# Patient Record
Sex: MALE | Race: WHITE | NOT HISPANIC OR LATINO | Employment: FULL TIME | URBAN - METROPOLITAN AREA
[De-identification: names, ages, dates, MRNs, and addresses within clinical notes are randomized per-mention and may not be internally consistent; named-entity substitution may affect disease eponyms.]

---

## 2020-06-15 ENCOUNTER — TRANSFERRED RECORDS (OUTPATIENT)
Dept: HEALTH INFORMATION MANAGEMENT | Facility: CLINIC | Age: 30
End: 2020-06-15

## 2021-01-08 NOTE — TELEPHONE ENCOUNTER
REFERRAL INFORMATION:    Referring Provider:  Self Referral     Referring Clinic:  N/A    Reason for Visit/Diagnosis: Crohn's Disease- 2nd opinion      FUTURE VISIT INFORMATION:    Appointment Date: 2/22/2021    Appointment Time: 1 PM      NOTES STATUS DETAILS   OFFICE NOTE from Referring Provider N/A    OFFICE NOTE from Other Specialist Care Everywhere 2/4/2021, 7/6/2020 Office visit with Dr. Chino Bishop ([facility])    HOSPITAL DISCHARGE SUMMARY/  ED VISITS N/A    OPERATIVE REPORT Care Everywhere Flexible Sigmoidoscopy: 9/24/2020 ([facility])    MEDICATION LIST Care Everywhere         ENDOSCOPY  N/A    COLONOSCOPY Care Everywhere 6/15/2020 ([facility])    ERCP N/A    EUS N/A    STOOL TESTING Care Everywhere 6/3/2020   PERTINENT LABS Care Everywhere    PATHOLOGY REPORTS (RELATED) Care Everywhere 9/24/2020, 6/15/2020   IMAGING (CT, MRI, EGD, MRCP, Small Bowel Follow Through/SBT, MR/CT Enterography) N/A

## 2021-01-09 ENCOUNTER — HEALTH MAINTENANCE LETTER (OUTPATIENT)
Age: 31
End: 2021-01-09

## 2021-02-22 ENCOUNTER — VIRTUAL VISIT (OUTPATIENT)
Dept: GASTROENTEROLOGY | Facility: CLINIC | Age: 31
End: 2021-02-22
Payer: COMMERCIAL

## 2021-02-22 ENCOUNTER — PRE VISIT (OUTPATIENT)
Dept: GASTROENTEROLOGY | Facility: CLINIC | Age: 31
End: 2021-02-22

## 2021-02-22 VITALS — BODY MASS INDEX: 28.14 KG/M2 | WEIGHT: 190 LBS | HEIGHT: 69 IN

## 2021-02-22 DIAGNOSIS — K52.9 IBD (INFLAMMATORY BOWEL DISEASE): ICD-10-CM

## 2021-02-22 DIAGNOSIS — K50.111 CROHN'S DISEASE OF LARGE INTESTINE WITH RECTAL BLEEDING (H): Primary | ICD-10-CM

## 2021-02-22 PROCEDURE — 99205 OFFICE O/P NEW HI 60 MIN: CPT | Mod: 95 | Performed by: INTERNAL MEDICINE

## 2021-02-22 RX ORDER — DICYCLOMINE HYDROCHLORIDE 10 MG/1
10 CAPSULE ORAL 2 TIMES DAILY
Qty: 90 CAPSULE | Refills: 1 | Status: SHIPPED | OUTPATIENT
Start: 2021-02-22 | End: 2021-08-05

## 2021-02-22 RX ORDER — BUDESONIDE 3 MG/1
9 CAPSULE, COATED PELLETS ORAL
COMMUNITY
Start: 2021-02-04 | End: 2021-04-20

## 2021-02-22 RX ORDER — LISINOPRIL 20 MG/1
20 TABLET ORAL EVERY MORNING
COMMUNITY
Start: 2021-01-18

## 2021-02-22 RX ORDER — MESALAMINE 1.2 G/1
4800 TABLET, DELAYED RELEASE ORAL EVERY MORNING
Qty: 180 TABLET | Refills: 3 | Status: SHIPPED | OUTPATIENT
Start: 2021-02-22 | End: 2021-08-17

## 2021-02-22 RX ORDER — VEDOLIZUMAB 300 MG/5ML
INJECTION, POWDER, LYOPHILIZED, FOR SOLUTION INTRAVENOUS
COMMUNITY
Start: 2020-07-14 | End: 2021-02-22

## 2021-02-22 ASSESSMENT — PAIN SCALES - GENERAL: PAINLEVEL: MILD PAIN (2)

## 2021-02-22 ASSESSMENT — MIFFLIN-ST. JEOR: SCORE: 1822.21

## 2021-02-22 NOTE — PATIENT INSTRUCTIONS
At this point, I am not 100% sure if you have crohn's or ulcerative colitis.  There are some features of ulcerative colitis on your colonoscopy and pathology such as involvement of the rectum, and potentially some features of Crohn's such as the patchy disease on the biopsies.  I would like to obtain your biopsies from [facility] and have been reviewed by our pathologist to help determine if you fit Crohn's or ulcerative colitis picture better.    Regardless of if we diagnose you with Crohn's disease or ulcerative colitis, the inflammation appears to be limited to the colon.  Because of this I think you would do best with a medication called mesalamine.  There are about 5 or 6 different brands of mesalamine, my preference would be Lialda 4 pills once a day.  These are much cheaper than the Biologics, and they are not suppressing your immune system.  Overall they are extremely safe.    --Hold off on starting the Humira as I am not sure that you need it.   --Start Lialda 4 pills once a day  --My office will obtain the pathology slides for review from [facility]  --Start Bentyl, and antispasmodic to help with urgency  --We will see how you respond to Lialda over the next 8 weeks.  --Once you start the Lialda, start to decrease the budesonide as follows: 2 pills for 2 weeks, then 1 pill for 2 weeks, then stop.     Follow-up in IBD clinic with Dr. Bowers in 8 weeks.     If you have any questions, please don't hesitate to contact the Gastroenterology nurse at [phone].     -Dr. Bowers

## 2021-02-22 NOTE — NURSING NOTE
"Chief Complaint   Patient presents with     Consult     IBD consult       Vitals:    02/22/21 1240   Weight: 86.2 kg (190 lb)   Height: 1.753 m (5' 9\")       Body mass index is 28.06 kg/m .    Neville Espinoza, EMT    "

## 2021-02-22 NOTE — LETTER
"    2/22/2021         RE: Cody Pickard  [address] [address] [place] [id number]        Dear Colleague,    Thank you for referring your patient, Cody Pickard, to the [facility] [place]. Please see a copy of my visit note below.    Cody Pickard is a 30 year old male who is being evaluated via a billable video visit.      The patient has been notified of following:     \"This video visit will be conducted via a call between you and your physician/provider. We have found that certain health care needs can be provided without the need for an in-person physical exam.  This service lets us provide the care you need with a video conversation.  If a prescription is necessary we can send it directly to your pharmacy.  If lab work is needed we can place an order for that and you can then stop by our lab to have the test done at a later time.    If during the course of the call the physician/provider feels a video visit is not appropriate, you will not be charged for this service.\"     Patient confirmed that they are in [place] for today's visit     If the video visit is dropped, please send link to:   Text to cell phone: [phone]    Video-Visit Details  Type of service:  Video Visit    Video Start Time: 1:03 PM  Video End Time:  1:32 PM    Originating Location (pt. Location): Home    Distant Location (provider location):  [facility] [place]     Platform used: Jennifer Bowers                IBD CLINIC VISIT    CC/REFERRING MD:  Referred Self  REASON FOR CONSULTATION: Colitis    ASSESSMENT/PLAN:    1. IBD: Extensive colitis  Current medication: Adalimumab (started Feb 2021)  Current clinical disease activity: Mild (on budesonide)  Current endoscopic disease activity: Mild    It is unclear to me at this point if he has Crohn's disease or ulcerative colitis.  He clearly has colonic inflammation in a fairly continuous pattern from the rectum to " the proximal transverse colon.  Ileum was normal on colonoscopy.  Favoring ulcerative colitis would be rectal involvement and continuous inflammation.  Favoring Crohn's disease would be the fact that he is still smoking with active disease and potentially the path report of patchy inflammation.  I plan to obtain pathology slides for Path consult at [facility].    Regardless of diagnosis of Crohn's disease or ulcerative colitis, he clearly has mild colonic disease of the left colon.  He has responded very well to budesonide although still has some residual urgency and only has decreased bowel movements due to decreased p.o. intake.  I recommend we try mesalamine therapy as this can be quite effective for colitis.  We will plan on starting Lialda 4.8 g once a day.  Plan to taper off budesonide over the first month of starting Lialda.    --Start mesalamine 4.8 g daily  --After starting mesalamine, taper budesonide as follows: 2 pills for 2 weeks, 1 pill for 2 weeks then stop  --Hold off on starting Humira at this time  --Discussed will need a flexible sigmoidoscopy in the future, however will not schedule at this time, rather we will reassess his clinical response in 8 weeks  --He is currently up-to-date on blood work, but will plan on repeating blood work after 2 to 3 months on mesalamine therapy.      2. IBD dysplasia surveillance: > 1/3 of colon involved. Will need dysplasia surveillance.   -- IBD dysplasia surveillance starting in 2028    Return to clinic in 2 months    Thank you for this consultation.  It was a pleasure to participate in the care of this patient; please contact us with any further questions.  I spent a total of 62 minutes, face to face, was spent with this patient, >50% of which was counseling regarding the above delineated issues.    This note was created with voice recognition software, and while reviewed for accuracy, typos may remain.     Alfie Bowers MD   of  "Medicine  Division of Gastroenterology, Hepatology and Nutrition  [facility]  Pager: [phone]       IBD HISTORY  Age at diagnosis: 30  Endoscopic extent of disease: Continuous: rectum to proximal transverse  Histologic Extent of disease: Rectum,   Disease phenotype:  Rose-anal disease:  Prior IBD surgeries:  Prior IBD Medications:  - Vedolizumab - not dose escalated     DRUG MONITORING  TPMT enzyme activity:     6-TGN/6-MMPN levels:    Biologic concentration:    HPI:   Diarrhea started in March 2020. Reported 10-12 stools a day with blood. Reports intermittent loose stools going back to ~2013. Stool studies were negative for infectious work-up including C difficile. H pylori stool antigen negative. TTG-IGA negative.      Colonoscopy demonstrated inflammation from the rectum through the transverse colon. It was favored Crohn's for \"patchy\" disease. He was started on prednisone    Prior endoscopy:   6/17/20: \"Congested, erythematous, friable, furrowed and inflamed mucosa in the rectum, sigmoid, descending and proximal transverse colon\".   Path:Continuous, patchy mild inflammation from rectum to transverse colon. Normal TI    At the time of diagnosis, he did not have anemia and albumin was normal. CBC was normal.     Hep B surface antigen Quant Gold negative (careeverwhere)    He was placed on budesonide with a plan to start infliximab or vedolizumab based on insurance approval. He ultimately started vedolizumab.     Of note, also reported chronic low back pain.     After starting the Entyvio, he did not feel like he got better.     Over winter 2021/2022 he had continued symptoms. A flex sig demonstrated mild colitis. He was changed from entyvio to adalimumab. He was also started on budesonide.     Currently, he is on budesonide 9mg once a day. He is having 2 stools a day. The first is a very urgent stool when he wakes up. Then he will have a 2nd stool during breakfast. He will still have some urgency " throughout the day. He manages his stools with not eating. Currently blood has resolved.     Prior to budesonide, he was having 6 stools a day. Basically every time he would eat. He was having bleeding too, although this may have been from a hemorrhoid.       His baseline stool frequency is: loose stools.     Bello score: (on budesonide 9mg)  Stool freq: 0 (baseline stools frequency)  Rectal bleedin (None)  PGA: 1 (Mild)  Endoscopy: Not done     Extra intestinal manifestations of IBD:  No uveitis/episcleritis  No aphthous ulcers   No arthritis   No erythema nodosum/pyoderma gangrenosum.     sIBDQ:  No flowsheet data found.     Last endoscopic activity: mild per report (2020)  Last histologic activity:  Mild, no CMV    Pertinent labs / imaging:     ROS:    Constitutional, HEENT, cardiovascular, pulmonary, GI, , musculoskeletal, neuro, skin, endocrine and psych systems are negative, except as otherwise noted.    PHYSICAL EXAMINATION:  Constitutional: aaox3, cooperative, pleasant, not dyspneic/diaphoretic, no acute distress  Vitals reviewed: There were no vitals taken for this visit.  Wt:   Wt Readings from Last 2 Encounters:   No data found for Wt      Constitutional - general appearance is well and in no acute distress. Body habitus normal  Eyes - No redness or discharge  Respiratory - No cough, unlabored breathing  Musculoskeletal - range of motion intact: Neck and arms  Skin - No discoloration or lesions  Neurological - No tremors, headaches  Psychiatric - No anxiety, alert & oriented      PERTINENT PAST MEDICAL HISTORY:  No past medical history on file.   Inflammatory bowel disease  Hypertension     PREVIOUS SURGERIES:  No past surgical history on file.    ALLERGIES:   Not on File    PERTINENT MEDICATIONS:  No current outpatient medications on file.    SOCIAL HISTORY:  Social History     Socioeconomic History     Marital status: Not on file     Spouse name: Not on file     Number of children: Not on file      Years of education: Not on file     Highest education level: Not on file   Occupational History     Not on file   Social Needs     Financial resource strain: Not on file     Food insecurity     Worry: Not on file     Inability: Not on file     Transportation needs     Medical: Not on file     Non-medical: Not on file   Tobacco Use     Smoking status: Not on file   Substance and Sexual Activity     Alcohol use: Not on file     Drug use: Not on file     Sexual activity: Not on file   Lifestyle     Physical activity     Days per week: Not on file     Minutes per session: Not on file     Stress: Not on file   Relationships     Social connections     Talks on phone: Not on file     Gets together: Not on file     Attends [employer] service: Not on file     Active member of club or organization: Not on file     Attends meetings of clubs or organizations: Not on file     Relationship status: Not on file     Intimate partner violence     Fear of current or ex partner: Not on file     Emotionally abused: Not on file     Physically abused: Not on file     Forced sexual activity: Not on file   Other Topics Concern     Not on file   Social History Narrative     Not on file       FAMILY HISTORY:  No family history on file.  No family history of IBD    Past/family/social history reviewed and no changes            Again, thank you for allowing me to participate in the care of your patient.        Sincerely,        Alfie Bowers MD

## 2021-02-22 NOTE — PROGRESS NOTES
IBD CLINIC VISIT    CC/REFERRING MD:  Referred Self  REASON FOR CONSULTATION: Colitis    ASSESSMENT/PLAN:    1. IBD: Extensive colitis  Current medication: Adalimumab (started Feb 2021)  Current clinical disease activity: Mild (on budesonide)  Current endoscopic disease activity: Mild    It is unclear to me at this point if he has Crohn's disease or ulcerative colitis.  He clearly has colonic inflammation in a fairly continuous pattern from the rectum to the proximal transverse colon.  Ileum was normal on colonoscopy.  Favoring ulcerative colitis would be rectal involvement and continuous inflammation.  Favoring Crohn's disease would be the fact that he is still smoking with active disease and potentially the path report of patchy inflammation.  I plan to obtain pathology slides for Path consult at [facility].    Regardless of diagnosis of Crohn's disease or ulcerative colitis, he clearly has mild colonic disease of the left colon.  He has responded very well to budesonide although still has some residual urgency and only has decreased bowel movements due to decreased p.o. intake.  I recommend we try mesalamine therapy as this can be quite effective for colitis.  We will plan on starting Lialda 4.8 g once a day.  Plan to taper off budesonide over the first month of starting Lialda.    --Start mesalamine 4.8 g daily  --After starting mesalamine, taper budesonide as follows: 2 pills for 2 weeks, 1 pill for 2 weeks then stop  --Hold off on starting Humira at this time  --Discussed will need a flexible sigmoidoscopy in the future, however will not schedule at this time, rather we will reassess his clinical response in 8 weeks  --He is currently up-to-date on blood work, but will plan on repeating blood work after 2 to 3 months on mesalamine therapy.      2. IBD dysplasia surveillance: > 1/3 of colon involved. Will need dysplasia surveillance.   -- IBD dysplasia surveillance starting in 2028    Return to clinic  "in 2 months    Thank you for this consultation.  It was a pleasure to participate in the care of this patient; please contact us with any further questions.  I spent a total of 62 minutes, face to face, was spent with this patient, >50% of which was counseling regarding the above delineated issues.    This note was created with voice recognition software, and while reviewed for accuracy, typos may remain.     Alfie Bowers MD   of Medicine  Division of Gastroenterology, Hepatology and Nutrition  [facility]  Pager: [phone]       IBD HISTORY  Age at diagnosis: 30  Endoscopic extent of disease: Continuous: rectum to proximal transverse  Histologic Extent of disease: Rectum,   Disease phenotype:  Rose-anal disease:  Prior IBD surgeries:  Prior IBD Medications:  - Vedolizumab - not dose escalated     DRUG MONITORING  TPMT enzyme activity:     6-TGN/6-MMPN levels:    Biologic concentration:    HPI:   Diarrhea started in March 2020. Reported 10-12 stools a day with blood. Reports intermittent loose stools going back to ~2013. Stool studies were negative for infectious work-up including C difficile. H pylori stool antigen negative. TTG-IGA negative.      Colonoscopy demonstrated inflammation from the rectum through the transverse colon. It was favored Crohn's for \"patchy\" disease. He was started on prednisone    Prior endoscopy:   6/17/20: \"Congested, erythematous, friable, furrowed and inflamed mucosa in the rectum, sigmoid, descending and proximal transverse colon\".   Path:Continuous, patchy mild inflammation from rectum to transverse colon. Normal TI    At the time of diagnosis, he did not have anemia and albumin was normal. CBC was normal.     Hep B surface antigen Quant Gold negative (careeverwhere)    He was placed on budesonide with a plan to start infliximab or vedolizumab based on insurance approval. He ultimately started vedolizumab.     Of note, also reported chronic low back pain. "     After starting the Entyvio, he did not feel like he got better.     Over winter 2021/2022 he had continued symptoms. A flex sig demonstrated mild colitis. He was changed from entyvio to adalimumab. He was also started on budesonide.     Currently, he is on budesonide 9mg once a day. He is having 2 stools a day. The first is a very urgent stool when he wakes up. Then he will have a 2nd stool during breakfast. He will still have some urgency throughout the day. He manages his stools with not eating. Currently blood has resolved.     Prior to budesonide, he was having 6 stools a day. Basically every time he would eat. He was having bleeding too, although this may have been from a hemorrhoid.       His baseline stool frequency is: loose stools.     Bello score: (on budesonide 9mg)  Stool freq: 0 (baseline stools frequency)  Rectal bleedin (None)  PGA: 1 (Mild)  Endoscopy: Not done     Extra intestinal manifestations of IBD:  No uveitis/episcleritis  No aphthous ulcers   No arthritis   No erythema nodosum/pyoderma gangrenosum.     sIBDQ:  No flowsheet data found.     Last endoscopic activity: mild per report (2020)  Last histologic activity:  Mild, no CMV    Pertinent labs / imaging:     ROS:    Constitutional, HEENT, cardiovascular, pulmonary, GI, , musculoskeletal, neuro, skin, endocrine and psych systems are negative, except as otherwise noted.    PHYSICAL EXAMINATION:  Constitutional: aaox3, cooperative, pleasant, not dyspneic/diaphoretic, no acute distress  Vitals reviewed: There were no vitals taken for this visit.  Wt:   Wt Readings from Last 2 Encounters:   No data found for Wt      Constitutional - general appearance is well and in no acute distress. Body habitus normal  Eyes - No redness or discharge  Respiratory - No cough, unlabored breathing  Musculoskeletal - range of motion intact: Neck and arms  Skin - No discoloration or lesions  Neurological - No tremors, headaches  Psychiatric - No  anxiety, alert & oriented      PERTINENT PAST MEDICAL HISTORY:  No past medical history on file.   Inflammatory bowel disease  Hypertension     PREVIOUS SURGERIES:  No past surgical history on file.    ALLERGIES:   Not on File    PERTINENT MEDICATIONS:  No current outpatient medications on file.    SOCIAL HISTORY:  Social History     Socioeconomic History     Marital status: Not on file     Spouse name: Not on file     Number of children: Not on file     Years of education: Not on file     Highest education level: Not on file   Occupational History     Not on file   Social Needs     Financial resource strain: Not on file     Food insecurity     Worry: Not on file     Inability: Not on file     Transportation needs     Medical: Not on file     Non-medical: Not on file   Tobacco Use     Smoking status: Not on file   Substance and Sexual Activity     Alcohol use: Not on file     Drug use: Not on file     Sexual activity: Not on file   Lifestyle     Physical activity     Days per week: Not on file     Minutes per session: Not on file     Stress: Not on file   Relationships     Social connections     Talks on phone: Not on file     Gets together: Not on file     Attends [employer] service: Not on file     Active member of club or organization: Not on file     Attends meetings of clubs or organizations: Not on file     Relationship status: Not on file     Intimate partner violence     Fear of current or ex partner: Not on file     Emotionally abused: Not on file     Physically abused: Not on file     Forced sexual activity: Not on file   Other Topics Concern     Not on file   Social History Narrative     Not on file       FAMILY HISTORY:  No family history on file.  No family history of IBD    Past/family/social history reviewed and no changes

## 2021-02-22 NOTE — PROGRESS NOTES
"Cody Pickard is a 30 year old male who is being evaluated via a billable video visit.      The patient has been notified of following:     \"This video visit will be conducted via a call between you and your physician/provider. We have found that certain health care needs can be provided without the need for an in-person physical exam.  This service lets us provide the care you need with a video conversation.  If a prescription is necessary we can send it directly to your pharmacy.  If lab work is needed we can place an order for that and you can then stop by our lab to have the test done at a later time.    If during the course of the call the physician/provider feels a video visit is not appropriate, you will not be charged for this service.\"     Patient confirmed that they are in [place] for today's visit     If the video visit is dropped, please send link to:   Text to cell phone: [phone]    Video-Visit Details  Type of service:  Video Visit    Video Start Time: 1:03 PM  Video End Time:  1:32 PM    Originating Location (pt. Location): Home    Distant Location (provider location):  [facility] [place]     Platform used: Jennifer Bowers              "

## 2021-02-22 NOTE — LETTER
Date:April 18, 2021      Patient was self referred, no letter generated. Do not send.        [facility] Health Information

## 2021-02-23 ENCOUNTER — PATIENT OUTREACH (OUTPATIENT)
Dept: GASTROENTEROLOGY | Facility: CLINIC | Age: 31
End: 2021-02-23

## 2021-02-23 NOTE — PROGRESS NOTES
his pathology slides for Path consult: Question ulcerative colitis versus Crohn's disease       Left a message for patient to call back to discuss path slides,follow up etc.  In basket to obtain slides from June 2020

## 2021-02-23 NOTE — PROGRESS NOTES
Patient returned called back. Discussed obtaining slides from [facility]  Scheduled follow up appointment  Patient states out of pocket for Lialda is 114  dollars  Will check with [employer]  Out of pocket cost has to meet out of pocket cost /deductible.   Patient  stares he is okay with the out of pocket costs.

## 2021-02-26 ENCOUNTER — DOCUMENTATION ONLY (OUTPATIENT)
Dept: GASTROENTEROLOGY | Facility: CLINIC | Age: 31
End: 2021-02-26

## 2021-02-26 NOTE — PROGRESS NOTES
Action 2021 7:39am -Bhao   Action Taken Fax request sent to [facility] ([phone]) for Path Slides: Colonoscopy 6/15/2020.     [facility]  [facility]  [address]   [facility] [address]  [phone]    Case: [id number]  Ordering Location: [facility]  Authoring Provider: Dr. Nash Morales    ** Requested by Carmelita Vega RN

## 2021-03-12 ENCOUNTER — PATIENT OUTREACH (OUTPATIENT)
Dept: GASTROENTEROLOGY | Facility: CLINIC | Age: 31
End: 2021-03-12

## 2021-03-12 DIAGNOSIS — K52.9 IBD (INFLAMMATORY BOWEL DISEASE): Primary | ICD-10-CM

## 2021-03-12 NOTE — PROGRESS NOTES
Order printed along with 2 packs of slides Case [id number]  Delivered to [facility] path   Reason for consult crohns versus ulcerative colitis

## 2021-03-15 PROCEDURE — 88321 CONSLTJ&REPRT SLD PREP ELSWR: CPT | Performed by: PATHOLOGY

## 2021-03-15 PROCEDURE — 999N001032 HC STATISTIC REVIEW OUTSIDE SLIDES TC 88321: Performed by: INTERNAL MEDICINE

## 2021-03-16 LAB — COPATH REPORT: NORMAL

## 2021-04-20 ENCOUNTER — VIRTUAL VISIT (OUTPATIENT)
Dept: GASTROENTEROLOGY | Facility: CLINIC | Age: 31
End: 2021-04-20
Payer: COMMERCIAL

## 2021-04-20 VITALS — BODY MASS INDEX: 27.7 KG/M2 | HEIGHT: 69 IN | WEIGHT: 187 LBS

## 2021-04-20 DIAGNOSIS — M84.469D: ICD-10-CM

## 2021-04-20 DIAGNOSIS — K52.9 IBD (INFLAMMATORY BOWEL DISEASE): Primary | ICD-10-CM

## 2021-04-20 PROCEDURE — 99214 OFFICE O/P EST MOD 30 MIN: CPT | Mod: GC | Performed by: INTERNAL MEDICINE

## 2021-04-20 ASSESSMENT — MIFFLIN-ST. JEOR
SCORE: 1803.61
SCORE: 1350.01

## 2021-04-20 NOTE — PATIENT INSTRUCTIONS
- The pathology/biopsy tissue shows your disease is likely ulcerative colitis rather than Crohn's disease   - start taking calcium (1000mg a day( and vitamin D (1000 units day) supplements over the counter  - we will schedule flex sigmoidoscopy within the next few weeks to see if there is ongoing inflammation   - Complete a DEXA (bone) scan sometime within the next 6 months sine you had the fracture

## 2021-04-20 NOTE — PROGRESS NOTES
IBD CLINIC VISIT    CC/REFERRING MD:  Referred Self  REASON FOR CONSULTATION: Colitis    ASSESSMENT/PLAN:    1. IBD: Extensive colitis, favoring Ulcerative colitis   Current medication: Mesalamine  Current clinical disease activity: Mild   Current endoscopic disease activity: unknown since changing therapy.    Reviewed pathology consult for path slides obtained from [facility]. Although severity of inflammation is patchy, the continuous nature of inflammation is more suggestive of ulcerative colitis rather than initial suspected diagnosis of CD. He has had significant clinical improvement with mesalamine but continues to have mild symptoms and BRBPR. He does describe baseline GI symptoms/bowel patters that are suggestive of underlying IBS.  At this point, a flexible sigmoidoscopy would be helpful to distinguish if lingering symptoms are from ongoing colitis vs irritable bowel +/- hemorrhoid bleeding.   -- continue mesalamine 4.8g daily   -- plan for flexible sigmoidoscopy in the next few weeks with conscious sedation     2. IBD dysplasia surveillance: > 1/3 of colon involved. Will need dysplasia surveillance.   -- IBD dysplasia surveillance starting in 2028    3. Fibula fracture, non-traumatic  Slipped and fell on ice resulting in nondisplaced fibula fracture. Has not has significant or lengthy exposure to steroids but chronic inflammation from IBD may also increase risk of osteopenia or osteoporosis   -- DEXA scan to be completed within next 6 months   -- recommended to start calcium and vitamin D supplements     Patient seen and discussed with attending, Dr. Bowers.     Arik Lowe MD  [id number]       IBD HISTORY  Age at diagnosis: 30  Endoscopic extent of disease: Continuous: rectum to proximal transverse  Histologic Extent of disease: Rectum,   Disease phenotype:  Rose-anal disease:  Prior IBD surgeries: none  Prior IBD Medications:  - Vedolizumab - not dose escalated     Brief review of IBD diagnosis:  Presented in  "2020 with 10-12 bloody loose stools per day Stool studies were negative for infectious work-up including C difficile. H pylori stool antigen negative. TTG-IGA negative.  Colonoscopy demonstrated inflammation from the rectum through the transverse colon. It was favored Crohn's for \"patchy\" disease. At the time of diagnosis, he did not have anemia and albumin was normal. CBC was normal.  Started on budesonide and then ultimately vedolizumab however no improvement after starting vedolizumab and presented to [facility] for second opinion 2021. Started on mesalamine due to colonic inflammation and obtained pathology slides for path review here.     DRUG MONITORING  TPMT enzyme activity: --  6-TGN/6-MMPN levels:--  Biologic concentration: --    HPI:   Patient presents for follow-up after visit in February.  Since that time he has started mesalamine and Bentyl and weaned off the budesonide.  He reports that symptoms are better controlled with the new medications.  He is currently having 3-5 loose stools per day whereas previously he was having 8-12 stools per day.  He does note blood at the end of the bowel movement that drips into the water or is present on toilet paper when wiping, no blood within the stool.  Whereas in February he was limiting his oral intake due to frequent stools, today he reports he is able to eat more without having the loose stools or urgency.  Finds that dairy does still upset the stomach.  No nighttime bowel movements.  Weight is stable.  He did slip on the ice and broke his fibula since last visit, currently in a walking boot but thinks he will be getting out of it next week.    On review of systems he notes chronic low back pain that is worse at the end of the day after work.  No change or progression in this pain recently.    Bello score: 21  Stool freq: 1 (1-2 stools more than baseline stools frequency)  Rectal bleedin (blood with stool more than half the " "time)  PGA: 1 (Mild)  Endoscopy: unable to include due to change in therapy since last colonoscopy    Extra intestinal manifestations of IBD:  No uveitis/episcleritis  No aphthous ulcers   No arthritis   No erythema nodosum/pyoderma gangrenosum.     sIBDQ:  No flowsheet data found.     Last endoscopic activity: mild per report (9/24/2020)  Prior endoscopy:   6/17/20: \"Congested, erythematous, friable, furrowed and inflamed mucosa in the rectum, sigmoid, descending and proximal transverse colon\".   Path:Continuous, patchy mild inflammation from rectum to transverse colon. Normal TI      Last histologic activity:  Mild, no CMV    Pertinent labs / imaging: no new data since last visit     ROS:    Constitutional, HEENT, cardiovascular, pulmonary, GI, , musculoskeletal, neuro, skin, endocrine and psych systems are negative, except as otherwise noted.    PHYSICAL EXAMINATION:  Constitutional - general appearance is well and in no acute distress. Body habitus normal  Eyes - No redness or discharge  Respiratory - No cough, unlabored breathing  Musculoskeletal - range of motion intact: Neck and arms  Skin - No discoloration or lesions  Neurological - No tremors, headaches  Psychiatric - No anxiety, alert & oriented      PERTINENT PAST MEDICAL HISTORY:  Inflammatory bowel disease  Hypertension   Chronic low back pain    PREVIOUS SURGERIES:  No past surgical history on file.    ALLERGIES:   No Known Allergies    PERTINENT MEDICATIONS:    Current Outpatient Medications:      dicyclomine (BENTYL) 10 MG capsule, Take 1 capsule (10 mg) by mouth 2 times daily, Disp: 90 capsule, Rfl: 1     lisinopril (ZESTRIL) 20 MG tablet, Take 20 mg by mouth, Disp: , Rfl:      mesalamine (LIALDA) 1.2 g EC tablet, Take 4 tablets (4,800 mg) by mouth every morning, Disp: 180 tablet, Rfl: 3     budesonide (ENTOCORT EC) 3 MG EC capsule, Take 9 mg by mouth, Disp: , Rfl:     SOCIAL HISTORY:  Social History     Socioeconomic History     Marital status: " Not on file     Spouse name: Not on file     Number of children: Not on file     Years of education: Not on file     Highest education level: Not on file   Occupational History     Not on file   Social Needs     Financial resource strain: Not on file     Food insecurity     Worry: Not on file     Inability: Not on file     Transportation needs     Medical: Not on file     Non-medical: Not on file   Tobacco Use     Smoking status: Former Smoker     Packs/day: 0.50     Years: 2.50     Pack years: 1.25     Types: Cigarettes     Start date:      Quit date: 2020     Years since quittin.7     Smokeless tobacco: Never Used   Substance and Sexual Activity     Alcohol use: Not on file     Drug use: Not on file     Sexual activity: Not on file   Lifestyle     Physical activity     Days per week: Not on file     Minutes per session: Not on file     Stress: Not on file   Relationships     Social connections     Talks on phone: Not on file     Gets together: Not on file     Attends Bahai service: Not on file     Active member of club or organization: Not on file     Attends meetings of clubs or organizations: Not on file     Relationship status: Not on file     Intimate partner violence     Fear of current or ex partner: Not on file     Emotionally abused: Not on file     Physically abused: Not on file     Forced sexual activity: Not on file   Other Topics Concern     Not on file   Social History Narrative     Not on file       FAMILY HISTORY:  Family History   Problem Relation Age of Onset     Multiple Sclerosis Mother      Ulcerative Colitis Maternal Grandmother      Colon Cancer No family hx of      Inflammatory Bowel Disease No family hx of      Irritable Bowel Syndrome No family hx of      Crohn's Disease No family hx of      No family history of IBD    Past/family/social history reviewed and no changes

## 2021-04-20 NOTE — PROGRESS NOTES
"Cody Pickard is a 31 year old male who is being evaluated via a billable video visit.      The patient has been notified of following:     \"This video visit will be conducted via a call between you and your physician/provider. We have found that certain health care needs can be provided without the need for an in-person physical exam.  This service lets us provide the care you need with a video conversation.  If a prescription is necessary we can send it directly to your pharmacy.  If lab work is needed we can place an order for that and you can then stop by our lab to have the test done at a later time.    If during the course of the call the physician/provider feels a video visit is not appropriate, you will not be charged for this service.\"     Patient confirmed that they are in [place] for today's visit yes.    Video-Visit Details  Type of service:  Video Visit    Video Start Time: 9:25 AM  Video End Time:  9:41 AM    Originating Location (pt. Location): [place]    Distant Location (provider location):  [facility] [place]     Platform used: [employer]    Virtual visit for patient conducted via three way video conference with myself, patient and Dr. Lowe. I reviewed the history and abbreviated physical exam and discussed the management with Dr. Lowe on 4/20/2021. I reviewed the note and there are no changes to the past medical, family or social history.  A complete 10 point review of systems was obtained. Please see the HPI for pertinent positives and negatives. All other systems were reviewed and were found to be negative. I agree with the documented findings and plan of care as outlined.    Dramatic improvement on mesalamine therapy. Still with some ongoing symptoms that may be residual colitis or may be irritable bowel. Will pursue flexible sigmoidoscopy to clarify.     -Dr. Bowers            "

## 2021-04-20 NOTE — LETTER
"    4/20/2021         RE: Cody Pickard  [id number] [address] [place] [id number]        Dear Colleague,    Thank you for referring your patient, Cody Pickard, to the [facility] [place]. Please see a copy of my visit note below.    Cody Pickard is a 31 year old male who is being evaluated via a billable video visit.      The patient has been notified of following:     \"This video visit will be conducted via a call between you and your physician/provider. We have found that certain health care needs can be provided without the need for an in-person physical exam.  This service lets us provide the care you need with a video conversation.  If a prescription is necessary we can send it directly to your pharmacy.  If lab work is needed we can place an order for that and you can then stop by our lab to have the test done at a later time.    If during the course of the call the physician/provider feels a video visit is not appropriate, you will not be charged for this service.\"     Patient confirmed that they are in [place] for today's visit yes.    Video-Visit Details  Type of service:  Video Visit    Video Start Time: 9:25 AM  Video End Time:  9:41 AM    Originating Location (pt. Location): Home    Distant Location (provider location):  [facility] [place]     Platform used: [employer]    Virtual visit for patient conducted via three way video conference with myself, patient and Dr. Lowe. I reviewed the history and abbreviated physical exam and discussed the management with Dr. Lowe on 4/20/2021. I reviewed the note and there are no changes to the past medical, family or social history.  A complete 10 point review of systems was obtained. Please see the HPI for pertinent positives and negatives. All other systems were reviewed and were found to be negative. I agree with the documented findings and plan of care as outlined.    Dramatic improvement " on mesalamine therapy. Still with some ongoing symptoms that may be residual colitis or may be irritable bowel. Will pursue flexible sigmoidoscopy to clarify.     -Dr. Bowers              IBD CLINIC VISIT    CC/REFERRING MD:  Referred Self  REASON FOR CONSULTATION: Colitis    ASSESSMENT/PLAN:    1. IBD: Extensive colitis, favoring Ulcerative colitis   Current medication: Mesalamine  Current clinical disease activity: Mild   Current endoscopic disease activity: unknown since changing therapy.    Reviewed pathology consult for path slides obtained from [facility]. Although severity of inflammation is patchy, the continuous nature of inflammation is more suggestive of ulcerative colitis rather than initial suspected diagnosis of CD. He has had significant clinical improvement with mesalamine but continues to have mild symptoms and BRBPR. He does describe baseline GI symptoms/bowel patters that are suggestive of underlying IBS.  At this point, a flexible sigmoidoscopy would be helpful to distinguish if lingering symptoms are from ongoing colitis vs irritable bowel +/- hemorrhoid bleeding.   -- continue mesalamine 4.8g daily   -- plan for flexible sigmoidoscopy in the next few weeks with conscious sedation     2. IBD dysplasia surveillance: > 1/3 of colon involved. Will need dysplasia surveillance.   -- IBD dysplasia surveillance starting in 2028    3. Fibula fracture, non-traumatic  Slipped and fell on ice resulting in nondisplaced fibula fracture. Has not has significant or lengthy exposure to steroids but chronic inflammation from IBD may also increase risk of osteopenia or osteoporosis   -- DEXA scan to be completed within next 6 months   -- recommended to start calcium and vitamin D supplements     Patient seen and discussed with attending, Dr. Bowers.     Arik Lowe MD  IM-[id number]       IBD HISTORY  Age at diagnosis: 30  Endoscopic extent of disease: Continuous: rectum to proximal transverse  Histologic Extent of  "disease: Rectum,   Disease phenotype:  Rose-anal disease:  Prior IBD surgeries: none  Prior IBD Medications:  - Vedolizumab - not dose escalated     Brief review of IBD diagnosis:  Presented in March 2020 with 10-12 bloody loose stools per day Stool studies were negative for infectious work-up including C difficile. H pylori stool antigen negative. TTG-IGA negative.  Colonoscopy demonstrated inflammation from the rectum through the transverse colon. It was favored Crohn's for \"patchy\" disease. At the time of diagnosis, he did not have anemia and albumin was normal. CBC was normal.  Started on budesonide and then ultimately vedolizumab however no improvement after starting vedolizumab and presented to [facility] for second opinion 2/2021. Started on mesalamine due to colonic inflammation and obtained pathology slides for path review here.     DRUG MONITORING  TPMT enzyme activity: --  6-TGN/6-MMPN levels:--  Biologic concentration: --    HPI:   Patient presents for follow-up after visit in February.  Since that time he has started mesalamine and Bentyl and weaned off the budesonide.  He reports that symptoms are better controlled with the new medications.  He is currently having 3-5 loose stools per day whereas previously he was having 8-12 stools per day.  He does note blood at the end of the bowel movement that drips into the water or is present on toilet paper when wiping, no blood within the stool.  Whereas in February he was limiting his oral intake due to frequent stools, today he reports he is able to eat more without having the loose stools or urgency.  Finds that dairy does still upset the stomach.  No nighttime bowel movements.  Weight is stable.  He did slip on the ice and broke his fibula since last visit, currently in a walking boot but thinks he will be getting out of it next week.    On review of systems he notes chronic low back pain that is worse at the end of the day after work.  No " "change or progression in this pain recently.    Bello score: 21  Stool freq: 1 (1-2 stools more than baseline stools frequency)  Rectal bleedin (blood with stool more than half the time)  PGA: 1 (Mild)  Endoscopy: unable to include due to change in therapy since last colonoscopy    Extra intestinal manifestations of IBD:  No uveitis/episcleritis  No aphthous ulcers   No arthritis   No erythema nodosum/pyoderma gangrenosum.     sIBDQ:  No flowsheet data found.     Last endoscopic activity: mild per report (2020)  Prior endoscopy:   20: \"Congested, erythematous, friable, furrowed and inflamed mucosa in the rectum, sigmoid, descending and proximal transverse colon\".   Path:Continuous, patchy mild inflammation from rectum to transverse colon. Normal TI      Last histologic activity:  Mild, no CMV    Pertinent labs / imaging: no new data since last visit     ROS:    Constitutional, HEENT, cardiovascular, pulmonary, GI, , musculoskeletal, neuro, skin, endocrine and psych systems are negative, except as otherwise noted.    PHYSICAL EXAMINATION:  Constitutional - general appearance is well and in no acute distress. Body habitus normal  Eyes - No redness or discharge  Respiratory - No cough, unlabored breathing  Musculoskeletal - range of motion intact: Neck and arms  Skin - No discoloration or lesions  Neurological - No tremors, headaches  Psychiatric - No anxiety, alert & oriented      PERTINENT PAST MEDICAL HISTORY:  Inflammatory bowel disease  Hypertension   Chronic low back pain    PREVIOUS SURGERIES:  No past surgical history on file.    ALLERGIES:   No Known Allergies    PERTINENT MEDICATIONS:    Current Outpatient Medications:      dicyclomine (BENTYL) 10 MG capsule, Take 1 capsule (10 mg) by mouth 2 times daily, Disp: 90 capsule, Rfl: 1     lisinopril (ZESTRIL) 20 MG tablet, Take 20 mg by mouth, Disp: , Rfl:      mesalamine (LIALDA) 1.2 g EC tablet, Take 4 tablets (4,800 mg) by mouth every " morning, Disp: 180 tablet, Rfl: 3     budesonide (ENTOCORT EC) 3 MG EC capsule, Take 9 mg by mouth, Disp: , Rfl:     SOCIAL HISTORY:  Social History     Socioeconomic History     Marital status: Not on file     Spouse name: Not on file     Number of children: Not on file     Years of education: Not on file     Highest education level: Not on file   Occupational History     Not on file   Social Needs     Financial resource strain: Not on file     Food insecurity     Worry: Not on file     Inability: Not on file     Transportation needs     Medical: Not on file     Non-medical: Not on file   Tobacco Use     Smoking status: Former Smoker     Packs/day: 0.50     Years: 2.50     Pack years: 1.25     Types: Cigarettes     Start date:      Quit date: 2020     Years since quittin.7     Smokeless tobacco: Never Used   Substance and Sexual Activity     Alcohol use: Not on file     Drug use: Not on file     Sexual activity: Not on file   Lifestyle     Physical activity     Days per week: Not on file     Minutes per session: Not on file     Stress: Not on file   Relationships     Social connections     Talks on phone: Not on file     Gets together: Not on file     Attends [employer] service: Not on file     Active member of club or organization: Not on file     Attends meetings of clubs or organizations: Not on file     Relationship status: Not on file     Intimate partner violence     Fear of current or ex partner: Not on file     Emotionally abused: Not on file     Physically abused: Not on file     Forced sexual activity: Not on file   Other Topics Concern     Not on file   Social History Narrative     Not on file       FAMILY HISTORY:  Family History   Problem Relation Age of Onset     Multiple Sclerosis Mother      Ulcerative Colitis Maternal Grandmother      Colon Cancer No family hx of      Inflammatory Bowel Disease No family hx of      Irritable Bowel Syndrome No family hx of      Crohn's Disease No family hx  of      No family history of IBD    Past/family/social history reviewed and no changes            Again, thank you for allowing me to participate in the care of your patient.        Sincerely,        Alfie Bowers MD

## 2021-04-20 NOTE — LETTER
Date:June 29, 2021      Patient was self referred, no letter generated. Do not send.        [facility] Information

## 2021-04-20 NOTE — NURSING NOTE
"Chief Complaint   Patient presents with     Follow Up       Vitals:    04/20/21 0854   Weight: 39.5 kg (87 lb)   Height: 1.753 m (5' 9\")       Body mass index is 12.85 kg/m .    Donna Braxton CMA    "

## 2021-05-18 ENCOUNTER — TELEPHONE (OUTPATIENT)
Dept: GASTROENTEROLOGY | Facility: CLINIC | Age: 31
End: 2021-05-18

## 2021-05-18 NOTE — TELEPHONE ENCOUNTER
Screening Questions  1. What insurance is in the chart? BCBS    2.  Ordering/Referring Provider: Mynor    3. BMI 28.1    4. Are you on daily home oxygen? no    5. Do you have a history of difficult airway? no    6. Have you had a heart, lung, or liver transplant? no    7. Are you currently on dialysis? no    8. Have you had a stroke or Transient ischemic atttack (TIA) within 6 months? no    9. In the past 6 months, have you had any heart related issues including cardiomyopathy or heart attack?         If yes, did it require cardiac stenting or other implantable device? no    10. Do you have any implantable devices in your body (pacemaker, defib, LVAD)? no    11. Do you take nitroglycerin? If yes, how often? no    12. Are you currently taking any blood thinners yes    13. Are you a diabetic? no    14. (Females) Are you currently pregnant?   If yes, how many weeks?    15. Have you had a procedure in the past that was difficult to tolerate with conscious sedation? Any allergies to Fentanyl or Versed  no    16. Are you taking any scheduled prescription narcotics more than once daily?  no    17. Do you have any chemical dependencies such as alcohol, street drugs, or methadone? no    18. Do you have any history of post-traumatic stress syndrome or mental health issues? no    19. Do you transfer independently? yes    20.  Do you have any issues with constipation? no    21. Preferred Pharmacy for Pre Prescription     Scheduling Details    Procedure Scheduled: Colon  Provider/Surgeon: Robinson  Date of Procedure: 6/28/2021  Location: [facility]  Caller (Please ask for phone number if not scheduled by patient): Cody Pickard      Sedation Type: MAC  Conscious Sedation- Needs  for 6 hours after the procedure  MAC/General-Needs  for 24 hours after procedure    Pre-op Required at [facility] and OR for  MAC sedation:   (if yes advise patient they will need a pre-op prior to procedure)      Is patient on blood thinners? -no (If  yes- inform patient to follow up with PCP or provider for follow up instructions)     Informed patient they will need an adult  yes  Cannot take any type of public or medical transportation alone    Informed Patient of COVID Test Requirement yes    Confirmed Nurse will call to complete assessment yes     Additional comments: no

## 2021-05-31 DIAGNOSIS — Z11.59 ENCOUNTER FOR SCREENING FOR OTHER VIRAL DISEASES: ICD-10-CM

## 2021-06-15 ENCOUNTER — TELEPHONE (OUTPATIENT)
Dept: GASTROENTEROLOGY | Facility: OUTPATIENT CENTER | Age: 31
End: 2021-06-15

## 2021-06-15 NOTE — TELEPHONE ENCOUNTER
Is patient taking blood thinners/antiplatelet medications? no    Heart Disease? denies    Lung Disease? denies    Sleep Apnea? denies    Diabetic? denies    Kidney Disease? denies    Electronic Implantable Devices? denies    PTSD? N/a    Prep instructions reviewed with patient? Instructions,  policy, MAC sedation plan reviewed. Instructed patient to have someone stay with him for 24 hours post exam    : yes    Pharmacy: N/a    Indication for Procedure: eval for ongoing inflammation for UC    Referring Provider: Self    Arrival Time: 9:15 AM    COVID test? 6-24 [facility]    Yana Avalos RN

## 2021-06-24 DIAGNOSIS — Z11.59 ENCOUNTER FOR SCREENING FOR OTHER VIRAL DISEASES: ICD-10-CM

## 2021-06-24 LAB
SARS-COV-2 RNA RESP QL NAA+PROBE: NORMAL
SPECIMEN SOURCE: NORMAL

## 2021-06-24 PROCEDURE — U0003 INFECTIOUS AGENT DETECTION BY NUCLEIC ACID (DNA OR RNA); SEVERE ACUTE RESPIRATORY SYNDROME CORONAVIRUS 2 (SARS-COV-2) (CORONAVIRUS DISEASE [COVID-19]), AMPLIFIED PROBE TECHNIQUE, MAKING USE OF HIGH THROUGHPUT TECHNOLOGIES AS DESCRIBED BY CMS-2020-01-R: HCPCS | Performed by: INTERNAL MEDICINE

## 2021-06-24 PROCEDURE — U0005 INFEC AGEN DETEC AMPLI PROBE: HCPCS | Performed by: INTERNAL MEDICINE

## 2021-06-25 LAB
LABORATORY COMMENT REPORT: NORMAL
SARS-COV-2 RNA RESP QL NAA+PROBE: NEGATIVE
SPECIMEN SOURCE: NORMAL

## 2021-06-28 ENCOUNTER — TRANSFERRED RECORDS (OUTPATIENT)
Dept: HEALTH INFORMATION MANAGEMENT | Facility: CLINIC | Age: 31
End: 2021-06-28

## 2021-06-28 ENCOUNTER — DOCUMENTATION ONLY (OUTPATIENT)
Dept: GASTROENTEROLOGY | Facility: OUTPATIENT CENTER | Age: 31
End: 2021-06-28
Payer: COMMERCIAL

## 2021-06-28 DIAGNOSIS — K52.9 IBD (INFLAMMATORY BOWEL DISEASE): ICD-10-CM

## 2021-06-30 LAB — COPATH REPORT: NORMAL

## 2021-07-01 ENCOUNTER — PATIENT OUTREACH (OUTPATIENT)
Dept: GASTROENTEROLOGY | Facility: CLINIC | Age: 31
End: 2021-07-01

## 2021-07-05 ENCOUNTER — VIRTUAL VISIT (OUTPATIENT)
Dept: PHARMACY | Facility: CLINIC | Age: 31
End: 2021-07-05
Payer: COMMERCIAL

## 2021-07-05 DIAGNOSIS — K52.9 IBD (INFLAMMATORY BOWEL DISEASE): Primary | ICD-10-CM

## 2021-07-05 PROCEDURE — 99605 MTMS BY PHARM NP 15 MIN: CPT | Performed by: PHARMACIST

## 2021-07-05 NOTE — PATIENT INSTRUCTIONS
Recommendations from today's MTM visit:                                                      1. Start rectal tacrolimus therapy as indicated by Dr. Bowers. We typically use this twice daily for eight weeks. Please reach out if you are experiencing any side effects or have difficulty getting the product.          -- [facility]   [address] [place] [address]     PHONE: [phone] or    [phone]      Follow-up: follow-up with Abdiaziz Hull PA-C next week as planned   - Consider an IBD health maintenance visit going forward, especially if you start a biologic medication.    It was great to speak with you today.  I value your experience and would be very thankful for your time with providing feedback on our clinic survey. You may receive a survey via email or text message in the next few days.     To schedule another MTM appointment, please call the clinic directly or you may call the MTM scheduling line at [phone] or toll-free at [phone].     My Clinical Pharmacist's contact information:                                                      Please feel free to contact me with any questions or concerns you have.      Lo Peters PharmD, BCACP  MTM Pharmacist    [facility]  Phone: [phone]

## 2021-07-05 NOTE — PROGRESS NOTES
Medication Therapy Management (MTM) Encounter    ASSESSMENT:                            Medication Adherence/Access: No issues identified    Inflammatory Bowel Disease: Cody may benefit from starting rectal tacrolimus therapy as indicated by Dr. Bowers. Follow-up plan in place to see Abdiaziz next week. No major drug-drug interactions with rectal tacrolimus therapy, however, if he does absorb the tacrolimus to a meaningful degree he may benefit from a potassium check given he is on a moderate dose of lisinopril. Will discuss with his care team.    Hypertension: Stable based on report.    PLAN:                            1. Cody to start rectal tacrolimus therapy as indicated by Dr. Bowers   -- order sent to  compounding pharmacy per discussion    Follow-up: Follow-up with Abdiaziz Hull PA-C 7/14 as planned     Future Consideration   -- DEXA/bone supplements per Dr. Bowers visit on 4/20    SUBJECTIVE/OBJECTIVE:                          Cody Pickard is a 31 year old male called for an initial visit. He was referred to me from Dr. Bowers.      Reason for visit: discuss rectal tacrolimus per Dr. Bowers    Allergies/ADRs: Reviewed in chart  Tobacco: He reports that he quit smoking about 11 months ago. His smoking use included cigarettes. He started smoking about 3 years ago. He has a 1.25 pack-year smoking history. He has never used smokeless tobacco. smokes 1/2 pack per day - yes slowing down  Alcohol: 12 drinks / week    Medication Adherence/Access: no issues reported    Inflammatory Bowel Disease:   Dicyclomine 10 mg twice daily   Mesalamine 4.8 g daily     Pain and bleeding with bowel movements (5-6 times daily). He acknowledges urgency, and states that when he needs to go he pretty much needs to go. Per chart notes, he was initially planning to start Humira but this was held-off. Per recent scope notes, discuss rectal tac versus escalating to biologics.    We reviewed rectal tacrolimus today including mechanism of  action, administration and dosing, product availability (compounded), results from studies and potential adverse effects which were limited in studies but in the setting of inflammation there can be meaningful serum levels of tacrolimus. Encouraged him to contact us if he is experiencing adverse effects.    Health maintenance not discussed with the patient today, but done per chart review in anticipation of possible biologic escalation:    IBD Health Care Maintenance:    Vaccinations:  All patients on biologics should avoid live vaccines.    -- Influenza (every year) recommend yearly  -- TdaP (every 10 years) 8/10/2017  -- Pneumococcal Pneumonia    -Prevnar-13 not on file   -Pneumovax-23 not on file  -- Yearly assessment for latent Tb (verbal screening and exam, PPD or QuantiFERON-Tb testing)   - indeterminate 7/6/2020    One time confirmation of immunity or serologies:  -- Hepatitis A (serologies or immunizations) not on file  -- Hepatitis B (serologies or immunizations) not on file    Due to the planned immunosuppression in this patient, I would not advise administration of live vaccines such as varicella/VZV, intranasal influenza, MMR, or yellow fever vaccine (if traveling).        Cancer Screening:  IBD dysplasia surveillance: > 1/3 of colon involved. Will need dysplasia surveillance.   -- IBD dysplasia surveillance starting in 2028    Skin cancer screening: Annual visual exam of skin by dermatologist if he were to progress to biologic therapy    Misc:  -- Avoid tobacco use  -- Avoid NSAIDs as there is potentially a 25% chance of causing an IBD flare     Hypertension:   Lisinopril 20 mg daily    No reported questions or concerns. Last office /72 mmHg per [facility].    ----------------    I spent 10 minutes with this patient today. All changes were made via verbal approval with Dr. Bowers. A copy of the visit note was provided to the patient's referring provider.    The patient was sent via [employer]  summary of these recommendations.     Lo Peters PharmD, BCACP  MTM Pharmacist   [facility]  Phone: [phone]    Telemedicine Visit Details  Type of service:  Telephone visit  Start Time: 10:06 AM  End Time: 10:16 AM  Originating Location (patient location): Home  Distant Location (provider location):  [facility] MTM    The appointment was originally scheduled for video, however, after five minutes I called Cody to check-in as he had not arrived. He was agreeable to a telephone visit.        Medication Therapy Recommendations  IBD (inflammatory bowel disease)    Current Medication: mesalamine (LIALDA) 1.2 g EC tablet   Rationale: Synergistic therapy - Needs additional medication therapy - Indication   Recommendation: Start Medication   Status: Accepted per Provider   Note: Start rectal tacrolimus twice daily as indicated by Dr. Bowers.

## 2021-07-05 NOTE — Clinical Note
Hello! He is seeing Abdiaziz next week. I would be in favor of having him get his potassium checked in case he has meaningful absorption of tac in setting of moderate lisinopril dose. Please let me know if you would like me to coordinate this.

## 2021-07-08 NOTE — PROGRESS NOTES
Robinson, MD Fran Engle Lindsay [facility]          Yes please!    Previous Messages    ----- Message -----   From: Lo Peters AUDREY   Sent: 7/5/2021  11:25 AM CDT   To: MD Augustine Fernandez! He is seeing Abdiaziz next week. I would be in favor of having him get his potassium checked in case he has meaningful absorption of tac in setting of moderate lisinopril dose. Please let me know if you would like me to coordinate this.         Will notify Cody via [employer].

## 2021-07-14 ENCOUNTER — VIRTUAL VISIT (OUTPATIENT)
Dept: GASTROENTEROLOGY | Facility: CLINIC | Age: 31
End: 2021-07-14
Payer: COMMERCIAL

## 2021-07-14 VITALS — WEIGHT: 185 LBS | BODY MASS INDEX: 27.32 KG/M2

## 2021-07-14 DIAGNOSIS — K52.9 IBD (INFLAMMATORY BOWEL DISEASE): Primary | ICD-10-CM

## 2021-07-14 PROCEDURE — 99215 OFFICE O/P EST HI 40 MIN: CPT | Mod: 95 | Performed by: PHYSICIAN ASSISTANT

## 2021-07-14 NOTE — PROGRESS NOTES
"Cody Pickard is a 31 year old male who is being evaluated via a billable video visit.      The patient has been notified of following:     \"This video visit will be conducted via a call between you and your physician/provider. We have found that certain health care needs can be provided without the need for an in-person physical exam.  This service lets us provide the care you need with a video conversation.  If a prescription is necessary we can send it directly to your pharmacy.  If lab work is needed we can place an order for that and you can then stop by our lab to have the test done at a later time.    If during the course of the call the physician/provider feels a video visit is not appropriate, you will not be charged for this service.\"     Patient confirmed that they are in [place] for today's visit yes.    Video-Visit Details  Type of service:  Video Visit    Video Start Time: 2:24 PM  Video End Time:  2:38 PM    Originating Location (pt. Location): [place]    Distant Location (provider location):  [facility] [place]     Platform used: [facility]     IBD CLINIC VISIT    CC/REFERRING MD:  Referred Self  REASON FOR CONSULTATION: Colitis    ASSESSMENT/PLAN:    1. IBD: Extensive colitis  Current medication: lialda 4.8g daily  Current clinical disease activity: Bello score 8  Current endoscopic disease activity: Bello 2 in rectum, mild activity on biopsies with associated anal stricture.     It is unclear to me at this point if he has Crohn's disease or ulcerative colitis.  Path consult on previous scopes were unable to fully distinguish either (absence of ileal and cecal inflammation with more distal, continuous, albeit on even severity may favor UC over Crohn's however histologic features not specific for either).     Most recent flex sig showing moderate disease endoscopically and histologic evidence of mild acute on chronic inflammation.  Plan was to proceed with [place] tacrolimus " however this has yet to be obtained due to cost and insurance issues.  He continues on Lialda 4.8 g daily.  Clinically he is having evidence of active symptoms consistent with a flare.  Pending coverage, alternative options to be discussed include Canasa suppositories and uceris GA foam to help with tenesmus.  If these options are not available, we may need to consider biologic therapy.  When and if tacrolimus is started, pharmacist recommended potassium check in case he has meaningful absorption of tac in the setting of moderate lisinopril dose.   --Continue mesalamine 4.8 g daily  --Proceed with GA tacrolimus  --Blood work due to include CBC, LFTs, CRP, ESR  --Fecal calprotectin to trend    ADDENDUM:  Tacro is unable to be approved (insurance does not cover compounding agents). We will proceed with starting Uceris foam (BID x 2 weeks then weekly x 4 weeks) and starting adalimumab induction. He will meet with our pharmacist again for new start of adalimumab.     2. IBD dysplasia surveillance: > 1/3 of colon involved. Will need dysplasia surveillance.   -- IBD dysplasia surveillance starting in 2028    Return to clinic in 2 months    Thank you for this consultation.  It was a pleasure to participate in the care of this patient; please contact us with any further questions.  I spent a total of 40 minutes, face to face, was spent with this patient, >50% of which was counseling regarding the above delineated issues.      DIANA EliasC  Division of Gastroenterology, Hepatology and Nutrition  [facility]        IBD HISTORY  Age at diagnosis: 30  Endoscopic extent of disease: Continuous: rectum to proximal transverse  Histologic Extent of disease: Rectum,   Disease phenotype:  Rose-anal disease:  Prior IBD surgeries:  Prior IBD Medications:  - Vedolizumab - not dose escalated     DRUG MONITORING  TPMT enzyme activity:     6-TGN/6-MMPN levels:    Biologic concentration:    HPI:   Diarrhea started in  "2020. Reported 10-12 stools a day with blood. Reports intermittent loose stools going back to ~. Stool studies were negative for infectious work-up including C difficile. H pylori stool antigen negative. TTG-IGA negative.      Colonoscopy demonstrated inflammation from the rectum through the transverse colon. It was favored Crohn's for \"patchy\" disease. He was started on prednisone    Prior endoscopy:   20: \"Congested, erythematous, friable, furrowed and inflamed mucosa in the rectum, sigmoid, descending and proximal transverse colon\".   Path:Continuous, patchy mild inflammation from rectum to transverse colon. Normal TI    At the time of diagnosis, he did not have anemia and albumin was normal. CBC was normal. Hep B surface antigen Quant Gold negative (careeverwhere).  He was placed on budesonide with a plan to start infliximab or vedolizumab based on insurance approval. He ultimately started vedolizumab.  Of note, also reported chronic low back pain. After starting the Entyvio, he did not feel like he got better. Over winter 2021/2022 he had continued symptoms. A flex sig demonstrated mild colitis. It was recommended that he switch to adalimumab but this was never started. He was also started on budesonide 9mg, still with urgency in the morning.     Currently having 6 stools per day, blood in half the stools which is primarily at the end of the stool and when wiping. Associated urgency. No fecal incontinence. Sometimes will have nighttime stools. He is also having abdominal discomfort which has fluctuated in severity, but feels that it is worse now.  It can be associated with the urgency, but can also be when he is laying down in bed.  No EIM at this time. He is only on lialda 4.8g at this time.      Bello score:   Stool freq: 2 (3-4 stools/day more than normal)  Rectal bleedin (Visible blood more than half of the time)  PGA: 2 (Moderate)  Endoscopy: 2 (Moderate)    Remission: <3   Mild disease: " 3-5  Moderate disease: 6-10  Severe disease: >10     Extra intestinal manifestations of IBD:  No uveitis/episcleritis  No aphthous ulcers   No arthritis   No erythema nodosum/pyoderma gangrenosum.     sIBDQ:  No flowsheet data found.     Last endoscopic activity: flex sig 6/2021 with moderate disease in rectum   Last histologic activity:  Mild (rectum)    Pertinent labs / imaging:     ROS:    Constitutional, HEENT, cardiovascular, pulmonary, GI, , musculoskeletal, neuro, skin, endocrine and psych systems are negative, except as otherwise noted.    PHYSICAL EXAMINATION:  Constitutional: aaox3, cooperative, pleasant, not dyspneic/diaphoretic, no acute distress  Vitals reviewed: Wt 83.9 kg (185 lb)   BMI 27.32 kg/m    Wt:   Wt Readings from Last 2 Encounters:   07/14/21 83.9 kg (185 lb)   04/20/21 84.8 kg (187 lb)      Constitutional - general appearance is well and in no acute distress. Body habitus normal  Eyes - No redness or discharge  Respiratory - No cough, unlabored breathing  Musculoskeletal - range of motion intact: Neck and arms  Skin - No discoloration or lesions  Neurological - No tremors, headaches  Psychiatric - No anxiety, alert & oriented      PERTINENT PAST MEDICAL HISTORY:  No past medical history on file.   Inflammatory bowel disease  Hypertension     PREVIOUS SURGERIES:  No past surgical history on file.    ALLERGIES:   No Known Allergies    PERTINENT MEDICATIONS:    Current Outpatient Medications:      COMPOUNDED NON-CONTROLLED SUBSTANCE (CMPD RX) - PHARMACY TO MIX COMPOUNDED MEDICATION, Place 1.5 mg tacrolimus (3 g ointment) rectally twice daily via vaginal topiclick device., Disp: 180 g, Rfl: 1     dicyclomine (BENTYL) 10 MG capsule, Take 1 capsule (10 mg) by mouth 2 times daily, Disp: 90 capsule, Rfl: 1     lisinopril (ZESTRIL) 20 MG tablet, Take 20 mg by mouth, Disp: , Rfl:      mesalamine (LIALDA) 1.2 g EC tablet, Take 4 tablets (4,800 mg) by mouth every morning, Disp: 180 tablet, Rfl:  3    SOCIAL HISTORY:  Social History     Socioeconomic History     Marital status:      Spouse name: Not on file     Number of children: Not on file     Years of education: Not on file     Highest education level: Not on file   Occupational History     Not on file   Tobacco Use     Smoking status: Former Smoker     Packs/day: 0.50     Years: 2.50     Pack years: 1.25     Types: Cigarettes     Start date:      Quit date: 2020     Years since quittin.9     Smokeless tobacco: Never Used   Substance and Sexual Activity     Alcohol use: Not on file     Drug use: Not on file     Sexual activity: Not on file   Other Topics Concern     Not on file   Social History Narrative     Not on file     Social Determinants of Health     Financial Resource Strain:      Difficulty of Paying Living Expenses:    Food Insecurity:      Worried About Running Out of Food in the Last Year:      Ran Out of Food in the Last Year:    Transportation Needs:      Lack of Transportation (Medical):      Lack of Transportation (Non-Medical):    Physical Activity:      Days of Exercise per Week:      Minutes of Exercise per Session:    Stress:      Feeling of Stress :    Social Connections:      Frequency of Communication with Friends and Family:      Frequency of Social Gatherings with Friends and Family:      Attends [employer] Services:      Active Member of Clubs or Organizations:      Attends Club or Organization Meetings:      Marital Status:    Intimate Partner Violence:      Fear of Current or Ex-Partner:      Emotionally Abused:      Physically Abused:      Sexually Abused:        FAMILY HISTORY:  Family History   Problem Relation Age of Onset     Multiple Sclerosis Mother      Ulcerative Colitis Maternal Grandmother      Colon Cancer No family hx of      Inflammatory Bowel Disease No family hx of      Irritable Bowel Syndrome No family hx of      Crohn's Disease No family hx of      No family history of  IBD    Past/family/social history reviewed and no changes

## 2021-07-14 NOTE — LETTER
"    7/14/2021         RE: Cody Pickard  [address] [address] [place] [id number]        Dear Colleague,    Thank you for referring your patient, Cody Pickard, to the [facility] [place]. Please see a copy of my visit note below.    Cody Pickard is a 31 year old male who is being evaluated via a billable video visit.      The patient has been notified of following:     \"This video visit will be conducted via a call between you and your physician/provider. We have found that certain health care needs can be provided without the need for an in-person physical exam.  This service lets us provide the care you need with a video conversation.  If a prescription is necessary we can send it directly to your pharmacy.  If lab work is needed we can place an order for that and you can then stop by our lab to have the test done at a later time.    If during the course of the call the physician/provider feels a video visit is not appropriate, you will not be charged for this service.\"     Patient confirmed that they are in [place] for today's visit yes.    Video-Visit Details  Type of service:  Video Visit    Video Start Time: 2:24 PM  Video End Time:  2:38 PM    Originating Location (pt. Location): Home    Distant Location (provider location):  [facility] [place]     Platform used: [email or web address]     IBD CLINIC VISIT    CC/REFERRING MD:  Referred Self  REASON FOR CONSULTATION: Colitis    ASSESSMENT/PLAN:    1. IBD: Extensive colitis  Current medication: lialda 4.8g daily  Current clinical disease activity: Bello score 8  Current endoscopic disease activity: Bello 2 in rectum, mild activity on biopsies with associated anal stricture.     It is unclear to me at this point if he has Crohn's disease or ulcerative colitis.  Path consult on previous scopes were unable to fully distinguish either (absence of ileal and cecal inflammation with more distal, continuous, albeit " on even severity may favor UC over Crohn's however histologic features not specific for either).     Most recent flex sig showing moderate disease endoscopically and histologic evidence of mild acute on chronic inflammation.  Plan was to proceed with KS tacrolimus however this has yet to be obtained due to cost and insurance issues.  He continues on Lialda 4.8 g daily.  Clinically he is having evidence of active symptoms consistent with a flare.  Pending coverage, alternative options to be discussed include Canasa suppositories and uceris KS foam to help with tenesmus.  If these options are not available, we may need to consider biologic therapy.  When and if tacrolimus is started, pharmacist recommended potassium check in case he has meaningful absorption of tac in the setting of moderate lisinopril dose.   --Continue mesalamine 4.8 g daily  --Proceed with KS tacrolimus  --Blood work due to include CBC, LFTs, CRP, ESR  --Fecal calprotectin to trend    ADDENDUM:  Tacro is unable to be approved (insurance does not cover compounding agents). We will proceed with starting Uceris foam (BID x 2 weeks then weekly x 4 weeks) and starting adalimumab induction. He will meet with our pharmacist again for new start of adalimumab.     2. IBD dysplasia surveillance: > 1/3 of colon involved. Will need dysplasia surveillance.   -- IBD dysplasia surveillance starting in 2028    Return to clinic in 2 months    Thank you for this consultation.  It was a pleasure to participate in the care of this patient; please contact us with any further questions.  I spent a total of 40 minutes, face to face, was spent with this patient, >50% of which was counseling regarding the above delineated issues.      Abdiaziz Hull PA-C  Division of Gastroenterology, Hepatology and Nutrition  [facility]        IBD HISTORY  Age at diagnosis: 30  Endoscopic extent of disease: Continuous: rectum to proximal transverse  Histologic Extent of  "disease: Rectum,   Disease phenotype:  Rose-anal disease:  Prior IBD surgeries:  Prior IBD Medications:  - Vedolizumab - not dose escalated     DRUG MONITORING  TPMT enzyme activity:     6-TGN/6-MMPN levels:    Biologic concentration:    HPI:   Diarrhea started in March 2020. Reported 10-12 stools a day with blood. Reports intermittent loose stools going back to ~2013. Stool studies were negative for infectious work-up including C difficile. H pylori stool antigen negative. TTG-IGA negative.      Colonoscopy demonstrated inflammation from the rectum through the transverse colon. It was favored Crohn's for \"patchy\" disease. He was started on prednisone    Prior endoscopy:   6/17/20: \"Congested, erythematous, friable, furrowed and inflamed mucosa in the rectum, sigmoid, descending and proximal transverse colon\".   Path:Continuous, patchy mild inflammation from rectum to transverse colon. Normal TI    At the time of diagnosis, he did not have anemia and albumin was normal. CBC was normal. Hep B surface antigen Quant Gold negative (careeverwhere).  He was placed on budesonide with a plan to start infliximab or vedolizumab based on insurance approval. He ultimately started vedolizumab.  Of note, also reported chronic low back pain. After starting the Entyvio, he did not feel like he got better. Over winter 2021/2022 he had continued symptoms. A flex sig demonstrated mild colitis. It was recommended that he switch to adalimumab but this was never started. He was also started on budesonide 9mg, still with urgency in the morning.     Currently having 6 stools per day, blood in half the stools which is primarily at the end of the stool and when wiping. Associated urgency. No fecal incontinence. Sometimes will have nighttime stools. He is also having abdominal discomfort which has fluctuated in severity, but feels that it is worse now.  It can be associated with the urgency, but can also be when he is laying down in bed.  No " EIM at this time. He is only on lialda 4.8g at this time.      Bello score:   Stool freq: 2 (3-4 stools/day more than normal)  Rectal bleedin (Visible blood more than half of the time)  PGA: 2 (Moderate)  Endoscopy: 2 (Moderate)    Remission: <3   Mild disease: 3-5  Moderate disease: 6-10  Severe disease: >10     Extra intestinal manifestations of IBD:  No uveitis/episcleritis  No aphthous ulcers   No arthritis   No erythema nodosum/pyoderma gangrenosum.     sIBDQ:  No flowsheet data found.     Last endoscopic activity: flex sig 2021 with moderate disease in rectum   Last histologic activity:  Mild (rectum)    Pertinent labs / imaging:     ROS:    Constitutional, HEENT, cardiovascular, pulmonary, GI, , musculoskeletal, neuro, skin, endocrine and psych systems are negative, except as otherwise noted.    PHYSICAL EXAMINATION:  Constitutional: aaox3, cooperative, pleasant, not dyspneic/diaphoretic, no acute distress  Vitals reviewed: Wt 83.9 kg (185 lb)   BMI 27.32 kg/m    Wt:   Wt Readings from Last 2 Encounters:   21 83.9 kg (185 lb)   21 84.8 kg (187 lb)      Constitutional - general appearance is well and in no acute distress. Body habitus normal  Eyes - No redness or discharge  Respiratory - No cough, unlabored breathing  Musculoskeletal - range of motion intact: Neck and arms  Skin - No discoloration or lesions  Neurological - No tremors, headaches  Psychiatric - No anxiety, alert & oriented      PERTINENT PAST MEDICAL HISTORY:  No past medical history on file.   Inflammatory bowel disease  Hypertension     PREVIOUS SURGERIES:  No past surgical history on file.    ALLERGIES:   No Known Allergies    PERTINENT MEDICATIONS:    Current Outpatient Medications:      COMPOUNDED NON-CONTROLLED SUBSTANCE (CMPD RX) - PHARMACY TO MIX COMPOUNDED MEDICATION, Place 1.5 mg tacrolimus (3 g ointment) rectally twice daily via vaginal topiclick device., Disp: 180 g, Rfl: 1     dicyclomine (BENTYL) 10 MG capsule,  Take 1 capsule (10 mg) by mouth 2 times daily, Disp: 90 capsule, Rfl: 1     lisinopril (ZESTRIL) 20 MG tablet, Take 20 mg by mouth, Disp: , Rfl:      mesalamine (LIALDA) 1.2 g EC tablet, Take 4 tablets (4,800 mg) by mouth every morning, Disp: 180 tablet, Rfl: 3    SOCIAL HISTORY:  Social History     Socioeconomic History     Marital status:      Spouse name: Not on file     Number of children: Not on file     Years of education: Not on file     Highest education level: Not on file   Occupational History     Not on file   Tobacco Use     Smoking status: Former Smoker     Packs/day: 0.50     Years: 2.50     Pack years: 1.25     Types: Cigarettes     Start date:      Quit date: 2020     Years since quittin.9     Smokeless tobacco: Never Used   Substance and Sexual Activity     Alcohol use: Not on file     Drug use: Not on file     Sexual activity: Not on file   Other Topics Concern     Not on file   Social History Narrative     Not on file     Social Determinants of Health     Financial Resource Strain:      Difficulty of Paying Living Expenses:    Food Insecurity:      Worried About Running Out of Food in the Last Year:      Ran Out of Food in the Last Year:    Transportation Needs:      Lack of Transportation (Medical):      Lack of Transportation (Non-Medical):    Physical Activity:      Days of Exercise per Week:      Minutes of Exercise per Session:    Stress:      Feeling of Stress :    Social Connections:      Frequency of Communication with Friends and Family:      Frequency of Social Gatherings with Friends and Family:      Attends Judaism Services:      Active Member of Clubs or Organizations:      Attends Club or Organization Meetings:      Marital Status:    Intimate Partner Violence:      Fear of Current or Ex-Partner:      Emotionally Abused:      Physically Abused:      Sexually Abused:        FAMILY HISTORY:  Family History   Problem Relation Age of Onset     Multiple Sclerosis  Mother      Ulcerative Colitis Maternal Grandmother      Colon Cancer No family hx of      Inflammatory Bowel Disease No family hx of      Irritable Bowel Syndrome No family hx of      Crohn's Disease No family hx of      No family history of IBD    Past/family/social history reviewed and no changes                  Again, thank you for allowing me to participate in the care of your patient.        Sincerely,        Abdiaziz Hull PA-C

## 2021-07-14 NOTE — NURSING NOTE
Chief Complaint   Patient presents with     Follow Up       Vitals:    07/14/21 1341   Weight: 83.9 kg (185 lb)       Body mass index is 27.32 kg/m .    Donna Braxton CMA

## 2021-07-14 NOTE — PATIENT INSTRUCTIONS
It was a pleasure taking care of you today.  I've included a brief summary of our discussion and care plan from today's visit below.  Please review this information with your primary care provider.  ______________________________________________________________________    My recommendations are summarized as follows:    -- Continue lialda 4.8 g daily   -- Labs + stool sample when able   The order for this is in, can be completed at our lab. To schedule lab appointment here, use my chart or call [phone].   -- Next endoscopic assessment: TBD  -- Patient with IBD we recommend supplementation vitamin D 1000 units daily and calcium 500 mg twice daily.  -- Vaccines/immunizations to be updated: Recommend yearly flu shot, pneumonia vaccines (Prevnar 13 then 8 weeks later Pneumovax 23 then 5 years later Pneumovax 23), tetanus every 10 years.  -- No NSAIDs (ibuprofen, or anything containing ibuprofen)       For additional resources about inflammatory bowel disease visit [email or web address]      Return to GI Clinic in 2 months to review your progress.    ______________________________________________________________________    How do I schedule labs, imaging studies, or procedures that were ordered in clinic today?     Labs: To schedule lab appointment at the [facility], use my chart or call [phone]. If you have a [facility] lab closer to home where you are regularly seen you can give them a call.     Procedures: If a colonoscopy, upper endoscopy, breath test, esophageal manometry, or pH impedence was ordered today, our endoscopy team will call you to schedule this. If you have not heard from our endoscopy team within a week, please call [phone] to schedule.     Imaging Studies: If you were scheduled for a CT scan, X-ray, MRI, ultrasound, HIDA scan or other imaging study, please call [phone] to have this scheduled.     Referral: If a referral to another specialty was  ordered, expect a phone call or follow instructions above. If you have not heard from anyone regarding your referral in a week, please call our clinic to check the status.     Who do I call with any questions after my visit?  Please be in touch if there are any further questions that arise following today's visit.  There are multiple ways to contact your gastroenterology care team.        During business hours, you may reach a Gastroenterology nurse at [phone]      To schedule or reschedule an appointment, please call [phone].       You can always send a secure message through [employer].  [employer] messages are answered by your nurse or doctor typically within 24 hours.  Please allow extra time on weekends and holidays.        For urgent/emergent questions after business hours, you may reach the on-call GI Fellow by contacting the [facility]  at [phone].     How will I get the results of any tests ordered?    You will receive all of your results.  If you have signed up for [employer], any tests ordered at your visit will be available to you after your physician reviews them.  Typically this takes 1-2 weeks.  If there are urgent results that require a change in your care plan, your physician or nurse will call you to discuss the next steps.      What is [employer]?  [employer] is a secure way for you to access all of your healthcare records from the [facility].  It is a web based computer program, so you can sign on to it from any location.  It also allows you to send secure messages to your care team.  I recommend signing up for [employer] access if you have not already done so and are comfortable with using a computer.         Sincerely,    Abdiaziz Hull PA-C  [facility]  Division of Gastroenterology

## 2021-07-15 RX ORDER — BUDESONIDE 28 MG/1
AEROSOL, FOAM RECTAL
Qty: 33.4 G | Refills: 1 | Status: SHIPPED | OUTPATIENT
Start: 2021-07-15 | End: 2021-07-27

## 2021-07-19 ENCOUNTER — VIRTUAL VISIT (OUTPATIENT)
Dept: PHARMACY | Facility: CLINIC | Age: 31
End: 2021-07-19
Payer: COMMERCIAL

## 2021-07-19 ENCOUNTER — CARE COORDINATION (OUTPATIENT)
Dept: GASTROENTEROLOGY | Facility: CLINIC | Age: 31
End: 2021-07-19

## 2021-07-19 DIAGNOSIS — K52.9 IBD (INFLAMMATORY BOWEL DISEASE): Primary | ICD-10-CM

## 2021-07-19 PROCEDURE — 99606 MTMS BY PHARM EST 15 MIN: CPT | Performed by: PHARMACIST

## 2021-07-19 PROCEDURE — 99607 MTMS BY PHARM ADDL 15 MIN: CPT | Performed by: PHARMACIST

## 2021-07-19 NOTE — Clinical Note
Chapin Freed - I asked Aye/Elisabeth to add his pre-biologic screening labs on for tomorrow. I assumed he should stay on mesalamine for now, but he wanted me to double check with you. Thank you! Lo

## 2021-07-19 NOTE — PROGRESS NOTES
Orders placed for Humira starter pack, Humira every 2 weeks and for labs (hepatic function, CBC, CPR, and Sed rate.

## 2021-07-19 NOTE — PATIENT INSTRUCTIONS
Recommendations from today's MTM visit:                                                       1. Cody to get labs tomorrow as scheduled.    -- Lo to update appointment notes to indicate labs per Abdiaziz Hull PA-C (done). This should also include your hepatitis B labs and Quantiferon screening (for tuberculosis).     2. Use the budesonide rectal foam twice daily for 14 days, then decrease to once daily for 28 days. I did contact [facility] to make sure they were clear on the directions.    3. I will help coordinate your order for Humira. The pharmacy will likely reach out to you to setup delivery. When you have confirmed your shipment, please call [facility] to setup a time for your first injection.   -- Update: You are restricted to using [facility] pharmacy ([facility]) so we are unable to confirm if the prior authorization is still in place. As far as we can tell, it will be good until 2/2022. Please let us know if you have difficulty coordinating your order.   -- Consider signing up for Humira Complete [email or web address]  This program offers copay assistance if needed, and many people also opt into the sharps container program for disposal. They have a nurse ambassador program as well, though this is an optional service.    4. Consider the following vaccines:   -- Pneumonia vaccines: Prevnar-13 first, followed by Pneumovax-23 at least eight weeks later. Pneumovax-23 then gets boosted in five years if you remain on immunosuppression.   -- Shingrix (off-label recommendation - please contact our office if you are interested)    -- Reminder to avoid any LIVE vaccines given planned immunosuppression.     Follow-up: 1 month after medication start    It was great to speak with you today.  I value your experience and would be very thankful for your time with providing feedback on our clinic survey. You may receive a survey via email or text message in the next few days.     To schedule another MTM  appointment, please call the clinic directly or you may call the MTM scheduling line at [phone] or toll-free at [phone].     My Clinical Pharmacist's contact information:                                                      Please feel free to contact me with any questions or concerns you have.      Lo GottliebD, BCACP  MTM Pharmacist   [facility]  Phone: [phone]

## 2021-07-19 NOTE — PROGRESS NOTES
Medication Therapy Management (MTM) Encounter    ASSESSMENT:                            Medication Adherence/Access: See below for considerations    Inflammatory Bowel Disease: Cody would benefit from starting adalimumab as directed by Dr. Bowers and Abdiaziz Hull PA-C. He has labs planned for tomorrow (baseline inflammatory/CBC/hepatic panel) and would likely benefit from addition of pre-biologic screening labs, especially given his last quantiferon was indeterminate. Will also coordinate an order for adalimumab so we can determine if his previous prior authorization is still active.     We discussed dosing for budesonide foam and I will reach out to his pharmacy to confirm this was processed correctly, otherwise he may not be able to get a timely refill. This should have been twice DAILY for two weeks, then decrease to once daily for four weeks. As below, he doesn't typically get vaccines, but would likely benefit from getting at least the pneumococcal series and hepatitis B depending on serologies. Shingrix would be another consideration given planned anti-TNF therapy, though this is a an off-label recommendation and would not likely be covered for him. He should avoid any LIVE vaccines at this time. Reviewed need for formal injection training with his first dose which can be setup after his order has a scheduled delivery date.    We discussed that dicyclomine is more used for symptomatic relief, so he can continue this when starting Humira. Typically mesalamine is continued at least through the induction phase of biologics, but I will confirm with his care team at his preference. Abdiaziz agrees with mesalamine continuation.    PLAN:                            1. Cody to get labs tomorrow as scheduled   -- Lo to coordinate pre-biologic screening     - orders placed by Aye REBOLLEDO    2. Lo to contact Sam Roche regarding budesonide foam   -- 7/19/21 at 1:55 PM I spoke with the pharmacist and confirmed the  "directions should have been twice daily for 14 days, then once daily for 28 days.    3. Lo to coordinate order for Humira starter pack + maintenance dose    - orders placed by Aye VALENZUELACC    4. Cody to Consider the following vaccines:   -- Pneumonia vaccines: Prevnar-13 first, followed by Pneumovax-23 at least eight weeks later. Pneumovax-23 then gets boosted in five years if you remain on immunosuppression.   -- Shingrix (off-label recommendation - please contact our office if you are interested)   -- Hep B pending serologies     Follow-up: 1 month after medication start    SUBJECTIVE/OBJECTIVE:                          Cody Pickard is a 31 year old male called for a follow-up visit. He was referred to me from Dr. Bowers/Abdiaziz Hull PA-C.  Today's visit is a follow-up MTM visit from 7/5/21.     Reason for visit: Humira start    Allergies/ADRs: Reviewed in chart  Tobacco: He reports that he quit smoking about a year ago. His smoking use included cigarettes. He started smoking about 3 years ago. He has a 1.25 pack-year smoking history. He has never used smokeless tobacco.  Alcohol: 10 or more beverages /week    Medication Adherence/Access: notes he had difficulty scheduling labs because they could only see potassium, unable to get rectal tacrolimus because compounded medications are not covered by his plan. Additionally, he said the box of uceris foam says use \"Place 2 mg rectally twice a week for 14 days, THEN 2 mg daily for 28 days\".  He thought this didn't make sense. He started using it just at night since he wasn't sure.    Inflammatory Bowel Disease:   Budesonide foam twice daily x 2 weeks, then daily for four weeks (taking at night)  Mesalamine 4.8 g daily  Dicyclomine 10 mg twice daily if needed  Adalimumab (pending start)    We were unable to proceed with rectal tacrolimus since compounded agents are not covered by his insurance plan. Plan per Abdiaziz Hull PA-C:    \"Tacro is unable to be approved " "(insurance does not cover compounding agents). We will proceed with starting Uceris foam (BID x 2 weeks then weekly x 4 weeks) and starting adalimumab induction. He will meet with our pharmacist again for new start of adalimumab.\"    Cody notes he was previously on Entyvio and they were going to start adalimumab, but he did not. Then he switched to our clinic and we were going to use rectal tacrolimus, but as noted above this was not covered by his insurance plan. He did research Humira before, but he is okay with going over the major highlights today. He is unsure if he should stay on his other medications while starting Humira.    Discussed adalimumab today including mechanism of action, dosing, dosage form, side effects (both common/serious), monitoring for safety and efficacy as well as time to efficacy. Discussed the basics of specialty pharmacy and coverage process. Reviewed vaccinations briefly, and he stated he doesn't usually get vaccines. Reviewed recommendation to avoid live vaccines given planned immunosuppression as well as potential need to hold biologics in the setting of immunosuppression. Reviewed recommendation for yearly skin cancer screening with anti-TNF therapy. He notes that he previously had a prior authorization in place for Humira and per [facility] it appears they did have an authorization for the 40 mg / 0.8 mL adalimumab through 2/18/2022.     He is unsure if he should continue with mesalamine/Bentyl once starting Humira.    IBD Health Care Maintenance:     Vaccinations:  All patients on biologics should avoid live vaccines.    -- Influenza (every year) recommend yearly  -- TdaP (every 10 years) 8/10/2017  -- Pneumococcal Pneumonia               -Prevnar-13 not on file              -Pneumovax-23 not on file  -- Yearly assessment for latent Tb (verbal screening and exam, PPD or QuantiFERON-Tb testing)              - indeterminate 7/6/2020     One time confirmation of immunity or " serologies:  -- Hepatitis A (serologies or immunizations) not on file  -- Hepatitis B (serologies or immunizations) not on file     Due to the planned immunosuppression in this patient, I would not advise administration of live vaccines such as varicella/VZV, intranasal influenza, MMR, or yellow fever vaccine (if traveling).       Cancer Screening:  IBD dysplasia surveillance: > 1/3 of colon involved. Will need dysplasia surveillance.   -- IBD dysplasia surveillance starting in 2028     Skin cancer screening: Annual visual exam of skin by dermatologist due to planned immunosuppressive therapy     Misc:  -- Avoid tobacco use  -- Avoid NSAIDs as there is potentially a 25% chance of causing an IBD flare     ----------------    I spent 32 minutes with this patient today. I offer these suggestions for consideration by his care team. A copy of the visit note was provided to the patient's referring provider.    The patient was sent via "[employer] a summary of these recommendations.     Lo GottliebD, BCACP  MTM Pharmacist   [facility]  Phone: [phone]    Telemedicine Visit Details  Type of service:  Telephone visit  Start Time: 10:02 AM  End Time: 10:34 AM  Originating Location (patient location): Home  Distant Location (provider location):  [facility] SPECIAL[facility] MTM     Medication Therapy Recommendations  IBD (inflammatory bowel disease)    Current Medication: Budesonide 2 MG/ACT FOAM   Rationale: Dose too low - Dosage too low - Effectiveness   Recommendation: Increase Frequency   Status: Accepted - no CPA Needed   Note: Increase budesonide rectal foam to twice daily x 14 days, then decrease to daily for one month.          Current Medication: adalimumab (HUMIRA *CF*) 80 MG/0.8ML pen kit   Rationale: Medication requires monitoring - Needs additional monitoring   Recommendation: Order Lab   Status: Accepted per Provider   Note: Consider pre-biologic screening labs           Current Medication: adalimumab (HUMIRA *CF*) 80 MG/0.8ML pen kit   Rationale: Medication product not available - Adherence - Adherence   Status: Accepted per Provider   Note: Lo to coordinate Humira order.          Rationale: Preventive therapy - Needs additional medication therapy - Indication   Recommendation: Start Medication - pneumococcal Susp injection   Status: Patient Agreed - Adherence/Education   Note: Consider Prevnar-13, Pneumovax-23, Shingrix (off-label) and Hepatitis B (depending on labs).

## 2021-07-20 ENCOUNTER — LAB (OUTPATIENT)
Dept: LAB | Facility: CLINIC | Age: 31
End: 2021-07-20
Payer: COMMERCIAL

## 2021-07-20 DIAGNOSIS — K52.9 IBD (INFLAMMATORY BOWEL DISEASE): ICD-10-CM

## 2021-07-20 LAB
ALBUMIN SERPL-MCNC: 3.6 G/DL (ref 3.4–5)
ALP SERPL-CCNC: 82 U/L (ref 40–150)
ALT SERPL W P-5'-P-CCNC: 22 U/L (ref 0–70)
AST SERPL W P-5'-P-CCNC: 11 U/L (ref 0–45)
BASOPHILS # BLD AUTO: 0.1 10E3/UL (ref 0–0.2)
BASOPHILS NFR BLD AUTO: 1 %
BILIRUB DIRECT SERPL-MCNC: 0.2 MG/DL (ref 0–0.2)
BILIRUB SERPL-MCNC: 0.5 MG/DL (ref 0.2–1.3)
CRP SERPL-MCNC: 24.6 MG/L (ref 0–8)
EOSINOPHIL # BLD AUTO: 0.2 10E3/UL (ref 0–0.7)
EOSINOPHIL NFR BLD AUTO: 1 %
ERYTHROCYTE [DISTWIDTH] IN BLOOD BY AUTOMATED COUNT: 12.3 % (ref 10–15)
ERYTHROCYTE [SEDIMENTATION RATE] IN BLOOD BY WESTERGREN METHOD: 8 MM/HR (ref 0–15)
HCT VFR BLD AUTO: 41.1 % (ref 40–53)
HGB BLD-MCNC: 13.3 G/DL (ref 13.3–17.7)
IMM GRANULOCYTES # BLD: 0 10E3/UL
IMM GRANULOCYTES NFR BLD: 0 %
LYMPHOCYTES # BLD AUTO: 1.2 10E3/UL (ref 0.8–5.3)
LYMPHOCYTES NFR BLD AUTO: 11 %
MCH RBC QN AUTO: 28.8 PG (ref 26.5–33)
MCHC RBC AUTO-ENTMCNC: 32.4 G/DL (ref 31.5–36.5)
MCV RBC AUTO: 89 FL (ref 78–100)
MONOCYTES # BLD AUTO: 0.8 10E3/UL (ref 0–1.3)
MONOCYTES NFR BLD AUTO: 7 %
NEUTROPHILS # BLD AUTO: 8.7 10E3/UL (ref 1.6–8.3)
NEUTROPHILS NFR BLD AUTO: 80 %
NRBC # BLD AUTO: 0 10E3/UL
NRBC BLD AUTO-RTO: 0 /100
PLATELET # BLD AUTO: 425 10E3/UL (ref 150–450)
PROT SERPL-MCNC: 7.8 G/DL (ref 6.8–8.8)
RBC # BLD AUTO: 4.62 10E6/UL (ref 4.4–5.9)
WBC # BLD AUTO: 11 10E3/UL (ref 4–11)

## 2021-07-20 PROCEDURE — 86481 TB AG RESPONSE T-CELL SUSP: CPT

## 2021-07-20 PROCEDURE — 36415 COLL VENOUS BLD VENIPUNCTURE: CPT

## 2021-07-20 PROCEDURE — 85652 RBC SED RATE AUTOMATED: CPT

## 2021-07-20 PROCEDURE — 80076 HEPATIC FUNCTION PANEL: CPT

## 2021-07-20 PROCEDURE — 86140 C-REACTIVE PROTEIN: CPT

## 2021-07-20 PROCEDURE — 86706 HEP B SURFACE ANTIBODY: CPT

## 2021-07-20 PROCEDURE — 87340 HEPATITIS B SURFACE AG IA: CPT

## 2021-07-20 PROCEDURE — 85025 COMPLETE CBC W/AUTO DIFF WBC: CPT

## 2021-07-20 PROCEDURE — 86704 HEP B CORE ANTIBODY TOTAL: CPT

## 2021-07-20 NOTE — TELEPHONE ENCOUNTER
Phoned [facility] pharmacy - spoke to valentine Esquivel.  He stated that the starter kit order for Humira was received as well as both maintenance scripts.  I explained that the clinic only wants the CF kit.  He stated that he cannot discontinue meds, but will send to [facility] to perform ([facility] may be calling to clarify) - Did also explain that patient needs/would like to have med in hand by Friday for nurse training.  Alvin stated he will place note in patient profile.

## 2021-07-21 LAB
HBV CORE AB SERPL QL IA: NONREACTIVE
HBV SURFACE AB SERPL IA-ACNC: 242.75 M[IU]/ML
HBV SURFACE AG SERPL QL IA: NONREACTIVE

## 2021-07-22 ENCOUNTER — PATIENT OUTREACH (OUTPATIENT)
Dept: GASTROENTEROLOGY | Facility: CLINIC | Age: 31
End: 2021-07-22

## 2021-07-22 LAB
GAMMA INTERFERON BACKGROUND BLD IA-ACNC: 0.08 IU/ML
M TB IFN-G BLD-IMP: NEGATIVE
M TB IFN-G CD4+ BCKGRND COR BLD-ACNC: 5.32 IU/ML
MITOGEN IGNF BCKGRD COR BLD-ACNC: -0.01 IU/ML
MITOGEN IGNF BCKGRD COR BLD-ACNC: -0.03 IU/ML
QUANTIFERON MITOGEN: 5.4 IU/ML
QUANTIFERON NIL TUBE: 0.08 IU/ML
QUANTIFERON TB1 TUBE: 0.05 IU/ML
QUANTIFERON TB2 TUBE: 0.07

## 2021-07-22 NOTE — PROGRESS NOTES
Patient will call  [facility] to schedule humira asap  Patient will call back to update on delivery date so appointment for injection training can take place.

## 2021-07-22 NOTE — PROGRESS NOTES
Patient calls back with an update on humira  Per his pharmacy will send ou today with a deliver to be on Friday the 23rd.   Pt will call once he has received the humira

## 2021-07-23 ENCOUNTER — ALLIED HEALTH/NURSE VISIT (OUTPATIENT)
Dept: GASTROENTEROLOGY | Facility: CLINIC | Age: 31
End: 2021-07-23
Payer: COMMERCIAL

## 2021-07-23 DIAGNOSIS — K50.111 CROHN'S DISEASE OF LARGE INTESTINE WITH RECTAL BLEEDING (H): Primary | ICD-10-CM

## 2021-07-23 PROCEDURE — 99207 PR NO BILLABLE SERVICE THIS VISIT: CPT

## 2021-07-23 NOTE — PROGRESS NOTES
Discussed with pt the following   Preparation of sites  Sterile technique  Site selection  Rotation  of sites   Next dose  Dosing  Importance of timeliness  Questioning if upper respiratory symptoms or GI is or fever if to administer the next dose   Calling if misfire and process for receiving  another pen   Traveling with medication  Refills  Follow up   Disposal of pens  To call if symptoms develop     Patient here for injection training

## 2021-07-27 ENCOUNTER — PATIENT OUTREACH (OUTPATIENT)
Dept: GASTROENTEROLOGY | Facility: CLINIC | Age: 31
End: 2021-07-27

## 2021-07-27 DIAGNOSIS — K52.9 IBD (INFLAMMATORY BOWEL DISEASE): ICD-10-CM

## 2021-07-27 RX ORDER — BUDESONIDE 28 MG/1
AEROSOL, FOAM RECTAL
Qty: 33.4 G | Refills: 1 | Status: SHIPPED | OUTPATIENT
Start: 2021-07-27 | End: 2021-09-06

## 2021-07-27 NOTE — PROGRESS NOTES
Patient states that he has a swollen  sore spot on the left side of his rectum  Stated that noted on Saturday  Tender to the the touch   Started Humira on July 23   Has been using budenoside foam twice a day     Discussed symptoms with Ms Hull   Colon and rectal referral  Sitz bath  No toilet paper only baby wipes   Will discuss with Dr Bowers

## 2021-08-03 ENCOUNTER — TELEPHONE (OUTPATIENT)
Dept: SURGERY | Facility: CLINIC | Age: 31
End: 2021-08-03

## 2021-08-03 NOTE — TELEPHONE ENCOUNTER
LVM 3x for patient to follow up with colorectal per Dr. Bowers. DRAKE Lance and I followed up with Dr. Bowers today 08/03/2021 in regards to being unable to reach patient. I will reach out to Dr. Bowers's nurse to follow up with the patient.         Yaima Bergeron, CMA

## 2021-08-04 DIAGNOSIS — K50.111 CROHN'S DISEASE OF LARGE INTESTINE WITH RECTAL BLEEDING (H): ICD-10-CM

## 2021-08-05 DIAGNOSIS — K50.111 CROHN'S DISEASE OF LARGE INTESTINE WITH RECTAL BLEEDING (H): ICD-10-CM

## 2021-08-05 RX ORDER — DICYCLOMINE HYDROCHLORIDE 10 MG/1
10 CAPSULE ORAL 2 TIMES DAILY
Qty: 90 CAPSULE | Refills: 1 | Status: SHIPPED | OUTPATIENT
Start: 2021-08-05 | End: 2022-06-10

## 2021-08-06 RX ORDER — DICYCLOMINE HYDROCHLORIDE 10 MG/1
10 CAPSULE ORAL 2 TIMES DAILY
Qty: 90 CAPSULE | Refills: 1 | OUTPATIENT
Start: 2021-08-06

## 2021-08-13 DIAGNOSIS — K50.111 CROHN'S DISEASE OF LARGE INTESTINE WITH RECTAL BLEEDING (H): ICD-10-CM

## 2021-08-17 RX ORDER — MESALAMINE 1.2 G/1
4800 TABLET, DELAYED RELEASE ORAL EVERY MORNING
Qty: 180 TABLET | Refills: 3 | Status: SHIPPED | OUTPATIENT
Start: 2021-08-17 | End: 2022-03-08

## 2021-08-17 NOTE — TELEPHONE ENCOUNTER
MESALAMINE 1.2GM TABLETS  Last Written Prescription Date:  2/22/2021  Last Fill Quantity: 180,   # refills: 3  Last Office Visit : 7/14/2021  Future Office visit:  10/19/2021  Order sent to updated pharmacy for Pt care.       Alisha Harrington RN  Central Triage Red Flags/Med Refills

## 2021-08-22 ENCOUNTER — NURSE TRIAGE (OUTPATIENT)
Dept: NURSING | Facility: CLINIC | Age: 31
End: 2021-08-22

## 2021-08-22 NOTE — TELEPHONE ENCOUNTER
"Patient not feeling well, the left side of rectum swollen this morning. Fever 100.6.  Patient started taking humira a month ago. Patient has been icing the left side of his anus. The area was hot to the touch and swollen before icing. One episode of bleeding with bowel movement a small amount. Rectal pain rating 4/10. \"if you touch in the wrong spot it is more painful.\"  Protocol recommends see HCP within 4 hours or PCP triage. No PCP.   Directing patient to ED for evaluation.   Chaya Norman RN   08/22/21 6:55 PM  [facility] Nurse Advisor    COVID 19 Nurse Triage Plan/Patient Instructions    Please be aware that novel coronavirus (COVID-19) may be circulating in the community. If you develop symptoms such as fever, cough, or SOB or if you have concerns about the presence of another infection including coronavirus (COVID-19), please contact your health care provider or visit [email or web address].     Disposition/Instructions    ED Visit recommended. Follow protocol based instructions.     Bring Your Own Device:  Please also bring your smart device(s) (smart phones, tablets, laptops) and their charging cables for your personal use and to communicate with your care team during your visit.    Thank you for taking steps to prevent the spread of this virus.  o Limit your contact with others.  o Wear a simple mask to cover your cough.  o Wash your hands well and often.    Resources    [facility]: About COVID-19: [email or web address]    CDC: What to Do If You're Sick: www.cdc.gov/coronavirus/2019-ncov/about/steps-when-sick.html    CDC: Ending Home Isolation: www.cdc.gov/coronavirus/2019-ncov/hcp/disposition-in-home-patients.html     CDC: Caring for Someone: www.cdc.gov/coronavirus/2019-ncov/if-you-are-sick/care-for-someone.html     [facility]: Interim Guidance for Hospital Discharge to Home: www.health.[facility].[place].us/diseases/coronavirus/hcp/hospdischarge.pdf    [facility] clinical " trials (COVID-19 research studies): clinicalaffairs.[facility]/n-clinical-trials     Below are the COVID-19 hotlines at the [place] Department of Health ([facility]). Interpreters are available.   o For health questions: Call 989-803-5050 or 1-274.672.8637 (7 a.m. to 7 p.m.)  o For questions about schools and childcare: Call 732-702-9273 or 1-893.314.3783 (7 a.m. to 7 p.m.)     Reason for Disposition    [1] Rectal pain or redness AND [2] fever    Additional Information    Negative: Foreign body in rectum    Negative: Diarrhea is main symptom    Negative: Constipation is main symptom (e.g., pain or discomfort caused by passage of hard BMs)    Negative: Blood in or on bowel movement is main symptom    Negative: Pregnant    Negative: Pinworms are suspected (rectal itching; (white thread-like worm about size of a staple, moves)    Negative: Sexual assault    Negative: Injury to rectum    Negative: Large mass protruding out of rectum    Negative: Patient sounds very sick or weak to the triager    Negative: SEVERE rectal pain (e.g., excruciating, unable to have a bowel movement)    Protocols used: RECTAL SYMPTOMS-A-AH

## 2021-08-23 ENCOUNTER — PATIENT OUTREACH (OUTPATIENT)
Dept: GASTROENTEROLOGY | Facility: CLINIC | Age: 31
End: 2021-08-23

## 2021-08-23 ENCOUNTER — PRE VISIT (OUTPATIENT)
Dept: SURGERY | Facility: CLINIC | Age: 31
End: 2021-08-23

## 2021-08-23 ENCOUNTER — TELEPHONE (OUTPATIENT)
Dept: SURGERY | Facility: CLINIC | Age: 31
End: 2021-08-23

## 2021-08-23 DIAGNOSIS — K60.30 ANAL FISTULA: ICD-10-CM

## 2021-08-23 DIAGNOSIS — K50.111 CROHN'S DISEASE OF LARGE INTESTINE WITH RECTAL BLEEDING (H): Primary | ICD-10-CM

## 2021-08-23 NOTE — PROGRESS NOTES
Patient calls this am with an update  Stated out to lunch with his wife yesterday and started to have chills  Notes a small lump in his left side of rectum   States has been lump but worse.   Contacted the triage nurse and directed to go to emergency room as had a fever of 100.6   Patient states he was told to go to the emergency room so thought [facility] was part of [place]   Patient has ultrasound and said that an abscess of 3 cm.  States drainage of fluid   Culture taken  Given prescription for flagyl but has not picked up   Pt had been contacted last week 3 times occasions to set up appt by colon and rectal team.   Patient states when he called that number for colon and rectal no answer.   Pt just started humira on July 27.

## 2021-08-23 NOTE — PROGRESS NOTES
Discussed with Ms Hull  Start flagyl   Await appt with colon and rectal   If develops a fever again after office hours to go to the [facility] on [address]  Await call to be seen by colon and rectal   When author of this note called colon and rectal scheduling stated first opening is November.   Sent a message to the colon and rectal team   Will wait on humira level

## 2021-08-23 NOTE — TELEPHONE ENCOUNTER
RECORDS RECEIVED FROM: internal and CE   Appt date: 08/25/2021   NOTES STATUS DETAILS   OFFICE NOTE from referring provider  Internal 08/05/2021 -- Abdiaziz Hull PA-C in [facility]   OFFICE NOTE from other specialist   Internal 02/22/2021 -- Alfie Bowers MD -- [facility] [place]   DISCHARGE SUMMARY from hospital  N/A    DISCHARGE REPORT from the ER Care Everywhere 08/22/2021 -- Yakov Agudelo MD  - [facility]   OPERATIVE REPORT  N/A    MEDICATION LIST Internal    LABS     PFC TESTING N/A    ANAL PAP N/A    BIOPSIES/PATHOLOGY RELATED TO DIAGNOSIS Internal 06/28/2021 -- [facility] - A: Colon biopsy, left   B: Rectal biopsy    DIAGNOSTIC PROCEDURES     COLONOSCOPY Care Everywhere 06/15/2020 -- [facility]   UPPER ENDOSCOPY (EGD) N/A    FLEX SIGMOIDOSCOPY  Received 06/28/2021 -- [facility]  09/24/2020 -- [facility]   ERCP N/A    IMAGING (DISC & REPORT)      CT  N/A    MRI N/A    XRAY N/A    ULTRASOUND (ENDOANAL/ENDORECTAL) N/A

## 2021-08-25 ENCOUNTER — OFFICE VISIT (OUTPATIENT)
Dept: SURGERY | Facility: CLINIC | Age: 31
End: 2021-08-25
Payer: COMMERCIAL

## 2021-08-25 VITALS
WEIGHT: 191.6 LBS | SYSTOLIC BLOOD PRESSURE: 135 MMHG | HEART RATE: 59 BPM | OXYGEN SATURATION: 100 % | DIASTOLIC BLOOD PRESSURE: 88 MMHG | HEIGHT: 70 IN | BODY MASS INDEX: 27.43 KG/M2 | TEMPERATURE: 99.2 F

## 2021-08-25 DIAGNOSIS — K60.30 ANAL FISTULA: ICD-10-CM

## 2021-08-25 DIAGNOSIS — K61.0 PERIANAL ABSCESS: Primary | ICD-10-CM

## 2021-08-25 PROCEDURE — 46050 I&D PERIANAL ABSCESS SUPFC: CPT | Performed by: NURSE PRACTITIONER

## 2021-08-25 RX ORDER — LIDOCAINE HYDROCHLORIDE AND EPINEPHRINE 10; 10 MG/ML; UG/ML
2.5 INJECTION, SOLUTION INFILTRATION; PERINEURAL ONCE
Status: COMPLETED | OUTPATIENT
Start: 2021-08-25 | End: 2021-08-25

## 2021-08-25 RX ORDER — METRONIDAZOLE 500 MG/1
TABLET ORAL
COMMUNITY
Start: 2021-08-23 | End: 2021-09-06

## 2021-08-25 RX ORDER — CEFPODOXIME PROXETIL 200 MG/1
TABLET, FILM COATED ORAL
COMMUNITY
Start: 2021-08-23 | End: 2021-09-06

## 2021-08-25 RX ADMIN — LIDOCAINE HYDROCHLORIDE AND EPINEPHRINE 2.5 ML: 10; 10 INJECTION, SOLUTION INFILTRATION; PERINEURAL at 10:05

## 2021-08-25 ASSESSMENT — MIFFLIN-ST. JEOR: SCORE: 1832.4

## 2021-08-25 ASSESSMENT — PAIN SCALES - GENERAL: PAINLEVEL: MILD PAIN (2)

## 2021-08-25 NOTE — NURSING NOTE
"Chief Complaint   Patient presents with     New Patient     New consult for anal fistula       Vitals:    08/25/21 0858   BP: 135/88   BP Location: Left arm   Patient Position: Sitting   Cuff Size: Adult Regular   Pulse: 59   Temp: 99.2  F (37.3  C)   TempSrc: Oral   SpO2: 100%   Weight: 191 lb 9.6 oz   Height: 5' 9.5\"       Body mass index is 27.89 kg/m .       Isabella Sandoval CMA    "

## 2021-08-25 NOTE — PROGRESS NOTES
"Colon and Rectal Surgery Consult Clinic Note    Date: 2021     Referring provider:  No referring provider defined for this encounter.     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    HPI:  Cody was diagnosed with colitis last year.  He was initially diagnosed with Crohn's disease and was treated with Entyvio.  However, this was not helpful and he switched his care here to the [facility] and is following with Dr. Bowers.  He was started on mesalamine and Humira and has found this helpful.  He is now having but wanted to bowel movements a day.  He has had intermittent swelling in the perianal area for about a year.  This seems to come and go and he has noticed some mild bleeding or drainage with it on occasion.  However, on  he developed worsening area of pain and redness as well as a fever.  He was seen in the emergency room in [place] with aspiration of a perianal abscess.  He was also put on antibiotics.  He reports that this does not seem to have resolved his symptoms and he still has a swollen, painful area.    Physical Examination: Exam was chaperoned by Isabella Petit MA  /88 (BP Location: Left arm, Patient Position: Sitting, Cuff Size: Adult Regular)   Pulse 59   Temp 99.2  F (37.3  C) (Oral)   Ht 5' 9.5\"   Wt 191 lb 9.6 oz   SpO2 100%   BMI 27.89 kg/m    General: Alert, oriented, in no acute distress, sitting comfortably  HEENT: Mucous membranes moist  Perianal external examination:  Induration in the left posterior position with fluctuance present.  There is a tract of induration palpable extending anteriorly and towards the anus.  No erythema and no drainage present.    Digital rectal examination: Was deferred    Anoscopy: Was deferred    Procedures:  Prior to the start of the procedure and with procedural staff participation, I verbally confirmed " the patient s identity using two indicators, relevant allergies, that the procedure was appropriate and matched the consent or emergent situation, and that the correct equipment/implants were available. Immediately prior to starting the procedure I conducted the Time Out with the procedural staff and re-confirmed the patient s name, procedure, and site/side. (The Joint Commission universal protocol was followed.)  Yes    Sedation (Moderate or Deep): None    Effected area cleaned with betadine. 1% lidocaine with epineprhine used to infiltrate the area with good anethesia. Scapel used to make a cruciate incision and edges trimmed. A small amount of purulent drainage was removed.  I was able to gently insert a fistula probe into the site through a tract of induration that extends anteriorly and toward the anal canal approximately 5 to 6 cm.  No gauze was needed for packing as area was small. External gauze dressing applied. Pt tolerated preocedure well. No additional loculations noted.      Assessment/Plan: 31 year old male with colitis and perianal abscess.  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  Would like to get a 3T MRI when this is settled down in a few weeks.  Discussed that I would recommend a seton placement.  However, he would like to consider continued medical therapy to see if this will slow down the perianal disease, which is likely Crohn's disease to try to avoid or hold off on surgery as he is having a baby in 2 weeks.  We will have him discuss this with Dr. Devries but I worry that he will develop a recurrent abscess given the size of the fistula tract.  He tolerated I&D in clinic in today and I would like to have him return next week for recheck.  Asked him to contact the clinic in the meantime with any questions or concerns. Patient's questions were answered to his stated satisfaction and he is in agreement with this plan.      Medical  history:  No past medical history on file.    Surgical history:  No past surgical history on file.    Problem list:  Patient Active Problem List    Diagnosis Date Noted     IBD (inflammatory bowel disease) 2021     Priority: Medium       Medications:  Current Outpatient Medications   Medication Sig Dispense Refill     adalimumab (HUMIRA *CF*) 40 MG/0.4ML pen kit Inject 0.4 mLs (40 mg) Subcutaneous every 14 days 2 each 5     Budesonide 2 MG/ACT FOAM Place 2 mg rectally 2 times daily for 14 days, THEN 2 mg daily for 28 days. 33.4 g 1     cefpodoxime (VANTIN) 200 MG tablet TAKE 2 TABLETS BY MOUTH TWICE DAILY for five days       dicyclomine (BENTYL) 10 MG capsule Take 1 capsule (10 mg) by mouth 2 times daily 90 capsule 1     lisinopril (ZESTRIL) 20 MG tablet Take 20 mg by mouth       mesalamine (LIALDA) 1.2 g EC tablet Take 4 tablets (4,800 mg) by mouth every morning 180 tablet 3     metroNIDAZOLE (FLAGYL) 500 MG tablet Take 1 tablet by mouth twice a day for 5 days. Dont drink alcohol when taking this antibiotic.       adalimumab (HUMIRA *CF*) 80 MG/0.8ML pen kit Inject 0.8 mLs (80 mg) Subcutaneous once for 1 dose On day 0 inject 160 mg (2 pens) subcutaneous,  On day 14 inject  80 mg (1 pen) subcutaneous 3 each 0       Allergies:  No Known Allergies    Family history:  Family History   Problem Relation Age of Onset     Multiple Sclerosis Mother      Ulcerative Colitis Maternal Grandmother      Colon Cancer No family hx of      Inflammatory Bowel Disease No family hx of      Irritable Bowel Syndrome No family hx of      Crohn's Disease No family hx of        Social history:  Social History     Tobacco Use     Smoking status: Former Smoker     Packs/day: 0.50     Years: 2.50     Pack years: 1.25     Types: Cigarettes     Start date:      Quit date: 2020     Years since quittin.0     Smokeless tobacco: Never Used   Substance Use Topics     Alcohol use: Not on file    Marital status: .  Occupation:  "[employer]    Nursing Notes:   Isabella Calderón CMA  8/25/2021  9:03 AM  Signed  Chief Complaint   Patient presents with     New Patient     New consult for anal fistula       Vitals:    08/25/21 0858   BP: 135/88   BP Location: Left arm   Patient Position: Sitting   Cuff Size: Adult Regular   Pulse: 59   Temp: 99.2  F (37.3  C)   TempSrc: Oral   SpO2: 100%   Weight: 191 lb 9.6 oz   Height: 5' 9.5\"       Body mass index is 27.89 kg/m .       Isabella Sandoval CMA         30 minutes spent on the date of the encounter doing chart review, history and exam, documentation and further activities as noted above.     MEENAKSHI Bass, NP-C  Colon and Rectal Surgery   [facility]    This note was created using speech recognition software and may contain unintended word substitutions.    "

## 2021-08-25 NOTE — LETTER
"2021       RE: Cody Pickard  [address] [address] [place] [id number]     Dear Colleague,    Thank you for referring your patient, Cody Pickard, to the [facility] COLON AND RECTAL SURGERY CLINIC [place] at [facility]. Please see a copy of my visit note below.    Colon and Rectal Surgery Consult Clinic Note    Date: 2021     Referring provider:  No referring provider defined for this encounter.     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    HPI:  Cody was diagnosed with colitis last year.  He was initially diagnosed with Crohn's disease and was treated with Entyvio.  However, this was not helpful and he switched his care here to the [place] and is following with Dr. Bowers.  He was started on mesalamine and Humira and has found this helpful.  He is now having but wanted to bowel movements a day.  He has had intermittent swelling in the perianal area for about a year.  This seems to come and go and he has noticed some mild bleeding or drainage with it on occasion.  However, on  he developed worsening area of pain and redness as well as a fever.  He was seen in the emergency room in [place] with aspiration of a perianal abscess.  He was also put on antibiotics.  He reports that this does not seem to have resolved his symptoms and he still has a swollen, painful area.    Physical Examination: Exam was chaperoned by Isabella Petit MA  /88 (BP Location: Left arm, Patient Position: Sitting, Cuff Size: Adult Regular)   Pulse 59   Temp 99.2  F (37.3  C) (Oral)   Ht 5' 9.5\"   Wt 191 lb 9.6 oz   SpO2 100%   BMI 27.89 kg/m    General: Alert, oriented, in no acute distress, sitting comfortably  HEENT: Mucous membranes moist  Perianal external examination:  Induration in the left posterior position with " fluctuance present.  There is a tract of induration palpable extending anteriorly and towards the anus.  No erythema and no drainage present.    Digital rectal examination: Was deferred    Anoscopy: Was deferred    Procedures:  Prior to the start of the procedure and with procedural staff participation, I verbally confirmed the patient s identity using two indicators, relevant allergies, that the procedure was appropriate and matched the consent or emergent situation, and that the correct equipment/implants were available. Immediately prior to starting the procedure I conducted the Time Out with the procedural staff and re-confirmed the patient s name, procedure, and site/side. (The Joint Commission universal protocol was followed.)  Yes    Sedation (Moderate or Deep): None    Effected area cleaned with betadine. 1% lidocaine with epineprhine used to infiltrate the area with good anethesia. Scapel used to make a cruciate incision and edges trimmed. A small amount of purulent drainage was removed.  I was able to gently insert a fistula probe into the site through a tract of induration that extends anteriorly and toward the anal canal approximately 5 to 6 cm.  No gauze was needed for packing as area was small. External gauze dressing applied. Pt tolerated preocedure well. No additional loculations noted.      Assessment/Plan: 31 year old male with colitis and perianal abscess.  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  Would like to get a 3T MRI when this is settled down in a few weeks.  Discussed that I would recommend a seton placement.  However, he would like to consider continued medical therapy to see if this will slow down the perianal disease, which is likely Crohn's disease to try to avoid or hold off on surgery as he is having a baby in 2 weeks.  We will have him discuss this with Dr. Devries but I worry that he will develop a recurrent abscess given the  size of the fistula tract.  He tolerated I&D in clinic in today and I would like to have him return next week for recheck.  Asked him to contact the clinic in the meantime with any questions or concerns. Patient's questions were answered to his stated satisfaction and he is in agreement with this plan.      Medical history:  No past medical history on file.    Surgical history:  No past surgical history on file.    Problem list:  Patient Active Problem List    Diagnosis Date Noted     IBD (inflammatory bowel disease) 02/22/2021     Priority: Medium       Medications:  Current Outpatient Medications   Medication Sig Dispense Refill     adalimumab (HUMIRA *CF*) 40 MG/0.4ML pen kit Inject 0.4 mLs (40 mg) Subcutaneous every 14 days 2 each 5     Budesonide 2 MG/ACT FOAM Place 2 mg rectally 2 times daily for 14 days, THEN 2 mg daily for 28 days. 33.4 g 1     cefpodoxime (VANTIN) 200 MG tablet TAKE 2 TABLETS BY MOUTH TWICE DAILY for five days       dicyclomine (BENTYL) 10 MG capsule Take 1 capsule (10 mg) by mouth 2 times daily 90 capsule 1     lisinopril (ZESTRIL) 20 MG tablet Take 20 mg by mouth       mesalamine (LIALDA) 1.2 g EC tablet Take 4 tablets (4,800 mg) by mouth every morning 180 tablet 3     metroNIDAZOLE (FLAGYL) 500 MG tablet Take 1 tablet by mouth twice a day for 5 days. Dont drink alcohol when taking this antibiotic.       adalimumab (HUMIRA *CF*) 80 MG/0.8ML pen kit Inject 0.8 mLs (80 mg) Subcutaneous once for 1 dose On day 0 inject 160 mg (2 pens) subcutaneous,  On day 14 inject  80 mg (1 pen) subcutaneous 3 each 0       Allergies:  No Known Allergies    Family history:  Family History   Problem Relation Age of Onset     Multiple Sclerosis Mother      Ulcerative Colitis Maternal Grandmother      Colon Cancer No family hx of      Inflammatory Bowel Disease No family hx of      Irritable Bowel Syndrome No family hx of      Crohn's Disease No family hx of        Social history:  Social History     Tobacco  "Use     Smoking status: Former Smoker     Packs/day: 0.50     Years: 2.50     Pack years: 1.25     Types: Cigarettes     Start date:      Quit date: 2020     Years since quittin.0     Smokeless tobacco: Never Used   Substance Use Topics     Alcohol use: Not on file    Marital status: .  Occupation: [employer] for [employer]    Nursing Notes:   Isabella Calderón CMA  2021  9:03 AM  Signed  Chief Complaint   Patient presents with     New Patient     New consult for anal fistula       Vitals:    21 0858   BP: 135/88   BP Location: Left arm   Patient Position: Sitting   Cuff Size: Adult Regular   Pulse: 59   Temp: 99.2  F (37.3  C)   TempSrc: Oral   SpO2: 100%   Weight: 191 lb 9.6 oz   Height: 5' 9.5\"       Body mass index is 27.89 kg/m .       Isabella Sandoval CMA         30 minutes spent on the date of the encounter doing chart review, history and exam, documentation and further activities as noted above.     MEENAKSHI Lance, NP-C  Colon and Rectal Surgery   [facility]    This note was created using speech recognition software and may contain unintended word substitutions.        Again, thank you for allowing me to participate in the care of your patient.      Sincerely,    MEENAKSHI Lance CNP      "

## 2021-09-03 ENCOUNTER — PATIENT OUTREACH (OUTPATIENT)
Dept: GASTROENTEROLOGY | Facility: CLINIC | Age: 31
End: 2021-09-03

## 2021-09-03 ENCOUNTER — OFFICE VISIT (OUTPATIENT)
Dept: SURGERY | Facility: CLINIC | Age: 31
End: 2021-09-03
Payer: COMMERCIAL

## 2021-09-03 ENCOUNTER — LAB (OUTPATIENT)
Dept: LAB | Facility: CLINIC | Age: 31
End: 2021-09-03
Payer: COMMERCIAL

## 2021-09-03 VITALS
WEIGHT: 183.8 LBS | HEIGHT: 70 IN | DIASTOLIC BLOOD PRESSURE: 74 MMHG | HEART RATE: 117 BPM | SYSTOLIC BLOOD PRESSURE: 121 MMHG | OXYGEN SATURATION: 100 % | BODY MASS INDEX: 26.31 KG/M2

## 2021-09-03 DIAGNOSIS — K60.30 ANAL FISTULA: ICD-10-CM

## 2021-09-03 DIAGNOSIS — R19.7 DIARRHEA: ICD-10-CM

## 2021-09-03 DIAGNOSIS — K50.111 CROHN'S DISEASE OF LARGE INTESTINE WITH RECTAL BLEEDING (H): Primary | ICD-10-CM

## 2021-09-03 DIAGNOSIS — K50.111 CROHN'S DISEASE OF LARGE INTESTINE WITH RECTAL BLEEDING (H): ICD-10-CM

## 2021-09-03 DIAGNOSIS — K60.30 ANAL FISTULA: Primary | ICD-10-CM

## 2021-09-03 DIAGNOSIS — K61.0 PERIANAL ABSCESS: Primary | ICD-10-CM

## 2021-09-03 LAB
ALBUMIN SERPL-MCNC: 3.4 G/DL (ref 3.4–5)
ALP SERPL-CCNC: 61 U/L (ref 40–150)
ALT SERPL W P-5'-P-CCNC: 40 U/L (ref 0–70)
AST SERPL W P-5'-P-CCNC: 17 U/L (ref 0–45)
BASOPHILS # BLD AUTO: 0 10E3/UL (ref 0–0.2)
BASOPHILS NFR BLD AUTO: 0 %
BILIRUB DIRECT SERPL-MCNC: 0.2 MG/DL (ref 0–0.2)
BILIRUB SERPL-MCNC: 0.6 MG/DL (ref 0.2–1.3)
CRP SERPL-MCNC: 43 MG/L (ref 0–8)
EOSINOPHIL # BLD AUTO: 0 10E3/UL (ref 0–0.7)
EOSINOPHIL NFR BLD AUTO: 0 %
ERYTHROCYTE [DISTWIDTH] IN BLOOD BY AUTOMATED COUNT: 12.1 % (ref 10–15)
ERYTHROCYTE [SEDIMENTATION RATE] IN BLOOD BY WESTERGREN METHOD: 9 MM/HR (ref 0–15)
HCT VFR BLD AUTO: 37.5 % (ref 40–53)
HGB BLD-MCNC: 12.6 G/DL (ref 13.3–17.7)
IMM GRANULOCYTES # BLD: 0.1 10E3/UL
IMM GRANULOCYTES NFR BLD: 1 %
LYMPHOCYTES # BLD AUTO: 0.8 10E3/UL (ref 0.8–5.3)
LYMPHOCYTES NFR BLD AUTO: 4 %
MCH RBC QN AUTO: 28.9 PG (ref 26.5–33)
MCHC RBC AUTO-ENTMCNC: 33.6 G/DL (ref 31.5–36.5)
MCV RBC AUTO: 86 FL (ref 78–100)
MONOCYTES # BLD AUTO: 1.4 10E3/UL (ref 0–1.3)
MONOCYTES NFR BLD AUTO: 8 %
NEUTROPHILS # BLD AUTO: 16 10E3/UL (ref 1.6–8.3)
NEUTROPHILS NFR BLD AUTO: 87 %
NRBC # BLD AUTO: 0 10E3/UL
NRBC BLD AUTO-RTO: 0 /100
PLATELET # BLD AUTO: 411 10E3/UL (ref 150–450)
PROT SERPL-MCNC: 7.5 G/DL (ref 6.8–8.8)
RBC # BLD AUTO: 4.36 10E6/UL (ref 4.4–5.9)
WBC # BLD AUTO: 18.3 10E3/UL (ref 4–11)

## 2021-09-03 PROCEDURE — 86140 C-REACTIVE PROTEIN: CPT | Performed by: PATHOLOGY

## 2021-09-03 PROCEDURE — 99024 POSTOP FOLLOW-UP VISIT: CPT | Performed by: NURSE PRACTITIONER

## 2021-09-03 PROCEDURE — 85025 COMPLETE CBC W/AUTO DIFF WBC: CPT | Performed by: PATHOLOGY

## 2021-09-03 PROCEDURE — 36415 COLL VENOUS BLD VENIPUNCTURE: CPT | Performed by: PATHOLOGY

## 2021-09-03 PROCEDURE — 85652 RBC SED RATE AUTOMATED: CPT | Performed by: PATHOLOGY

## 2021-09-03 PROCEDURE — 80076 HEPATIC FUNCTION PANEL: CPT | Performed by: PATHOLOGY

## 2021-09-03 ASSESSMENT — PAIN SCALES - GENERAL: PAINLEVEL: MILD PAIN (2)

## 2021-09-03 ASSESSMENT — MIFFLIN-ST. JEOR: SCORE: 1797.02

## 2021-09-03 NOTE — NURSING NOTE
"Chief Complaint   Patient presents with     Follow Up     f/u after I&D       Vitals:    09/03/21 1230   BP: 121/74   BP Location: Left arm   Patient Position: Sitting   Cuff Size: Adult Regular   Pulse: 117   SpO2: 100%   Weight: 183 lb 12.8 oz   Height: 5' 9.5\"       Body mass index is 26.75 kg/m .    Yaima Bergeron CMA    "

## 2021-09-03 NOTE — PROGRESS NOTES
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    Interval history: Cody's wife had their baby, Connor, the day after he was in clinic last week. [facility] is [facility]. Cody's abscess site has been doing better. He had a lot of drainage the first day but this has slowed down. No fevers or chills. No significant pain at that site. He had a lot of diarrhea yesterday and today and his anal opening is sore. He is due for his Humira today.    Physical Examination: Exam was chaperoned by Yaima Bergeron MA   /74 (BP Location: Left arm, Patient Position: Sitting, Cuff Size: Adult Regular)   Pulse 117   Ht 5' 9.5\"   Wt 183 lb 12.8 oz   SpO2 100%   BMI 26.75 kg/m    General: alert, oriented, in no acute distress, sitting comfortably  HEENT: mucous membranes moist  Perianal external examination:  I&D site in the left posterior position only superficially open with a small amount of induration but no fluctuance or erythema. Minimal drainage. Some perianal excoriation and skin tag present.    Digital rectal examination: Was deferred.    Anoscopy: Was deferred.    Assessment/Plan: 31 year old male with indeterminate colitis with perianal abscess s/p I&D in clinic last week. Discussed likely Crohn's with perianal fistula. Scheduled for 3T MRI on . He discussed seton with Dr. Bowers and would like to schedule this, assuming there is a fistula on MRI. He had increased diarrhea yesterday and today. Spoke with Carmelita Adame RN with GI who will order labs and stool studies. Asked him to contact the clinic in the meantime with any questions or concerns. Patient's questions were answered to his stated satisfaction and he is in agreement with this plan.    Medical history:  No past " medical history on file.    Surgical history:  No past surgical history on file.    Problem list:  Patient Active Problem List    Diagnosis Date Noted     IBD (inflammatory bowel disease) 2021     Priority: Medium       Medications:  Current Outpatient Medications   Medication Sig Dispense Refill     adalimumab (HUMIRA *CF*) 40 MG/0.4ML pen kit Inject 0.4 mLs (40 mg) Subcutaneous every 14 days 2 each 5     Budesonide 2 MG/ACT FOAM Place 2 mg rectally 2 times daily for 14 days, THEN 2 mg daily for 28 days. 33.4 g 1     cefpodoxime (VANTIN) 200 MG tablet TAKE 2 TABLETS BY MOUTH TWICE DAILY for five days       dicyclomine (BENTYL) 10 MG capsule Take 1 capsule (10 mg) by mouth 2 times daily 90 capsule 1     lisinopril (ZESTRIL) 20 MG tablet Take 20 mg by mouth       mesalamine (LIALDA) 1.2 g EC tablet Take 4 tablets (4,800 mg) by mouth every morning 180 tablet 3     metroNIDAZOLE (FLAGYL) 500 MG tablet Take 1 tablet by mouth twice a day for 5 days. Dont drink alcohol when taking this antibiotic.       adalimumab (HUMIRA *CF*) 80 MG/0.8ML pen kit Inject 0.8 mLs (80 mg) Subcutaneous once for 1 dose On day 0 inject 160 mg (2 pens) subcutaneous,  On day 14 inject  80 mg (1 pen) subcutaneous 3 each 0       Allergies:  No Known Allergies    Family history:  Family History   Problem Relation Age of Onset     Multiple Sclerosis Mother      Ulcerative Colitis Maternal Grandmother      Colon Cancer No family hx of      Inflammatory Bowel Disease No family hx of      Irritable Bowel Syndrome No family hx of      Crohn's Disease No family hx of        Social history:  Social History     Tobacco Use     Smoking status: Former Smoker     Packs/day: 0.50     Years: 2.50     Pack years: 1.25     Types: Cigarettes     Start date:      Quit date: 2020     Years since quittin.0     Smokeless tobacco: Never Used   Substance Use Topics     Alcohol use: Not on file     Marital status: .    Nursing Notes:   Rubio  "Yaima  9/3/2021 12:32 PM  Sign at exiting of workspace  Chief Complaint   Patient presents with     Follow Up     f/u after I&D       Vitals:    09/03/21 1230   BP: 121/74   BP Location: Left arm   Patient Position: Sitting   Cuff Size: Adult Regular   Pulse: 117   SpO2: 100%   Weight: 183 lb 12.8 oz   Height: 5' 9.5\"       Body mass index is 26.75 kg/m .    Yaima Bergeron CMA         15 minutes spent on the date of the encounter doing chart review, history and exam, documentation and further activities as noted above.     Rachel Mahmood NP-C  Colon and Rectal Surgery  [facility]    "

## 2021-09-03 NOTE — LETTER
"9/3/2021       RE: Cody Pickard  [address] [address] [place] [id number]     Dear Colleague,    Thank you for referring your patient, Cody Pickard, to the [facility] COLON AND RECTAL SURGERY CLINIC [place] at [facility]. Please see a copy of my visit note below.    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    Interval history: Cody's wife had their baby, Connor, the day after he was in clinic last week. [facility] at [facility] but is [facility] well. Cody's abscess site has been doing better. He had a lot of drainage the first day but this has slowed down. No fevers or chills. No significant pain at that site. He had a lot of diarrhea yesterday and today and his anal opening is sore. He is due for his Humira today.    Physical Examination: Exam was chaperoned by Yaima Bergeron MA   /74 (BP Location: Left arm, Patient Position: Sitting, Cuff Size: Adult Regular)   Pulse 117   Ht 5' 9.5\"   Wt 183 lb 12.8 oz   SpO2 100%   BMI 26.75 kg/m    General: alert, oriented, in no acute distress, sitting comfortably  HEENT: mucous membranes moist  Perianal external examination:  I&D site in the left posterior position only superficially open with a small amount of induration but no fluctuance or erythema. Minimal drainage. Some perianal excoriation and skin tag present.    Digital rectal examination: Was deferred.    Anoscopy: Was deferred.    Assessment/Plan: 31 year old male with indeterminate colitis with perianal abscess s/p I&D in clinic last week. Discussed likely Crohn's with perianal fistula. Scheduled for 3T MRI on . He discussed seton with Dr. Bowers and would like to schedule this, assuming there is a fistula on MRI. He had " increased diarrhea yesterday and today. Spoke with Carmelita Adame RN with GI who will order labs and stool studies. Asked him to contact the clinic in the meantime with any questions or concerns. Patient's questions were answered to his stated satisfaction and he is in agreement with this plan.    Medical history:  No past medical history on file.    Surgical history:  No past surgical history on file.    Problem list:  Patient Active Problem List    Diagnosis Date Noted     IBD (inflammatory bowel disease) 02/22/2021     Priority: Medium       Medications:  Current Outpatient Medications   Medication Sig Dispense Refill     adalimumab (HUMIRA *CF*) 40 MG/0.4ML pen kit Inject 0.4 mLs (40 mg) Subcutaneous every 14 days 2 each 5     Budesonide 2 MG/ACT FOAM Place 2 mg rectally 2 times daily for 14 days, THEN 2 mg daily for 28 days. 33.4 g 1     cefpodoxime (VANTIN) 200 MG tablet TAKE 2 TABLETS BY MOUTH TWICE DAILY for five days       dicyclomine (BENTYL) 10 MG capsule Take 1 capsule (10 mg) by mouth 2 times daily 90 capsule 1     lisinopril (ZESTRIL) 20 MG tablet Take 20 mg by mouth       mesalamine (LIALDA) 1.2 g EC tablet Take 4 tablets (4,800 mg) by mouth every morning 180 tablet 3     metroNIDAZOLE (FLAGYL) 500 MG tablet Take 1 tablet by mouth twice a day for 5 days. Dont drink alcohol when taking this antibiotic.       adalimumab (HUMIRA *CF*) 80 MG/0.8ML pen kit Inject 0.8 mLs (80 mg) Subcutaneous once for 1 dose On day 0 inject 160 mg (2 pens) subcutaneous,  On day 14 inject  80 mg (1 pen) subcutaneous 3 each 0       Allergies:  No Known Allergies    Family history:  Family History   Problem Relation Age of Onset     Multiple Sclerosis Mother      Ulcerative Colitis Maternal Grandmother      Colon Cancer No family hx of      Inflammatory Bowel Disease No family hx of      Irritable Bowel Syndrome No family hx of      Crohn's Disease No family hx of        Social history:  Social History     Tobacco Use     Smoking  "status: Former Smoker     Packs/day: 0.50     Years: 2.50     Pack years: 1.25     Types: Cigarettes     Start date:      Quit date: 2020     Years since quittin.0     Smokeless tobacco: Never Used   Substance Use Topics     Alcohol use: Not on file     Marital status: .    Nursing Notes:   Yaima Bergeron  9/3/2021 12:32 PM  Sign at exiting of workspace  Chief Complaint   Patient presents with     Follow Up     f/u after I&D       Vitals:    21 1230   BP: 121/74   BP Location: Left arm   Patient Position: Sitting   Cuff Size: Adult Regular   Pulse: 117   SpO2: 100%   Weight: 183 lb 12.8 oz   Height: 5' 9.5\"       Body mass index is 26.75 kg/m .    Yaima Bergeron CMA         15 minutes spent on the date of the encounter doing chart review, history and exam, documentation and further activities as noted above.     LYN BassC  Colon and Rectal Surgery  [facility]        Again, thank you for allowing me to participate in the care of your patient.      Sincerely,    Rachel Holt, APRN CNP      "

## 2021-09-06 ENCOUNTER — ANESTHESIA (OUTPATIENT)
Dept: SURGERY | Facility: CLINIC | Age: 31
End: 2021-09-06
Payer: COMMERCIAL

## 2021-09-06 ENCOUNTER — APPOINTMENT (OUTPATIENT)
Dept: CT IMAGING | Facility: CLINIC | Age: 31
End: 2021-09-06
Attending: EMERGENCY MEDICINE
Payer: COMMERCIAL

## 2021-09-06 ENCOUNTER — HOSPITAL ENCOUNTER (OUTPATIENT)
Facility: CLINIC | Age: 31
Discharge: HOME | End: 2021-09-07
Attending: EMERGENCY MEDICINE | Admitting: COLON & RECTAL SURGERY
Payer: COMMERCIAL

## 2021-09-06 ENCOUNTER — ANESTHESIA EVENT (OUTPATIENT)
Dept: SURGERY | Facility: CLINIC | Age: 31
End: 2021-09-06
Payer: COMMERCIAL

## 2021-09-06 DIAGNOSIS — Z11.52 ENCOUNTER FOR SCREENING LABORATORY TESTING FOR SEVERE ACUTE RESPIRATORY SYNDROME CORONAVIRUS 2 (SARS-COV-2): ICD-10-CM

## 2021-09-06 DIAGNOSIS — G89.18 ACUTE POST-OPERATIVE PAIN: Primary | ICD-10-CM

## 2021-09-06 DIAGNOSIS — K61.0 PERIANAL ABSCESS: ICD-10-CM

## 2021-09-06 DIAGNOSIS — K50.10 CROHN'S DISEASE OF LARGE INTESTINE WITHOUT COMPLICATION (H): ICD-10-CM

## 2021-09-06 DIAGNOSIS — K50.114 PERIANAL CROHN'S DISEASE, WITH ABSCESS (H): ICD-10-CM

## 2021-09-06 LAB
ALBUMIN SERPL-MCNC: 3.6 G/DL (ref 3.4–5)
ALP SERPL-CCNC: 60 U/L (ref 40–150)
ALT SERPL W P-5'-P-CCNC: 31 U/L (ref 0–70)
ANION GAP SERPL CALCULATED.3IONS-SCNC: 6 MMOL/L (ref 3–14)
AST SERPL W P-5'-P-CCNC: 14 U/L (ref 0–45)
BASOPHILS # BLD AUTO: 0 10E3/UL (ref 0–0.2)
BASOPHILS NFR BLD AUTO: 0 %
BILIRUB SERPL-MCNC: 0.6 MG/DL (ref 0.2–1.3)
BUN SERPL-MCNC: 9 MG/DL (ref 7–30)
CALCIUM SERPL-MCNC: 9.3 MG/DL (ref 8.5–10.1)
CHLORIDE BLD-SCNC: 105 MMOL/L (ref 94–109)
CO2 SERPL-SCNC: 26 MMOL/L (ref 20–32)
CREAT SERPL-MCNC: 0.83 MG/DL (ref 0.66–1.25)
CRP SERPL-MCNC: 27 MG/L (ref 0–8)
EOSINOPHIL # BLD AUTO: 0 10E3/UL (ref 0–0.7)
EOSINOPHIL NFR BLD AUTO: 0 %
ERYTHROCYTE [DISTWIDTH] IN BLOOD BY AUTOMATED COUNT: 12.2 % (ref 10–15)
ERYTHROCYTE [SEDIMENTATION RATE] IN BLOOD BY WESTERGREN METHOD: 13 MM/HR (ref 0–15)
GFR SERPL CREATININE-BSD FRML MDRD: >90 ML/MIN/1.73M2
GLUCOSE BLD-MCNC: 94 MG/DL (ref 70–99)
HCT VFR BLD AUTO: 38.7 % (ref 40–53)
HGB BLD-MCNC: 12.9 G/DL (ref 13.3–17.7)
HOLD SPECIMEN: NORMAL
IMM GRANULOCYTES # BLD: 0.1 10E3/UL
IMM GRANULOCYTES NFR BLD: 1 %
LYMPHOCYTES # BLD AUTO: 1.4 10E3/UL (ref 0.8–5.3)
LYMPHOCYTES NFR BLD AUTO: 8 %
MCH RBC QN AUTO: 28.8 PG (ref 26.5–33)
MCHC RBC AUTO-ENTMCNC: 33.3 G/DL (ref 31.5–36.5)
MCV RBC AUTO: 86 FL (ref 78–100)
MONOCYTES # BLD AUTO: 1.2 10E3/UL (ref 0–1.3)
MONOCYTES NFR BLD AUTO: 6 %
NEUTROPHILS # BLD AUTO: 15.8 10E3/UL (ref 1.6–8.3)
NEUTROPHILS NFR BLD AUTO: 85 %
NRBC # BLD AUTO: 0 10E3/UL
NRBC BLD AUTO-RTO: 0 /100
PLATELET # BLD AUTO: 429 10E3/UL (ref 150–450)
POTASSIUM BLD-SCNC: 4 MMOL/L (ref 3.4–5.3)
PROT SERPL-MCNC: 8.2 G/DL (ref 6.8–8.8)
RBC # BLD AUTO: 4.48 10E6/UL (ref 4.4–5.9)
SARS-COV-2 RNA RESP QL NAA+PROBE: NEGATIVE
SODIUM SERPL-SCNC: 137 MMOL/L (ref 133–144)
WBC # BLD AUTO: 18.6 10E3/UL (ref 4–11)

## 2021-09-06 PROCEDURE — 250N000011 HC RX IP 250 OP 636: Performed by: STUDENT IN AN ORGANIZED HEALTH CARE EDUCATION/TRAINING PROGRAM

## 2021-09-06 PROCEDURE — 87040 BLOOD CULTURE FOR BACTERIA: CPT | Performed by: EMERGENCY MEDICINE

## 2021-09-06 PROCEDURE — 272N000001 HC OR GENERAL SUPPLY STERILE: Performed by: COLON & RECTAL SURGERY

## 2021-09-06 PROCEDURE — 120N000002 HC R&B MED SURG/OB UMMC

## 2021-09-06 PROCEDURE — 96367 TX/PROPH/DG ADDL SEQ IV INF: CPT | Mod: 59 | Performed by: EMERGENCY MEDICINE

## 2021-09-06 PROCEDURE — 74177 CT ABD & PELVIS W/CONTRAST: CPT

## 2021-09-06 PROCEDURE — 250N000025 HC SEVOFLURANE, PER MIN: Performed by: COLON & RECTAL SURGERY

## 2021-09-06 PROCEDURE — 250N000009 HC RX 250: Performed by: STUDENT IN AN ORGANIZED HEALTH CARE EDUCATION/TRAINING PROGRAM

## 2021-09-06 PROCEDURE — 85025 COMPLETE CBC W/AUTO DIFF WBC: CPT | Performed by: EMERGENCY MEDICINE

## 2021-09-06 PROCEDURE — 36415 COLL VENOUS BLD VENIPUNCTURE: CPT | Performed by: EMERGENCY MEDICINE

## 2021-09-06 PROCEDURE — 46020 PLACEMENT OF SETON: CPT | Performed by: COLON & RECTAL SURGERY

## 2021-09-06 PROCEDURE — 250N000009 HC RX 250: Performed by: COLON & RECTAL SURGERY

## 2021-09-06 PROCEDURE — 370N000017 HC ANESTHESIA TECHNICAL FEE, PER MIN: Performed by: COLON & RECTAL SURGERY

## 2021-09-06 PROCEDURE — 99285 EMERGENCY DEPT VISIT HI MDM: CPT | Mod: 25 | Performed by: EMERGENCY MEDICINE

## 2021-09-06 PROCEDURE — 96375 TX/PRO/DX INJ NEW DRUG ADDON: CPT | Mod: 59 | Performed by: EMERGENCY MEDICINE

## 2021-09-06 PROCEDURE — 250N000011 HC RX IP 250 OP 636: Performed by: EMERGENCY MEDICINE

## 2021-09-06 PROCEDURE — 86140 C-REACTIVE PROTEIN: CPT | Performed by: EMERGENCY MEDICINE

## 2021-09-06 PROCEDURE — U0005 INFEC AGEN DETEC AMPLI PROBE: HCPCS | Performed by: EMERGENCY MEDICINE

## 2021-09-06 PROCEDURE — 74177 CT ABD & PELVIS W/CONTRAST: CPT | Mod: 26 | Performed by: RADIOLOGY

## 2021-09-06 PROCEDURE — 96365 THER/PROPH/DIAG IV INF INIT: CPT | Mod: 59 | Performed by: EMERGENCY MEDICINE

## 2021-09-06 PROCEDURE — 99285 EMERGENCY DEPT VISIT HI MDM: CPT | Performed by: EMERGENCY MEDICINE

## 2021-09-06 PROCEDURE — 250N000011 HC RX IP 250 OP 636

## 2021-09-06 PROCEDURE — 85652 RBC SED RATE AUTOMATED: CPT | Performed by: EMERGENCY MEDICINE

## 2021-09-06 PROCEDURE — C9803 HOPD COVID-19 SPEC COLLECT: HCPCS | Performed by: EMERGENCY MEDICINE

## 2021-09-06 PROCEDURE — 360N000075 HC SURGERY LEVEL 2, PER MIN: Performed by: COLON & RECTAL SURGERY

## 2021-09-06 PROCEDURE — 80053 COMPREHEN METABOLIC PANEL: CPT | Performed by: EMERGENCY MEDICINE

## 2021-09-06 PROCEDURE — 96366 THER/PROPH/DIAG IV INF ADDON: CPT | Performed by: EMERGENCY MEDICINE

## 2021-09-06 PROCEDURE — 710N000010 HC RECOVERY PHASE 1, LEVEL 2, PER MIN: Performed by: COLON & RECTAL SURGERY

## 2021-09-06 RX ORDER — KETOROLAC TROMETHAMINE 15 MG/ML
15 INJECTION, SOLUTION INTRAMUSCULAR; INTRAVENOUS ONCE
Status: COMPLETED | OUTPATIENT
Start: 2021-09-06 | End: 2021-09-06

## 2021-09-06 RX ORDER — CEFTRIAXONE 1 G/1
1 INJECTION, POWDER, FOR SOLUTION INTRAMUSCULAR; INTRAVENOUS EVERY 24 HOURS
Status: COMPLETED | OUTPATIENT
Start: 2021-09-06 | End: 2021-09-06

## 2021-09-06 RX ORDER — IOPAMIDOL 755 MG/ML
111 INJECTION, SOLUTION INTRAVASCULAR ONCE
Status: COMPLETED | OUTPATIENT
Start: 2021-09-06 | End: 2021-09-06

## 2021-09-06 RX ORDER — ONDANSETRON 4 MG/1
4 TABLET, ORALLY DISINTEGRATING ORAL EVERY 30 MIN PRN
Status: DISCONTINUED | OUTPATIENT
Start: 2021-09-06 | End: 2021-09-07 | Stop reason: HOSPADM

## 2021-09-06 RX ORDER — LIDOCAINE HYDROCHLORIDE 20 MG/ML
INJECTION, SOLUTION INFILTRATION; PERINEURAL PRN
Status: DISCONTINUED | OUTPATIENT
Start: 2021-09-06 | End: 2021-09-06

## 2021-09-06 RX ORDER — FENTANYL CITRATE 50 UG/ML
INJECTION, SOLUTION INTRAMUSCULAR; INTRAVENOUS PRN
Status: DISCONTINUED | OUTPATIENT
Start: 2021-09-06 | End: 2021-09-06

## 2021-09-06 RX ORDER — DEXAMETHASONE SODIUM PHOSPHATE 4 MG/ML
INJECTION, SOLUTION INTRA-ARTICULAR; INTRALESIONAL; INTRAMUSCULAR; INTRAVENOUS; SOFT TISSUE PRN
Status: DISCONTINUED | OUTPATIENT
Start: 2021-09-06 | End: 2021-09-06

## 2021-09-06 RX ORDER — LABETALOL HYDROCHLORIDE 5 MG/ML
10 INJECTION, SOLUTION INTRAVENOUS
Status: DISCONTINUED | OUTPATIENT
Start: 2021-09-06 | End: 2021-09-07 | Stop reason: HOSPADM

## 2021-09-06 RX ORDER — SODIUM CHLORIDE, SODIUM LACTATE, POTASSIUM CHLORIDE, CALCIUM CHLORIDE 600; 310; 30; 20 MG/100ML; MG/100ML; MG/100ML; MG/100ML
INJECTION, SOLUTION INTRAVENOUS CONTINUOUS
Status: DISCONTINUED | OUTPATIENT
Start: 2021-09-06 | End: 2021-09-07 | Stop reason: HOSPADM

## 2021-09-06 RX ORDER — CEFTRIAXONE 1 G/1
1 INJECTION, POWDER, FOR SOLUTION INTRAMUSCULAR; INTRAVENOUS EVERY 24 HOURS
Status: DISCONTINUED | OUTPATIENT
Start: 2021-09-06 | End: 2021-09-06

## 2021-09-06 RX ORDER — ONDANSETRON 2 MG/ML
4 INJECTION INTRAMUSCULAR; INTRAVENOUS EVERY 30 MIN PRN
Status: DISCONTINUED | OUTPATIENT
Start: 2021-09-06 | End: 2021-09-07 | Stop reason: HOSPADM

## 2021-09-06 RX ORDER — ONDANSETRON 2 MG/ML
INJECTION INTRAMUSCULAR; INTRAVENOUS PRN
Status: DISCONTINUED | OUTPATIENT
Start: 2021-09-06 | End: 2021-09-06

## 2021-09-06 RX ORDER — HYDROMORPHONE HCL IN WATER/PF 6 MG/30 ML
0.2 PATIENT CONTROLLED ANALGESIA SYRINGE INTRAVENOUS EVERY 5 MIN PRN
Status: DISCONTINUED | OUTPATIENT
Start: 2021-09-06 | End: 2021-09-07 | Stop reason: HOSPADM

## 2021-09-06 RX ORDER — PROPOFOL 10 MG/ML
INJECTION, EMULSION INTRAVENOUS PRN
Status: DISCONTINUED | OUTPATIENT
Start: 2021-09-06 | End: 2021-09-06

## 2021-09-06 RX ADMIN — FENTANYL CITRATE 25 MCG: 50 INJECTION, SOLUTION INTRAMUSCULAR; INTRAVENOUS at 23:07

## 2021-09-06 RX ADMIN — KETOROLAC TROMETHAMINE 15 MG: 15 INJECTION, SOLUTION INTRAMUSCULAR; INTRAVENOUS at 21:01

## 2021-09-06 RX ADMIN — MIDAZOLAM 2 MG: 1 INJECTION INTRAMUSCULAR; INTRAVENOUS at 22:29

## 2021-09-06 RX ADMIN — IOPAMIDOL 111 ML: 755 INJECTION, SOLUTION INTRAVENOUS at 19:41

## 2021-09-06 RX ADMIN — CEFTRIAXONE SODIUM 1 G: 1 INJECTION, POWDER, FOR SOLUTION INTRAMUSCULAR; INTRAVENOUS at 20:51

## 2021-09-06 RX ADMIN — LIDOCAINE HYDROCHLORIDE 100 MG: 20 INJECTION, SOLUTION INFILTRATION; PERINEURAL at 22:30

## 2021-09-06 RX ADMIN — ONDANSETRON 4 MG: 2 INJECTION INTRAMUSCULAR; INTRAVENOUS at 23:09

## 2021-09-06 RX ADMIN — FENTANYL CITRATE 50 MCG: 50 INJECTION, SOLUTION INTRAMUSCULAR; INTRAVENOUS at 22:53

## 2021-09-06 RX ADMIN — PROPOFOL 80 MG: 10 INJECTION, EMULSION INTRAVENOUS at 22:34

## 2021-09-06 RX ADMIN — PROPOFOL 200 MG: 10 INJECTION, EMULSION INTRAVENOUS at 22:30

## 2021-09-06 RX ADMIN — DEXAMETHASONE SODIUM PHOSPHATE 4 MG: 4 INJECTION, SOLUTION INTRA-ARTICULAR; INTRALESIONAL; INTRAMUSCULAR; INTRAVENOUS; SOFT TISSUE at 22:30

## 2021-09-06 RX ADMIN — FENTANYL CITRATE 100 MCG: 50 INJECTION, SOLUTION INTRAMUSCULAR; INTRAVENOUS at 22:30

## 2021-09-06 RX ADMIN — HYDROMORPHONE HYDROCHLORIDE 0.5 MG: 1 INJECTION, SOLUTION INTRAMUSCULAR; INTRAVENOUS; SUBCUTANEOUS at 23:11

## 2021-09-06 RX ADMIN — METRONIDAZOLE 500 MG: 500 INJECTION, SOLUTION INTRAVENOUS at 21:33

## 2021-09-06 RX ADMIN — ROCURONIUM BROMIDE 50 MG: 10 INJECTION INTRAVENOUS at 22:30

## 2021-09-06 ASSESSMENT — ENCOUNTER SYMPTOMS
FEVER: 1
ABDOMINAL PAIN: 1
BACK PAIN: 1

## 2021-09-06 ASSESSMENT — LIFESTYLE VARIABLES: TOBACCO_USE: 1

## 2021-09-06 ASSESSMENT — MIFFLIN-ST. JEOR: SCORE: 1776.39

## 2021-09-06 NOTE — ED TRIAGE NOTES
Presents for further evaluation of abnormal labs from 9/3/21. Patient has anal fistula, states it was examined in clinic this past Friday and looked okay. Patient reports intermittent fevers, chills, lower back/rectal pain. WBC, CRP elevated from lab draw 9/3. Denies urinary issues. Hx crohn's.

## 2021-09-06 NOTE — ED PROVIDER NOTES
ED Provider Note  [facility]      History     Chief Complaint   Patient presents with     Fever     The history is provided by the patient, medical records and the spouse.     Cody Pickard is a 31 year old male with history of Crohn's disease on Humira and perianal abscess s/p I&D 8/25 who presents to the ED for evaluation of fever. Patient is accompanied by his wife and 9 day old baby, who [facility]. Patient reports he has been having intermittent fevers, but was otherwise feeling well and his abscess was very much improved when he was seen in clinic on Friday. Patient is not currently on antibiotics, he finished a course of Flagyl and Vantin on 8/27. Patient is scheduled for 3T MRI on 9/18. He discussed seton with Dr. Bowers and would like to schedule this, assuming there is a fistula on MRI. He has had no previous abscess or fistula surgeries. He had labs done in colorectal clinic on Friday (9/3), which revealed elevated WBC and CRP. Patient states that he was not notified about these lab results over the weekend. Today he spiked a fever with Tmax of 102.6, which was controlled with Tylenol at 1 pm. He endorses increased chronic lower back pain and new lower abdominal pain. He denies increased swelling or drainage of the site. He denies urinary changes, cough, shortness of breath, runny nose. Patient is not vaccinated against Covid.     Past Medical History  History reviewed. No pertinent past medical history.  History reviewed. No pertinent surgical history.  dicyclomine (BENTYL) 10 MG capsule  lisinopril (ZESTRIL) 20 MG tablet  mesalamine (LIALDA) 1.2 g EC tablet  adalimumab (HUMIRA *CF*) 40 MG/0.4ML pen kit      No Known Allergies  Family History  Family History   Problem Relation Age of Onset     Multiple Sclerosis Mother      Ulcerative Colitis Maternal Grandmother      Colon Cancer No family hx of      Inflammatory Bowel Disease No family hx  "of      Irritable Bowel Syndrome No family hx of      Crohn's Disease No family hx of      Social History   Social History     Tobacco Use     Smoking status: Former Smoker     Packs/day: 0.50     Years: 2.50     Pack years: 1.25     Types: Cigarettes     Start date:      Quit date: 2020     Years since quittin.0     Smokeless tobacco: Never Used   Substance Use Topics     Alcohol use: Yes     Comment: occ     Drug use: Never      Past medical history, past surgical history, medications, allergies, family history, and social history were reviewed with the patient. No additional pertinent items.       Review of Systems   Constitutional: Positive for fever.   Gastrointestinal: Positive for abdominal pain (lower).   Musculoskeletal: Positive for back pain (lower).   All other systems reviewed and are negative.    A complete review of systems was performed with pertinent positives and negatives noted in the HPI, and all other systems negative.    Physical Exam   BP: 130/65  Pulse: 101  Temp: 99.6  F (37.6  C)  Resp: 18  Height: 175.3 cm (5' 9\")  Weight: 82.1 kg (181 lb)  SpO2: 99 %  Physical Exam  Constitutional:       General: He is not in acute distress.     Appearance: He is well-developed. He is not ill-appearing, toxic-appearing or diaphoretic.   HENT:      Head: Normocephalic and atraumatic.   Cardiovascular:      Rate and Rhythm: Normal rate and regular rhythm.      Heart sounds: Normal heart sounds.   Pulmonary:      Effort: Pulmonary effort is normal. No respiratory distress.      Breath sounds: No wheezing.   Abdominal:      General: There is no distension.      Palpations: Abdomen is soft.      Tenderness: There is no abdominal tenderness. There is no rebound.   Genitourinary:      Musculoskeletal:      Cervical back: Normal range of motion and neck supple.   Skin:     General: Skin is warm.   Neurological:      Mental Status: He is alert and oriented to person, place, and time.   Psychiatric:    "      Behavior: Behavior normal.         Thought Content: Thought content normal.         ED Course      Procedures       The medical record was reviewed and interpreted.  Current labs reviewed and interpreted.  Previous labs reviewed and interpreted.  Results for orders placed or performed during the hospital encounter of 09/06/21   CT Abdomen Pelvis w Contrast     Status: None    Narrative    EXAMINATION: CT ABDOMEN PELVIS W CONTRAST, 9/6/2021 7:57 PM    INDICATION: Perirectal abscess    COMPARISON STUDY: None    TECHNIQUE: CT scan of the abdomen and pelvis was performed on  multidetector CT scanner using volumetric acquisition technique and  images were reconstructed in multiple planes with variable thickness  and reviewed on dedicated workstations.     CONTRAST: iopamidol (ISOVUE-370) solution 111 mL   injected IV with  oral contrast    CT scan radiation dose is optimized to minimum requisite dose using  automated dose modulation techniques.    FINDINGS:    Lower thorax: No consolidative pulmonary opacities  No pleural  effusion.    Liver: Too small to characterize hepatic hypodensities.  No  intrahepatic biliary dilatation.    Biliary System: Normal gallbladder. No extrahepatic biliary ductal  dilation.    Pancreas: No mass or pancreatic ductal dilation.    Adrenal glands: No mass or nodules    Spleen: Normal.    Kidneys: No suspicious mass, obstructing calculus or hydronephrosis.   Contrast material within the bilateral renal collecting system.  Focal  cortical hypodensities, too small to characterize and statistically  favored to represent simple cysts.    Gastrointestinal tract :Normal appendix. Normal caliber small bowel.   Wall thickening of the distal rectum. Mild wall thickening and  surrounding inflammation of the distal descending colon. No clear  inflammation in the upper rectum or sigmoid colon. Terminal ileum  appears within normal limits.    Mesentery/peritoneum/retroperitoneum: No mass. No free  fluid or air.    Lymph nodes: Prominent but nonenlarged mesenteric lymph nodes in the  right lower quadrant.    Vasculature: Patent major abdominal vasculature.    Pelvis: Urinary bladder is normal.  Prostate and seminal vesicles are  within normal limits.    Osseous structures: No aggressive or acute osseous lesion.      Soft tissues: Within normal limits. Branching inflammatory changes in  the left ischioanal fossa with appear to extend to the posterior  aspect of the anus suspicious for transsphincteric anal fistula.      Impression    IMPRESSION:   1. Perianal inflammation posteriorly and to the left of the anus  extending into the ischioanal fossa suspicious for perianal fistula.  MRI is recommended for full characterization.   2. Mild wall thickening of the distal rectum and distal descending  colon suspicious for mild colitis. Given perianal changes, Crohn's  disease should be considered.  3. Prominent but nonenlarged mesenteric lymph nodes in the right lower  quadrant.    I have personally reviewed the examination and initial interpretation  and I agree with the findings.    TADEO DUENAS MD         SYSTEM ID:  [id number]   Extra Blue Top Tube     Status: None   Result Value Ref Range    Hold Specimen JIC    Extra Red Top Tube     Status: None   Result Value Ref Range    Hold Specimen JIC    Extra Green Top (Lithium Heparin) Tube     Status: None   Result Value Ref Range    Hold Specimen JIC    Extra Purple Top Tube     Status: None   Result Value Ref Range    Hold Specimen JIC    CBC with platelets differential     Status: Abnormal    Narrative    The following orders were created for panel order CBC with platelets differential.  Procedure                               Abnormality         Status                     ---------                               -----------         ------                     CBC with platelets and d...[[id number]]  Abnormal            Final result                 Please view  results for these tests on the individual orders.   Comprehensive metabolic panel     Status: Normal   Result Value Ref Range    Sodium 137 133 - 144 mmol/L    Potassium 4.0 3.4 - 5.3 mmol/L    Chloride 105 94 - 109 mmol/L    Carbon Dioxide (CO2) 26 20 - 32 mmol/L    Anion Gap 6 3 - 14 mmol/L    Urea Nitrogen 9 7 - 30 mg/dL    Creatinine 0.83 0.66 - 1.25 mg/dL    Calcium 9.3 8.5 - 10.1 mg/dL    Glucose 94 70 - 99 mg/dL    Alkaline Phosphatase 60 40 - 150 U/L    AST 14 0 - 45 U/L    ALT 31 0 - 70 U/L    Protein Total 8.2 6.8 - 8.8 g/dL    Albumin 3.6 3.4 - 5.0 g/dL    Bilirubin Total 0.6 0.2 - 1.3 mg/dL    GFR Estimate >90 >60 mL/min/1.73m2   CRP inflammation     Status: Abnormal   Result Value Ref Range    CRP Inflammation 27.0 (H) 0.0 - 8.0 mg/L   Erythrocyte sedimentation rate auto     Status: Normal   Result Value Ref Range    Erythrocyte Sedimentation Rate 13 0 - 15 mm/hr   CBC with platelets and differential     Status: Abnormal   Result Value Ref Range    WBC Count 18.6 (H) 4.0 - 11.0 10e3/uL    RBC Count 4.48 4.40 - 5.90 10e6/uL    Hemoglobin 12.9 (L) 13.3 - 17.7 g/dL    Hematocrit 38.7 (L) 40.0 - 53.0 %    MCV 86 78 - 100 fL    MCH 28.8 26.5 - 33.0 pg    MCHC 33.3 31.5 - 36.5 g/dL    RDW 12.2 10.0 - 15.0 %    Platelet Count 429 150 - 450 10e3/uL    % Neutrophils 85 %    % Lymphocytes 8 %    % Monocytes 6 %    % Eosinophils 0 %    % Basophils 0 %    % Immature Granulocytes 1 %    NRBCs per 100 WBC 0 <1 /100    Absolute Neutrophils 15.8 (H) 1.6 - 8.3 10e3/uL    Absolute Lymphocytes 1.4 0.8 - 5.3 10e3/uL    Absolute Monocytes 1.2 0.0 - 1.3 10e3/uL    Absolute Eosinophils 0.0 0.0 - 0.7 10e3/uL    Absolute Basophils 0.0 0.0 - 0.2 10e3/uL    Absolute Immature Granulocytes 0.1 (H) <=0.0 10e3/uL    Absolute NRBCs 0.0 10e3/uL   Asymptomatic COVID-19 Virus (Coronavirus) by PCR Nasopharyngeal     Status: Normal    Specimen: Nasopharyngeal; Swab   Result Value Ref Range    SARS CoV2 PCR Negative Negative    Narrative     Testing was performed using the Xpert Xpress SARS-CoV-2 Assay on the  Cepheid Gene-Xpert Instrument Systems. Additional information about  this Emergency Use Authorization (EUA) assay can be found via the Lab  Guide. This test should be ordered for the detection of SARS-CoV-2 in  individuals who meet SARS-CoV-2 clinical and/or epidemiological  criteria. Test performance is unknown in asymptomatic patients. This  test is for in vitro diagnostic use under the FDA EUA for  laboratories certified under CLIA to perform high complexity testing.  This test has not been FDA cleared or approved. A negative result  does not rule out the presence of PCR inhibitors in the specimen or  target RNA in concentration below the limit of detection for the  assay. The possibility of a false negative should be considered if  the patient's recent exposure or clinical presentation suggests  COVID-19. This test was validated by the [facility]. This laboratory is certified under  the Clinical Laboratory Improvement Amendments of 1988 (CLIA-88) as  qualified to perform high complexity laboratory testing.     Creatinine     Status: Normal   Result Value Ref Range    Creatinine 0.78 0.66 - 1.25 mg/dL    GFR Estimate >90 >60 mL/min/1.73m2   Blood Culture Peripheral Blood     Status: Normal (Preliminary result)    Specimen: Peripheral Blood   Result Value Ref Range    Culture No growth after 12 hours    Blood Culture Peripheral Blood     Status: Normal (Preliminary result)    Specimen: Peripheral Blood   Result Value Ref Range    Culture No growth after 12 hours    [place] Draw     Status: None    Narrative    The following orders were created for panel order [place] Draw.  Procedure                               Abnormality         Status                     ---------                               -----------         ------                     Extra Blue Top Tube[[id number]]                               Final result               Extra Red Top Tube[[id number]]                               Final result               Extra Green Top (Lithium...[[id number]]                      Final result               Extra Purple Top Tube[[id number]]                            Final result                 Please view results for these tests on the individual orders.     Medications   lidocaine 1 % 0.1-1 mL (has no administration in time range)   lidocaine (LMX4) cream (has no administration in time range)   sodium chloride (PF) 0.9% PF flush 3 mL (3 mLs Intracatheter Not Given 9/7/21 1057)   sodium chloride (PF) 0.9% PF flush 3 mL (has no administration in time range)   acetaminophen (TYLENOL) tablet 975 mg (975 mg Oral Given 9/7/21 1054)   acetaminophen (TYLENOL) tablet 650 mg (has no administration in time range)   ondansetron (ZOFRAN-ODT) ODT tab 4 mg (has no administration in time range)     Or   ondansetron (ZOFRAN) injection 4 mg (has no administration in time range)   prochlorperazine (COMPAZINE) injection 10 mg (has no administration in time range)     Or   prochlorperazine (COMPAZINE) tablet 10 mg (has no administration in time range)   senna-docusate (SENOKOT-S/PERICOLACE) 8.6-50 MG per tablet 1 tablet (1 tablet Oral Not Given 9/7/21 0800)   polyethylene glycol (MIRALAX) Packet 17 g (17 g Oral Given 9/7/21 1051)   magnesium hydroxide (MILK OF MAGNESIA) suspension 30 mL (has no administration in time range)   enoxaparin ANTICOAGULANT (LOVENOX) injection 40 mg (has no administration in time range)   HYDROmorphone (DILAUDID) injection 0.2 mg (has no administration in time range)     Or   HYDROmorphone (DILAUDID) injection 0.4 mg (has no administration in time range)   traMADol (ULTRAM) tablet 50 mg (50 mg Oral Given 9/7/21 0354)   naloxone (NARCAN) injection 0.2 mg (has no administration in time range)     Or   naloxone (NARCAN) injection 0.4 mg (has no administration in time range)     Or   naloxone (NARCAN) injection  0.2 mg (has no administration in time range)     Or   naloxone (NARCAN) injection 0.4 mg (has no administration in time range)   ciprofloxacin (CIPRO) tablet 500 mg (500 mg Oral Given 9/7/21 1053)   metroNIDAZOLE (FLAGYL) tablet 500 mg (has no administration in time range)   iopamidol (ISOVUE-370) solution 111 mL (111 mLs Intravenous Given 9/6/21 1941)   sodium chloride (PF) 0.9% PF flush 78 mL (78 mLs Intravenous Given 9/6/21 1941)   ketorolac (TORADOL) injection 15 mg (15 mg Intravenous Given 9/6/21 2101)   cefTRIAXone (ROCEPHIN) 1 g vial to attach to  mL bag for ADULTS or NS 50 mL bag for PEDS (1 g Intravenous New Bag 9/6/21 2051)          Results for orders placed or performed during the hospital encounter of 09/06/21   [place] Draw     Status: None (In process)    Narrative    The following orders were created for panel order [place] Draw.  Procedure                               Abnormality         Status                     ---------                               -----------         ------                     Extra Blue Top Tube[[id number]]                              In process                 Extra Red Top Tube[[id number]]                               In process                 Extra Green Top (Lithium...[[id number]]                      In process                 Extra Purple Top Tube[[id number]]                            In process                   Please view results for these tests on the individual orders.     Medications - No data to display     Assessments & Plan (with Medical Decision Making)   Patient is a very nice 31-year-old male who presents to the ER due to increased pain in his rectal area.  Patient had a perianal abscess that was recently I&D by colorectal surgery.  Patient says that since Friday the pain is gotten worse.  Patient had a fever today at home so presented to the ER.  Patient here with a low-grade fever but had taken Tylenol prior to coming.  Patient did have an abscess  that was recently opened up but did have tenderness surrounding it.  Patient also has a significant leukocytosis of 18,000.  Case was discussed with the colorectal team who came and evaluated him.  They agreed with obtaining a CT abdomen pelvis to evaluate the abscess.  The CT abdomen pelvis was done which shows that he has fluid collection that is concerning for an abscess.  He also has some perianal inflammation posteriorly into the left of the anus suspicious for a fistula.  Colorectal came and evaluated him and they recommend that he be admitted to the hospital for IV antibiotics and then taken to the operating room to see if that they can open this infection out.  Plan of care was discussed with the patient and his wife.  Patient will be admitted to colorectal service for further care.    I have reviewed the nursing notes. I have reviewed the findings, diagnosis, plan and need for follow up with the patient.    This part of the medical record was transcribed by Beatriz Patel, Medical Scribe, from a dictation done by Mary Velarde MD.   New Prescriptions    No medications on file       Final diagnoses:   Perianal Crohn's disease, with abscess (H)   Perianal abscess   I, Beatriz Patel, am serving as a trained medical scribe to document services personally performed by Mary Velarde MD, based on the provider's statements to me.     I, Mary Velarde MD, was physically present and have reviewed and verified the accuracy of this note documented by Beatriz Patel.      --  Mary Velarde MD  [facility] EMERGENCY DEPARTMENT  9/6/2021     Mary Velarde MD  09/07/21 1128

## 2021-09-07 VITALS
HEART RATE: 68 BPM | RESPIRATION RATE: 14 BRPM | TEMPERATURE: 97.4 F | HEIGHT: 69 IN | WEIGHT: 181 LBS | SYSTOLIC BLOOD PRESSURE: 99 MMHG | OXYGEN SATURATION: 97 % | BODY MASS INDEX: 26.81 KG/M2 | DIASTOLIC BLOOD PRESSURE: 47 MMHG

## 2021-09-07 PROBLEM — G89.18 ACUTE POST-OPERATIVE PAIN: Status: ACTIVE | Noted: 2021-09-07

## 2021-09-07 LAB
CREAT SERPL-MCNC: 0.78 MG/DL (ref 0.66–1.25)
GFR SERPL CREATININE-BSD FRML MDRD: >90 ML/MIN/1.73M2

## 2021-09-07 PROCEDURE — 82565 ASSAY OF CREATININE: CPT | Performed by: COLON & RECTAL SURGERY

## 2021-09-07 PROCEDURE — 250N000011 HC RX IP 250 OP 636: Performed by: STUDENT IN AN ORGANIZED HEALTH CARE EDUCATION/TRAINING PROGRAM

## 2021-09-07 PROCEDURE — 258N000003 HC RX IP 258 OP 636

## 2021-09-07 PROCEDURE — 36415 COLL VENOUS BLD VENIPUNCTURE: CPT | Performed by: COLON & RECTAL SURGERY

## 2021-09-07 PROCEDURE — 250N000013 HC RX MED GY IP 250 OP 250 PS 637: Performed by: PHYSICIAN ASSISTANT

## 2021-09-07 PROCEDURE — 250N000011 HC RX IP 250 OP 636

## 2021-09-07 PROCEDURE — G0378 HOSPITAL OBSERVATION PER HR: HCPCS

## 2021-09-07 PROCEDURE — 250N000013 HC RX MED GY IP 250 OP 250 PS 637

## 2021-09-07 RX ORDER — ACETAMINOPHEN 325 MG/1
975 TABLET ORAL EVERY 8 HOURS
Qty: 70 TABLET | Refills: 0 | Status: SHIPPED | OUTPATIENT
Start: 2021-09-07 | End: 2022-06-10

## 2021-09-07 RX ORDER — PROCHLORPERAZINE MALEATE 5 MG
10 TABLET ORAL EVERY 6 HOURS PRN
Status: DISCONTINUED | OUTPATIENT
Start: 2021-09-07 | End: 2021-09-07 | Stop reason: HOSPADM

## 2021-09-07 RX ORDER — HYDROMORPHONE HCL IN WATER/PF 6 MG/30 ML
0.2 PATIENT CONTROLLED ANALGESIA SYRINGE INTRAVENOUS
Status: DISCONTINUED | OUTPATIENT
Start: 2021-09-07 | End: 2021-09-07 | Stop reason: HOSPADM

## 2021-09-07 RX ORDER — LIDOCAINE 40 MG/G
CREAM TOPICAL
Status: DISCONTINUED | OUTPATIENT
Start: 2021-09-07 | End: 2021-09-07 | Stop reason: HOSPADM

## 2021-09-07 RX ORDER — POLYETHYLENE GLYCOL 3350 17 G/17G
17 POWDER, FOR SOLUTION ORAL DAILY
Status: DISCONTINUED | OUTPATIENT
Start: 2021-09-07 | End: 2021-09-07 | Stop reason: HOSPADM

## 2021-09-07 RX ORDER — CIPROFLOXACIN 500 MG/1
500 TABLET, FILM COATED ORAL EVERY 12 HOURS SCHEDULED
Status: DISCONTINUED | OUTPATIENT
Start: 2021-09-07 | End: 2021-09-07 | Stop reason: HOSPADM

## 2021-09-07 RX ORDER — TRAMADOL HYDROCHLORIDE 50 MG/1
50 TABLET ORAL EVERY 6 HOURS PRN
Status: DISCONTINUED | OUTPATIENT
Start: 2021-09-07 | End: 2021-09-07 | Stop reason: HOSPADM

## 2021-09-07 RX ORDER — HYDROMORPHONE HCL IN WATER/PF 6 MG/30 ML
0.4 PATIENT CONTROLLED ANALGESIA SYRINGE INTRAVENOUS
Status: DISCONTINUED | OUTPATIENT
Start: 2021-09-07 | End: 2021-09-07 | Stop reason: HOSPADM

## 2021-09-07 RX ORDER — AMOXICILLIN 250 MG
1 CAPSULE ORAL 2 TIMES DAILY
Qty: 60 TABLET | Refills: 0 | Status: SHIPPED | OUTPATIENT
Start: 2021-09-07 | End: 2021-09-20

## 2021-09-07 RX ORDER — ONDANSETRON 4 MG/1
4 TABLET, ORALLY DISINTEGRATING ORAL EVERY 6 HOURS PRN
Status: DISCONTINUED | OUTPATIENT
Start: 2021-09-07 | End: 2021-09-07 | Stop reason: HOSPADM

## 2021-09-07 RX ORDER — METRONIDAZOLE 500 MG/1
500 TABLET ORAL 3 TIMES DAILY
Qty: 21 TABLET | Refills: 0 | Status: SHIPPED | OUTPATIENT
Start: 2021-09-07 | End: 2021-09-14

## 2021-09-07 RX ORDER — BISACODYL 10 MG
10 SUPPOSITORY, RECTAL RECTAL DAILY PRN
Status: DISCONTINUED | OUTPATIENT
Start: 2021-09-07 | End: 2021-09-07

## 2021-09-07 RX ORDER — METRONIDAZOLE 250 MG/1
500 TABLET ORAL 3 TIMES DAILY
Status: DISCONTINUED | OUTPATIENT
Start: 2021-09-07 | End: 2021-09-07 | Stop reason: HOSPADM

## 2021-09-07 RX ORDER — POLYETHYLENE GLYCOL 3350 17 G/17G
17 POWDER, FOR SOLUTION ORAL DAILY
Qty: 510 G | Refills: 0 | Status: SHIPPED | OUTPATIENT
Start: 2021-09-07 | End: 2021-09-14

## 2021-09-07 RX ORDER — ACETAMINOPHEN 325 MG/1
975 TABLET ORAL EVERY 8 HOURS
Status: DISCONTINUED | OUTPATIENT
Start: 2021-09-07 | End: 2021-09-07 | Stop reason: HOSPADM

## 2021-09-07 RX ORDER — NALOXONE HYDROCHLORIDE 0.4 MG/ML
0.4 INJECTION, SOLUTION INTRAMUSCULAR; INTRAVENOUS; SUBCUTANEOUS
Status: DISCONTINUED | OUTPATIENT
Start: 2021-09-07 | End: 2021-09-07 | Stop reason: HOSPADM

## 2021-09-07 RX ORDER — ONDANSETRON 2 MG/ML
4 INJECTION INTRAMUSCULAR; INTRAVENOUS EVERY 6 HOURS PRN
Status: DISCONTINUED | OUTPATIENT
Start: 2021-09-07 | End: 2021-09-07 | Stop reason: HOSPADM

## 2021-09-07 RX ORDER — SODIUM CHLORIDE, SODIUM LACTATE, POTASSIUM CHLORIDE, CALCIUM CHLORIDE 600; 310; 30; 20 MG/100ML; MG/100ML; MG/100ML; MG/100ML
INJECTION, SOLUTION INTRAVENOUS CONTINUOUS
Status: DISCONTINUED | OUTPATIENT
Start: 2021-09-07 | End: 2021-09-07

## 2021-09-07 RX ORDER — NALOXONE HYDROCHLORIDE 0.4 MG/ML
0.2 INJECTION, SOLUTION INTRAMUSCULAR; INTRAVENOUS; SUBCUTANEOUS
Status: DISCONTINUED | OUTPATIENT
Start: 2021-09-07 | End: 2021-09-07 | Stop reason: HOSPADM

## 2021-09-07 RX ORDER — ACETAMINOPHEN 325 MG/1
650 TABLET ORAL EVERY 4 HOURS PRN
Status: DISCONTINUED | OUTPATIENT
Start: 2021-09-09 | End: 2021-09-07 | Stop reason: HOSPADM

## 2021-09-07 RX ORDER — AMOXICILLIN 250 MG
1 CAPSULE ORAL 2 TIMES DAILY
Status: DISCONTINUED | OUTPATIENT
Start: 2021-09-07 | End: 2021-09-07 | Stop reason: HOSPADM

## 2021-09-07 RX ORDER — CIPROFLOXACIN 500 MG/1
500 TABLET, FILM COATED ORAL EVERY 12 HOURS
Qty: 14 TABLET | Refills: 0 | Status: SHIPPED | OUTPATIENT
Start: 2021-09-07 | End: 2021-09-14

## 2021-09-07 RX ORDER — TRAMADOL HYDROCHLORIDE 50 MG/1
50 TABLET ORAL EVERY 6 HOURS PRN
Qty: 28 TABLET | Refills: 0 | Status: SHIPPED | OUTPATIENT
Start: 2021-09-07 | End: 2022-06-10

## 2021-09-07 RX ADMIN — ACETAMINOPHEN 975 MG: 325 TABLET, FILM COATED ORAL at 10:54

## 2021-09-07 RX ADMIN — SODIUM CHLORIDE, POTASSIUM CHLORIDE, SODIUM LACTATE AND CALCIUM CHLORIDE: 600; 310; 30; 20 INJECTION, SOLUTION INTRAVENOUS at 03:53

## 2021-09-07 RX ADMIN — SODIUM CHLORIDE, POTASSIUM CHLORIDE, SODIUM LACTATE AND CALCIUM CHLORIDE: 600; 310; 30; 20 INJECTION, SOLUTION INTRAVENOUS at 00:12

## 2021-09-07 RX ADMIN — ACETAMINOPHEN 975 MG: 325 TABLET, FILM COATED ORAL at 01:16

## 2021-09-07 RX ADMIN — TRAMADOL HYDROCHLORIDE 50 MG: 50 TABLET, FILM COATED ORAL at 03:54

## 2021-09-07 RX ADMIN — HYDROMORPHONE HYDROCHLORIDE 0.2 MG: 0.2 INJECTION, SOLUTION INTRAMUSCULAR; INTRAVENOUS; SUBCUTANEOUS at 00:14

## 2021-09-07 RX ADMIN — CIPROFLOXACIN 500 MG: 500 TABLET, FILM COATED ORAL at 10:53

## 2021-09-07 RX ADMIN — METRONIDAZOLE 500 MG: 500 INJECTION, SOLUTION INTRAVENOUS at 06:42

## 2021-09-07 RX ADMIN — POLYETHYLENE GLYCOL 3350 17 G: 17 POWDER, FOR SOLUTION ORAL at 10:51

## 2021-09-07 RX ADMIN — METRONIDAZOLE 500 MG: 250 TABLET ORAL at 13:13

## 2021-09-07 ASSESSMENT — ACTIVITIES OF DAILY LIVING (ADL)
ADLS_ACUITY_SCORE: 14
ADLS_ACUITY_SCORE: 14

## 2021-09-07 NOTE — BRIEF OP NOTE
[facility]    Brief Operative Note    Pre-operative diagnosis: Crohn's disease, perinanal abscess  Post-operative diagnosis Same as pre-operative diagnosis    Procedure: Procedure(s):  INCISION AND DRAINAGE, RECTUM, placement of Seton  Surgeon: Surgeon(s) and Role:     * Vidhya Hamm MD - Primary   Dilia Marie MD - Fellow  Anesthesia: General   Estimated blood loss: Minimal  Drains: None  Specimens: * No specimens in log *  Findings: Large perianal abscess located in the left posterior quadrant. Fistula identified in the posterior midline and seton placed. Circumferential stricture at 3cm from anal verge was dilated.  Complications: None.  Implants: * No implants in log *    Dilia Marie MD  [facility] Fellow

## 2021-09-07 NOTE — PLAN OF CARE
Admitted/transferred from: PACU   2 RN full skin assessment completed by Susana Holcomb, NICOLAS and Consuelo Jackson RN.  Skin assessment finding: skin intact, no problems. Open incision on R buttock.  Interventions/actions: None at this time    Will continue to monitor.

## 2021-09-07 NOTE — ANESTHESIA POSTPROCEDURE EVALUATION
Patient: Cody Pickard    Procedure(s):  INCISION AND DRAINAGE, RECTUM, placement of Seton    Diagnosis:* No pre-op diagnosis entered *  Diagnosis Additional Information: No value filed.    Anesthesia Type:  General    Note:  Disposition: Outpatient   Postop Pain Control: Uneventful            Sign Out: Well controlled pain   PONV: No   Neuro/Psych: Uneventful            Sign Out: Acceptable/Baseline neuro status   Airway/Respiratory: Uneventful            Sign Out: Acceptable/Baseline resp. status   CV/Hemodynamics: Uneventful            Sign Out: Acceptable CV status; No obvious hypovolemia; No obvious fluid overload   Other NRE: NONE   DID A NON-ROUTINE EVENT OCCUR? No           Last vitals:  Vitals Value Taken Time   /62 09/06/21 2326   Temp     Pulse 102 09/06/21 2329   Resp 15 09/06/21 2329   SpO2 100 % 09/06/21 2329   Vitals shown include unvalidated device data.    Electronically Signed By: Brandon Dejesus MD  September 6, 2021  11:30 PM

## 2021-09-07 NOTE — CONSULTS
Colon and Rectal Surgery Consultation Note  [facility]    Cody Pickard MRN# [id number]   Age: 31 year old YOB: 1990     Date of Admission: 9/6/2021    Reason for consult:        Requesting physician: Dr. Velarde        Level of consult: Consult, place orders and assume complete care of the patient           Assessment:   Patient is a very nice 31-year-old male with IBD who presents to the ER due to increased pain in his rectal area after recent I&Ds. Febrile and leukocytosis. Non-toxic appearing. New back pain and very tender on rectal exam. CT pelvis with fluid concerning for abscess/fistula.          Recommendations:   -Direct to OR from ED, likely admission post-operatively   -NPO   -OR for I&D and possible seton placement   -Ceftriaxone and flagyl      Patient discussed with the fellow who discussed with the staff surgeon.          History of Present Illness:   CC: perirectal abscess      History is obtained from the patient and wife.    Patient is a 32yo male with h/o IBD on Humira with recurrent perianal abscess s/p I&D 8/25 who presents to the ED for evaluation of fever. Patient is accompanied by his wife and 9 day old baby. Patient reports he has been having intermittent fevers, but was otherwise feeling well and his abscess was very much improved when he was seen in clinic on Friday. Patient is not currently on antibiotics, he finished a course of Flagyl and Vantin on 8/27. Patient is scheduled for 3T MRI on 9/18. He discussed seton with Dr. Bowers and would like to schedule this, assuming there is a fistula on MRI. He has had no previous abscess or fistula surgeries. He had labs done in colorectal clinic on Friday (9/3), which revealed elevated WBC and CRP. Patient states that he was not notified about these lab results over the weekend. Today he spiked a fever with Tmax of 102.6, which was controlled with Tylenol at 1 pm. He endorses increased chronic lower back pain and  new lower abdominal pain. He denies increased swelling or drainage of the site. He denies urinary changes, cough, shortness of breath, runny nose. Patient is not vaccinated against Covid. He last ate at 10AM. He has no bleeding or clotting disorders.           Past Medical History:   History reviewed. No pertinent past medical history.          Past Surgical History:   History reviewed. No pertinent surgical history.          Social History:     Social History     Socioeconomic History     Marital status:      Spouse name: Not on file     Number of children: Not on file     Years of education: Not on file     Highest education level: Not on file   Occupational History     Not on file   Tobacco Use     Smoking status: Former Smoker     Packs/day: 0.50     Years: 2.50     Pack years: 1.25     Types: Cigarettes     Start date:      Quit date: 2020     Years since quittin.0     Smokeless tobacco: Never Used   Substance and Sexual Activity     Alcohol use: Yes     Comment: occ     Drug use: Never     Sexual activity: Not on file   Other Topics Concern     Not on file   Social History Narrative     Not on file     Social Determinants of Health     Financial Resource Strain:      Difficulty of Paying Living Expenses:    Food Insecurity:      Worried About Running Out of Food in the Last Year:      Ran Out of Food in the Last Year:    Transportation Needs:      Lack of Transportation (Medical):      Lack of Transportation (Non-Medical):    Physical Activity:      Days of Exercise per Week:      Minutes of Exercise per Session:    Stress:      Feeling of Stress :    Social Connections:      Frequency of Communication with Friends and Family:      Frequency of Social Gatherings with Friends and Family:      Attends [employer] Services:      Active Member of Clubs or Organizations:      Attends Club or Organization Meetings:      Marital Status:    Intimate Partner Violence:      Fear of Current or Ex-Partner:  "     Emotionally Abused:      Physically Abused:      Sexually Abused:              Family History:     Family History   Problem Relation Age of Onset     Multiple Sclerosis Mother      Ulcerative Colitis Maternal Grandmother      Colon Cancer No family hx of      Inflammatory Bowel Disease No family hx of      Irritable Bowel Syndrome No family hx of      Crohn's Disease No family hx of              Allergies:    No Known Allergies          Medications:   No current facility-administered medications on file prior to encounter.  dicyclomine (BENTYL) 10 MG capsule, Take 1 capsule (10 mg) by mouth 2 times daily  lisinopril (ZESTRIL) 20 MG tablet, Take 20 mg by mouth  mesalamine (LIALDA) 1.2 g EC tablet, Take 4 tablets (4,800 mg) by mouth every morning  adalimumab (HUMIRA *CF*) 40 MG/0.4ML pen kit, Inject 0.4 mLs (40 mg) Subcutaneous every 14 days           Review of Systems:      All other review of systems negative, except for what is mentioned above        Physical Exam:   /69   Pulse 70   Temp 98.7  F (37.1  C) (Axillary)   Resp 18   Ht 1.753 m (5' 9\")   Wt 82.1 kg (181 lb)   SpO2 100%   BMI 26.73 kg/m    General: Alert, interactive, NAD  Resp: CTAB, no crackles or wheezes  Cardiac: RRR, NS1,S2, No m/r/g  Abdomen: Soft, nontender, nondistended. No HSM or masses, no rebound or guarding. No surgical scars   Extremities: No LE edema or obvious joint abnormalities  Skin: Warm and dry, no jaundice or rash  Neuro: A&Ox3, CN 2-12 intact, FRASER  : large type I skin tag most anterior aspect of anus. Scab over healing I&D site left of the anus with blanching erythema, tender. Rectal exam painful, fluctuance felt left lateral aspect.            Data:   All laboratory data reviewed  All imaging studies reviewed by me.     CT Pelvis                                                         IMPRESSION:   1. Perianal inflammation posteriorly and to the left of the anus  extending into the ischioanal fossa suspicious " for perianal fistula.  MRI is recommended for full characterization.   2. Mild wall thickening of the distal rectum and distal descending  colon suspicious for mild colitis. Given perianal changes, Crohn's  disease should be considered.  3. Prominent but nonenlarged mesenteric lymph nodes in the right lower  quadrant    Lo Aguiar MD  General Surgery, PGY-2      Staff Addendum:  Agree with the consultation H&P as documented by the housestaff. I was personally involved with the recommendations made by our service for this patient.  Vidhya Hamm MD  Colon and Rectal Surgery Staff  [facility]

## 2021-09-07 NOTE — OR NURSING
A C-diff sample was requested on 9/6 from an outside facility. Per Dr. Marie from colorectal, a c-diff sample is not needed and that he does not need to be on c-diff precautions as symptoms were due to related procedures.

## 2021-09-07 NOTE — ANESTHESIA CARE TRANSFER NOTE
Patient: Cody Picakrd    Procedure(s):  INCISION AND DRAINAGE, RECTUM, placement of Seton    Diagnosis: * No pre-op diagnosis entered *  Diagnosis Additional Information: No value filed.    Anesthesia Type:   General     Note:    Oropharynx: oropharynx clear of all foreign objects  Level of Consciousness: awake  Oxygen Supplementation: nasal cannula (2L)  Level of Supplemental Oxygen (L/min / FiO2): 2  Independent Airway: airway patency satisfactory and stable  Dentition: dentition unchanged  Vital Signs Stable: post-procedure vital signs reviewed and stable  Report to RN Given: handoff report given  Patient transferred to: PACU    Handoff Report: Identifed the Patient, Identified the Reponsible Provider, Reviewed the pertinent medical history, Discussed the surgical course, Reviewed Intra-OP anesthesia mangement and issues during anesthesia, Set expectations for post-procedure period and Allowed opportunity for questions and acknowledgement of understanding      Vitals:  Vitals Value Taken Time   /62 09/06/21 2326   Temp     Pulse 102 09/06/21 2329   Resp 15 09/06/21 2329   SpO2 100 % 09/06/21 2329   Vitals shown include unvalidated device data.    Electronically Signed By: Brandon Dejesus MD  September 6, 2021  11:31 PM

## 2021-09-07 NOTE — PLAN OF CARE
Arrived from PACU at 0100, s/p I&D of perinanal abscess with seton placed.  Alert, oriented, walked from cart to bed with SBA.  AVSS. R buttock pain managed with tylenol and tramadol.  Low fiber diet, had crackers, peanut butter, water.  Voiding spont.  R buttock incision open with gauze fluffs, mod amt serosang drainage. Packing in place.  PLAN: Discharge today.

## 2021-09-07 NOTE — OR NURSING
"Updated wife of patient on room number, patient being brought up to room at time of note. Wife requests update from surgical team, has questions regarding discharge.    Page sent to Colorectal Resident Pager:    \"Wife of SHAREE Pickard would like an update from surgical team, patient just sent upstairs to inpatient room. Wife's number listed in emergency contacts.\"  "

## 2021-09-07 NOTE — UTILIZATION REVIEW
"[facility]    Admission Status; Secondary Review Determination     Admission Date: 9/6/2021  4:49 PM      Under the authority of the Utilization Management Committee, the utilization review process indicated a secondary review on the above patient.  The review outcome is based on review of the medical records, discussions with staff, and applying clinical experience noted on the date of the review.          (x) Observation Status Appropriate - This patient does not meet hospital inpatient criteria and is placed in observation status. If this patient's primary payer is Medicare and was admitted as an inpatient, Condition Code 44 should be used and patient status changed to \"observation\".     RATIONALE FOR DETERMINATION   31-year-old male with a history of IBD/Crohn's disease was admitted yesterday with rectal area pain found to have imaging evidence of an abscess/fistula.  He was taken to the operating room last night for I&D.  He was subsequently admitted and has received some IV antibiotics.  There is no evidence of sepsis/instability and he plans to discharge home today.  At the time of this review the patient does not meet medical necessity for inpatient hospitalization and observation status is recommended.    The severity of illness, intensity of service provided, expected LOS and risk for adverse outcome make the care appropriate for further observation; however, doesn't meet criteria for hospital inpatient admission.  Dr Shirley notified of this determination.        The information on this document is developed by the utilization review team in order for the business office to ensure compliance.  This only denotes the appropriateness of proper admission status and does not reflect the quality of care rendered.         The definitions of Inpatient Status and Observation Status used in making the determination above are those provided in the CMS Coverage Manual, Chapter 1 and Chapter 6, section 70.4.    "   Sincerely,     Kishore Edwards DO MPH   Physician Advisor  Utilization Review  [facility]

## 2021-09-07 NOTE — ANESTHESIA CARE TRANSFER NOTE
Patient: Cody Pickard    Procedure(s):  INCISION AND DRAINAGE, RECTUM, placement of Seton    Diagnosis: * No pre-op diagnosis entered *  Diagnosis Additional Information: No value filed.    Anesthesia Type:   General     Note:    Oropharynx: oropharynx clear of all foreign objects  Level of Consciousness: awake  Oxygen Supplementation: nasal cannula  Level of Supplemental Oxygen (L/min / FiO2): 6  Independent Airway: airway patency satisfactory and stable  Dentition: dentition unchanged  Vital Signs Stable: post-procedure vital signs reviewed and stable  Report to RN Given: handoff report given  Patient transferred to: PACU    Handoff Report: Identifed the Patient, Identified the Reponsible Provider, Reviewed the pertinent medical history, Discussed the surgical course, Reviewed Intra-OP anesthesia mangement and issues during anesthesia, Set expectations for post-procedure period and Allowed opportunity for questions and acknowledgement of understanding      Vitals:  Vitals Value Taken Time   /62 09/06/21 2326   Temp     Pulse 102 09/06/21 2329   Resp 15 09/06/21 2329   SpO2 100 % 09/06/21 2329   Vitals shown include unvalidated device data.    Electronically Signed By: Boy Ramirez MD  September 6, 2021  11:30 PM

## 2021-09-07 NOTE — ANESTHESIA PREPROCEDURE EVALUATION
Anesthesia Pre-Procedure Evaluation    Patient: Cody Pickard   MRN: [id number] : 1990        Preoperative Diagnosis: * No pre-op diagnosis entered *   Procedure : Procedure(s):  INCISION AND DRAINAGE, RECTUM     History reviewed. No pertinent past medical history.   History reviewed. No pertinent surgical history.   No Known Allergies   Social History     Tobacco Use     Smoking status: Former Smoker     Packs/day: 0.50     Years: 2.50     Pack years: 1.25     Types: Cigarettes     Start date:      Quit date: 2020     Years since quittin.0     Smokeless tobacco: Never Used   Substance Use Topics     Alcohol use: Yes     Comment: occ      Wt Readings from Last 1 Encounters:   21 82.1 kg (181 lb)        Anesthesia Evaluation   Pt has had prior anesthetic. Type: MAC.    No history of anesthetic complications       ROS/MED HX  ENT/Pulmonary:     (+) tobacco use, Current use,     Neurologic:  - neg neurologic ROS     Cardiovascular:  - neg cardiovascular ROS     METS/Exercise Tolerance:     Hematologic:  - neg hematologic  ROS     Musculoskeletal:  - neg musculoskeletal ROS     GI/Hepatic:     (+) Inflammatory bowel disease (Crohn's, s/p rectal abscess drainage , now with new fever and pain),     Renal/Genitourinary:  - neg Renal ROS     Endo:  - neg endo ROS     Psychiatric/Substance Use:  - neg psychiatric ROS     Infectious Disease:  - neg infectious disease ROS     Malignancy:  - neg malignancy ROS     Other:  - neg other ROS          Physical Exam    Airway  airway exam normal           Respiratory Devices and Support         Dental  no notable dental history         Cardiovascular   cardiovascular exam normal          Pulmonary   pulmonary exam normal                OUTSIDE LABS:  CBC:   Lab Results   Component Value Date    WBC 18.6 (H) 2021    WBC 18.3 (H) 2021    HGB 12.9 (L) 2021    HGB 12.6 (L) 2021    HCT 38.7 (L) 2021    HCT 37.5 (L) 2021      09/06/2021     09/03/2021     BMP:   Lab Results   Component Value Date     09/06/2021    POTASSIUM 4.0 09/06/2021    CHLORIDE 105 09/06/2021    CO2 26 09/06/2021    BUN 9 09/06/2021    CR 0.83 09/06/2021    GLC 94 09/06/2021     COAGS: No results found for: PTT, INR, FIBR  POC: No results found for: BGM, HCG, HCGS  HEPATIC:   Lab Results   Component Value Date    ALBUMIN 3.6 09/06/2021    PROTTOTAL 8.2 09/06/2021    ALT 31 09/06/2021    AST 14 09/06/2021    ALKPHOS 60 09/06/2021    BILITOTAL 0.6 09/06/2021     OTHER:   Lab Results   Component Value Date    MICHELLE 9.3 09/06/2021    CRP 27.0 (H) 09/06/2021    SED 13 09/06/2021       Anesthesia Plan    ASA Status:  2, emergent       Anesthesia Type: General.     - Airway: ETT   Induction: Intravenous.   Maintenance: Balanced.        Consents         - Extended Intubation/Ventilatory Support Discussed: No.      - Patient is DNR/DNI Status: No    Use of blood products discussed: No .     Postoperative Care    Pain management: IV analgesics.   PONV prophylaxis: Ondansetron (or other 5HT-3), Dexamethasone or Solumedrol     Comments:                Kishore Warren III, MD

## 2021-09-07 NOTE — ANESTHESIA PROCEDURE NOTES
Airway       Patient location during procedure: OR       Procedure Start/Stop Times: 9/6/2021 10:34 PM  Staff -        Anesthesiologist:  Brandon Dejesus MD       Resident/Fellow: Boy Ramirez MD       Performed By: resident  Consent for Airway        Urgency: elective  Indications and Patient Condition       Indications for airway management: joan-procedural       Induction type:intravenous       Mask difficulty assessment: 1 - vent by mask    Final Airway Details       Final airway type: endotracheal airway       Successful airway: ETT - single and Oral  Endotracheal Airway Details        ETT size (mm): 7.5       Cuffed: yes       Successful intubation technique: direct laryngoscopy       DL Blade Type: MAC 4       Grade View of Cords: 1       Adjucts: stylet       Measured from: gums/teeth       Secured at (cm): 23       Bite block used: None    Post intubation assessment        Placement verified by: capnometry, equal breath sounds and chest rise        Number of attempts at approach: 1       Number of other approaches attempted: 0       Secured with: pink tape       Ease of procedure: easy       Dentition: Intact and Unchanged

## 2021-09-07 NOTE — DISCHARGE SUMMARY
[facility]  Discharge Summary  Colon and Rectal Surgery     Cody Pickard MRN# [id number]   YOB: 1990 Age: 31 year old     Date of Admission:  9/6/2021  Date of Discharge::  9/7/2021  Admitting Physician:  Vidhya Hamm MD  Discharge Physician:  Vidhya Hamm MD  Primary Care Physician:        No Ref-Primary, Physician          Admission Diagnoses:   Perianal abscess [K61.0]  Perianal Crohn's disease, with abscess (H) [K50.114]          Discharge Diagnosis:   Perianal abscess [K61.0]  Perianal Crohn's disease, with abscess (H) [K50.114]         Procedures:   INCISION AND DRAINAGE, RECTUM, placement of Seton            Consultations:   None         Imaging Studies:     Results for orders placed or performed during the hospital encounter of 09/06/21   CT Abdomen Pelvis w Contrast    Narrative    EXAMINATION: CT ABDOMEN PELVIS W CONTRAST, 9/6/2021 7:57 PM    INDICATION: Perirectal abscess    COMPARISON STUDY: None    TECHNIQUE: CT scan of the abdomen and pelvis was performed on  multidetector CT scanner using volumetric acquisition technique and  images were reconstructed in multiple planes with variable thickness  and reviewed on dedicated workstations.     CONTRAST: iopamidol (ISOVUE-370) solution 111 mL   injected IV with  oral contrast    CT scan radiation dose is optimized to minimum requisite dose using  automated dose modulation techniques.    FINDINGS:    Lower thorax: No consolidative pulmonary opacities  No pleural  effusion.    Liver: Too small to characterize hepatic hypodensities.  No  intrahepatic biliary dilatation.    Biliary System: Normal gallbladder. No extrahepatic biliary ductal  dilation.    Pancreas: No mass or pancreatic ductal dilation.    Adrenal glands: No mass or nodules    Spleen: Normal.    Kidneys: No suspicious mass, obstructing calculus or hydronephrosis.   Contrast material within the bilateral renal  collecting system.  Focal  cortical hypodensities, too small to characterize and statistically  favored to represent simple cysts.    Gastrointestinal tract :Normal appendix. Normal caliber small bowel.   Wall thickening of the distal rectum. Mild wall thickening and  surrounding inflammation of the distal descending colon. No clear  inflammation in the upper rectum or sigmoid colon. Terminal ileum  appears within normal limits.    Mesentery/peritoneum/retroperitoneum: No mass. No free fluid or air.    Lymph nodes: Prominent but nonenlarged mesenteric lymph nodes in the  right lower quadrant.    Vasculature: Patent major abdominal vasculature.    Pelvis: Urinary bladder is normal.  Prostate and seminal vesicles are  within normal limits.    Osseous structures: No aggressive or acute osseous lesion.      Soft tissues: Within normal limits. Branching inflammatory changes in  the left ischioanal fossa with appear to extend to the posterior  aspect of the anus suspicious for transsphincteric anal fistula.      Impression    IMPRESSION:   1. Perianal inflammation posteriorly and to the left of the anus  extending into the ischioanal fossa suspicious for perianal fistula.  MRI is recommended for full characterization.   2. Mild wall thickening of the distal rectum and distal descending  colon suspicious for mild colitis. Given perianal changes, Crohn's  disease should be considered.  3. Prominent but nonenlarged mesenteric lymph nodes in the right lower  quadrant.    I have personally reviewed the examination and initial interpretation  and I agree with the findings.    TADEO DUENAS MD         SYSTEM ID:  [id number]              Medications Prior to Admission:     Medications Prior to Admission   Medication Sig Dispense Refill Last Dose     adalimumab (HUMIRA *CF*) 40 MG/0.4ML pen kit Inject 0.4 mLs (40 mg) Subcutaneous every 14 days 2 each 5 Unknown at Unknown time     dicyclomine (BENTYL) 10 MG capsule Take 1  capsule (10 mg) by mouth 2 times daily 90 capsule 1 9/6/2021 at Unknown time     lisinopril (ZESTRIL) 20 MG tablet Take 20 mg by mouth   9/6/2021 at Unknown time     mesalamine (LIALDA) 1.2 g EC tablet Take 4 tablets (4,800 mg) by mouth every morning 180 tablet 3 9/6/2021 at Unknown time              Discharge Medications:     Current Discharge Medication List      START taking these medications    Details   acetaminophen (TYLENOL) 325 MG tablet Take 3 tablets (975 mg) by mouth every 8 hours As scheduled for the next 7-10 days, then decrease both dose & frequency to as needed there after.  Qty: 70 tablet, Refills: 0    Associated Diagnoses: Perianal Crohn's disease, with abscess (H); Perianal abscess      ciprofloxacin (CIPRO) 500 MG tablet Take 1 tablet (500 mg) by mouth every 12 hours for 7 days  Qty: 14 tablet, Refills: 0    Associated Diagnoses: Perianal Crohn's disease, with abscess (H); Perianal abscess      magnesium hydroxide (MILK OF MAGNESIA) 400 MG/5ML suspension Take 30 mLs by mouth daily as needed for constipation (Use if preventive measures (senna-docusate, docusate, and polyethylene glycol) are not effective.)  Qty: 118 mL, Refills: 0    Associated Diagnoses: Perianal Crohn's disease, with abscess (H); Perianal abscess      metroNIDAZOLE (FLAGYL) 500 MG tablet Take 1 tablet (500 mg) by mouth 3 times daily for 7 days  Qty: 21 tablet, Refills: 0    Associated Diagnoses: Perianal Crohn's disease, with abscess (H); Perianal abscess      polyethylene glycol (MIRALAX) 17 GM/Dose powder Take 17 g by mouth daily Can hold intermittently for loose stools.  Qty: 510 g, Refills: 0    Associated Diagnoses: Perianal Crohn's disease, with abscess (H); Perianal abscess      senna-docusate (SENOKOT-S/PERICOLACE) 8.6-50 MG tablet Take 1 tablet by mouth 2 times daily  Qty: 60 tablet, Refills: 0    Associated Diagnoses: Perianal Crohn's disease, with abscess (H); Perianal abscess      traMADol (ULTRAM) 50 MG tablet Take  1 tablet (50 mg) by mouth every 6 hours as needed for moderate pain or severe pain  Qty: 28 tablet, Refills: 0    Associated Diagnoses: Perianal Crohn's disease, with abscess (H); Perianal abscess; Acute post-operative pain         CONTINUE these medications which have NOT CHANGED    Details   adalimumab (HUMIRA *CF*) 40 MG/0.4ML pen kit Inject 0.4 mLs (40 mg) Subcutaneous every 14 days  Qty: 2 each, Refills: 5    Comments: Disregard 40mg/0.8ml rx!!  Associated Diagnoses: IBD (inflammatory bowel disease)      dicyclomine (BENTYL) 10 MG capsule Take 1 capsule (10 mg) by mouth 2 times daily  Qty: 90 capsule, Refills: 1    Associated Diagnoses: Crohn's disease of large intestine with rectal bleeding (H)      lisinopril (ZESTRIL) 20 MG tablet Take 20 mg by mouth      mesalamine (LIALDA) 1.2 g EC tablet Take 4 tablets (4,800 mg) by mouth every morning  Qty: 180 tablet, Refills: 3    Associated Diagnoses: Crohn's disease of large intestine with rectal bleeding (H)                      Brief History of Illness:   Patient is a 32yo male with h/o IBD on Humira with recurrent perianal abscess s/p I&D 8/25 who presents to the ED for evaluation of fever. Patient is accompanied by his wife and 9 day old baby. Patient reports he has been having intermittent fevers, but was otherwise feeling well and his abscess was very much improved when he was seen in clinic on Friday. Patient is not currently on antibiotics, he finished a course of Flagyl and Vantin on 8/27. Patient is scheduled for 3T MRI on 9/18. He discussed seton with Dr. Bowers and would like to schedule this, assuming there is a fistula on MRI. He has had no previous abscess or fistula surgeries. He had labs done in colorectal clinic on Friday (9/3), which revealed elevated WBC and CRP. Patient states that he was not notified about these lab results over the weekend. Today he spiked a fever with Tmax of 102.6, which was controlled with Tylenol at 1 pm. He endorses  "increased chronic lower back pain and new lower abdominal pain. He denies increased swelling or drainage of the site. He denies urinary changes, cough, shortness of breath, runny nose. Patient is not vaccinated against Covid.     He underwent I&D of the rectum and placement of Seton on 9/6 with Dr. Hamm.           Hospital Course:   Post-operatively the pt did well.  Pt was able to void on his own and was able to tolerate small amounts of a low fiber diet.    Pt ambulated independently and did not require evaluation by PT/OT.  Post-operative pain was controlled with scheduled tylenol, tramadol PRN, and dilaudid PRN.  He was weaned off IV pain medications and pain was controlled with an oral regimen.    Patient is to follow up in the Colon and Rectal Surgery Clinic in 2-3 week with Rachel Mahmood NP and then with Dr. Hamm in 2-3 weeks after that.         Day of Discharge Physical Exam:   Blood pressure 99/47, pulse 68, temperature 97.4  F (36.3  C), temperature source Temporal, resp. rate 14, height 1.753 m (5' 9\"), weight 82.1 kg (181 lb), SpO2 97 %.    Gen: AAOx3, NAD  Pulm: Non-labored breathing  Abd: Soft, non-tender, no guarding   Incision C/D/I with gauze packing and seton in place  Ext:  Warm and well-perfused         Final Pathology Result:   No pathology submitted           Discharge Instructions and Follow-Up:     Discharge Procedure Orders   Reason for your hospital stay   Order Comments: 9/6-9/7/2021:  INCISION AND DRAINAGE, RECTUM, placement of Seton.  Large perianal abscess located in the left posterior quadrant. Fistula identified in the posterior midline and seton placed. Circumferential stricture at 3cm from anal verge was dilated.     Activity   Order Comments: Your activity upon discharge:   Activity:  After your procedure, there are no restrictions on your activity   except restrictions surrounding being on narcotics and in pain, such as no heavy machine operating or " driving.   You may walk, climb stairs, ride in a car, and sit as tolerated.   It is helpful to avoid sitting in one position for long periods (2 or more hours).  After some surgeries, you may be told not to perform any lifting (more than 10 pounds) for several weeks after surgery.     Order Specific Question Answer Comments   Is discharge order? Yes      Adult [facility] Follow-up and recommended labs and tests   Order Comments: Anorectal Surgery Instructions    What can I expect after anorectal surgery?  Most anorectal procedures are done as outpatient surgery, and you go home the same day as the procedure. A few surgical procedures will require that you stay in the hospital for about one to three days. No matter where the procedure is done or how long or short it takes, these recommendations will help you heal and feel more comfortable.    Medicines:  The anal area is very sensitive; you can expect to have some pain for up to 2-4 weeks after the procedure. Your doctor will give you a prescription for one or more pain medications.    Take acetominaphen (Tylenol) 650-1000 mg four times a day.   Take this on a regular basis (not as needed) following surgery for pain control.   Take the lower dose if you are >65 years old or have liver disease. The maximum dose of acetominaphen is 4000 mg a day. You can stop the acetaminophen or reduce the dose when you are feeling better.  It is important to realize that many narcotic pain relievers (including vicodin, percocet, tylenol #3) also have acetaminophen, and excessive doses of acetaminophen can be dangerous, so do not take these in addition to acetominaphen.  You may take narcotics that don't contain acetominaphen such as oxycodone or Tramadol.      Take Tramadol AS NEEDED in addition to the acetominaphen.    Because narcotics have side effects (including constipation), you should reduce your use of these medications as tolerated as your pain improves.    *In general, the  best strategy is to take (if you are able to tolerate it) the tylenol on a regular basis until your pain has largely gone away. You can take the narcotic pain medicine as needed in addition to the tylenol. As your pain begins to lessen, you should cut back on your narcotic use while continuing to take your regular tylenol doses.    Refilling prescriptions. If you need additional pain medication, please call the triage nurse at [phone] during normal business hours (8 a.m. to 4 p.m., Monday though Friday).  If you call after hours or on the weekends, the doctor on call may not know you personally and may not renew narcotic pain medication by phone. Call your primary care provider for all other medication refills.    Perineal care:  Tub baths:  If possible, take a tub bath immediately after each bowel movement.   Baths should be take at least 3 times daily for the first week to 10 days following your procedure. You should soak in the tub for 10 to 15 minutes each time with water as warm as you can tolerate.   Even after you go back to work, it is a good idea to sit in the tub in the morning, after returning from work, and again in the evening before bedtime.    Bleeding/Infection:  You can expect to have some bleeding after bowel movements, but it should stop soon after you wipe.   Use a wet cloth or perianal pad (Tucks or Preparation H pads) to gently wipe the area after each bowel movement.  Do not rub the anal area or use a lot of pressure.  Using a spray bottle filled with warm water helps loosen any remaining stool. Blot gently with a soft dry cloth or tissue paper.  Infection around the anal opening is not very common. The anal area has excellent blood supply, which helps the area to heal. Bloody discharge after bowel movements is normal and may last 2 to 4 weeks after your surgery. However, if you bleed between bowel movements and cannot get it to stop, call the triage nurse immediately  [phone].    Bowel function:  Take a fiber supplement such as Metamucil or Citrucel, which is over the counter. It is important to drink six to eight glasses of water or juice everyday when using fiber products.    If you do not have a bowel movement after 1-2 days:  Take Milk of Magnesia-2 tablespoons.     If there are no results, repeat this or add over the counter Miralax.    If you still do not have results, contact the clinic.   If there are no results, repeat this. Stop taking Milk of Magnesia or other laxatives if you begin to have diarrhea.    * Constipation will cause you to strain when you have a bowel movement. The hard stool will be difficult to pass, will increase pain and bleeding, and will slow down healing.  Try to avoid constipation and/or diarrhea as this can make the pain and bleeding worse.    * It is important to have regular bowel movements at least every other day and to keep your stool soft.  A high fiber diet, including at least four servings of fruits or vegetables daily, will help to keep your bowel movements regular and soft.    Activity:  After your procedure, there are no restrictions on your activity   except restrictions surrounding being on narcotics and in pain, such as no heavy machine operating or driving.   You may walk, climb stairs, ride in a car, and sit as tolerated.   It is helpful to avoid sitting in one position for long periods (2 or more hours).  After some surgeries, you may be told not to perform any lifting (more than 10 pounds) for several weeks after surgery.    When to call:  When do I need to call the doctor or triage nurse?  If you experience any of the problems listed here, call our triage nurse during business hours ([phone]).   The nurse will help you with your problem or have the doctor call you.   After hours and on weekends, please call the main hospital number ([phone]) and ask for the colon and rectal surgery person on call.   Some is  available to help you 24 hours a day, seven days a week.  Call for:   ? Fever greater than 101 degrees   ? Chills   ? Foul-smelling drainage   ? Nausea and vomiting   ? Diarrhea - greater than 3 water stools in 24 hours   ? Constipation - no bowel movement after 3 days   ? Severe bleeding that does not stop soon after a bowel movement   ? Problems with the incision, including increased pain, swelling, or redness      NOTIFY  Please contact Iris Johnson RN or Isabella KAUR at [phone] for problems after discharge such as:  -Temperature > 101F, chills, rigors, dizziness  -Redness around or purulent drainage from wound  -Inability to tolerate diet, nausea or vomiting  -You stop passing gas (or your stoma stops functioning), develop significant bloating, abdominal pain  -Have blood in stools/vomit  -Have severe diarrhea/constipation  -Any other questions or concerns.  - At nights (after 4:30pm), on weekends, or if urgent, call [phone] and ask the  to speak with the on-call Colorectal Surgery resident or fellow      FOLLOW-UP  1.  You will need to follow-up with Rachel Mahmood NP in the Colon and Rectal Surgery clinic in 2-3 week(s) and then with [facility] Staff: Dr. Hamm in 4-6 weeks after.  Please contact our clinic scheduler (phone # [phone]) if you have not heard from our clinic in 3 business days afer discharge to schedule a follow-up appointment.      Appointments on [place] and/or [facility] (with [facility] or [facility] provider or service). Call [phone] if you haven't heard regarding these appointments within 7 days of discharge.     Diet   Order Comments: Follow this diet upon discharge: As tolerated     Order Specific Question Answer Comments   Is discharge order? Yes             Home Health Care:     Not needed           Discharge Disposition:     Discharged to home      Condition at discharge: Stable      Boy Shirley MD  General Surgery PGY-1  Pager:  #[phone]      Pt was seen and discussed with Dr. Lawrence who discussed with staff.

## 2021-09-08 ENCOUNTER — TELEPHONE (OUTPATIENT)
Dept: SURGERY | Facility: CLINIC | Age: 31
End: 2021-09-08

## 2021-09-08 NOTE — TELEPHONE ENCOUNTER
"Tier 2 Case Request received to schedule:    Patient Name: Cody Pickard   MRN: [id number]   Case#: [id number]   Surgeon(s) and Role:      * Weston Pichardo MD - Primary   Date requested: * No dates entered *   Location: [facility] OR   Procedure(s):   EXAM UNDER ANESTHESIA, ANUS, seton placement (N/A)   PLACEMENT, SETON STITCH (N/A)     Additional Instructions for the Case  Multi-surgeon case:  no  Time in minutes:  45  Anesthesia: MAC  Prep: 2 fleets  Pre-Op: PAC vs PCP  Labs: no  WOC: no  Special equipment: no  Special Instructions: schedule after MRI on 9/18    Schedule with Rachel Mahmood NP 1-2 weeks after surgery.    On 9/8/2021:  Sent the following in basket message to KESHA Simmons in response to conversation with patient via phone:    \"Chapin Simmons,    Patient states that he already had this surgery emergently on Monday (maybe with [facility]?). He said he was to keep his upcoming MRI appt, but otherwise no need to schedule surgery at this time. Is that correct?    Thank-you,  Jennifer\"    KESHA Simmons confirmed via in basket message that patient already had this procedure emergently over the weekend. Canceling case request.        "

## 2021-09-09 ENCOUNTER — PATIENT OUTREACH (OUTPATIENT)
Dept: SURGERY | Facility: CLINIC | Age: 31
End: 2021-09-09

## 2021-09-09 NOTE — PROGRESS NOTES
Post Op Note     Called to check on patient postoperatively after hospital discharge.     Patient is s/p I&D perianal abscess, seton placement, anal dilation with Dr. Vidhya Hamm for anal abscess.   Admitted 9/6/2021 and discharged on 9/7/2021.      Pain is well controlled with tylenol     Patient is passing flats, is having loose bowel movements.    Patient is not set up with home care.     Patient Denies nausea and vomiting.    Patient Denies any fevers or chills.     Follow up is set up with Rachel Bojorquez NP on 9/24/2021 and with Dr. Vidhya Hamm on 10/20/2021.    Encouraged the patient to contact the clinic in the meantime with any fevers, chills, nausea, vomiting, increased colostomy output, no colostomy output, dizziness, lightheadedness, uncontrolled pain, changes to the incisions, or with any questions or concerns.    Patient's questions were answered to their stated satisfaction and they are in agreement with this plan.    NICOLAS Simmons [phone]  Colon & Rectal Surgery Clinic  [facility]

## 2021-09-11 LAB
BACTERIA BLD CULT: NO GROWTH
BACTERIA BLD CULT: NO GROWTH

## 2021-09-11 NOTE — OP NOTE
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     POSTOPERATIVE DIAGNOSIS: Recurrent perirectal abscess, Crohn's disease, anorectal fistula. Crohn's anorectal stricture about 3 cm from the anal verge.    PROCEDURE: Examination under anesthesia, incision and drainage with debridement of perirectal abscess, placement of seton.     INDICATIONS: Cody Pickard is a 31 year old male with Crohn's disease who presents to the emergency room due to increased pain in his rectal area after recent incision and drainage. He presents now febrile and with leukocytosis but overall non-toxic appearing. On examination, he has new back pain and is very tender on rectal exam. A CT scan demonstrates fluid in the anorectal region concerning for abscess/fistula. . The risks and benefits of surgery were thoroughly discussed with the patient and he agreed to proceed.     DESCRIPTION OF PROCEDURE: The patient was brought to the operating room. After anesthesia was induced, he was carefully placed prone on the operating room table. We prepped and draped the area in the usual sterile fashion. We began the procedure with a timeout and performed an anal block using a mixture 50/50 of bupivacaine and lidocaine with epinephrine. We performed a digital rectal examination and a stricture of the anorectal ring was noted 3 cm from the anal verge. Anoscopy demonstrated anal tags and induration in the left posterior quadrant, several Crohn's disease fissures both anteriorly and posteriorly and inflammation of the mucosa of the anorectum consistent with his Crohn's disease and the abscess/fistula. The previous drainage site was opened an pocket of about 5 plus cm in size was demonstrated with purulent drainage and significant proud granulation tissue. This was carefully drained and debrided. A lacrimal probe  was gently used and the fistula tract easily identified at the posterior midline consistent with a transphincteric fistula and abscess going into the deep post anal space. A yellow vessel loop was used to place a seton and secured with 4 separate 2-0 Silk. The abscess cavity had good hemostasis, was carefully packed gently and dressings applied. At the end the case, all instruments and sponge counts were correct x2. The patient was emerged from anesthesia and taken to postoperative anesthesia care unit in good condition.     SPECIMEN: None.     ESTIMATED BLOOD LOSS: Minimal.     URINE OUTPUT: Not measured.     PAUL MARTIN MD   Colon and Rectal Surgery Staff  [facility]

## 2021-09-13 ENCOUNTER — PATIENT OUTREACH (OUTPATIENT)
Dept: GASTROENTEROLOGY | Facility: CLINIC | Age: 31
End: 2021-09-13

## 2021-09-13 LAB — SCANNED LAB RESULT: NORMAL

## 2021-09-14 ENCOUNTER — VIRTUAL VISIT (OUTPATIENT)
Dept: GASTROENTEROLOGY | Facility: CLINIC | Age: 31
End: 2021-09-14
Payer: COMMERCIAL

## 2021-09-14 VITALS — BODY MASS INDEX: 27.4 KG/M2 | WEIGHT: 185 LBS | HEIGHT: 69 IN

## 2021-09-14 DIAGNOSIS — K50.114 CROHN'S DISEASE OF LARGE INTESTINE WITH ABSCESS (H): Primary | ICD-10-CM

## 2021-09-14 PROCEDURE — 99214 OFFICE O/P EST MOD 30 MIN: CPT | Mod: 95 | Performed by: INTERNAL MEDICINE

## 2021-09-14 ASSESSMENT — MIFFLIN-ST. JEOR: SCORE: 1794.53

## 2021-09-14 ASSESSMENT — PAIN SCALES - GENERAL: PAINLEVEL: NO PAIN (0)

## 2021-09-14 NOTE — PROGRESS NOTES
Cody is a 31 year old who is being evaluated via a billable video visit.      How would you like to obtain your AVS? MyChart  If the video visit is dropped, the invitation should be resent by: Text to cell phone: [phone]  Will anyone else be joining your video visit? No      Video Start Time: 8:37 AM  Video-Visit Details    Type of service:  Video Visit    Video End Time:9:02 AM    Originating Location (pt. Location): Home    Distant Location (provider location):  [facility] [place]     Platform used for Video Visit: Leaf

## 2021-09-14 NOTE — LETTER
9/14/2021         RE: Cody Pickard  [id number] [address] [place] [id number]        Dear Colleague,    Thank you for referring your patient, Cody Pickard, to the [facility] [place]. Please see a copy of my visit note below.    IBD CLINIC VISIT    CC/REFERRING MD:  Referred Self  REASON FOR CONSULTATION: Colitis    ASSESSMENT/PLAN:    1. Crohn's disease: Extensive colitis with joan-anal fistula  Current medication:   - lialda 4.8g daily  - Humira every other week (July 2021)  Current clinical disease activity:   Current endoscopic disease activity: Bello 2 in rectum, mild activity on biopsies with associated anal stricture.     With the development of a fistula I feel comfortable classifying his Crohn's disease.  He has had a clinical improvement on adalimumab, but course complicated by perianal abscess.  He now has a seton in place for management of his perianal fistula.  We will need some time to see how he does.  Discussed the natural history of perianal fistula.  Discussed seton may need to be permanent, although some people can have healing on biologic therapy, and will take around 12 months to assess.    --Continue mesalamine 4.8 g daily for now, although may stop over the next 3 to 6 months as we started adalimumab  --Continue adalimumab  --Follow-up with colorectal surgery regarding management of seton   --Tentative plan for flexible sigmoidoscopy in the next 3 to 6 months, based on improvement/stabilization of perianal disease    2. IBD dysplasia surveillance: > 1/3 of colon involved. Will need dysplasia surveillance.   -- IBD dysplasia surveillance starting in 2028    Return to clinic in 2-3 months    Alfie Bowers MD MS    [facility]  Inflammatory Bowel Disease Program  Division of Gastroenterology, Hepatology and Nutrition  Pager: [phone]      IBD HISTORY  Age at diagnosis: 30  Endoscopic extent of disease: Continuous: rectum to proximal  "transverse  Histologic Extent of disease: Rectum,   Disease phenotype: Extenssive   Joan-anal disease: Yes  Prior IBD surgeries:  Prior IBD Medications:  - Vedolizumab - not dose escalated     DRUG MONITORING  TPMT enzyme activity:     6-TGN/6-MMPN levels:    Biologic concentration:    HPI:   Developed a joan-anal abscess when at [facility] after his baby was born. Developed fever and went to the . Had incision and drainage on 9/6/21 with seton placement. He is on his last day of antibiotics at this point.     With the seton, he continues to drain a small amount of green material. He will change the gauze 3-4 times over 12 hours. Small amount of blood daily.     Otherwise he reports 1-2 stool in AM typically and maybe one other later in day. All stools are loose, but not completely watery. He still has urgency - 5-10 minutes to bathroom. Overall stools have been better since a few weeks ago. Also with some cramping.     He also notes a possible sore on the left side of his mouth, after procedure. He things it might have been related to intubation.     HBI:  Overall patient well being (prior day): 1 (Slightly below par)  Abdominal pain (prior day): 1 (Mild)  Number of liquid or soft stools (prior day): 3 (1 point per stool)  Abdominal mass on exam:   Complications (1 point for each):   New fistula    sIBDQ:  No flowsheet data found.     Last endoscopic activity: flex sig 6/2021 with moderate disease in rectum   Last histologic activity:  Mild (rectum)    Pertinent labs / imaging:     ROS:    Constitutional, HEENT, cardiovascular, pulmonary, GI, , musculoskeletal, neuro, skin, endocrine and psych systems are negative, except as otherwise noted.    PHYSICAL EXAMINATION:  Constitutional: aaox3, cooperative, pleasant, not dyspneic/diaphoretic, no acute distress  Vitals reviewed: Ht 1.753 m (5' 9\")   Wt 83.9 kg (185 lb)   BMI 27.32 kg/m    Wt:   Wt Readings from Last 2 Encounters:   09/14/21 83.9 kg (185 lb) "   09/06/21 82.1 kg (181 lb)      Constitutional - general appearance is well and in no acute distress. Body habitus normal  Eyes - No redness or discharge  Respiratory - No cough, unlabored breathing  Musculoskeletal - range of motion intact: Neck and arms  Skin - No discoloration or lesions  Neurological - No tremors, headaches  Psychiatric - No anxiety, alert & oriented      PERTINENT PAST MEDICAL HISTORY:  No past medical history on file.   Inflammatory bowel disease  Hypertension     PREVIOUS SURGERIES:  Past Surgical History:   Procedure Laterality Date     INCISION AND DRAINAGE RECTUM, COMBINED N/A 9/6/2021    Procedure: INCISION AND DRAINAGE, RECTUM, placement of Seton;  Surgeon: Vidhya Hamm MD;  Location:  OR       ALLERGIES:   No Known Allergies    PERTINENT MEDICATIONS:    Current Outpatient Medications:      acetaminophen (TYLENOL) 325 MG tablet, Take 3 tablets (975 mg) by mouth every 8 hours As scheduled for the next 7-10 days, then decrease both dose & frequency to as needed there after., Disp: 70 tablet, Rfl: 0     adalimumab (HUMIRA *CF*) 40 MG/0.4ML pen kit, Inject 0.4 mLs (40 mg) Subcutaneous every 14 days, Disp: 2 each, Rfl: 5     dicyclomine (BENTYL) 10 MG capsule, Take 1 capsule (10 mg) by mouth 2 times daily, Disp: 90 capsule, Rfl: 1     lisinopril (ZESTRIL) 20 MG tablet, Take 20 mg by mouth, Disp: , Rfl:      mesalamine (LIALDA) 1.2 g EC tablet, Take 4 tablets (4,800 mg) by mouth every morning, Disp: 180 tablet, Rfl: 3     metroNIDAZOLE (FLAGYL) 500 MG tablet, Take 1 tablet (500 mg) by mouth 3 times daily for 7 days, Disp: 21 tablet, Rfl: 0     traMADol (ULTRAM) 50 MG tablet, Take 1 tablet (50 mg) by mouth every 6 hours as needed for moderate pain or severe pain, Disp: 28 tablet, Rfl: 0     ciprofloxacin (CIPRO) 500 MG tablet, Take 1 tablet (500 mg) by mouth every 12 hours for 7 days (Patient not taking: Reported on 9/14/2021), Disp: 14 tablet, Rfl: 0     magnesium hydroxide  (MILK OF MAGNESIA) 400 MG/5ML suspension, Take 30 mLs by mouth daily as needed for constipation (Use if preventive measures (senna-docusate, docusate, and polyethylene glycol) are not effective.) (Patient not taking: Reported on 2021), Disp: 118 mL, Rfl: 0     polyethylene glycol (MIRALAX) 17 GM/Dose powder, Take 17 g by mouth daily Can hold intermittently for loose stools. (Patient not taking: Reported on 2021), Disp: 510 g, Rfl: 0     senna-docusate (SENOKOT-S/PERICOLACE) 8.6-50 MG tablet, Take 1 tablet by mouth 2 times daily (Patient not taking: Reported on 2021), Disp: 60 tablet, Rfl: 0    SOCIAL HISTORY:  Social History     Socioeconomic History     Marital status:      Spouse name: Not on file     Number of children: Not on file     Years of education: Not on file     Highest education level: Not on file   Occupational History     Not on file   Tobacco Use     Smoking status: Former Smoker     Packs/day: 0.50     Years: 2.50     Pack years: 1.25     Types: Cigarettes     Start date:      Quit date: 2020     Years since quittin.1     Smokeless tobacco: Never Used   Substance and Sexual Activity     Alcohol use: Yes     Comment: occ     Drug use: Never     Sexual activity: Not on file   Other Topics Concern     Not on file   Social History Narrative     Not on file     Social Determinants of Health     Financial Resource Strain:      Difficulty of Paying Living Expenses:    Food Insecurity:      Worried About Running Out of Food in the Last Year:      Ran Out of Food in the Last Year:    Transportation Needs:      Lack of Transportation (Medical):      Lack of Transportation (Non-Medical):    Physical Activity:      Days of Exercise per Week:      Minutes of Exercise per Session:    Stress:      Feeling of Stress :    Social Connections:      Frequency of Communication with Friends and Family:      Frequency of Social Gatherings with Friends and Family:      Attends Rastafari  Services:      Active Member of Clubs or Organizations:      Attends Club or Organization Meetings:      Marital Status:    Intimate Partner Violence:      Fear of Current or Ex-Partner:      Emotionally Abused:      Physically Abused:      Sexually Abused:        FAMILY HISTORY:  Family History   Problem Relation Age of Onset     Multiple Sclerosis Mother      Ulcerative Colitis Maternal Grandmother      Colon Cancer No family hx of      Inflammatory Bowel Disease No family hx of      Irritable Bowel Syndrome No family hx of      Crohn's Disease No family hx of      No family history of IBD    Past/family/social history reviewed and no changes        Again, thank you for allowing me to participate in the care of your patient.      Sincerely,    Alfie Bowers MD

## 2021-09-14 NOTE — PROGRESS NOTES
IBD CLINIC VISIT    CC/REFERRING MD:  Referred Self  REASON FOR CONSULTATION: Colitis    ASSESSMENT/PLAN:    1. Crohn's disease: Extensive colitis with joan-anal fistula  Current medication:   - lialda 4.8g daily  - Humira every other week (July 2021)  Current clinical disease activity:   Current endoscopic disease activity: Bello 2 in rectum, mild activity on biopsies with associated anal stricture.     With the development of a fistula I feel comfortable classifying his Crohn's disease.  He has had a clinical improvement on adalimumab, but course complicated by perianal abscess.  He now has a seton in place for management of his perianal fistula.  We will need some time to see how he does.  Discussed the natural history of perianal fistula.  Discussed seton may need to be permanent, although some people can have healing on biologic therapy, and will take around 12 months to assess.    --Continue mesalamine 4.8 g daily for now, although may stop over the next 3 to 6 months as we started adalimumab  --Continue adalimumab  --Follow-up with colorectal surgery regarding management of seton   --Tentative plan for flexible sigmoidoscopy in the next 3 to 6 months, based on improvement/stabilization of perianal disease    2. IBD dysplasia surveillance: > 1/3 of colon involved. Will need dysplasia surveillance.   -- IBD dysplasia surveillance starting in 2028    Return to clinic in 2-3 months    Alfie Bowers MD MS    [facility]  Inflammatory Bowel Disease Program  Division of Gastroenterology, Hepatology and Nutrition  Pager: [phone]      IBD HISTORY  Age at diagnosis: 30  Endoscopic extent of disease: Continuous: rectum to proximal transverse  Histologic Extent of disease: Rectum,   Disease phenotype: Extenssive   Joan-anal disease: Yes  Prior IBD surgeries:  Prior IBD Medications:  - Vedolizumab - not dose escalated     DRUG MONITORING  TPMT enzyme activity:     6-TGN/6-MMPN  "levels:    Biologic concentration:    HPI:   [facility] Developed fever and went to the . Had incision and drainage on 9/6/21 with seton placement. He is on his last day of antibiotics at this point.     With the seton, he continues to drain a small amount of green material. He will change the gauze 3-4 times over 12 hours. Small amount of blood daily.     Otherwise he reports 1-2 stool in AM typically and maybe one other later in day. All stools are loose, but not completely watery. He still has urgency - 5-10 minutes to bathroom. Overall stools have been better since a few weeks ago. Also with some cramping.     He also notes a possible sore on the left side of his mouth, after procedure. He things it might have been related to intubation.     HBI:  Overall patient well being (prior day): 1 (Slightly below par)  Abdominal pain (prior day): 1 (Mild)  Number of liquid or soft stools (prior day): 3 (1 point per stool)  Abdominal mass on exam:   Complications (1 point for each):   New fistula    sIBDQ:  No flowsheet data found.     Last endoscopic activity: flex sig 6/2021 with moderate disease in rectum   Last histologic activity:  Mild (rectum)    Pertinent labs / imaging:     ROS:    Constitutional, HEENT, cardiovascular, pulmonary, GI, , musculoskeletal, neuro, skin, endocrine and psych systems are negative, except as otherwise noted.    PHYSICAL EXAMINATION:  Constitutional: aaox3, cooperative, pleasant, not dyspneic/diaphoretic, no acute distress  Vitals reviewed: Ht 1.753 m (5' 9\")   Wt 83.9 kg (185 lb)   BMI 27.32 kg/m    Wt:   Wt Readings from Last 2 Encounters:   09/14/21 83.9 kg (185 lb)   09/06/21 82.1 kg (181 lb)      Constitutional - general appearance is well and in no acute distress. Body habitus normal  Eyes - No redness or discharge  Respiratory - No cough, unlabored breathing  Musculoskeletal - range of motion intact: Neck and " arms  Skin - No discoloration or lesions  Neurological - No tremors, headaches  Psychiatric - No anxiety, alert & oriented      PERTINENT PAST MEDICAL HISTORY:  No past medical history on file.   Inflammatory bowel disease  Hypertension     PREVIOUS SURGERIES:  Past Surgical History:   Procedure Laterality Date     INCISION AND DRAINAGE RECTUM, COMBINED N/A 9/6/2021    Procedure: INCISION AND DRAINAGE, RECTUM, placement of Seton;  Surgeon: Vidhya Hamm MD;  Location:  OR       ALLERGIES:   No Known Allergies    PERTINENT MEDICATIONS:    Current Outpatient Medications:      acetaminophen (TYLENOL) 325 MG tablet, Take 3 tablets (975 mg) by mouth every 8 hours As scheduled for the next 7-10 days, then decrease both dose & frequency to as needed there after., Disp: 70 tablet, Rfl: 0     adalimumab (HUMIRA *CF*) 40 MG/0.4ML pen kit, Inject 0.4 mLs (40 mg) Subcutaneous every 14 days, Disp: 2 each, Rfl: 5     dicyclomine (BENTYL) 10 MG capsule, Take 1 capsule (10 mg) by mouth 2 times daily, Disp: 90 capsule, Rfl: 1     lisinopril (ZESTRIL) 20 MG tablet, Take 20 mg by mouth, Disp: , Rfl:      mesalamine (LIALDA) 1.2 g EC tablet, Take 4 tablets (4,800 mg) by mouth every morning, Disp: 180 tablet, Rfl: 3     metroNIDAZOLE (FLAGYL) 500 MG tablet, Take 1 tablet (500 mg) by mouth 3 times daily for 7 days, Disp: 21 tablet, Rfl: 0     traMADol (ULTRAM) 50 MG tablet, Take 1 tablet (50 mg) by mouth every 6 hours as needed for moderate pain or severe pain, Disp: 28 tablet, Rfl: 0     ciprofloxacin (CIPRO) 500 MG tablet, Take 1 tablet (500 mg) by mouth every 12 hours for 7 days (Patient not taking: Reported on 9/14/2021), Disp: 14 tablet, Rfl: 0     magnesium hydroxide (MILK OF MAGNESIA) 400 MG/5ML suspension, Take 30 mLs by mouth daily as needed for constipation (Use if preventive measures (senna-docusate, docusate, and polyethylene glycol) are not effective.) (Patient not taking: Reported on 9/14/2021), Disp: 118 mL,  Rfl: 0     polyethylene glycol (MIRALAX) 17 GM/Dose powder, Take 17 g by mouth daily Can hold intermittently for loose stools. (Patient not taking: Reported on 2021), Disp: 510 g, Rfl: 0     senna-docusate (SENOKOT-S/PERICOLACE) 8.6-50 MG tablet, Take 1 tablet by mouth 2 times daily (Patient not taking: Reported on 2021), Disp: 60 tablet, Rfl: 0    SOCIAL HISTORY:  Social History     Socioeconomic History     Marital status:      Spouse name: Not on file     Number of children: Not on file     Years of education: Not on file     Highest education level: Not on file   Occupational History     Not on file   Tobacco Use     Smoking status: Former Smoker     Packs/day: 0.50     Years: 2.50     Pack years: 1.25     Types: Cigarettes     Start date:      Quit date: 2020     Years since quittin.1     Smokeless tobacco: Never Used   Substance and Sexual Activity     Alcohol use: Yes     Comment: occ     Drug use: Never     Sexual activity: Not on file   Other Topics Concern     Not on file   Social History Narrative     Not on file     Social Determinants of Health     Financial Resource Strain:      Difficulty of Paying Living Expenses:    Food Insecurity:      Worried About Running Out of Food in the Last Year:      Ran Out of Food in the Last Year:    Transportation Needs:      Lack of Transportation (Medical):      Lack of Transportation (Non-Medical):    Physical Activity:      Days of Exercise per Week:      Minutes of Exercise per Session:    Stress:      Feeling of Stress :    Social Connections:      Frequency of Communication with Friends and Family:      Frequency of Social Gatherings with Friends and Family:      Attends Restoration Services:      Active Member of Clubs or Organizations:      Attends Club or Organization Meetings:      Marital Status:    Intimate Partner Violence:      Fear of Current or Ex-Partner:      Emotionally Abused:      Physically Abused:      Sexually Abused:         FAMILY HISTORY:  Family History   Problem Relation Age of Onset     Multiple Sclerosis Mother      Ulcerative Colitis Maternal Grandmother      Colon Cancer No family hx of      Inflammatory Bowel Disease No family hx of      Irritable Bowel Syndrome No family hx of      Crohn's Disease No family hx of      No family history of IBD    Past/family/social history reviewed and no changes

## 2021-09-14 NOTE — PATIENT INSTRUCTIONS
-- OK to stop senna    -- Continue Humira    -- Continue Lialda    -- Follow-up with colorectal surgery team    -- Follow-up with [facility] (IBD program) in 2-3 months.     Anticipate repeating flex sig in 3-6 months based on your symptoms.

## 2021-09-15 ENCOUNTER — PATIENT OUTREACH (OUTPATIENT)
Dept: GASTROENTEROLOGY | Facility: CLINIC | Age: 31
End: 2021-09-15

## 2021-09-18 ENCOUNTER — ANCILLARY PROCEDURE (OUTPATIENT)
Dept: MRI IMAGING | Facility: CLINIC | Age: 31
End: 2021-09-18
Attending: NURSE PRACTITIONER
Payer: COMMERCIAL

## 2021-09-18 DIAGNOSIS — K60.30 ANAL FISTULA: ICD-10-CM

## 2021-09-18 PROCEDURE — A9585 GADOBUTROL INJECTION: HCPCS | Performed by: RADIOLOGY

## 2021-09-18 PROCEDURE — 72197 MRI PELVIS W/O & W/DYE: CPT | Performed by: RADIOLOGY

## 2021-09-18 RX ORDER — GADOBUTROL 604.72 MG/ML
10 INJECTION INTRAVENOUS ONCE
Status: COMPLETED | OUTPATIENT
Start: 2021-09-18 | End: 2021-09-18

## 2021-09-18 RX ADMIN — GADOBUTROL 8 ML: 604.72 INJECTION INTRAVENOUS at 07:56

## 2021-09-18 NOTE — DISCHARGE INSTRUCTIONS
MRI Contrast Discharge Instructions    The IV contrast you received today will pass out of your body in your  urine. This will happen in the next 24 hours. You will not feel this process.  Your urine will not change color.    Drink at least 4 extra glasses of water or juice today (unless your doctor  has restricted your fluids). This reduces the stress on your kidneys.  You may take your regular medicines.    If you are on dialysis: It is best to have dialysis today.    If you have a reaction: Most reactions happen right away. If you have  any new symptoms after leaving the hospital (such as hives or swelling),  call your hospital at the correct number below. Or call your family doctor.  If you have breathing distress or wheezing, call 911.    Special instructions: ***    I have read and understand the above information.    Signature:______________________________________ Date:___________    Staff:__________________________________________ Date:___________     Time:__________    [place] [facility] Departments:    ___[place]: [phone]  ___[facility]: [phone]  ___[place]: [phone] ___[facility]: [phone]  ___[facility]: [phone]  ___[facility]: [phone]  ___[phone]  ___[facility]: [phone]  ___[phone]

## 2021-09-24 ENCOUNTER — LAB (OUTPATIENT)
Dept: LAB | Facility: CLINIC | Age: 31
End: 2021-09-24
Payer: COMMERCIAL

## 2021-09-24 ENCOUNTER — PATIENT OUTREACH (OUTPATIENT)
Dept: GASTROENTEROLOGY | Facility: CLINIC | Age: 31
End: 2021-09-24

## 2021-09-24 ENCOUNTER — OFFICE VISIT (OUTPATIENT)
Dept: SURGERY | Facility: CLINIC | Age: 31
End: 2021-09-24
Payer: COMMERCIAL

## 2021-09-24 ENCOUNTER — TELEPHONE (OUTPATIENT)
Dept: GASTROENTEROLOGY | Facility: CLINIC | Age: 31
End: 2021-09-24

## 2021-09-24 VITALS
SYSTOLIC BLOOD PRESSURE: 122 MMHG | HEIGHT: 70 IN | OXYGEN SATURATION: 100 % | BODY MASS INDEX: 26.88 KG/M2 | DIASTOLIC BLOOD PRESSURE: 71 MMHG | WEIGHT: 187.8 LBS | HEART RATE: 86 BPM

## 2021-09-24 DIAGNOSIS — K60.30 ANAL FISTULA: ICD-10-CM

## 2021-09-24 DIAGNOSIS — K61.0 PERIANAL ABSCESS: Primary | ICD-10-CM

## 2021-09-24 DIAGNOSIS — K50.114 CROHN'S DISEASE OF LARGE INTESTINE WITH ABSCESS (H): Primary | ICD-10-CM

## 2021-09-24 DIAGNOSIS — K50.114 CROHN'S DISEASE OF LARGE INTESTINE WITH ABSCESS (H): ICD-10-CM

## 2021-09-24 PROCEDURE — 99213 OFFICE O/P EST LOW 20 MIN: CPT | Performed by: NURSE PRACTITIONER

## 2021-09-24 PROCEDURE — U0003 INFECTIOUS AGENT DETECTION BY NUCLEIC ACID (DNA OR RNA); SEVERE ACUTE RESPIRATORY SYNDROME CORONAVIRUS 2 (SARS-COV-2) (CORONAVIRUS DISEASE [COVID-19]), AMPLIFIED PROBE TECHNIQUE, MAKING USE OF HIGH THROUGHPUT TECHNOLOGIES AS DESCRIBED BY CMS-2020-01-R: HCPCS | Mod: 90 | Performed by: PATHOLOGY

## 2021-09-24 PROCEDURE — U0005 INFEC AGEN DETEC AMPLI PROBE: HCPCS | Mod: 90 | Performed by: PATHOLOGY

## 2021-09-24 RX ORDER — METRONIDAZOLE 500 MG/1
500 TABLET ORAL 3 TIMES DAILY
Qty: 30 TABLET | Refills: 0 | Status: SHIPPED | OUTPATIENT
Start: 2021-09-24 | End: 2021-10-04

## 2021-09-24 ASSESSMENT — PAIN SCALES - GENERAL: PAINLEVEL: MODERATE PAIN (5)

## 2021-09-24 ASSESSMENT — MIFFLIN-ST. JEOR: SCORE: 1815.17

## 2021-09-24 NOTE — LETTER
"2021       RE: Cody Pickard  [address] [address] [place] [id number]     Dear Colleague,    Thank you for referring your patient, Cody Pickard, to the [facility] COLON AND RECTAL SURGERY CLINIC [place] at [facility]. Please see a copy of my visit note below.    Colon and Rectal Surgery Follow-Up Clinic Note    RE: Cody Pickard  : 1990  WIL: 2021    Cody Pickard is a very pleasant 31 year old male with extensive colitis on Humira and lialda with perianal abscess and fistula who is now status post examination under anesthesia with incision and drainage with debridement of perirectal abscess and placement of seton on 21 with Dr. Hamm.    Interval history: Cody had been doing well but got increased pain and green drainage yesterday. No fevers or chills. He has been doing tub baths but not every day. The seton is somewhat irritating. He is taking tylenol for pain. He has also gotten an increase in loose stools. He has some urgency and some incontinence. He has had 4 loose stools today already.    Physical Examination: Exam was chaperoned by Yaima Bergeron MA   /71 (BP Location: Left arm, Patient Position: Sitting, Cuff Size: Adult Regular)   Pulse 86   Ht 5' 9.5\"   Wt 187 lb 12.8 oz   SpO2 100%   BMI 27.34 kg/m    General: alert, oriented, in no acute distress, sitting comfortably  Perianal external examination:  Yellow vessel loop seton in place in the left posterior position. There is a fistulotomy site that extends from this with a small skin bridge present. Within the fistulotomy site there is a tract that can be probed posteriorly about 5 cm with a moderate amount of serosanguinous drainage present. I inserted an additional fistula probe through the seton site and these two do connect internally.    Digital rectal examination: Was deferred.    Anoscopy: Was deferred.    Assessment/Plan: 31 year old male status " post examination under anesthesia with incision and drainage with debridement of perirectal abscess and placement of seton on 9/6/21 with Dr. Hamm. He does have a fairly large tract with a moderate amount of drainage on exam today. I would like him soaking in the tub more often, at least 2-3 times a day to help facilitate drainage. Will try a course of Flagyl also to see if this helps with drainage and pain. This may also improve his loose stools. Discussed with Dr. Bowers as well who will do a flexible sigmoidoscopy soon and consider switching his medical management. I would like to see him for a recheck next week but encouraged him to contact the clinic in the meantime with any questions or concerns. Patient's questions were answered to his stated satisfaction and he is in agreement with this plan.    Medical history:  No past medical history on file.    Surgical history:  Past Surgical History:   Procedure Laterality Date     INCISION AND DRAINAGE RECTUM, COMBINED N/A 9/6/2021    Procedure: INCISION AND DRAINAGE, RECTUM, placement of Seton;  Surgeon: Vidhya Hamm MD;  Location: [facility] OR       Problem list:  Patient Active Problem List    Diagnosis Date Noted     Acute post-operative pain 09/07/2021     Priority: Medium     Perianal abscess 09/06/2021     Priority: Medium     Perianal Crohn's disease, with abscess (H) 09/06/2021     Priority: Medium     IBD (inflammatory bowel disease) 02/22/2021     Priority: Medium       Medications:  Current Outpatient Medications   Medication Sig Dispense Refill     acetaminophen (TYLENOL) 325 MG tablet Take 3 tablets (975 mg) by mouth every 8 hours As scheduled for the next 7-10 days, then decrease both dose & frequency to as needed there after. 70 tablet 0     adalimumab (HUMIRA *CF*) 40 MG/0.4ML pen kit Inject 0.4 mLs (40 mg) Subcutaneous every 14 days 2 each 5     dicyclomine (BENTYL) 10 MG capsule Take 1 capsule (10 mg) by mouth 2 times daily 90 capsule  "1     lisinopril (ZESTRIL) 20 MG tablet Take 20 mg by mouth       mesalamine (LIALDA) 1.2 g EC tablet Take 4 tablets (4,800 mg) by mouth every morning 180 tablet 3     metroNIDAZOLE (FLAGYL) 500 MG tablet Take 1 tablet (500 mg) by mouth 3 times daily for 10 days 30 tablet 0     traMADol (ULTRAM) 50 MG tablet Take 1 tablet (50 mg) by mouth every 6 hours as needed for moderate pain or severe pain 28 tablet 0       Allergies:  No Known Allergies    Family history:  Family History   Problem Relation Age of Onset     Multiple Sclerosis Mother      Ulcerative Colitis Maternal Grandmother      Colon Cancer No family hx of      Inflammatory Bowel Disease No family hx of      Irritable Bowel Syndrome No family hx of      Crohn's Disease No family hx of        Social history:  Social History     Tobacco Use     Smoking status: Former Smoker     Packs/day: 0.50     Years: 2.50     Pack years: 1.25     Types: Cigarettes     Start date:      Quit date: 2020     Years since quittin.1     Smokeless tobacco: Never Used   Substance Use Topics     Alcohol use: Yes     Comment: occ     Marital status: .    Nursing Notes:   Yaima Bergeron  2021 12:55 PM  Signed  Chief Complaint   Patient presents with     Post-op Visit       Vitals:    21 1252   BP: 122/71   BP Location: Left arm   Patient Position: Sitting   Cuff Size: Adult Regular   Pulse: 86   SpO2: 100%   Weight: 187 lb 12.8 oz   Height: 5' 9.5\"       Body mass index is 27.34 kg/m .    Yaima Bergeron CMA         30 minutes spent on the date of the encounter doing chart review, history and exam, documentation and further activities as noted above.     Rachel Holt NP-C  Colon and Rectal Surgery  [facility]        Again, thank you for allowing me to participate in the care of your patient.      Sincerely,    Rachel Holt, APRN CNP      "

## 2021-09-24 NOTE — NURSING NOTE
"Chief Complaint   Patient presents with     Post-op Visit       Vitals:    09/24/21 1252   BP: 122/71   BP Location: Left arm   Patient Position: Sitting   Cuff Size: Adult Regular   Pulse: 86   SpO2: 100%   Weight: 187 lb 12.8 oz   Height: 5' 9.5\"       Body mass index is 27.34 kg/m .    Yaima Bergeron CMA    "

## 2021-09-24 NOTE — PROGRESS NOTES
"Colon and Rectal Surgery Follow-Up Clinic Note    RE: Cody Pickard  : 1990  WIL: 2021    Cody Pickard is a very pleasant 31 year old male with extensive colitis on Humira and lialda with perianal abscess and fistula who is now status post examination under anesthesia with incision and drainage with debridement of perirectal abscess and placement of seton on 21 with Dr. Hamm.    Interval history: Cody had been doing well but got increased pain and green drainage yesterday. No fevers or chills. He has been doing tub baths but not every day. The seton is somewhat irritating. He is taking tylenol for pain. He has also gotten an increase in loose stools. He has some urgency and some incontinence. He has had 4 loose stools today already.    Physical Examination: Exam was chaperoned by Yaima Bergeron MA   /71 (BP Location: Left arm, Patient Position: Sitting, Cuff Size: Adult Regular)   Pulse 86   Ht 5' 9.5\"   Wt 187 lb 12.8 oz   SpO2 100%   BMI 27.34 kg/m    General: alert, oriented, in no acute distress, sitting comfortably  Perianal external examination:  Yellow vessel loop seton in place in the left posterior position. There is a fistulotomy site that extends from this with a small skin bridge present. Within the fistulotomy site there is a tract that can be probed posteriorly about 5 cm with a moderate amount of serosanguinous drainage present. I inserted an additional fistula probe through the seton site and these two do connect internally.    Digital rectal examination: Was deferred.    Anoscopy: Was deferred.    Assessment/Plan: 31 year old male status post examination under anesthesia with incision and drainage with debridement of perirectal abscess and placement of seton on 21 with Dr. Hamm. He does have a fairly large tract with a moderate amount of drainage on exam today. I would like him soaking in the tub more often, at least 2-3 times a day to help " facilitate drainage. Will try a course of Flagyl also to see if this helps with drainage and pain. This may also improve his loose stools. Discussed with Dr. Bowers as well who will do a flexible sigmoidoscopy soon and consider switching his medical management. I would like to see him for a recheck next week but encouraged him to contact the clinic in the meantime with any questions or concerns. Patient's questions were answered to his stated satisfaction and he is in agreement with this plan.    Medical history:  No past medical history on file.    Surgical history:  Past Surgical History:   Procedure Laterality Date     INCISION AND DRAINAGE RECTUM, COMBINED N/A 9/6/2021    Procedure: INCISION AND DRAINAGE, RECTUM, placement of Seton;  Surgeon: Vidhya Hamm MD;  Location: [facility] OR       Problem list:  Patient Active Problem List    Diagnosis Date Noted     Acute post-operative pain 09/07/2021     Priority: Medium     Perianal abscess 09/06/2021     Priority: Medium     Perianal Crohn's disease, with abscess (H) 09/06/2021     Priority: Medium     IBD (inflammatory bowel disease) 02/22/2021     Priority: Medium       Medications:  Current Outpatient Medications   Medication Sig Dispense Refill     acetaminophen (TYLENOL) 325 MG tablet Take 3 tablets (975 mg) by mouth every 8 hours As scheduled for the next 7-10 days, then decrease both dose & frequency to as needed there after. 70 tablet 0     adalimumab (HUMIRA *CF*) 40 MG/0.4ML pen kit Inject 0.4 mLs (40 mg) Subcutaneous every 14 days 2 each 5     dicyclomine (BENTYL) 10 MG capsule Take 1 capsule (10 mg) by mouth 2 times daily 90 capsule 1     lisinopril (ZESTRIL) 20 MG tablet Take 20 mg by mouth       mesalamine (LIALDA) 1.2 g EC tablet Take 4 tablets (4,800 mg) by mouth every morning 180 tablet 3     metroNIDAZOLE (FLAGYL) 500 MG tablet Take 1 tablet (500 mg) by mouth 3 times daily for 10 days 30 tablet 0     traMADol (ULTRAM) 50 MG tablet Take 1  "tablet (50 mg) by mouth every 6 hours as needed for moderate pain or severe pain 28 tablet 0       Allergies:  No Known Allergies    Family history:  Family History   Problem Relation Age of Onset     Multiple Sclerosis Mother      Ulcerative Colitis Maternal Grandmother      Colon Cancer No family hx of      Inflammatory Bowel Disease No family hx of      Irritable Bowel Syndrome No family hx of      Crohn's Disease No family hx of        Social history:  Social History     Tobacco Use     Smoking status: Former Smoker     Packs/day: 0.50     Years: 2.50     Pack years: 1.25     Types: Cigarettes     Start date:      Quit date: 2020     Years since quittin.1     Smokeless tobacco: Never Used   Substance Use Topics     Alcohol use: Yes     Comment: occ     Marital status: .    Nursing Notes:   Yaima Bergeron  2021 12:55 PM  Signed  Chief Complaint   Patient presents with     Post-op Visit       Vitals:    21 1252   BP: 122/71   BP Location: Left arm   Patient Position: Sitting   Cuff Size: Adult Regular   Pulse: 86   SpO2: 100%   Weight: 187 lb 12.8 oz   Height: 5' 9.5\"       Body mass index is 27.34 kg/m .    Yaima Bergeron CMA         30 minutes spent on the date of the encounter doing chart review, history and exam, documentation and further activities as noted above.     Rachel Mahmood NP-C  Colon and Rectal Surgery  [facility]    "

## 2021-09-24 NOTE — TELEPHONE ENCOUNTER
Screening Questions  1. Are you active on mychart?    2. What insurance is in the chart? BCBS    2.  Ordering/Referring Provider: Robinson      3. BMI 27.6    4. Are you on daily home oxygen? No     5. Do you have a history of difficult airway?  no    6. Have you had a heart, lung, or liver transplant?  no    7. Are you currently on dialysis or have chronic kidney disease?  No     8. Have you had a stroke or Transient ischemic atttack (TIA) within 6 months?  no    9. In the past 6 months, have you had any heart related issues including cardiomyopathy or heart attack?         If yes, did it require cardiac stenting or other implantable device? no    10. Do you have any implantable devices in your body (pacemaker, defib, LVAD)? No     11. Do you take nitroglycerin? If yes, how often?  no    12. Are you currently taking any blood thinners? no    13. Are you a diabetic? No     14. (Females) Are you currently pregnant?   If yes, how many weeks?    15. Have you had a procedure in the past that was difficult to tolerate with conscious sedation? Any allergies to Fentanyl or Versed  No     16. Are you taking any scheduled prescription narcotics more than once daily? No     17. Do you have any chemical dependencies such as alcohol, street drugs, or methadone?  no    18. Do you have any history of post-traumatic stress syndrome or mental health issues?  No     19. Do you transfer independently? Yes     20.  Do you have any issues with constipation?     21. Preferred Pharmacy for Pre Prescription     Scheduling Details    Which Colonoscopy Prep was Sent?:   Procedure Scheduled: Flex Sig  Provider/Surgeon: pietro Alva   Date of Procedure: 9/27/2021  [place] [facility] [place] (Please ask for phone number if not scheduled by patient): Cody Pickard      Sedation Type: CS  Conscious Sedation- Needs  for 6 hours after the procedure  MAC/General-Needs  for 24 hours after procedure    Pre-op Required at [facility], [facility] and OR for MAC sedation:   (if yes advise patient they will need a pre-op prior to procedure)      Is patient on blood thinners? -no (If yes- inform patient to follow up with PCP or provider for follow up instructions)     Informed patient they will need an adult  yes  Cannot take any type of public or medical transportation alone    [facility] [place] [facility]    Confirmed Nurse will call to complete assessment y    Additional comments: no

## 2021-09-25 LAB — SARS-COV-2 RNA RESP QL NAA+PROBE: NEGATIVE

## 2021-09-27 ENCOUNTER — HOSPITAL ENCOUNTER (OUTPATIENT)
Facility: AMBULATORY SURGERY CENTER | Age: 31
Discharge: HOME | End: 2021-09-27
Attending: INTERNAL MEDICINE | Admitting: INTERNAL MEDICINE
Payer: COMMERCIAL

## 2021-09-27 ENCOUNTER — PATIENT OUTREACH (OUTPATIENT)
Dept: GASTROENTEROLOGY | Facility: CLINIC | Age: 31
End: 2021-09-27

## 2021-09-27 VITALS
WEIGHT: 185 LBS | RESPIRATION RATE: 14 BRPM | DIASTOLIC BLOOD PRESSURE: 62 MMHG | TEMPERATURE: 97.6 F | HEIGHT: 69 IN | OXYGEN SATURATION: 98 % | BODY MASS INDEX: 27.4 KG/M2 | SYSTOLIC BLOOD PRESSURE: 106 MMHG | HEART RATE: 60 BPM

## 2021-09-27 LAB — FLEXIBLE SIGMOIDOSCOPY: NORMAL

## 2021-09-27 PROCEDURE — 88305 TISSUE EXAM BY PATHOLOGIST: CPT | Mod: 26 | Performed by: PATHOLOGY

## 2021-09-27 PROCEDURE — 88305 TISSUE EXAM BY PATHOLOGIST: CPT | Mod: TC | Performed by: INTERNAL MEDICINE

## 2021-09-27 PROCEDURE — 45331 SIGMOIDOSCOPY AND BIOPSY: CPT

## 2021-09-27 RX ORDER — NALOXONE HYDROCHLORIDE 0.4 MG/ML
0.4 INJECTION, SOLUTION INTRAMUSCULAR; INTRAVENOUS; SUBCUTANEOUS
Status: DISCONTINUED | OUTPATIENT
Start: 2021-09-27 | End: 2021-09-28 | Stop reason: HOSPADM

## 2021-09-27 RX ORDER — ONDANSETRON 4 MG/1
4 TABLET, ORALLY DISINTEGRATING ORAL EVERY 6 HOURS PRN
Status: CANCELLED | OUTPATIENT
Start: 2021-09-27

## 2021-09-27 RX ORDER — FLUMAZENIL 0.1 MG/ML
0.2 INJECTION, SOLUTION INTRAVENOUS
Status: DISCONTINUED | OUTPATIENT
Start: 2021-09-27 | End: 2021-09-28 | Stop reason: HOSPADM

## 2021-09-27 RX ORDER — ONDANSETRON 2 MG/ML
4 INJECTION INTRAMUSCULAR; INTRAVENOUS EVERY 6 HOURS PRN
Status: DISCONTINUED | OUTPATIENT
Start: 2021-09-27 | End: 2021-09-28 | Stop reason: HOSPADM

## 2021-09-27 RX ORDER — ONDANSETRON 2 MG/ML
4 INJECTION INTRAMUSCULAR; INTRAVENOUS
Status: DISCONTINUED | OUTPATIENT
Start: 2021-09-27 | End: 2021-09-27 | Stop reason: HOSPADM

## 2021-09-27 RX ORDER — PROCHLORPERAZINE MALEATE 10 MG
10 TABLET ORAL EVERY 6 HOURS PRN
Status: CANCELLED | OUTPATIENT
Start: 2021-09-27

## 2021-09-27 RX ORDER — PROCHLORPERAZINE MALEATE 10 MG
10 TABLET ORAL EVERY 6 HOURS PRN
Status: DISCONTINUED | OUTPATIENT
Start: 2021-09-27 | End: 2021-09-28 | Stop reason: HOSPADM

## 2021-09-27 RX ORDER — FENTANYL CITRATE 50 UG/ML
INJECTION, SOLUTION INTRAMUSCULAR; INTRAVENOUS PRN
Status: DISCONTINUED | OUTPATIENT
Start: 2021-09-27 | End: 2021-09-27 | Stop reason: HOSPADM

## 2021-09-27 RX ORDER — NALOXONE HYDROCHLORIDE 0.4 MG/ML
0.2 INJECTION, SOLUTION INTRAMUSCULAR; INTRAVENOUS; SUBCUTANEOUS
Status: DISCONTINUED | OUTPATIENT
Start: 2021-09-27 | End: 2021-09-28 | Stop reason: HOSPADM

## 2021-09-27 RX ORDER — LIDOCAINE 40 MG/G
CREAM TOPICAL
Status: DISCONTINUED | OUTPATIENT
Start: 2021-09-27 | End: 2021-09-27 | Stop reason: HOSPADM

## 2021-09-27 RX ORDER — ONDANSETRON 2 MG/ML
4 INJECTION INTRAMUSCULAR; INTRAVENOUS EVERY 6 HOURS PRN
Status: CANCELLED | OUTPATIENT
Start: 2021-09-27

## 2021-09-27 RX ORDER — FLUMAZENIL 0.1 MG/ML
0.2 INJECTION, SOLUTION INTRAVENOUS
Status: CANCELLED | OUTPATIENT
Start: 2021-09-27 | End: 2021-09-28

## 2021-09-27 RX ORDER — SODIUM CHLORIDE, SODIUM LACTATE, POTASSIUM CHLORIDE, CALCIUM CHLORIDE 600; 310; 30; 20 MG/100ML; MG/100ML; MG/100ML; MG/100ML
500 INJECTION, SOLUTION INTRAVENOUS CONTINUOUS
Status: DISCONTINUED | OUTPATIENT
Start: 2021-09-27 | End: 2021-09-27 | Stop reason: HOSPADM

## 2021-09-27 RX ORDER — ONDANSETRON 4 MG/1
4 TABLET, ORALLY DISINTEGRATING ORAL EVERY 6 HOURS PRN
Status: DISCONTINUED | OUTPATIENT
Start: 2021-09-27 | End: 2021-09-28 | Stop reason: HOSPADM

## 2021-09-27 RX ADMIN — SODIUM CHLORIDE, SODIUM LACTATE, POTASSIUM CHLORIDE, CALCIUM CHLORIDE 500 ML: 600; 310; 30; 20 INJECTION, SOLUTION INTRAVENOUS at 10:45

## 2021-09-27 ASSESSMENT — MIFFLIN-ST. JEOR: SCORE: 1794.53

## 2021-09-27 NOTE — H&P
Cody Brownvishnu  [id number]  male  31 year old      Reason for procedure/surgery: Crohn's Disease    Patient Active Problem List   Diagnosis     IBD (inflammatory bowel disease)     Perianal abscess     Perianal Crohn's disease, with abscess (H)     Acute post-operative pain       Past Surgical History:    Past Surgical History:   Procedure Laterality Date     INCISION AND DRAINAGE RECTUM, COMBINED N/A 2021    Procedure: INCISION AND DRAINAGE, RECTUM, placement of Seton;  Surgeon: Vidhya Hamm MD;  Location:  OR       Past Medical History: No past medical history on file.    Social History:   Social History     Tobacco Use     Smoking status: Former Smoker     Packs/day: 0.50     Years: 2.50     Pack years: 1.25     Types: Cigarettes     Start date:      Quit date: 2020     Years since quittin.1     Smokeless tobacco: Never Used   Substance Use Topics     Alcohol use: Yes     Comment: occ       Family History:   Family History   Problem Relation Age of Onset     Multiple Sclerosis Mother      Ulcerative Colitis Maternal Grandmother      Colon Cancer No family hx of      Inflammatory Bowel Disease No family hx of      Irritable Bowel Syndrome No family hx of      Crohn's Disease No family hx of        Allergies: No Known Allergies    Active Medications:   Current Outpatient Medications   Medication Sig Dispense Refill     acetaminophen (TYLENOL) 325 MG tablet Take 3 tablets (975 mg) by mouth every 8 hours As scheduled for the next 7-10 days, then decrease both dose & frequency to as needed there after. 70 tablet 0     adalimumab (HUMIRA *CF*) 40 MG/0.4ML pen kit Inject 0.4 mLs (40 mg) Subcutaneous every 14 days 2 each 5     dicyclomine (BENTYL) 10 MG capsule Take 1 capsule (10 mg) by mouth 2 times daily 90 capsule 1     lisinopril (ZESTRIL) 20 MG tablet Take 20 mg by mouth       mesalamine (LIALDA) 1.2 g EC tablet Take 4 tablets (4,800 mg) by mouth every morning 180 tablet 3      "metroNIDAZOLE (FLAGYL) 500 MG tablet Take 1 tablet (500 mg) by mouth 3 times daily for 10 days 30 tablet 0     traMADol (ULTRAM) 50 MG tablet Take 1 tablet (50 mg) by mouth every 6 hours as needed for moderate pain or severe pain 28 tablet 0       Systemic Review:   CONSTITUTIONAL: NEGATIVE for fever, chills, change in weight  ENT/MOUTH: NEGATIVE for ear, mouth and throat problems  RESP: NEGATIVE for significant cough or SOB  CV: NEGATIVE for chest pain, palpitations or peripheral edema    Physical Examination:   Vital Signs: /64 (Cuff Size: Adult Regular)   Pulse 63   Temp 98.3  F (36.8  C) (Temporal)   Resp 18   Ht 1.753 m (5' 9\")   Wt 83.9 kg (185 lb)   SpO2 99%   BMI 27.32 kg/m    GENERAL: healthy, alert and no distress  NECK: no adenopathy, no asymmetry, masses, or scars  RESP: lungs clear to auscultation - no rales, rhonchi or wheezes  CV: regular rate and rhythm, normal S1 S2, no S3 or S4, no murmur, click or rub, no peripheral edema and peripheral pulses strong  ABDOMEN: soft, nontender, no hepatosplenomegaly, no masses and bowel sounds normal  MS: no gross musculoskeletal defects noted, no edema    Plan: Appropriate to proceed as scheduled.      Leoncio Yeboah MD  9/27/2021    PCP:  No Ref-Primary, Physician    "

## 2021-09-29 ENCOUNTER — TELEPHONE (OUTPATIENT)
Dept: GASTROENTEROLOGY | Facility: CLINIC | Age: 31
End: 2021-09-29

## 2021-09-29 ENCOUNTER — PATIENT OUTREACH (OUTPATIENT)
Dept: GASTROENTEROLOGY | Facility: CLINIC | Age: 31
End: 2021-09-29

## 2021-09-29 DIAGNOSIS — K52.9 IBD (INFLAMMATORY BOWEL DISEASE): ICD-10-CM

## 2021-09-29 DIAGNOSIS — K60.30 ANAL FISTULA: Primary | ICD-10-CM

## 2021-09-29 DIAGNOSIS — K50.111 CROHN'S DISEASE OF LARGE INTESTINE WITH RECTAL BLEEDING (H): ICD-10-CM

## 2021-09-29 NOTE — TELEPHONE ENCOUNTER
New order for humira weekly entered in response to colonoscopy recently by Dr Bowers   Pt aware of change  In basket to Ms. Ferrer to start approval for weekly humira    Erythematous mucosa in the sigmoid colon and in the                        descending colon. Biopsied.                        - Erythematous mucosa in the rectum. Biopsied. Rectum                        looked to be less inflammed than sigmoid and descending                        colon. No ulcers or erosions in rectum                        - Nodular mucosa in the distal rectum. Biopsied.                        - Stricture at the anus. Dilated with finger and                        pediatric colonoscope.                        - Exam consistent with moderately active Crohn's Disease.

## 2021-09-29 NOTE — TELEPHONE ENCOUNTER
Prior Authorization Not Needed per Insurance    Medication: HUMIRA WEEKLY - [place] NOT NEEDED  Insurance Company: [employer] ([facility]) - Phone [phone] Fax [phone]  Expected CoPay:      Pharmacy Filling the Rx: [facility] - [place], [place] - [address]  Pharmacy Notified:  YES  Patient Notified: YES

## 2021-09-30 LAB
PATH REPORT.COMMENTS IMP SPEC: NORMAL
PATH REPORT.FINAL DX SPEC: NORMAL
PATH REPORT.GROSS SPEC: NORMAL
PATH REPORT.MICROSCOPIC SPEC OTHER STN: NORMAL
PATH REPORT.RELEVANT HX SPEC: NORMAL
PHOTO IMAGE: NORMAL

## 2021-10-01 ENCOUNTER — OFFICE VISIT (OUTPATIENT)
Dept: SURGERY | Facility: CLINIC | Age: 31
End: 2021-10-01
Payer: COMMERCIAL

## 2021-10-01 VITALS
TEMPERATURE: 98.5 F | HEART RATE: 60 BPM | SYSTOLIC BLOOD PRESSURE: 122 MMHG | WEIGHT: 186.8 LBS | DIASTOLIC BLOOD PRESSURE: 64 MMHG | HEIGHT: 69 IN | OXYGEN SATURATION: 99 % | BODY MASS INDEX: 27.67 KG/M2

## 2021-10-01 DIAGNOSIS — K61.0 PERIANAL ABSCESS: ICD-10-CM

## 2021-10-01 DIAGNOSIS — Z09 FOLLOW-UP EXAMINATION AFTER COLORECTAL SURGERY: Primary | ICD-10-CM

## 2021-10-01 PROCEDURE — 99212 OFFICE O/P EST SF 10 MIN: CPT | Performed by: NURSE PRACTITIONER

## 2021-10-01 ASSESSMENT — PAIN SCALES - GENERAL: PAINLEVEL: MILD PAIN (2)

## 2021-10-01 ASSESSMENT — MIFFLIN-ST. JEOR: SCORE: 1802.7

## 2021-10-01 NOTE — LETTER
"10/1/2021       RE: Cody Pickard  [address] [address] [place] [id number]     Dear Colleague,    Thank you for referring your patient, Cody Pickard, to the [facility] COLON AND RECTAL SURGERY CLINIC [place] at [facility]. Please see a copy of my visit note below.    Colon and Rectal Surgery Follow-Up Clinic Note    RE: Cody Pickard  : 1990  WIL: 10/1/21    Cody Pickard is a very pleasant 31 year old male with extensive colitis on Humira and lialda with perianal abscess and fistula who is now status post examination under anesthesia with incision and drainage with debridement of perirectal abscess and placement of seton on 21 with Dr. Hamm.    Interval history: I saw Cody in clinic last week and he was having increased pain, a lot of drainage, and increased diarrhea.  We tried a course of Flagyl.  He reports that his symptoms are much better.  He gets occasional moderate amount of drainage but this is significantly improved.  His pain is much better.  His diarrhea has also improved.  He is also taking tub baths twice a day, which he thinks is helpful.  No fevers or chills.    Physical Examination: Exam was chaperoned by Isabella Petit MA   /64 (BP Location: Left arm, Patient Position: Sitting, Cuff Size: Adult Regular)   Pulse 60   Temp 98.5  F (36.9  C) (Oral)   Ht 5' 9\"   Wt 186 lb 12.8 oz   SpO2 99%   BMI 27.59 kg/m    General: alert, oriented, in no acute distress, sitting comfortably  Perianal external examination:  Yellow vessel loop seton in place in the left posterior position. There is a fistulotomy site that extends from this with a small skin bridge present. Within the fistulotomy site there is a tract that can be probed posteriorly about 5 cm with a small amount of serosanguinous drainage present.    Digital rectal examination: Was deferred.    Anoscopy: Was deferred.    Assessment/Plan: 31 year old male " status post examination under anesthesia with incision and drainage with debridement of perirectal abscess and placement of seton on 9/6/21 with Dr. Hamm. His drainage, pain, and diarrhea have improved no Flagyl. He has a few days left of this. Asked him to reach out if his symptoms worsen off of this. Would like to recheck site in about 2 weeks. Asked him to contact the clinic in the meantime with any questions or concerns. Patient's questions were answered to his stated satisfaction and he is in agreement with this plan.    Medical history:  No past medical history on file.    Surgical history:  Past Surgical History:   Procedure Laterality Date     INCISION AND DRAINAGE RECTUM, COMBINED N/A 9/6/2021    Procedure: INCISION AND DRAINAGE, RECTUM, placement of Seton;  Surgeon: Vidhya Hamm MD;  Location: [facility] OR       Problem list:    Patient Active Problem List    Diagnosis Date Noted     Acute post-operative pain 09/07/2021     Priority: Medium     Perianal abscess 09/06/2021     Priority: Medium     Perianal Crohn's disease, with abscess (H) 09/06/2021     Priority: Medium     IBD (inflammatory bowel disease) 02/22/2021     Priority: Medium       Medications:  Current Outpatient Medications   Medication Sig Dispense Refill     acetaminophen (TYLENOL) 325 MG tablet Take 3 tablets (975 mg) by mouth every 8 hours As scheduled for the next 7-10 days, then decrease both dose & frequency to as needed there after. 70 tablet 0     adalimumab (HUMIRA *CF*) 40 MG/0.4ML pen kit Inject 0.4 mLs (40 mg) Subcutaneous every 7 days 4 each 5     dicyclomine (BENTYL) 10 MG capsule Take 1 capsule (10 mg) by mouth 2 times daily 90 capsule 1     lisinopril (ZESTRIL) 20 MG tablet Take 20 mg by mouth       mesalamine (LIALDA) 1.2 g EC tablet Take 4 tablets (4,800 mg) by mouth every morning 180 tablet 3     metroNIDAZOLE (FLAGYL) 500 MG tablet Take 1 tablet (500 mg) by mouth 3 times daily for 10 days 30 tablet 0      "traMADol (ULTRAM) 50 MG tablet Take 1 tablet (50 mg) by mouth every 6 hours as needed for moderate pain or severe pain 28 tablet 0       Allergies:  No Known Allergies    Family history:  Family History   Problem Relation Age of Onset     Multiple Sclerosis Mother      Ulcerative Colitis Maternal Grandmother      Colon Cancer No family hx of      Inflammatory Bowel Disease No family hx of      Irritable Bowel Syndrome No family hx of      Crohn's Disease No family hx of        Social history:  Social History     Tobacco Use     Smoking status: Former Smoker     Packs/day: 0.50     Years: 2.50     Pack years: 1.25     Types: Cigarettes     Start date:      Quit date: 2020     Years since quittin.1     Smokeless tobacco: Never Used   Substance Use Topics     Alcohol use: Yes     Comment: occ     Marital status: .    Nursing Notes:   Isabella Calderón CMA  10/1/2021  1:42 PM  Signed  Chief Complaint   Patient presents with     Surgical Followup     Post-op follow up, DOS:2021       Vitals:    10/01/21 1309   BP: 122/64   BP Location: Left arm   Patient Position: Sitting   Cuff Size: Adult Regular   Pulse: 60   Temp: 98.5  F (36.9  C)   TempSrc: Oral   SpO2: 99%   Weight: 186 lb 12.8 oz   Height: 5' 9\"       Body mass index is 27.59 kg/m .       Isabella Sandoval CMA         15 minutes spent on the date of the encounter doing chart review, history and exam, documentation and further activities as noted above.     LYN BassC  Colon and Rectal Surgery  [facility]        Again, thank you for allowing me to participate in the care of your patient.      Sincerely,    Rachel Holt, APRN CNP      "

## 2021-10-01 NOTE — NURSING NOTE
"Chief Complaint   Patient presents with     Surgical Followup     Post-op follow up, DOS:09/06/2021       Vitals:    10/01/21 1309   BP: 122/64   BP Location: Left arm   Patient Position: Sitting   Cuff Size: Adult Regular   Pulse: 60   Temp: 98.5  F (36.9  C)   TempSrc: Oral   SpO2: 99%   Weight: 186 lb 12.8 oz   Height: 5' 9\"       Body mass index is 27.59 kg/m .       Isabella Sandoval CMA    "

## 2021-10-01 NOTE — PROGRESS NOTES
"Colon and Rectal Surgery Follow-Up Clinic Note    RE: Cody Pickard  : 1990  WIL: 10/1/21    Cody Pickard is a very pleasant 31 year old male with extensive colitis on Humira and lialda with perianal abscess and fistula who is now status post examination under anesthesia with incision and drainage with debridement of perirectal abscess and placement of seton on 21 with Dr. Hamm.    Interval history: I saw Cody in clinic last week and he was having increased pain, a lot of drainage, and increased diarrhea.  We tried a course of Flagyl.  He reports that his symptoms are much better.  He gets occasional moderate amount of drainage but this is significantly improved.  His pain is much better.  His diarrhea has also improved.  He is also taking tub baths twice a day, which he thinks is helpful.  No fevers or chills.    Physical Examination: Exam was chaperoned by Isabella Petit MA   /64 (BP Location: Left arm, Patient Position: Sitting, Cuff Size: Adult Regular)   Pulse 60   Temp 98.5  F (36.9  C) (Oral)   Ht 5' 9\"   Wt 186 lb 12.8 oz   SpO2 99%   BMI 27.59 kg/m    General: alert, oriented, in no acute distress, sitting comfortably  Perianal external examination:  Yellow vessel loop seton in place in the left posterior position. There is a fistulotomy site that extends from this with a small skin bridge present. Within the fistulotomy site there is a tract that can be probed posteriorly about 5 cm with a small amount of serosanguinous drainage present.    Digital rectal examination: Was deferred.    Anoscopy: Was deferred.    Assessment/Plan: 31 year old male status post examination under anesthesia with incision and drainage with debridement of perirectal abscess and placement of seton on 21 with Dr. Hamm. His drainage, pain, and diarrhea have improved no Flagyl. He has a few days left of this. Asked him to reach out if his symptoms worsen off of this. Would like to " recheck site in about 2 weeks. Asked him to contact the clinic in the meantime with any questions or concerns. Patient's questions were answered to his stated satisfaction and he is in agreement with this plan.    Medical history:  No past medical history on file.    Surgical history:  Past Surgical History:   Procedure Laterality Date     INCISION AND DRAINAGE RECTUM, COMBINED N/A 9/6/2021    Procedure: INCISION AND DRAINAGE, RECTUM, placement of Seton;  Surgeon: Vidhya Hamm MD;  Location: [facility] OR       Problem list:    Patient Active Problem List    Diagnosis Date Noted     Acute post-operative pain 09/07/2021     Priority: Medium     Perianal abscess 09/06/2021     Priority: Medium     Perianal Crohn's disease, with abscess (H) 09/06/2021     Priority: Medium     IBD (inflammatory bowel disease) 02/22/2021     Priority: Medium       Medications:  Current Outpatient Medications   Medication Sig Dispense Refill     acetaminophen (TYLENOL) 325 MG tablet Take 3 tablets (975 mg) by mouth every 8 hours As scheduled for the next 7-10 days, then decrease both dose & frequency to as needed there after. 70 tablet 0     adalimumab (HUMIRA *CF*) 40 MG/0.4ML pen kit Inject 0.4 mLs (40 mg) Subcutaneous every 7 days 4 each 5     dicyclomine (BENTYL) 10 MG capsule Take 1 capsule (10 mg) by mouth 2 times daily 90 capsule 1     lisinopril (ZESTRIL) 20 MG tablet Take 20 mg by mouth       mesalamine (LIALDA) 1.2 g EC tablet Take 4 tablets (4,800 mg) by mouth every morning 180 tablet 3     metroNIDAZOLE (FLAGYL) 500 MG tablet Take 1 tablet (500 mg) by mouth 3 times daily for 10 days 30 tablet 0     traMADol (ULTRAM) 50 MG tablet Take 1 tablet (50 mg) by mouth every 6 hours as needed for moderate pain or severe pain 28 tablet 0       Allergies:  No Known Allergies    Family history:  Family History   Problem Relation Age of Onset     Multiple Sclerosis Mother      Ulcerative Colitis Maternal Grandmother      Colon Cancer  "No family hx of      Inflammatory Bowel Disease No family hx of      Irritable Bowel Syndrome No family hx of      Crohn's Disease No family hx of        Social history:  Social History     Tobacco Use     Smoking status: Former Smoker     Packs/day: 0.50     Years: 2.50     Pack years: 1.25     Types: Cigarettes     Start date:      Quit date: 2020     Years since quittin.1     Smokeless tobacco: Never Used   Substance Use Topics     Alcohol use: Yes     Comment: occ     Marital status: .    Nursing Notes:   Isabella Calderón CMA  10/1/2021  1:42 PM  Signed  Chief Complaint   Patient presents with     Surgical Followup     Post-op follow up, DOS:2021       Vitals:    10/01/21 1309   BP: 122/64   BP Location: Left arm   Patient Position: Sitting   Cuff Size: Adult Regular   Pulse: 60   Temp: 98.5  F (36.9  C)   TempSrc: Oral   SpO2: 99%   Weight: 186 lb 12.8 oz   Height: 5' 9\"       Body mass index is 27.59 kg/m .       Isabella Sandoval CMA         15 minutes spent on the date of the encounter doing chart review, history and exam, documentation and further activities as noted above.     Rachel Mahmood NP-C  Colon and Rectal Surgery  [facility]    "

## 2021-10-11 ENCOUNTER — HEALTH MAINTENANCE LETTER (OUTPATIENT)
Age: 31
End: 2021-10-11

## 2021-10-15 ENCOUNTER — OFFICE VISIT (OUTPATIENT)
Dept: SURGERY | Facility: CLINIC | Age: 31
End: 2021-10-15
Payer: COMMERCIAL

## 2021-10-15 VITALS
HEART RATE: 94 BPM | OXYGEN SATURATION: 99 % | BODY MASS INDEX: 28.69 KG/M2 | DIASTOLIC BLOOD PRESSURE: 70 MMHG | WEIGHT: 193.7 LBS | HEIGHT: 69 IN | TEMPERATURE: 98.5 F | SYSTOLIC BLOOD PRESSURE: 128 MMHG

## 2021-10-15 DIAGNOSIS — Z09 FOLLOW-UP EXAMINATION AFTER COLORECTAL SURGERY: Primary | ICD-10-CM

## 2021-10-15 DIAGNOSIS — K60.30 ANAL FISTULA: ICD-10-CM

## 2021-10-15 PROCEDURE — 17250 CHEM CAUT OF GRANLTJ TISSUE: CPT | Performed by: NURSE PRACTITIONER

## 2021-10-15 RX ORDER — LIDOCAINE HYDROCHLORIDE AND EPINEPHRINE 10; 10 MG/ML; UG/ML
1.5 INJECTION, SOLUTION INFILTRATION; PERINEURAL ONCE
Status: COMPLETED | OUTPATIENT
Start: 2021-10-15 | End: 2021-10-15

## 2021-10-15 RX ADMIN — LIDOCAINE HYDROCHLORIDE AND EPINEPHRINE 1.5 ML: 10; 10 INJECTION, SOLUTION INFILTRATION; PERINEURAL at 12:50

## 2021-10-15 ASSESSMENT — MIFFLIN-ST. JEOR: SCORE: 1834

## 2021-10-15 ASSESSMENT — PAIN SCALES - GENERAL: PAINLEVEL: NO PAIN (0)

## 2021-10-15 NOTE — PROGRESS NOTES
Colon and Rectal Surgery Follow-Up Clinic Note    RE: Cody Pickard  : 1990  WIL: 10/15/21    Cody Pickard is a very pleasant 31 year old male with extensive colitis on Humira and lialda with perianal abscess and fistula who is now status post examination under anesthesia with incision and drainage with debridement of perirectal abscess and placement of seton on 21 with Dr. Hamm.    MR Pelvis 21:  Findings:      The following perianal fistula(s) are identified:     Tract 1: The tract internal opening is located at the posterior aspect  of the anus, midline to the left approximately 3 cm from the anal  verge. There is a seton in place.  The tract is transsphincteric and extends within the left ischioanal  fossa to its cutaneous exit site.  The primary tract is hyperintense on T2 weighted images with diffuse  enhancement on post contrast T1 weighted images suggestive of active  inflammation.  Suggestion of a small branching tract emanating from the primary  fistula along the posterior anus with mild extension towards the right  ischioanal fossa.   Diffuse inflammatory changes with associated edema surround the  fistulous tract with associated intersphincteric  inflammation/horseshoeing.  There are 2 small fluid collections about the left ischioanal fossa  measuring 1.8 x 0.6 cm, series 7 image 45 and at the midline with mild  extension into the right ischioanal fossa measuring 1.1 x 0.7 cm,  series 7 image 39.  Anal Sphincter muscle bulk: No atrophy.     Remainder of the pelvis: Unremarkable urinary bladder. No suspicious  bone lesions. No free fluid in the pelvis. No inguinal or pelvic  lymphadenopathy. Symmetric seminal vesicles. The prostate is not  enlarged. Partially visualized loops of bowel are not dilated.                                                                   Impression:   1. Transsphincteric perianal fistula with associated perianal  inflammation extending into these  "cranial fossa left greater than  right with a seton in place as described above.  2. There are 2 small fluid collections concerning for small abscesses  as aforementioned.    Interval history: Cody has been doing well but does get intermittent pain followed by a lot of drainage. [place] and then had a lot of drainage after that. No associated fevers or chills. He is now doing sitz baths twice a day.    Physical Examination: Exam was chaperoned by Isabella Petit MA   /70 (BP Location: Left arm, Patient Position: Sitting, Cuff Size: Adult Regular)   Pulse 94   Temp 98.5  F (36.9  C) (Oral)   Ht 5' 9\"   Wt 193 lb 11.2 oz   SpO2 99%   BMI 28.60 kg/m    General: alert, oriented, in no acute distress, sitting comfortably  Perianal external examination:  Yellow vessel loop seton in place in the left posterior position. There is a fistulotomy site that extends from this with a small skin bridge present. Within the fistulotomy site there is a tract that can be probed posteriorly about 5 cm with a small moderate amount of purulent drainage present.     Procedure:   Prior to the start of the procedure and with procedural staff participation, I verbally confirmed the patient s identity using two indicators, relevant allergies, that the procedure was appropriate and matched the consent or emergent situation, and that the correct equipment/implants were available. Immediately prior to starting the procedure I conducted the Time Out with the procedural staff and re-confirmed the patient s name, procedure, and site/side. (The Joint Commission universal protocol was followed.)  Yes    Sedation (Moderate or Deep): None     After injection with 1% lidocaine with epinephrine, skin bridge was cut using cautery. He tolerated this well. Silver nitrate applied to hypergranulation tissue.    Digital rectal examination: Was deferred.    Anoscopy: Was " deferred.    Assessment/Plan: 31 year old male status post examination under anesthesia with incision and drainage with debridement of perirectal abscess and placement of seton on 9/6/21 with Dr. Hamm. He has had increased drainage and what sounds like a build up of drainage with pain improving once drained. He has a skin bridge from his seton to a second external opening that is also draining. I was able to connect this to the seton opening. After discussion with Dr. Hamm, I cut this skin bridge to help facilitate drainage. If pain or drainage continue to be a problem, may need to consider examination under anesthesia. Will have him return to clinic in two weeks for recheck with Dr. Hamm. Patient's questions were answered to his stated satisfaction and he is in agreement with this plan.    Medical history:  No past medical history on file.    Surgical history:  Past Surgical History:   Procedure Laterality Date     INCISION AND DRAINAGE RECTUM, COMBINED N/A 9/6/2021    Procedure: INCISION AND DRAINAGE, RECTUM, placement of Seton;  Surgeon: Vidhya Hamm MD;  Location: [facility] OR       Problem list:    Patient Active Problem List    Diagnosis Date Noted     Acute post-operative pain 09/07/2021     Priority: Medium     Perianal abscess 09/06/2021     Priority: Medium     Perianal Crohn's disease, with abscess (H) 09/06/2021     Priority: Medium     IBD (inflammatory bowel disease) 02/22/2021     Priority: Medium       Medications:  Current Outpatient Medications   Medication Sig Dispense Refill     acetaminophen (TYLENOL) 325 MG tablet Take 3 tablets (975 mg) by mouth every 8 hours As scheduled for the next 7-10 days, then decrease both dose & frequency to as needed there after. 70 tablet 0     adalimumab (HUMIRA *CF*) 40 MG/0.4ML pen kit Inject 0.4 mLs (40 mg) Subcutaneous every 7 days 4 each 5     dicyclomine (BENTYL) 10 MG capsule Take 1 capsule (10 mg) by mouth 2 times daily 90  capsule 1     lisinopril (ZESTRIL) 20 MG tablet Take 20 mg by mouth       mesalamine (LIALDA) 1.2 g EC tablet Take 4 tablets (4,800 mg) by mouth every morning 180 tablet 3     traMADol (ULTRAM) 50 MG tablet Take 1 tablet (50 mg) by mouth every 6 hours as needed for moderate pain or severe pain 28 tablet 0       Allergies:  No Known Allergies    Family history:  Family History   Problem Relation Age of Onset     Multiple Sclerosis Mother      Ulcerative Colitis Maternal Grandmother      Colon Cancer No family hx of      Inflammatory Bowel Disease No family hx of      Irritable Bowel Syndrome No family hx of      Crohn's Disease No family hx of        Social history:  Social History     Tobacco Use     Smoking status: Former Smoker     Packs/day: 0.50     Years: 2.50     Pack years: 1.25     Types: Cigarettes     Start date:      Quit date: 2020     Years since quittin.2     Smokeless tobacco: Never Used   Substance Use Topics     Alcohol use: Yes     Comment: occ     Marital status: .    There are no exam notes on file for this visit.     15 minutes spent on the date of the encounter doing chart review, history and exam, documentation and further activities as noted above.     Rachel Mahmood, NP-C  Colon and Rectal Surgery  [facility]

## 2021-10-15 NOTE — NURSING NOTE
"Chief Complaint   Patient presents with     Surgical Followup     Two week follow up       Vitals:    10/15/21 1216   BP: 128/70   BP Location: Left arm   Patient Position: Sitting   Cuff Size: Adult Regular   Pulse: 94   Temp: 98.5  F (36.9  C)   TempSrc: Oral   SpO2: 99%   Weight: 193 lb 11.2 oz   Height: 5' 9\"       Body mass index is 28.6 kg/m .       Isabella Sandoval CMA    "

## 2021-10-15 NOTE — LETTER
10/15/2021       RE: Cody Pickard  [id number] [address] [place] [id number]     Dear Colleague,    Thank you for referring your patient, Cody Pickard, to the [facility] COLON AND RECTAL SURGERY CLINIC [place] at [facility]. Please see a copy of my visit note below.    Colon and Rectal Surgery Follow-Up Clinic Note    RE: Cody Pickard  : 1990  WIL: 10/15/21    Cody Pickard is a very pleasant 31 year old male with extensive colitis on Humira and lialda with perianal abscess and fistula who is now status post examination under anesthesia with incision and drainage with debridement of perirectal abscess and placement of seton on 21 with Dr. Hamm.    MR Pelvis 21:  Findings:      The following perianal fistula(s) are identified:     Tract 1: The tract internal opening is located at the posterior aspect  of the anus, midline to the left approximately 3 cm from the anal  verge. There is a seton in place.  The tract is transsphincteric and extends within the left ischioanal  fossa to its cutaneous exit site.  The primary tract is hyperintense on T2 weighted images with diffuse  enhancement on post contrast T1 weighted images suggestive of active  inflammation.  Suggestion of a small branching tract emanating from the primary  fistula along the posterior anus with mild extension towards the right  ischioanal fossa.   Diffuse inflammatory changes with associated edema surround the  fistulous tract with associated intersphincteric  inflammation/horseshoeing.  There are 2 small fluid collections about the left ischioanal fossa  measuring 1.8 x 0.6 cm, series 7 image 45 and at the midline with mild  extension into the right ischioanal fossa measuring 1.1 x 0.7 cm,  series 7 image 39.  Anal Sphincter muscle bulk: No atrophy.     Remainder of the pelvis: Unremarkable urinary bladder. No suspicious  bone lesions. No free fluid in the pelvis. No  "inguinal or pelvic  lymphadenopathy. Symmetric seminal vesicles. The prostate is not  enlarged. Partially visualized loops of bowel are not dilated.                                                                   Impression:   1. Transsphincteric perianal fistula with associated perianal  inflammation extending into these cranial fossa left greater than  right with a seton in place as described above.  2. There are 2 small fluid collections concerning for small abscesses  as aforementioned.    Interval history: Cody has been doing well but does get intermittent pain followed by a lot of drainage. [place] few days and then had a lot of drainage after that. No associated fevers or chills. He is now doing sitz baths twice a day.    Physical Examination: Exam was chaperoned by Isabella Petit MA   /70 (BP Location: Left arm, Patient Position: Sitting, Cuff Size: Adult Regular)   Pulse 94   Temp 98.5  F (36.9  C) (Oral)   Ht 5' 9\"   Wt 193 lb 11.2 oz   SpO2 99%   BMI 28.60 kg/m    General: alert, oriented, in no acute distress, sitting comfortably  Perianal external examination:  Yellow vessel loop seton in place in the left posterior position. There is a fistulotomy site that extends from this with a small skin bridge present. Within the fistulotomy site there is a tract that can be probed posteriorly about 5 cm with a small moderate amount of purulent drainage present.     Procedure:   Prior to the start of the procedure and with procedural staff participation, I verbally confirmed the patient s identity using two indicators, relevant allergies, that the procedure was appropriate and matched the consent or emergent situation, and that the correct equipment/implants were available. Immediately prior to starting the procedure I conducted the Time Out with the procedural staff and re-confirmed the patient s name, procedure, and site/side. (The Joint Commission " universal protocol was followed.)  Yes    Sedation (Moderate or Deep): None     After injection with 1% lidocaine with epinephrine, skin bridge was cut using cautery. He tolerated this well. Silver nitrate applied to hypergranulation tissue.    Digital rectal examination: Was deferred.    Anoscopy: Was deferred.    Assessment/Plan: 31 year old male status post examination under anesthesia with incision and drainage with debridement of perirectal abscess and placement of seton on 9/6/21 with Dr. Hamm. He has had increased drainage and what sounds like a build up of drainage with pain improving once drained. He has a skin bridge from his seton to a second external opening that is also draining. I was able to connect this to the seton opening. After discussion with Dr. Hamm, I cut this skin bridge to help facilitate drainage. If pain or drainage continue to be a problem, may need to consider examination under anesthesia. Will have him return to clinic in two weeks for recheck with Dr. Hamm. Patient's questions were answered to his stated satisfaction and he is in agreement with this plan.    Medical history:  No past medical history on file.    Surgical history:  Past Surgical History:   Procedure Laterality Date     INCISION AND DRAINAGE RECTUM, COMBINED N/A 9/6/2021    Procedure: INCISION AND DRAINAGE, RECTUM, placement of Seton;  Surgeon: Vidhya Hamm MD;  Location: [facility] OR       Problem list:    Patient Active Problem List    Diagnosis Date Noted     Acute post-operative pain 09/07/2021     Priority: Medium     Perianal abscess 09/06/2021     Priority: Medium     Perianal Crohn's disease, with abscess (H) 09/06/2021     Priority: Medium     IBD (inflammatory bowel disease) 02/22/2021     Priority: Medium       Medications:  Current Outpatient Medications   Medication Sig Dispense Refill     acetaminophen (TYLENOL) 325 MG tablet Take 3 tablets (975 mg) by mouth every 8 hours As  scheduled for the next 7-10 days, then decrease both dose & frequency to as needed there after. 70 tablet 0     adalimumab (HUMIRA *CF*) 40 MG/0.4ML pen kit Inject 0.4 mLs (40 mg) Subcutaneous every 7 days 4 each 5     dicyclomine (BENTYL) 10 MG capsule Take 1 capsule (10 mg) by mouth 2 times daily 90 capsule 1     lisinopril (ZESTRIL) 20 MG tablet Take 20 mg by mouth       mesalamine (LIALDA) 1.2 g EC tablet Take 4 tablets (4,800 mg) by mouth every morning 180 tablet 3     traMADol (ULTRAM) 50 MG tablet Take 1 tablet (50 mg) by mouth every 6 hours as needed for moderate pain or severe pain 28 tablet 0     Allergies:  No Known Allergies    Family history:  Family History   Problem Relation Age of Onset     Multiple Sclerosis Mother      Ulcerative Colitis Maternal Grandmother      Colon Cancer No family hx of      Inflammatory Bowel Disease No family hx of      Irritable Bowel Syndrome No family hx of      Crohn's Disease No family hx of      Social history:  Social History     Tobacco Use     Smoking status: Former Smoker     Packs/day: 0.50     Years: 2.50     Pack years: 1.25     Types: Cigarettes     Start date:      Quit date: 2020     Years since quittin.2     Smokeless tobacco: Never Used   Substance Use Topics     Alcohol use: Yes     Comment: occ     Marital status: .    There are no exam notes on file for this visit.     15 minutes spent on the date of the encounter doing chart review, history and exam, documentation and further activities as noted above.     Rachel Holt NP-C  Colon and Rectal Surgery  [facility]      Again, thank you for allowing me to participate in the care of your patient.      Sincerely,    Rachel Holt, MEENAKSHI CNP

## 2021-10-19 ENCOUNTER — VIRTUAL VISIT (OUTPATIENT)
Dept: GASTROENTEROLOGY | Facility: CLINIC | Age: 31
End: 2021-10-19
Payer: COMMERCIAL

## 2021-10-19 VITALS — WEIGHT: 190 LBS | BODY MASS INDEX: 28.06 KG/M2

## 2021-10-19 DIAGNOSIS — K50.111 CROHN'S DISEASE OF LARGE INTESTINE WITH RECTAL BLEEDING (H): Primary | ICD-10-CM

## 2021-10-19 PROCEDURE — 99214 OFFICE O/P EST MOD 30 MIN: CPT | Mod: GC | Performed by: INTERNAL MEDICINE

## 2021-10-19 NOTE — PROGRESS NOTES
"Cody Pickard is a 31 year old male who is being evaluated via a billable video visit.      The patient has been notified of following:     \"This video visit will be conducted via a call between you and your physician/provider. We have found that certain health care needs can be provided without the need for an in-person physical exam.  This service lets us provide the care you need with a video conversation.  If a prescription is necessary we can send it directly to your pharmacy.  If lab work is needed we can place an order for that and you can then stop by our lab to have the test done at a later time.    If during the course of the call the physician/provider feels a video visit is not appropriate, you will not be charged for this service.\"     Patient confirmed that they are in [place] for today's visit yes.    Video-Visit Details  Type of service:  Video Visit    Video Start Time: 11:03 AM  Video End Time:  11:22 AM    Originating Location (pt. Location): [place]    Distant Location (provider location):  [facility] [place]     Platform used: [employer]      I performed a history and physical examination of this patient and discussed the management with Dr. Engle on 10/19/2021. I reviewed the note and there are no changes to the past medical, family or social history.  A complete 10 point review of systems was obtained. Please see the HPI for pertinent positives and negatives. All other systems were reviewed and were found to be negative. I agree with the documented findings and plan of care as outlined.    Substantial improvement on Humira. Given active joan-anal fistula, dose escalated to weekly Humira. If still struggling with fistula healing in 3-4 months, will plan on repeating humira level and dose escalating as appropriate.     Alfie Bowers MD  GI Attending  Pager: [phone]        "

## 2021-10-19 NOTE — PROGRESS NOTES
IBD CLINIC VISIT    CC/REFERRING MD:  Referred Self  REASON FOR CONSULTATION: Colitis    ASSESSMENT/PLAN:  31 year old man with Crohn's disease.     1. 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 Reports overall 80-90% better, and doing well without any side effects reported on weekly humira  CRP 27 on 9/2021.     -- Labs: CBC, CRP, ESR  -- Follow up with colorectal surgery regarding management of seton   -- Flex sigmoidoscopy in 6-12 mo     2. IBD dysplasia surveillance: > 1/3 of colon involved. Will need dysplasia surveillance.   -- IBD dysplasia surveillance starting in 2028    3. Red papules: On forehead, hand. Unable to examine via video visit, may need dermatology referral based on further evaluation.   -- Pt to take photo and upload to [employer]    Return to clinic in 3-4 months.     Patient was seen and plan of care was discussed with attending physician Dr. Bowers.     Uriel Engle MD  PGY-2 Internal Medicine     IBD HISTORY  Age at diagnosis: 30  Endoscopic extent of disease: Continuous: rectum to proximal transverse  Histologic Extent of disease: Rectum, descending   Disease phenotype: Extensive   Joan-anal disease: Yes  Prior IBD surgeries: None  Prior IBD Medications:  - Vedolizumab - not dose escalated     DRUG MONITORING  TPMT enzyme activity:  n/a  6-TGN/6-MMPN levels: n/a  Biologic concentration:   Adalimumab: 6.1 on 9/2021, no antibodies     HPI:   31 year old man with Crohn's disease with perianal fistula and seton placement presents for follow up.     He has no specific concerns, wanted to talk about recent flex sig results.     Started humira once a week - no side effects reported. Denies any sores in  his mouth. Currently having 2 stools a day, not as loose as before and not having to rush to the bathroom. No cramping, no joint pain, eye issues. Continues to have blood in stools every day or every other day.     Overall feels 80-90% better except the pain with the seton. He is having drainage, not green, and has been following with colorectal surgery. Currently off antibiotics.     Does report red bumps that come and go on skin. On forehead, leg, and hand. May be blisters and isn't sure if it's from working.     HBI:  Overall patient well being (prior day): 1 (Slightly below par) - better  Abdominal pain (prior day): 1 (Mild) - not much  Number of liquid or soft stools (prior day): 2 (1 point per stool) - better  Abdominal mass on exam:   Complications (1 point for each):   New fistula    sIBDQ:  No flowsheet data found.     Last endoscopic activity:   Flex sig 2021 with moderate disease in rectum   Flex sig 2021 with seton in place, moderate disease in descending, sigmoid, and rectum     Last histologic activity:   2021: Mild active chronic colitis in descending colon; chronic colitis in rectum     Pertinent labs / imagin/2021 labs: CRP 27, ESR 13, WBC 18, Hgb 12.9    2021 MR pelvis:   1. Transsphincteric perianal fistula with associated perianal  inflammation extending into these cranial fossa left greater than  right with a seton in place as described above.  2. There are 2 small fluid collections concerning for small abscesses  as aforementioned.    ROS:    Constitutional, HEENT, cardiovascular, pulmonary, GI, , musculoskeletal, neuro, skin, endocrine and psych systems are negative, except as otherwise noted.    PHYSICAL EXAMINATION:  Constitutional: aaox3, cooperative, pleasant, not dyspneic/diaphoretic, no acute distress  Vitals reviewed: Wt 86.2 kg (190 lb)   BMI 28.06 kg/m    Wt:   Wt Readings from Last 2 Encounters:   10/19/21 86.2 kg (190 lb)   10/15/21 87.9 kg (193 lb 11.2 oz)       Constitutional - general appearance is well and in no acute distress.  Eyes - sunglasses on   Respiratory - No cough, unlabored breathing  Musculoskeletal - range of motion intact: Neck and arms  Skin - No discoloration or lesions visible - pt reports red pustule/papule unable to see via video  Neurological - No tremors, headaches  Psychiatric - No anxiety, alert & oriented    PERTINENT PAST MEDICAL HISTORY:  No past medical history on file.   Inflammatory bowel disease  Hypertension     PREVIOUS SURGERIES:  Past Surgical History:   Procedure Laterality Date     INCISION AND DRAINAGE RECTUM, COMBINED N/A 9/6/2021    Procedure: INCISION AND DRAINAGE, RECTUM, placement of Seton;  Surgeon: Vidhya Hamm MD;  Location:  OR     ALLERGIES:   No Known Allergies    PERTINENT MEDICATIONS:    Current Outpatient Medications:      acetaminophen (TYLENOL) 325 MG tablet, Take 3 tablets (975 mg) by mouth every 8 hours As scheduled for the next 7-10 days, then decrease both dose & frequency to as needed there after., Disp: 70 tablet, Rfl: 0     adalimumab (HUMIRA *CF*) 40 MG/0.4ML pen kit, Inject 0.4 mLs (40 mg) Subcutaneous every 7 days, Disp: 4 each, Rfl: 5     dicyclomine (BENTYL) 10 MG capsule, Take 1 capsule (10 mg) by mouth 2 times daily, Disp: 90 capsule, Rfl: 1     lisinopril (ZESTRIL) 20 MG tablet, Take 20 mg by mouth, Disp: , Rfl:      mesalamine (LIALDA) 1.2 g EC tablet, Take 4 tablets (4,800 mg) by mouth every morning, Disp: 180 tablet, Rfl: 3     traMADol (ULTRAM) 50 MG tablet, Take 1 tablet (50 mg) by mouth every 6 hours as needed for moderate pain or severe pain, Disp: 28 tablet, Rfl: 0    SOCIAL HISTORY:  Social History     Socioeconomic History     Marital status:      Spouse name: Not on file     Number of children: Not on file     Years of education: Not on file     Highest education level: Not on file   Occupational History     Not on file   Tobacco Use     Smoking status: Former Smoker      Packs/day: 0.50     Years: 2.50     Pack years: 1.25     Types: Cigarettes     Start date:      Quit date: 2020     Years since quittin.2     Smokeless tobacco: Never Used   Substance and Sexual Activity     Alcohol use: Yes     Comment: occ     Drug use: Never     Sexual activity: Not on file   Other Topics Concern     Not on file   Social History Narrative     Not on file     Social Determinants of Health     Financial Resource Strain:      Difficulty of Paying Living Expenses:    Food Insecurity:      Worried About Running Out of Food in the Last Year:      Ran Out of Food in the Last Year:    Transportation Needs:      Lack of Transportation (Medical):      Lack of Transportation (Non-Medical):    Physical Activity:      Days of Exercise per Week:      Minutes of Exercise per Session:    Stress:      Feeling of Stress :    Social Connections:      Frequency of Communication with Friends and Family:      Frequency of Social Gatherings with Friends and Family:      Attends Scientologist Services:      Active Member of Clubs or Organizations:      Attends Club or Organization Meetings:      Marital Status:    Intimate Partner Violence:      Fear of Current or Ex-Partner:      Emotionally Abused:      Physically Abused:      Sexually Abused:        FAMILY HISTORY:  Family History   Problem Relation Age of Onset     Multiple Sclerosis Mother      Ulcerative Colitis Maternal Grandmother      Colon Cancer No family hx of      Inflammatory Bowel Disease No family hx of      Irritable Bowel Syndrome No family hx of      Crohn's Disease No family hx of      No family history of IBD  Past/family/social history reviewed and no changes

## 2021-10-19 NOTE — LETTER
"    10/19/2021         RE: Cody Pickard  [id number] [address] [place] [id number]        Dear Colleague,    Thank you for referring your patient, Cody Pickard, to the [facility] [place]. Please see a copy of my visit note below.    Cody Pickard is a 31 year old male who is being evaluated via a billable video visit.      The patient has been notified of following:     \"This video visit will be conducted via a call between you and your physician/provider. We have found that certain health care needs can be provided without the need for an in-person physical exam.  This service lets us provide the care you need with a video conversation.  If a prescription is necessary we can send it directly to your pharmacy.  If lab work is needed we can place an order for that and you can then stop by our lab to have the test done at a later time.    If during the course of the call the physician/provider feels a video visit is not appropriate, you will not be charged for this service.\"     Patient confirmed that they are in [place] for today's visit yes.    Video-Visit Details  Type of service:  Video Visit    Video Start Time: 11:03 AM  Video End Time:  11:22 AM    Originating Location (pt. Location): Home    Distant Location (provider location):  [facility] [place]     Platform used: HW      I performed a history and physical examination of this patient and discussed the management with Dr. Engle on 10/19/2021. I reviewed the note and there are no changes to the past medical, family or social history.  A complete 10 point review of systems was obtained. Please see the HPI for pertinent positives and negatives. All other systems were reviewed and were found to be negative. I agree with the documented findings and plan of care as outlined.    Substantial improvement on Humira. Given active joan-anal fistula, dose escalated to weekly Humira. If still " struggling with fistula healing in 3-4 months, will plan on repeating humira level and dose escalating as appropriate.     Alfie Bowers MD  GI Attending  Pager: [phone]          IBD CLINIC VISIT    CC/REFERRING MD:  Referred Self  REASON FOR CONSULTATION: Colitis    ASSESSMENT/PLAN:  31 year old man with Crohn's disease.     1. Crohn's disease:   Current medication:   - Mesalamine 4.8g daily  - Humira/adalimumab every other week (Sep 2021)  Current clinical disease activity: Remission   Current endoscopic disease activity: Bello 2 in descending colon, sigmoid and rectum, mild activity on biopsies    Extensive colitis with rose-anal fistula and seton placement. Reports overall 80-90% better, and doing well without any side effects reported on weekly humira  CRP 27 on 9/2021.     -- Labs: CBC, CRP, ESR  -- Follow up with colorectal surgery regarding management of seton   -- Flex sigmoidoscopy in 6-12 mo     2. IBD dysplasia surveillance: > 1/3 of colon involved. Will need dysplasia surveillance.   -- IBD dysplasia surveillance starting in 2028    3. Red papules: On forehead, hand. Unable to examine via video visit, may need dermatology referral based on further evaluation.   -- Pt to take photo and upload to [employer]    Return to clinic in 3-4 months.     Patient was seen and plan of care was discussed with attending physician Dr. Bowers.     Uriel Engle MD  PGY-2 Internal Medicine     IBD HISTORY  Age at diagnosis: 30  Endoscopic extent of disease: Continuous: rectum to proximal transverse  Histologic Extent of disease: Rectum, descending   Disease phenotype: Extensive   Rose-anal disease: Yes  Prior IBD surgeries: None  Prior IBD Medications:  - Vedolizumab - not dose escalated     DRUG MONITORING  TPMT enzyme activity:  n/a  6-TGN/6-MMPN levels: n/a  Biologic concentration:   Adalimumab: 6.1 on 9/2021, no antibodies     HPI:   31 year old man with Crohn's disease with perianal fistula and seton placement  presents for follow up.     He has no specific concerns, wanted to talk about recent flex sig results.     Started humira once a week - no side effects reported. Denies any sores in his mouth. Currently having 2 stools a day, not as loose as before and not having to rush to the bathroom. No cramping, no joint pain, eye issues. Continues to have blood in stools every day or every other day.     Overall feels 80-90% better except the pain with the seton. He is having drainage, not green, and has been following with colorectal surgery. Currently off antibiotics.     Does report red bumps that come and go on skin. On forehead, leg, and hand. May be blisters and isn't sure if it's from working.     HBI:  Overall patient well being (prior day): 1 (Slightly below par) - better  Abdominal pain (prior day): 1 (Mild) - not much  Number of liquid or soft stools (prior day): 2 (1 point per stool) - better  Abdominal mass on exam:   Complications (1 point for each):   New fistula    sIBDQ:  No flowsheet data found.     Last endoscopic activity:   Flex sig 2021 with moderate disease in rectum   Flex sig 2021 with seton in place, moderate disease in descending, sigmoid, and rectum     Last histologic activity:   2021: Mild active chronic colitis in descending colon; chronic colitis in rectum     Pertinent labs / imagin/2021 labs: CRP 27, ESR 13, WBC 18, Hgb 12.9    2021 MR pelvis:   1. Transsphincteric perianal fistula with associated perianal  inflammation extending into these cranial fossa left greater than  right with a seton in place as described above.  2. There are 2 small fluid collections concerning for small abscesses  as aforementioned.    ROS:    Constitutional, HEENT, cardiovascular, pulmonary, GI, , musculoskeletal, neuro, skin, endocrine and psych systems are negative, except as otherwise noted.    PHYSICAL EXAMINATION:  Constitutional: aaox3, cooperative, pleasant, not dyspneic/diaphoretic, no acute  distress  Vitals reviewed: Wt 86.2 kg (190 lb)   BMI 28.06 kg/m    Wt:   Wt Readings from Last 2 Encounters:   10/19/21 86.2 kg (190 lb)   10/15/21 87.9 kg (193 lb 11.2 oz)      Constitutional - general appearance is well and in no acute distress.  Eyes - sunglasses on   Respiratory - No cough, unlabored breathing  Musculoskeletal - range of motion intact: Neck and arms  Skin - No discoloration or lesions visible - pt reports red pustule/papule unable to see via video  Neurological - No tremors, headaches  Psychiatric - No anxiety, alert & oriented    PERTINENT PAST MEDICAL HISTORY:  No past medical history on file.   Inflammatory bowel disease  Hypertension     PREVIOUS SURGERIES:  Past Surgical History:   Procedure Laterality Date     INCISION AND DRAINAGE RECTUM, COMBINED N/A 9/6/2021    Procedure: INCISION AND DRAINAGE, RECTUM, placement of Seton;  Surgeon: Vidhya Hamm MD;  Location:  OR     ALLERGIES:   No Known Allergies    PERTINENT MEDICATIONS:    Current Outpatient Medications:      acetaminophen (TYLENOL) 325 MG tablet, Take 3 tablets (975 mg) by mouth every 8 hours As scheduled for the next 7-10 days, then decrease both dose & frequency to as needed there after., Disp: 70 tablet, Rfl: 0     adalimumab (HUMIRA *CF*) 40 MG/0.4ML pen kit, Inject 0.4 mLs (40 mg) Subcutaneous every 7 days, Disp: 4 each, Rfl: 5     dicyclomine (BENTYL) 10 MG capsule, Take 1 capsule (10 mg) by mouth 2 times daily, Disp: 90 capsule, Rfl: 1     lisinopril (ZESTRIL) 20 MG tablet, Take 20 mg by mouth, Disp: , Rfl:      mesalamine (LIALDA) 1.2 g EC tablet, Take 4 tablets (4,800 mg) by mouth every morning, Disp: 180 tablet, Rfl: 3     traMADol (ULTRAM) 50 MG tablet, Take 1 tablet (50 mg) by mouth every 6 hours as needed for moderate pain or severe pain, Disp: 28 tablet, Rfl: 0    SOCIAL HISTORY:  Social History     Socioeconomic History     Marital status:      Spouse name: Not on file     Number of children:  Not on file     Years of education: Not on file     Highest education level: Not on file   Occupational History     Not on file   Tobacco Use     Smoking status: Former Smoker     Packs/day: 0.50     Years: 2.50     Pack years: 1.25     Types: Cigarettes     Start date:      Quit date: 2020     Years since quittin.2     Smokeless tobacco: Never Used   Substance and Sexual Activity     Alcohol use: Yes     Comment: occ     Drug use: Never     Sexual activity: Not on file   Other Topics Concern     Not on file   Social History Narrative     Not on file     Social Determinants of Health     Financial Resource Strain:      Difficulty of Paying Living Expenses:    Food Insecurity:      Worried About Running Out of Food in the Last Year:      Ran Out of Food in the Last Year:    Transportation Needs:      Lack of Transportation (Medical):      Lack of Transportation (Non-Medical):    Physical Activity:      Days of Exercise per Week:      Minutes of Exercise per Session:    Stress:      Feeling of Stress :    Social Connections:      Frequency of Communication with Friends and Family:      Frequency of Social Gatherings with Friends and Family:      Attends Evangelical Services:      Active Member of Clubs or Organizations:      Attends Club or Organization Meetings:      Marital Status:    Intimate Partner Violence:      Fear of Current or Ex-Partner:      Emotionally Abused:      Physically Abused:      Sexually Abused:        FAMILY HISTORY:  Family History   Problem Relation Age of Onset     Multiple Sclerosis Mother      Ulcerative Colitis Maternal Grandmother      Colon Cancer No family hx of      Inflammatory Bowel Disease No family hx of      Irritable Bowel Syndrome No family hx of      Crohn's Disease No family hx of      No family history of IBD  Past/family/social history reviewed and no changes                Again, thank you for allowing me to participate in the care of your patient.         Sincerely,        Alfie Bowers MD

## 2021-10-19 NOTE — PATIENT INSTRUCTIONS
-- Continue weekly Humira    -- Labs ordered for next week when you see colorectal team    -- Follow-up with Dr. Bowers in 3-4 months.

## 2021-10-19 NOTE — LETTER
Date:December 17, 2021      Patient was self referred, no letter generated. Do not send.        [facility] Information

## 2021-10-19 NOTE — NURSING NOTE
Chief Complaint   Patient presents with     Follow Up       Vitals:    10/19/21 1014   Weight: 86.2 kg (190 lb)       Body mass index is 28.06 kg/m .    Donna Braxton CMA

## 2021-10-26 NOTE — PROGRESS NOTES
Colon and Rectal Surgery Follow-up Clinic Note    Referring provider:  No referring provider defined for this encounter.       RE: Cody Pickard  : 1990  WIL: 10/27/2021      Cody Pickard is a very pleasant 31 year old male with extensive colitis on Humira and lialda with perianal abscess and fistula who is now status post examination under anesthesia with incision and drainage with debridement of perirectal abscess and placement of seton on 21.    He is following with Dr. Bowers of gastroenterology and is on mesalamine and Humira.    Flexible sigmoidoscopy 21:  Findings:        The perianal exam findings include seton in place.        A patchy area of mildly to moderately erythematous mucosa with        intermittent erosions and small ulcers (up to 4 mm in diameter) was        found in the sigmoid colon and in the descending colon. Biopsies were        taken with a cold forceps for histology and labeled left colon.        A patchy area of mildly erythematous mucosa was found in the rectum.        Biopsies were taken with a cold forceps for histology.        A localized area of mildly nodular mucosa was found in the distal        rectum. Biopsies were taken with a cold forceps for histology.        A benign-appearing, intrinsic mild stenosis measuring less than one cm        (in length) was found at the anus and was traversed. Dilated with index        finger. Pediatric colonoscope passed with mild resistance.   Pathology:  A.  DESCENDING COLON, BIOPSY:  - Mildly active chronic colitis  - No granulomas or dysplasia      B.  RECTUM, BIOPSY #1:  - Chronic colitis with crypt architecture distortion  - No granulomas, cryptitis or other histologic evidence of active Crohn  - Negative for dysplasia     C.  RECTUM, BIOPSY #2:  - Colonic mucosa with crypt architecture distortion  - No granulomas, cryptitis or other histologic evidence of active Crohn  - Negative for dysplasia    Interval history: Cody has  been doing fairly well but he continues to get periods of increased pain followed by increased drainage. He currently does not have a lot of pain. No fevers or chills. No difficulty with bowel movements.    PLEASE SEE NOTE BELOW FOR PHYSICAL EXAMINATION, REVIEW OF SYSTEMS, AND OTHER HISTORY.    Assessment/Plan: 31 year old male with Crohn's disease on Humira and anal fistula s/p seton placement 9/6/21. On exam he has a branching tract from the seton that extends posteriorly. I am concerned that this is not draining adequately and that is causing his recurrent pain. I recommended an examination under anesthesia with likely a second seton placed into the branching tract. Patient's questions were answered to his stated satisfaction and he is in agreement with this plan.    15 minutes spent on the date of the encounter doing chart review, history and exam, documentation and further activities as noted above.     Vidhya Hamm MD  Colon and Rectal Surgery Staff  [facility]    -------------------------------------------------------------------------------------------------------------------    Medical history:  No past medical history on file.    Surgical history:  Past Surgical History:   Procedure Laterality Date     INCISION AND DRAINAGE RECTUM, COMBINED N/A 9/6/2021    Procedure: INCISION AND DRAINAGE, RECTUM, placement of Seton;  Surgeon: Vidhya Hamm MD;  Location: [facility] OR       Problem list:    Patient Active Problem List    Diagnosis Date Noted     Acute post-operative pain 09/07/2021     Priority: Medium     Perianal abscess 09/06/2021     Priority: Medium     Perianal Crohn's disease, with abscess (H) 09/06/2021     Priority: Medium     IBD (inflammatory bowel disease) 02/22/2021     Priority: Medium       Medications:  Current Outpatient Medications   Medication Sig Dispense Refill     acetaminophen (TYLENOL) 325 MG tablet Take 3 tablets (975 mg) by mouth every  8 hours As scheduled for the next 7-10 days, then decrease both dose & frequency to as needed there after. 70 tablet 0     adalimumab (HUMIRA *CF*) 40 MG/0.4ML pen kit Inject 0.4 mLs (40 mg) Subcutaneous every 7 days 4 each 5     dicyclomine (BENTYL) 10 MG capsule Take 1 capsule (10 mg) by mouth 2 times daily 90 capsule 1     lisinopril (ZESTRIL) 20 MG tablet Take 20 mg by mouth       mesalamine (LIALDA) 1.2 g EC tablet Take 4 tablets (4,800 mg) by mouth every morning 180 tablet 3     traMADol (ULTRAM) 50 MG tablet Take 1 tablet (50 mg) by mouth every 6 hours as needed for moderate pain or severe pain 28 tablet 0       Allergies:  No Known Allergies    Family history:  Family History   Problem Relation Age of Onset     Multiple Sclerosis Mother      Ulcerative Colitis Maternal Grandmother      Colon Cancer No family hx of      Inflammatory Bowel Disease No family hx of      Irritable Bowel Syndrome No family hx of      Crohn's Disease No family hx of        Social history:  Social History     Socioeconomic History     Marital status:      Spouse name: Not on file     Number of children: Not on file     Years of education: Not on file     Highest education level: Not on file   Occupational History     Not on file   Tobacco Use     Smoking status: Former Smoker     Packs/day: 0.50     Years: 2.50     Pack years: 1.25     Types: Cigarettes     Start date:      Quit date: 2020     Years since quittin.2     Smokeless tobacco: Never Used   Substance and Sexual Activity     Alcohol use: Yes     Comment: occ     Drug use: Never     Sexual activity: Not on file   Other Topics Concern     Not on file   Social History Narrative     Not on file     Social Determinants of Health     Financial Resource Strain:      Difficulty of Paying Living Expenses:    Food Insecurity:      Worried About Running Out of Food in the Last Year:      Ran Out of Food in the Last Year:    Transportation Needs:      Lack of  "Transportation (Medical):      Lack of Transportation (Non-Medical):    Physical Activity:      Days of Exercise per Week:      Minutes of Exercise per Session:    Stress:      Feeling of Stress :    Social Connections:      Frequency of Communication with Friends and Family:      Frequency of Social Gatherings with Friends and Family:      Attends Christianity Services:      Active Member of Clubs or Organizations:      Attends Club or Organization Meetings:      Marital Status:    Intimate Partner Violence:      Fear of Current or Ex-Partner:      Emotionally Abused:      Physically Abused:      Sexually Abused:          Nursing Notes:   Yaima Bergeron  10/27/2021  5:09 PM  Signed  Chief Complaint   Patient presents with     Follow Up     wound check        Vitals:    10/27/21 1706   BP: (!) 140/81   BP Location: Left arm   Patient Position: Sitting   Cuff Size: Adult Regular   Pulse: 68   SpO2: 100%   Weight: 192 lb   Height: 5' 9\"       Body mass index is 28.35 kg/m .    Yaima Bergeron CMA         Physical Examination:  BP (!) 140/81 (BP Location: Left arm, Patient Position: Sitting, Cuff Size: Adult Regular)   Pulse 68   Ht 5' 9\"   Wt 192 lb   SpO2 100%   BMI 28.35 kg/m    General: alert, oriented, in no acute distress, sitting comfortably  Perianal external examination:  Yellow vessel loop seton in place in the left posterior position. There is a tract that extends from this that can be probed posteriorly about 5 cm with a small amount of purulent drainage present.    "

## 2021-10-27 ENCOUNTER — OFFICE VISIT (OUTPATIENT)
Dept: SURGERY | Facility: CLINIC | Age: 31
End: 2021-10-27
Payer: COMMERCIAL

## 2021-10-27 ENCOUNTER — LAB (OUTPATIENT)
Dept: LAB | Facility: CLINIC | Age: 31
End: 2021-10-27

## 2021-10-27 VITALS
HEART RATE: 68 BPM | HEIGHT: 69 IN | SYSTOLIC BLOOD PRESSURE: 140 MMHG | BODY MASS INDEX: 28.44 KG/M2 | OXYGEN SATURATION: 100 % | DIASTOLIC BLOOD PRESSURE: 81 MMHG | WEIGHT: 192 LBS

## 2021-10-27 DIAGNOSIS — K50.111 CROHN'S DISEASE OF LARGE INTESTINE WITH RECTAL BLEEDING (H): ICD-10-CM

## 2021-10-27 DIAGNOSIS — K60.30 ANAL FISTULA: Primary | ICD-10-CM

## 2021-10-27 DIAGNOSIS — Z09 FOLLOW-UP EXAMINATION AFTER COLORECTAL SURGERY: Primary | ICD-10-CM

## 2021-10-27 DIAGNOSIS — K60.30 ANAL FISTULA: ICD-10-CM

## 2021-10-27 LAB
CRP SERPL-MCNC: <2.9 MG/L (ref 0–8)
ERYTHROCYTE [DISTWIDTH] IN BLOOD BY AUTOMATED COUNT: 13.1 % (ref 10–15)
ERYTHROCYTE [SEDIMENTATION RATE] IN BLOOD BY WESTERGREN METHOD: 5 MM/HR (ref 0–15)
HCT VFR BLD AUTO: 42.3 % (ref 40–53)
HGB BLD-MCNC: 14 G/DL (ref 13.3–17.7)
MCH RBC QN AUTO: 29.5 PG (ref 26.5–33)
MCHC RBC AUTO-ENTMCNC: 33.1 G/DL (ref 31.5–36.5)
MCV RBC AUTO: 89 FL (ref 78–100)
PLATELET # BLD AUTO: 304 10E3/UL (ref 150–450)
RBC # BLD AUTO: 4.75 10E6/UL (ref 4.4–5.9)
WBC # BLD AUTO: 6 10E3/UL (ref 4–11)

## 2021-10-27 PROCEDURE — 99212 OFFICE O/P EST SF 10 MIN: CPT | Performed by: COLON & RECTAL SURGERY

## 2021-10-27 PROCEDURE — 85027 COMPLETE CBC AUTOMATED: CPT | Performed by: PATHOLOGY

## 2021-10-27 PROCEDURE — 86140 C-REACTIVE PROTEIN: CPT | Performed by: PATHOLOGY

## 2021-10-27 PROCEDURE — 36415 COLL VENOUS BLD VENIPUNCTURE: CPT | Performed by: PATHOLOGY

## 2021-10-27 PROCEDURE — 85652 RBC SED RATE AUTOMATED: CPT | Performed by: PATHOLOGY

## 2021-10-27 ASSESSMENT — MIFFLIN-ST. JEOR: SCORE: 1826.29

## 2021-10-27 ASSESSMENT — PAIN SCALES - GENERAL: PAINLEVEL: NO PAIN (0)

## 2021-10-27 NOTE — NURSING NOTE
"Chief Complaint   Patient presents with     Follow Up     wound check        Vitals:    10/27/21 1706   BP: (!) 140/81   BP Location: Left arm   Patient Position: Sitting   Cuff Size: Adult Regular   Pulse: 68   SpO2: 100%   Weight: 192 lb   Height: 5' 9\"       Body mass index is 28.35 kg/m .    Yaima Bergeron CMA    "

## 2021-10-27 NOTE — LETTER
10/27/2021       RE: Cody Pickard  [id number] [address] [place] [id number]     Dear Colleague,    Thank you for referring your patient, Cody Pickard, to the [facility] COLON AND RECTAL SURGERY CLINIC [place] [facility]. Please see a copy of my visit note below.    Colon and Rectal Surgery Follow-up Clinic Note    Referring provider:  No referring provider defined for this encounter.       RE: Cody Pickard  : 1990  WIL: 10/27/2021      Cody Pickard is a very pleasant 31 year old male with extensive colitis on Humira and lialda with perianal abscess and fistula who is now status post examination under anesthesia with incision and drainage with debridement of perirectal abscess and placement of seton on 21.    He is following with Dr. Bowers of gastroenterology and is on mesalamine and Humira.    Flexible sigmoidoscopy 21:  Findings:        The perianal exam findings include seton in place.        A patchy area of mildly to moderately erythematous mucosa with        intermittent erosions and small ulcers (up to 4 mm in diameter) was        found in the sigmoid colon and in the descending colon. Biopsies were        taken with a cold forceps for histology and labeled left colon.        A patchy area of mildly erythematous mucosa was found in the rectum.        Biopsies were taken with a cold forceps for histology.        A localized area of mildly nodular mucosa was found in the distal        rectum. Biopsies were taken with a cold forceps for histology.        A benign-appearing, intrinsic mild stenosis measuring less than one cm        (in length) was found at the anus and was traversed. Dilated with index        finger. Pediatric colonoscope passed with mild resistance.   Pathology:  A.  DESCENDING COLON, BIOPSY:  - Mildly active chronic colitis  - No granulomas or dysplasia      B.  RECTUM, BIOPSY #1:  - Chronic colitis with crypt  architecture distortion  - No granulomas, cryptitis or other histologic evidence of active Crohn  - Negative for dysplasia     C.  RECTUM, BIOPSY #2:  - Colonic mucosa with crypt architecture distortion  - No granulomas, cryptitis or other histologic evidence of active Crohn  - Negative for dysplasia    Interval history: Cody has been doing fairly well but he continues to get periods of increased pain followed by increased drainage. He currently does not have a lot of pain. No fevers or chills. No difficulty with bowel movements.    PLEASE SEE NOTE BELOW FOR PHYSICAL EXAMINATION, REVIEW OF SYSTEMS, AND OTHER HISTORY.    Assessment/Plan: 31 year old male with Crohn's disease on Humira and anal fistula s/p seton placement 9/6/21. On exam he has a branching tract from the seton that extends posteriorly. I am concerned that this is not draining adequately and that is causing his recurrent pain. I recommended an examination under anesthesia with likely a second seton placed into the branching tract. Patient's questions were answered to his stated satisfaction and he is in agreement with this plan.    15 minutes spent on the date of the encounter doing chart review, history and exam, documentation and further activities as noted above.     Vidhya Hamm MD  Colon and Rectal Surgery Staff  [facility]    -------------------------------------------------------------------------------------------------------------------    Medical history:  No past medical history on file.    Surgical history:  Past Surgical History:   Procedure Laterality Date     INCISION AND DRAINAGE RECTUM, COMBINED N/A 9/6/2021    Procedure: INCISION AND DRAINAGE, RECTUM, placement of Seton;  Surgeon: Vidhya Hamm MD;  Location:  OR       Problem list:    Patient Active Problem List    Diagnosis Date Noted     Acute post-operative pain 09/07/2021     Priority: Medium     Perianal abscess 09/06/2021      Priority: Medium     Perianal Crohn's disease, with abscess (H) 2021     Priority: Medium     IBD (inflammatory bowel disease) 2021     Priority: Medium       Medications:  Current Outpatient Medications   Medication Sig Dispense Refill     acetaminophen (TYLENOL) 325 MG tablet Take 3 tablets (975 mg) by mouth every 8 hours As scheduled for the next 7-10 days, then decrease both dose & frequency to as needed there after. 70 tablet 0     adalimumab (HUMIRA *CF*) 40 MG/0.4ML pen kit Inject 0.4 mLs (40 mg) Subcutaneous every 7 days 4 each 5     dicyclomine (BENTYL) 10 MG capsule Take 1 capsule (10 mg) by mouth 2 times daily 90 capsule 1     lisinopril (ZESTRIL) 20 MG tablet Take 20 mg by mouth       mesalamine (LIALDA) 1.2 g EC tablet Take 4 tablets (4,800 mg) by mouth every morning 180 tablet 3     traMADol (ULTRAM) 50 MG tablet Take 1 tablet (50 mg) by mouth every 6 hours as needed for moderate pain or severe pain 28 tablet 0       Allergies:  No Known Allergies    Family history:  Family History   Problem Relation Age of Onset     Multiple Sclerosis Mother      Ulcerative Colitis Maternal Grandmother      Colon Cancer No family hx of      Inflammatory Bowel Disease No family hx of      Irritable Bowel Syndrome No family hx of      Crohn's Disease No family hx of        Social history:  Social History     Socioeconomic History     Marital status:      Spouse name: Not on file     Number of children: Not on file     Years of education: Not on file     Highest education level: Not on file   Occupational History     Not on file   Tobacco Use     Smoking status: Former Smoker     Packs/day: 0.50     Years: 2.50     Pack years: 1.25     Types: Cigarettes     Start date:      Quit date: 2020     Years since quittin.2     Smokeless tobacco: Never Used   Substance and Sexual Activity     Alcohol use: Yes     Comment: occ     Drug use: Never     Sexual activity: Not on file   Other Topics  "Concern     Not on file   Social History Narrative     Not on file     Social Determinants of Health     Financial Resource Strain:      Difficulty of Paying Living Expenses:    Food Insecurity:      Worried About Running Out of Food in the Last Year:      Ran Out of Food in the Last Year:    Transportation Needs:      Lack of Transportation (Medical):      Lack of Transportation (Non-Medical):    Physical Activity:      Days of Exercise per Week:      Minutes of Exercise per Session:    Stress:      Feeling of Stress :    Social Connections:      Frequency of Communication with Friends and Family:      Frequency of Social Gatherings with Friends and Family:      Attends Church Services:      Active Member of Clubs or Organizations:      Attends Club or Organization Meetings:      Marital Status:    Intimate Partner Violence:      Fear of Current or Ex-Partner:      Emotionally Abused:      Physically Abused:      Sexually Abused:          Nursing Notes:   Yaima Bergeron  10/27/2021  5:09 PM  Signed  Chief Complaint   Patient presents with     Follow Up     wound check        Vitals:    10/27/21 1706   BP: (!) 140/81   BP Location: Left arm   Patient Position: Sitting   Cuff Size: Adult Regular   Pulse: 68   SpO2: 100%   Weight: 192 lb   Height: 5' 9\"       Body mass index is 28.35 kg/m .    Yaima Bergeron CMA         Physical Examination:  BP (!) 140/81 (BP Location: Left arm, Patient Position: Sitting, Cuff Size: Adult Regular)   Pulse 68   Ht 1.753 m (5' 9\")   Wt 87.1 kg (192 lb)   SpO2 100%   BMI 28.35 kg/m    General: alert, oriented, in no acute distress, sitting comfortably  Perianal external examination:  Yellow vessel loop seton in place in the left posterior position. There is a tract that extends from this that can be probed posteriorly about 5 cm with a small amount of purulent drainage present.        Again, thank you for allowing me to participate in the care of your patient.  "     Sincerely,    Vidhya Hamm MD

## 2021-10-27 NOTE — PATIENT INSTRUCTIONS
Follow up:    1. Please call Jennifer to set up your procedure     2. Appointments you will need:   -pre op physical   -MARY test    NICOLAS Simmons [phone]    Clinic Fax Number [phone]    Surgery Scheduling [phone]    My Chart is available 24 hours a day and is a secure way to access your records and communicate with your care team.  I strongly recommend signing up if you haven't already done so, if you are comfortable with computers.  If you would like to inquire about this or are having problems with My Chart access, you may call [phone] or go online at [email or web address].  Please allow at least 24 hours for a response and extra time on weekends and Holidays.

## 2021-11-06 NOTE — PROGRESS NOTES
Colon and Rectal Surgery Follow-up Clinic Note    Referring provider:  No referring provider defined for this encounter.       RE: Cody Pickard  : 1990  WIL: 10/27/2021      Cody Pickard is a very pleasant 31 year old male with extensive colitis on Humira and lialda with perianal abscess and fistula who is now status post examination under anesthesia with incision and drainage with debridement of perirectal abscess and placement of seton on 21.    He is following with Dr. Bowers of gastroenterology and is on mesalamine and Humira.    Flexible sigmoidoscopy 21:  Findings:        The perianal exam findings include seton in place.        A patchy area of mildly to moderately erythematous mucosa with        intermittent erosions and small ulcers (up to 4 mm in diameter) was        found in the sigmoid colon and in the descending colon. Biopsies were        taken with a cold forceps for histology and labeled left colon.        A patchy area of mildly erythematous mucosa was found in the rectum.        Biopsies were taken with a cold forceps for histology.        A localized area of mildly nodular mucosa was found in the distal        rectum. Biopsies were taken with a cold forceps for histology.        A benign-appearing, intrinsic mild stenosis measuring less than one cm        (in length) was found at the anus and was traversed. Dilated with index        finger. Pediatric colonoscope passed with mild resistance.   Pathology:  A.  DESCENDING COLON, BIOPSY:  - Mildly active chronic colitis  - No granulomas or dysplasia      B.  RECTUM, BIOPSY #1:  - Chronic colitis with crypt architecture distortion  - No granulomas, cryptitis or other histologic evidence of active Crohn  - Negative for dysplasia     C.  RECTUM, BIOPSY #2:  - Colonic mucosa with crypt architecture distortion  - No granulomas, cryptitis or other histologic evidence of active Crohn  - Negative for dysplasia    Interval history: Cody has  been doing fairly well but he continues to get periods of increased pain followed by increased drainage. He currently does not have a lot of pain. No fevers or chills. No difficulty with bowel movements.    PLEASE SEE NOTE BELOW FOR PHYSICAL EXAMINATION, REVIEW OF SYSTEMS, AND OTHER HISTORY.    Assessment/Plan: 31 year old male with Crohn's disease on Humira and anal fistula s/p seton placement 9/6/21. On exam he has a branching tract from the seton that extends posteriorly. I am concerned that this is not draining adequately and that is causing his recurrent pain. I recommended an examination under anesthesia with likely a second seton placed into the branching tract. Patient's questions were answered to his stated satisfaction and he is in agreement with this plan.    15 minutes spent on the date of the encounter doing chart review, history and exam, documentation and further activities as noted above.     Vidhya Hamm MD  Colon and Rectal Surgery Staff  [facility]    -------------------------------------------------------------------------------------------------------------------    Medical history:  No past medical history on file.    Surgical history:  Past Surgical History:   Procedure Laterality Date     INCISION AND DRAINAGE RECTUM, COMBINED N/A 9/6/2021    Procedure: INCISION AND DRAINAGE, RECTUM, placement of Seton;  Surgeon: Vidhya Hamm MD;  Location: [facility] OR       Problem list:    Patient Active Problem List    Diagnosis Date Noted     Acute post-operative pain 09/07/2021     Priority: Medium     Perianal abscess 09/06/2021     Priority: Medium     Perianal Crohn's disease, with abscess (H) 09/06/2021     Priority: Medium     IBD (inflammatory bowel disease) 02/22/2021     Priority: Medium       Medications:  Current Outpatient Medications   Medication Sig Dispense Refill     acetaminophen (TYLENOL) 325 MG tablet Take 3 tablets (975 mg) by mouth every  8 hours As scheduled for the next 7-10 days, then decrease both dose & frequency to as needed there after. 70 tablet 0     adalimumab (HUMIRA *CF*) 40 MG/0.4ML pen kit Inject 0.4 mLs (40 mg) Subcutaneous every 7 days 4 each 5     dicyclomine (BENTYL) 10 MG capsule Take 1 capsule (10 mg) by mouth 2 times daily 90 capsule 1     lisinopril (ZESTRIL) 20 MG tablet Take 20 mg by mouth       mesalamine (LIALDA) 1.2 g EC tablet Take 4 tablets (4,800 mg) by mouth every morning 180 tablet 3     traMADol (ULTRAM) 50 MG tablet Take 1 tablet (50 mg) by mouth every 6 hours as needed for moderate pain or severe pain 28 tablet 0       Allergies:  No Known Allergies    Family history:  Family History   Problem Relation Age of Onset     Multiple Sclerosis Mother      Ulcerative Colitis Maternal Grandmother      Colon Cancer No family hx of      Inflammatory Bowel Disease No family hx of      Irritable Bowel Syndrome No family hx of      Crohn's Disease No family hx of        Social history:  Social History     Socioeconomic History     Marital status:      Spouse name: Not on file     Number of children: Not on file     Years of education: Not on file     Highest education level: Not on file   Occupational History     Not on file   Tobacco Use     Smoking status: Former Smoker     Packs/day: 0.50     Years: 2.50     Pack years: 1.25     Types: Cigarettes     Start date:      Quit date: 2020     Years since quittin.2     Smokeless tobacco: Never Used   Substance and Sexual Activity     Alcohol use: Yes     Comment: occ     Drug use: Never     Sexual activity: Not on file   Other Topics Concern     Not on file   Social History Narrative     Not on file     Social Determinants of Health     Financial Resource Strain:      Difficulty of Paying Living Expenses:    Food Insecurity:      Worried About Running Out of Food in the Last Year:      Ran Out of Food in the Last Year:    Transportation Needs:      Lack of  "Transportation (Medical):      Lack of Transportation (Non-Medical):    Physical Activity:      Days of Exercise per Week:      Minutes of Exercise per Session:    Stress:      Feeling of Stress :    Social Connections:      Frequency of Communication with Friends and Family:      Frequency of Social Gatherings with Friends and Family:      Attends Alevism Services:      Active Member of Clubs or Organizations:      Attends Club or Organization Meetings:      Marital Status:    Intimate Partner Violence:      Fear of Current or Ex-Partner:      Emotionally Abused:      Physically Abused:      Sexually Abused:          Nursing Notes:   Yaima Bergeron  10/27/2021  5:09 PM  Signed  Chief Complaint   Patient presents with     Follow Up     wound check        Vitals:    10/27/21 1706   BP: (!) 140/81   BP Location: Left arm   Patient Position: Sitting   Cuff Size: Adult Regular   Pulse: 68   SpO2: 100%   Weight: 192 lb   Height: 5' 9\"       Body mass index is 28.35 kg/m .    Yaima Bergeron CMA         Physical Examination:  BP (!) 140/81 (BP Location: Left arm, Patient Position: Sitting, Cuff Size: Adult Regular)   Pulse 68   Ht 1.753 m (5' 9\")   Wt 87.1 kg (192 lb)   SpO2 100%   BMI 28.35 kg/m    General: alert, oriented, in no acute distress, sitting comfortably  Perianal external examination:  Yellow vessel loop seton in place in the left posterior position. There is a tract that extends from this that can be probed posteriorly about 5 cm with a small amount of purulent drainage present.    "

## 2021-11-09 ENCOUNTER — TELEPHONE (OUTPATIENT)
Dept: SURGERY | Facility: CLINIC | Age: 31
End: 2021-11-09
Payer: COMMERCIAL

## 2021-11-09 DIAGNOSIS — Z11.59 ENCOUNTER FOR SCREENING FOR OTHER VIRAL DISEASES: ICD-10-CM

## 2021-11-09 NOTE — TELEPHONE ENCOUNTER
Spoke with patient via phone, completed the following scheduling, then sent Surgery Packet to patient via MyChart and Mail including related scheduling information and instructions:     Required: Yes, you will need a  18 years or older to drive you home from your procedure as well as stay with you for 24 hours after your procedure    Surgery: Exam under anesthesia, anus    Date: Friday November 19, 2021  Surgeon: Dr. Vidhya Hamm    Time: You will receive a call 1-3 days prior to your surgery with your finalized surgery and arrival time.     Location: [facility] - [address]--[address]th Floor  [place], [place] [address] [phone]        Upcoming Appointments:     Pre-operative Physical appointment:   Clinic appointment with Pre-operative Assessment Center (PAC): 11/12/21 at 3:15 PM                                                 VIDEO VISIT    Pre-operative COVID-19 Test:  Pre-procedure asymptomatic COVID PCR:  11/17/2021 at 2:00 PM                                                                      [facility]        [address] [place] [id number]    Post operative appointment:  Clinic appointment with Rachel Mahmood NP: 11/26/2021 at 1:30 PM                                                                      [facility]                                                                      [address] [place] [id number]     Post operative appointment:  - Video appointment with Dr. Vidhya Hamm to be scheduled at your appointment with Rachel Mahmood NP    Bowel Preparation: NONE

## 2021-11-10 NOTE — TELEPHONE ENCOUNTER
FUTURE VISIT INFORMATION      SURGERY INFORMATION:    Date: 21    Location: [facility] OR    Surgeon:  Vidhya Hamm MD    Anesthesia Type:  MAC    Procedure: EXAM UNDER ANESTHESIA, ANUS    Consult: ov 10/27    RECORDS REQUESTED FROM:       Primary Care Provider: Nash Morales MD - [facility]    Most recent EKG+ Tracin18- [facility]

## 2021-11-12 ENCOUNTER — VIRTUAL VISIT (OUTPATIENT)
Dept: SURGERY | Facility: CLINIC | Age: 31
End: 2021-11-12
Payer: COMMERCIAL

## 2021-11-12 ENCOUNTER — PRE VISIT (OUTPATIENT)
Dept: SURGERY | Facility: CLINIC | Age: 31
End: 2021-11-12

## 2021-11-12 ENCOUNTER — ANESTHESIA EVENT (OUTPATIENT)
Dept: ANESTHESIOLOGY | Facility: CLINIC | Age: 31
End: 2021-11-12

## 2021-11-12 DIAGNOSIS — K60.30 ANAL FISTULA: ICD-10-CM

## 2021-11-12 DIAGNOSIS — Z01.818 PRE-OP EXAMINATION: Primary | ICD-10-CM

## 2021-11-12 PROCEDURE — 99202 OFFICE O/P NEW SF 15 MIN: CPT | Mod: 95 | Performed by: PHYSICIAN ASSISTANT

## 2021-11-12 RX ORDER — CHOLECALCIFEROL (VITAMIN D3) 50 MCG
1 TABLET ORAL EVERY MORNING
COMMUNITY
End: 2022-06-10

## 2021-11-12 RX ORDER — CALCIUM CARBONATE 500(1250)
1 TABLET ORAL EVERY MORNING
COMMUNITY
End: 2022-06-10

## 2021-11-12 ASSESSMENT — ENCOUNTER SYMPTOMS: SEIZURES: 0

## 2021-11-12 ASSESSMENT — PAIN SCALES - GENERAL: PAINLEVEL: NO PAIN (0)

## 2021-11-12 ASSESSMENT — LIFESTYLE VARIABLES: TOBACCO_USE: 1

## 2021-11-12 NOTE — H&P
Pre-Operative H & P     CC:  Preoperative exam to assess for increased cardiopulmonary risk while undergoing surgery and anesthesia.    Date of Encounter: 11/12/2021  Primary Care Physician:  No Ref-Primary, Physician     Reason for visit:   Encounter Diagnoses   Name Primary?     Pre-op examination Yes     Anal fistula          HPI  Cody Pickard is a 31 year old male who presents for pre-operative H & P in preparation for EXAM UNDER ANESTHESIA, ANUS with Dr. Hamm on 11/19/21 at [facility].     The patient is a 31-year-old man with past medical history significant for Crohn's disease, hypertension, former smoker who presented to the ED on 9/6/2021 for perirectal abscess.  He underwent incision and drainage of the abscess and placement and seton on 9/6/2021.  The patient continued to follow-up with his gastroenterologist, Dr. Bowers, and has remained on weekly Humira as well as mesalamine.  The patient followed up with Dr. Gagan Jackson on 10/27/2021 and on exam had concerns that the fistula was not draining properly.  She discussed treatment options and the patient has been scheduled for the procedure as above.    History is obtained from the patient and chart review      Hx of abnormal bleeding or anti-platelet use: none    Menstrual history: No LMP for male patient.:     Prior to Admission Medications  Current Outpatient Medications   Medication Sig Dispense Refill     acetaminophen (TYLENOL) 325 MG tablet Take 3 tablets (975 mg) by mouth every 8 hours As scheduled for the next 7-10 days, then decrease both dose & frequency to as needed there after. (Patient taking differently: Take 975 mg by mouth every 4 hours as needed As scheduled for the next 7-10 days, then decrease both dose & frequency to as needed there after.) 70 tablet 0     adalimumab (HUMIRA *CF*) 40 MG/0.4ML pen kit Inject 0.4 mLs (40 mg) Subcutaneous every 7 days 4 each 5     calcium carbonate (OS-MICHELLE) 500 MG tablet  Take 1 tablet by mouth every morning       dicyclomine (BENTYL) 10 MG capsule Take 1 capsule (10 mg) by mouth 2 times daily 90 capsule 1     lisinopril (ZESTRIL) 20 MG tablet Take 20 mg by mouth every morning        mesalamine (LIALDA) 1.2 g EC tablet Take 4 tablets (4,800 mg) by mouth every morning 180 tablet 3     vitamin D3 (CHOLECALCIFEROL) 50 mcg (2000 units) tablet Take 1 tablet by mouth every morning       traMADol (ULTRAM) 50 MG tablet Take 1 tablet (50 mg) by mouth every 6 hours as needed for moderate pain or severe pain (Patient not taking: Reported on 2021) 28 tablet 0       Family History  Family History   Problem Relation Age of Onset     Multiple Sclerosis Mother      Ulcerative Colitis Maternal Grandmother      Colon Cancer No family hx of      Inflammatory Bowel Disease No family hx of      Irritable Bowel Syndrome No family hx of      Crohn's Disease No family hx of        The complete review of systems is negative other than noted in the HPI or here.   Preprocedure Note     Last edited 21 1356 by Oksana Armendariz PA-C  Date of Service 21 1354  Creation Time 21 1354  Status: Signed             Anesthesia Pre-Procedure Evaluation    Patient: Cody Pickard   MRN: [id number] : 1990        Preoperative Diagnosis: * No surgery found *    Procedure :   Video Visit       Past Medical History:   Diagnosis Date     Anal fistula      Crohn's disease (H)      HTN (hypertension)       Past Surgical History:   Procedure Laterality Date     INCISION AND DRAINAGE RECTUM, COMBINED N/A 2021    Procedure: INCISION AND DRAINAGE, RECTUM, placement of Seton;  Surgeon: Vidhya Hamm MD;  Location:  OR      No Known Allergies   Social History     Tobacco Use     Smoking status: Former Smoker     Packs/day: 0.50     Years: 2.50     Pack years: 1.25     Types: Cigarettes     Start date:      Quit date: 2020     Years since quittin.2     Smokeless tobacco:  Never Used   Substance Use Topics     Alcohol use: Yes     Comment: occ      Wt Readings from Last 1 Encounters:   10/27/21 87.1 kg (192 lb)        Anesthesia Evaluation   Pt has had prior anesthetic. Type: MAC and General.        ROS/MED HX  ENT/Pulmonary:     (+) tobacco use, Past use,     Neurologic:  - neg neurologic ROS     Cardiovascular:     (+) hypertension-----    METS/Exercise Tolerance: >4 METS    Hematologic:  - neg hematologic  ROS     Musculoskeletal:  - neg musculoskeletal ROS     GI/Hepatic: Comment: Anal fistula     (+) Inflammatory bowel disease (crohn's disease),     Renal/Genitourinary:       Endo:  - neg endo ROS     Psychiatric/Substance Use:  - neg psychiatric ROS     Infectious Disease:  - neg infectious disease ROS     Malignancy:  - neg malignancy ROS     Other:  - neg other ROS             OUTSIDE LABS:  CBC:   Lab Results   Component Value Date    WBC 6.0 10/27/2021    WBC 18.6 (H) 09/06/2021    HGB 14.0 10/27/2021    HGB 12.9 (L) 09/06/2021    HCT 42.3 10/27/2021    HCT 38.7 (L) 09/06/2021     10/27/2021     09/06/2021     BMP:   Lab Results   Component Value Date     09/06/2021    POTASSIUM 4.0 09/06/2021    CHLORIDE 105 09/06/2021    CO2 26 09/06/2021    BUN 9 09/06/2021    CR 0.78 09/07/2021    CR 0.83 09/06/2021    GLC 94 09/06/2021     COAGS: No results found for: PTT, INR, FIBR  POC: No results found for: BGM, HCG, HCGS  HEPATIC:   Lab Results   Component Value Date    ALBUMIN 3.6 09/06/2021    PROTTOTAL 8.2 09/06/2021    ALT 31 09/06/2021    AST 14 09/06/2021    ALKPHOS 60 09/06/2021    BILITOTAL 0.6 09/06/2021     OTHER:   Lab Results   Component Value Date    MICHELLE 9.3 09/06/2021    CRP <2.9 10/27/2021    SED 5 10/27/2021       Virtual visit -  No vitals were obtained       Physical Exam  Constitutional: Awake, alert, cooperative, no apparent distress, and appears stated age.  Eyes: Pupils equal  HENT: Normocephalic  Respiratory: non labored breathing    Neurologic: Awake, alert, oriented to name, place and time.   Neuropsychiatric: Calm, cooperative. Normal affect.         PRIOR LABS/DIAGNOSTIC STUDIES:   All labs and imaging personally reviewed       EKG/ stress test - if available please see in ROS above       The patient's records and results personally reviewed by this provider.     Outside records reviewed from: care everywhere      ASSESSMENT and PLAN  Cody is a 31 year old man who is scheduled for EXAM UNDER ANESTHESIA, ANUS on 11/19/21 by Dr. Hamm in treatment of Anal fistula.  PAC referral for risk assessment and optimization for anesthesia with comorbid conditions of crohn'a disease, HTN, former smoker:      Pre-operative considerations:   1.  Cardiac:  Functional status- METS >4.  The patient denies any cardiac symptoms. Low risk surgery with 0.4% (RCRI #) risk of major adverse cardiac event.    ~ HTN - continue lisinopril. The patient had EKG in 2016 with sinus bradycardia (56). Low risk procedure and patient has good METS without symptoms so no further testing indicated.     2.  Pulm:  Airway feasible.  ELIEL risk: Intermediate (male, snore, HTN)   ~ Former smoker - 0.5 ppd - quit in 8/2020.     3. GI:  Risk of PONV score = 2.  If > 2, anti-emetic intervention recommended.   ~ Crohn's disease - followed by Dr. Bowers. The patient does Humira every 7 days which he can continue along with mesalamine. Continue bentyl.   ~ Perianal abscess s/p I&D and seton placement - procedure as above. Continue Tylenol and ultram.     VTE risk: 0.5%    ** Patient's visit was done virtually today.  A full physical exam was not completed.  Please refer to the physical examination documented by the anesthesiologist in the anesthesia record on the day of surgery. **      The patient is optimize and an acceptable candidate for the proposed procedure. Arrival time, NPO, shower and medication instructions provided by nursing staff today.      Video-Visit  Details    Type of service:  Video Visit    Patient verbally consented to video service today: YES      Video Start Time: 2:56  Video End Time (time video stopped): 3:08    Originating Location (pt. Location): Home    Distant Location (provider location):  [facility] PREOPERATIVE [facility]     Mode of Communication:  Video Conference via [employer]      On the day of service:     Prep time: 7 minutes  Visit time: 12 minutes  Documentation time: 10 minutes  ------------------------------------------  Total time: 29 minutes      Oksana Armendariz PA-C  Preoperative Assessment Center  [facility]  Cl[facility] and Surgery [facility]  Phone: [phone]  Fax: [phone]

## 2021-11-12 NOTE — PROGRESS NOTES
Cody is a 31 year old who is being evaluated via a billable video visit.      How would you like to obtain your AVS? MyChart  If the video visit is dropped, the invitation should be resent by: Text to cell phone: [phone]    HPI         Review of Systems         Objective    Vitals - Patient Reported  Pain Score: No Pain (0)        Physical Exam

## 2021-11-12 NOTE — PATIENT INSTRUCTIONS
Preparing for Your Surgery      Name:  Cody Pickard   MRN:  [id number]   :  1990   Today's Date:  2021       Arriving for surgery:  Surgery date:  21  Arrival time:  09:30 am    Restrictions due to COVID 19:       One visitor is allowed in the Pre Op area.       When you go into surgery, one visitor is allowed to wait in the Surgery Waiting Room       (provided there is enough space to social distance).         In hospital patients are allowed 1 visitor per day       The visitor may change daily     Visiting Hours: 8 am - 8:30 pm   No ill visitors.   All visitors must wear face mask.    [employer] parking is available for anyone with mobility limitations or disabilities.  ([place]  24 hours/ 7 days a week; [place]  7 am- 3:30 pm, Mon- Fri)    Please come to:   [facility] [address] [address]  -  Proceed to the 5th floor to check into the Ambulatory Surgery Center.              >> There will be patient concierges on the 1st and 5th floor, for assistance or an escort, if you would like.              >> Please call [phone] with any questions.    What can I eat or drink?  -  You may eat and drink normally for up to 8 hours before your surgery.   -  You may have clear liquids until 4 hours before surgery.     Examples of clear liquids:  Water  Clear broth  Juices (apple, white grape, white cranberry  and cider) without pulp  Noncarbonated, powder based beverages  (lemonade and Yannick-Aid)  Sodas (Sprite, 7-Up, ginger ale and seltzer)  Coffee or tea (without milk or cream)  Gatorade    -  No Alcohol for at least 24 hours before surgery     Which medicines can I take?    Hold Aspirin for 7 days before surgery.   Hold Multivitamins for 7 days before surgery.  Hold Supplements for 7 days before surgery.  Hold Ibuprofen (Advil, Motrin) for 1 day before surgery--unless otherwise directed by surgeon.  Hold Naproxen (Aleve) for 4  days before surgery.  -  PLEASE TAKE these medications the day of surgery:  Tylenol if needed; take morning medications.    How do I prepare myself?  - Please take 2 showers before surgery using Scrubcare or Hibiclens soap.    Use this soap only from the neck to your toes.     Leave the soap on your skin for one minute--then rinse thoroughly.      You may use your own shampoo and conditioner; no other hair products.   - Please remove all jewelry and body piercings.  - No lotions, deodorants or fragrance.  - No makeup or fingernail polish.   - Bring your ID and insurance card.    -If you have a Deep Brain Stimulator, Spinal Cord Stimulator or any neuro stimulator device---you must bring the remote control to the hospital     - All patients are required to have a Covid-19 test within 4 days of surgery/procedure.      -Patients will be contacted by the [facility] scheduling team within 1 week of surgery to make an appointment.      - Patients may call the Scheduling team at [phone] if they have not been scheduled within 4 days of  surgery.      ALL PATIENTS GOING HOME THE SAME DAY OF SURGERY ARE REQUIRED TO HAVE A RESPONSIBLE ADULT TO DRIVE AND BE IN ATTENDANCE WITH THEM FOR 24 HOURS FOLLOWING SURGERY.      Questions or Concerns:    - For any questions regarding the day of surgery or your hospital stay, please contact the Pre Admission Nursing Office at [phone].       - If you have health changes between today and your surgery please call your surgeon.       For questions after surgery please call your surgeons office.

## 2021-11-12 NOTE — H&P (VIEW-ONLY)
Pre-Operative H & P     CC:  Preoperative exam to assess for increased cardiopulmonary risk while undergoing surgery and anesthesia.    Date of Encounter: 11/12/2021  Primary Care Physician:  No Ref-Primary, Physician     Reason for visit:   Encounter Diagnoses   Name Primary?     Pre-op examination Yes     Anal fistula          HPI  Cody Pickard is a 31 year old male who presents for pre-operative H & P in preparation for EXAM UNDER ANESTHESIA, ANUS with Dr. Hamm on 11/19/21 at [facility].     The patient is a 31-year-old man with past medical history significant for Crohn's disease, hypertension, former smoker who presented to the ED on 9/6/2021 for perirectal abscess.  He underwent incision and drainage of the abscess and placement and seton on 9/6/2021.  The patient continued to follow-up with his gastroenterologist, Dr. Bowers, and has remained on weekly Humira as well as mesalamine.  The patient followed up with Dr. Gagan Jackson on 10/27/2021 and on exam had concerns that the fistula was not draining properly.  She discussed treatment options and the patient has been scheduled for the procedure as above.    History is obtained from the patient and chart review      Hx of abnormal bleeding or anti-platelet use: none    Menstrual history: No LMP for male patient.:     Prior to Admission Medications  Current Outpatient Medications   Medication Sig Dispense Refill     acetaminophen (TYLENOL) 325 MG tablet Take 3 tablets (975 mg) by mouth every 8 hours As scheduled for the next 7-10 days, then decrease both dose & frequency to as needed there after. (Patient taking differently: Take 975 mg by mouth every 4 hours as needed As scheduled for the next 7-10 days, then decrease both dose & frequency to as needed there after.) 70 tablet 0     adalimumab (HUMIRA *CF*) 40 MG/0.4ML pen kit Inject 0.4 mLs (40 mg) Subcutaneous every 7 days 4 each 5     calcium carbonate (OS-MICHELLE) 500 MG tablet  Take 1 tablet by mouth every morning       dicyclomine (BENTYL) 10 MG capsule Take 1 capsule (10 mg) by mouth 2 times daily 90 capsule 1     lisinopril (ZESTRIL) 20 MG tablet Take 20 mg by mouth every morning        mesalamine (LIALDA) 1.2 g EC tablet Take 4 tablets (4,800 mg) by mouth every morning 180 tablet 3     vitamin D3 (CHOLECALCIFEROL) 50 mcg (2000 units) tablet Take 1 tablet by mouth every morning       traMADol (ULTRAM) 50 MG tablet Take 1 tablet (50 mg) by mouth every 6 hours as needed for moderate pain or severe pain (Patient not taking: Reported on 2021) 28 tablet 0       Family History  Family History   Problem Relation Age of Onset     Multiple Sclerosis Mother      Ulcerative Colitis Maternal Grandmother      Colon Cancer No family hx of      Inflammatory Bowel Disease No family hx of      Irritable Bowel Syndrome No family hx of      Crohn's Disease No family hx of        The complete review of systems is negative other than noted in the HPI or here.   Preprocedure Note     Last edited 21 1356 by Oksana Armendariz PA-C  Date of Service 21 1354  Creation Time 21 1354  Status: Signed             Anesthesia Pre-Procedure Evaluation    Patient: Cody Pickard   MRN: [id number] : 1990        Preoperative Diagnosis: * No surgery found *    Procedure :   Video Visit       Past Medical History:   Diagnosis Date     Anal fistula      Crohn's disease (H)      HTN (hypertension)       Past Surgical History:   Procedure Laterality Date     INCISION AND DRAINAGE RECTUM, COMBINED N/A 2021    Procedure: INCISION AND DRAINAGE, RECTUM, placement of Seton;  Surgeon: Vidhya Hamm MD;  Location:  OR      No Known Allergies   Social History     Tobacco Use     Smoking status: Former Smoker     Packs/day: 0.50     Years: 2.50     Pack years: 1.25     Types: Cigarettes     Start date:      Quit date: 2020     Years since quittin.2     Smokeless tobacco:  Never Used   Substance Use Topics     Alcohol use: Yes     Comment: occ      Wt Readings from Last 1 Encounters:   10/27/21 87.1 kg (192 lb)        Anesthesia Evaluation   Pt has had prior anesthetic. Type: MAC and General.        ROS/MED HX  ENT/Pulmonary:     (+) tobacco use, Past use,     Neurologic:  - neg neurologic ROS     Cardiovascular:     (+) hypertension-----    METS/Exercise Tolerance: >4 METS    Hematologic:  - neg hematologic  ROS     Musculoskeletal:  - neg musculoskeletal ROS     GI/Hepatic: Comment: Anal fistula     (+) Inflammatory bowel disease (crohn's disease),     Renal/Genitourinary:       Endo:  - neg endo ROS     Psychiatric/Substance Use:  - neg psychiatric ROS     Infectious Disease:  - neg infectious disease ROS     Malignancy:  - neg malignancy ROS     Other:  - neg other ROS             OUTSIDE LABS:  CBC:   Lab Results   Component Value Date    WBC 6.0 10/27/2021    WBC 18.6 (H) 09/06/2021    HGB 14.0 10/27/2021    HGB 12.9 (L) 09/06/2021    HCT 42.3 10/27/2021    HCT 38.7 (L) 09/06/2021     10/27/2021     09/06/2021     BMP:   Lab Results   Component Value Date     09/06/2021    POTASSIUM 4.0 09/06/2021    CHLORIDE 105 09/06/2021    CO2 26 09/06/2021    BUN 9 09/06/2021    CR 0.78 09/07/2021    CR 0.83 09/06/2021    GLC 94 09/06/2021     COAGS: No results found for: PTT, INR, FIBR  POC: No results found for: BGM, HCG, HCGS  HEPATIC:   Lab Results   Component Value Date    ALBUMIN 3.6 09/06/2021    PROTTOTAL 8.2 09/06/2021    ALT 31 09/06/2021    AST 14 09/06/2021    ALKPHOS 60 09/06/2021    BILITOTAL 0.6 09/06/2021     OTHER:   Lab Results   Component Value Date    MICHELLE 9.3 09/06/2021    CRP <2.9 10/27/2021    SED 5 10/27/2021       Virtual visit -  No vitals were obtained       Physical Exam  Constitutional: Awake, alert, cooperative, no apparent distress, and appears stated age.  Eyes: Pupils equal  HENT: Normocephalic  Respiratory: non labored breathing    Neurologic: Awake, alert, oriented to name, place and time.   Neuropsychiatric: Calm, cooperative. Normal affect.         PRIOR LABS/DIAGNOSTIC STUDIES:   All labs and imaging personally reviewed       EKG/ stress test - if available please see in ROS above       The patient's records and results personally reviewed by this provider.     Outside records reviewed from: care everywhere      ASSESSMENT and PLAN  Cody is a 31 year old man who is scheduled for EXAM UNDER ANESTHESIA, ANUS on 11/19/21 by Dr. Hamm in treatment of Anal fistula.  PAC referral for risk assessment and optimization for anesthesia with comorbid conditions of crohn'a disease, HTN, former smoker:      Pre-operative considerations:   1.  Cardiac:  Functional status- METS >4.  The patient denies any cardiac symptoms. Low risk surgery with 0.4% (RCRI #) risk of major adverse cardiac event.    ~ HTN - continue lisinopril. The patient had EKG in 2016 with sinus bradycardia (56). Low risk procedure and patient has good METS without symptoms so no further testing indicated.     2.  Pulm:  Airway feasible.  ELIEL risk: Intermediate (male, snore, HTN)   ~ Former smoker - 0.5 ppd - quit in 8/2020.     3. GI:  Risk of PONV score = 2.  If > 2, anti-emetic intervention recommended.   ~ Crohn's disease - followed by Dr. Bowers. The patient does Humira every 7 days which he can continue along with mesalamine. Continue bentyl.   ~ Perianal abscess s/p I&D and seton placement - procedure as above. Continue Tylenol and ultram.     VTE risk: 0.5%    ** Patient's visit was done virtually today.  A full physical exam was not completed.  Please refer to the physical examination documented by the anesthesiologist in the anesthesia record on the day of surgery. **      The patient is optimize and an acceptable candidate for the proposed procedure. Arrival time, NPO, shower and medication instructions provided by nursing staff today.      Video-Visit  Details    Type of service:  Video Visit    Patient verbally consented to video service today: YES      Video Start Time: 2:56  Video End Time (time video stopped): 3:08    Originating Location (pt. Location): Home    Distant Location (provider location):  [facility] PREOPERATIVE [facility]     Mode of Communication:  Video Conference via [employer]      On the day of service:     Prep time: 7 minutes  Visit time: 12 minutes  Documentation time: 10 minutes  ------------------------------------------  Total time: 29 minutes      Oksana Armendariz PA-C  Preoperative Assessment Center  [facility]  Clinic and Surgery Center  Phone: [phone]  Fax: [phone]

## 2021-11-12 NOTE — ANESTHESIA PREPROCEDURE EVALUATION
Anesthesia Pre-Procedure Evaluation    Patient: Cody Pickard   MRN: [id number] : 1990        Preoperative Diagnosis: * No surgery found *    Procedure :   Video Visit       Past Medical History:   Diagnosis Date     Anal fistula      Crohn's disease (H)      HTN (hypertension)       Past Surgical History:   Procedure Laterality Date     INCISION AND DRAINAGE RECTUM, COMBINED N/A 2021    Procedure: INCISION AND DRAINAGE, RECTUM, placement of Seton;  Surgeon: Vidhya Hamm MD;  Location:  OR      No Known Allergies   Social History     Tobacco Use     Smoking status: Former Smoker     Packs/day: 0.50     Years: 2.50     Pack years: 1.25     Types: Cigarettes     Start date:      Quit date: 2020     Years since quittin.2     Smokeless tobacco: Never Used   Substance Use Topics     Alcohol use: Yes     Comment: occ      Wt Readings from Last 1 Encounters:   10/27/21 87.1 kg (192 lb)        Anesthesia Evaluation   Pt has had prior anesthetic. Type: MAC and General.    No history of anesthetic complications       ROS/MED HX  ENT/Pulmonary:     (+) ELIEL risk factors, snores loudly, hypertension, tobacco use (quit in 2020), Past use,     Neurologic:  - neg neurologic ROS  (-) no seizures, no CVA and no TIA   Cardiovascular:     (+) hypertension-----Previous cardiac testing   Echo: Date: Results:    Stress Test: Date: Results:    ECG Reviewed: Date:  Results:  SB  Cath: Date: Results:      METS/Exercise Tolerance: >4 METS    Hematologic:  - neg hematologic  ROS     Musculoskeletal:  - neg musculoskeletal ROS     GI/Hepatic: Comment: Anal fistula     (+) Inflammatory bowel disease (crohn's disease),  (-) GERD   Renal/Genitourinary:       Endo:  - neg endo ROS     Psychiatric/Substance Use:  - neg psychiatric ROS     Infectious Disease:  - neg infectious disease ROS     Malignancy:  - neg malignancy ROS     Other:  - neg other ROS             OUTSIDE LABS:  CBC:   Lab Results    Component Value Date    WBC 6.0 10/27/2021    WBC 18.6 (H) 09/06/2021    HGB 14.0 10/27/2021    HGB 12.9 (L) 09/06/2021    HCT 42.3 10/27/2021    HCT 38.7 (L) 09/06/2021     10/27/2021     09/06/2021     BMP:   Lab Results   Component Value Date     09/06/2021    POTASSIUM 4.0 09/06/2021    CHLORIDE 105 09/06/2021    CO2 26 09/06/2021    BUN 9 09/06/2021    CR 0.78 09/07/2021    CR 0.83 09/06/2021    GLC 94 09/06/2021     COAGS: No results found for: PTT, INR, FIBR  POC: No results found for: BGM, HCG, HCGS  HEPATIC:   Lab Results   Component Value Date    ALBUMIN 3.6 09/06/2021    PROTTOTAL 8.2 09/06/2021    ALT 31 09/06/2021    AST 14 09/06/2021    ALKPHOS 60 09/06/2021    BILITOTAL 0.6 09/06/2021     OTHER:   Lab Results   Component Value Date    MICHELLE 9.3 09/06/2021    CRP <2.9 10/27/2021    SED 5 10/27/2021             PAC Discussion and Assessment    ASA Classification: 2  Case is suitable for: ASC  Anesthetic techniques and relevant risks discussed: MAC with GA as backup                  PAC Resident/NP Anesthesia Assessment: Cody is a 31 year old man who is scheduled for EXAM UNDER ANESTHESIA, ANUS on 11/19/21 by Dr. Hamm in treatment of Anal fistula.  PAC referral for risk assessment and optimization for anesthesia with comorbid conditions of crohn'a disease, HTN, former smoker:      Pre-operative considerations:   1.  Cardiac:  Functional status- METS >4.  The patient denies any cardiac symptoms. Low risk surgery with 0.4% (RCRI #) risk of major adverse cardiac event.    ~ HTN - continue lisinopril. The patient had EKG in 2016 with sinus bradycardia (56). Low risk procedure and patient has good METS without symptoms so no further testing indicated.     2.  Pulm:  Airway feasible.  ELIEL risk: Intermediate (male, snore, HTN)   ~ Former smoker - 0.5 ppd - quit in 8/2020.     3. GI:  Risk of PONV score = 2.  If > 2, anti-emetic intervention recommended.   ~ Crohn's disease - followed  by Dr. Bowers. The patient does Humira every 7 days which he can continue along with mesalamine. Continue bentyl.   ~ Perianal abscess s/p I&D and seton placement - procedure as above. Continue Tylenol and ultram.     VTE risk: 0.5%    **Please refer to the physical examination documented by the anesthesiologist in the anesthesia record on the day of surgery**     Patient is optimized and is acceptable candidate for the proposed procedure.  No further diagnostic evaluation is needed.       For further details of assessment, testing, and physical exam please see H and P completed on same date     Oksana Armendariz PA-C       Mid-Level Provider/Resident: Oksana Armendariz PA-C  Date: 11/12/21                                 Oksana Armendariz PA-C

## 2021-11-17 ENCOUNTER — LAB (OUTPATIENT)
Dept: LAB | Facility: CLINIC | Age: 31
End: 2021-11-17
Payer: COMMERCIAL

## 2021-11-17 DIAGNOSIS — Z11.59 ENCOUNTER FOR SCREENING FOR OTHER VIRAL DISEASES: ICD-10-CM

## 2021-11-17 PROCEDURE — U0003 INFECTIOUS AGENT DETECTION BY NUCLEIC ACID (DNA OR RNA); SEVERE ACUTE RESPIRATORY SYNDROME CORONAVIRUS 2 (SARS-COV-2) (CORONAVIRUS DISEASE [COVID-19]), AMPLIFIED PROBE TECHNIQUE, MAKING USE OF HIGH THROUGHPUT TECHNOLOGIES AS DESCRIBED BY CMS-2020-01-R: HCPCS

## 2021-11-17 PROCEDURE — U0005 INFEC AGEN DETEC AMPLI PROBE: HCPCS

## 2021-11-18 ENCOUNTER — ANESTHESIA EVENT (OUTPATIENT)
Dept: SURGERY | Facility: AMBULATORY SURGERY CENTER | Age: 31
End: 2021-11-18
Payer: COMMERCIAL

## 2021-11-18 LAB — SARS-COV-2 RNA RESP QL NAA+PROBE: NEGATIVE

## 2021-11-19 ENCOUNTER — HOSPITAL ENCOUNTER (OUTPATIENT)
Facility: AMBULATORY SURGERY CENTER | Age: 31
End: 2021-11-19
Attending: COLON & RECTAL SURGERY
Payer: COMMERCIAL

## 2021-11-19 ENCOUNTER — ANESTHESIA (OUTPATIENT)
Dept: SURGERY | Facility: AMBULATORY SURGERY CENTER | Age: 31
End: 2021-11-19
Payer: COMMERCIAL

## 2021-11-19 VITALS
HEIGHT: 69 IN | DIASTOLIC BLOOD PRESSURE: 76 MMHG | WEIGHT: 190 LBS | RESPIRATION RATE: 16 BRPM | SYSTOLIC BLOOD PRESSURE: 135 MMHG | HEART RATE: 55 BPM | TEMPERATURE: 97.6 F | BODY MASS INDEX: 28.14 KG/M2 | OXYGEN SATURATION: 100 %

## 2021-11-19 DIAGNOSIS — G89.18 ACUTE POST-OPERATIVE PAIN: Primary | ICD-10-CM

## 2021-11-19 DIAGNOSIS — K60.30 ANAL FISTULA: ICD-10-CM

## 2021-11-19 PROCEDURE — 46060 I&D ISCHIORECTAL/NTRMRL ABSC: CPT

## 2021-11-19 PROCEDURE — 46060 I&D ISCHIORECTAL/NTRMRL ABSC: CPT | Performed by: COLON & RECTAL SURGERY

## 2021-11-19 RX ORDER — ONDANSETRON 4 MG/1
4 TABLET, ORALLY DISINTEGRATING ORAL EVERY 6 HOURS PRN
Status: DISCONTINUED | OUTPATIENT
Start: 2021-11-19 | End: 2021-11-20 | Stop reason: HOSPADM

## 2021-11-19 RX ORDER — LIDOCAINE 40 MG/G
CREAM TOPICAL
Status: DISCONTINUED | OUTPATIENT
Start: 2021-11-19 | End: 2021-11-19 | Stop reason: HOSPADM

## 2021-11-19 RX ORDER — MEPERIDINE HYDROCHLORIDE 25 MG/ML
12.5 INJECTION INTRAMUSCULAR; INTRAVENOUS; SUBCUTANEOUS
Status: DISCONTINUED | OUTPATIENT
Start: 2021-11-19 | End: 2021-11-20 | Stop reason: HOSPADM

## 2021-11-19 RX ORDER — OXYCODONE HYDROCHLORIDE 5 MG/1
5 TABLET ORAL EVERY 4 HOURS PRN
Status: DISCONTINUED | OUTPATIENT
Start: 2021-11-19 | End: 2021-11-20 | Stop reason: HOSPADM

## 2021-11-19 RX ORDER — PROPOFOL 10 MG/ML
INJECTION, EMULSION INTRAVENOUS CONTINUOUS PRN
Status: DISCONTINUED | OUTPATIENT
Start: 2021-11-19 | End: 2021-11-19

## 2021-11-19 RX ORDER — SODIUM CHLORIDE, SODIUM LACTATE, POTASSIUM CHLORIDE, CALCIUM CHLORIDE 600; 310; 30; 20 MG/100ML; MG/100ML; MG/100ML; MG/100ML
INJECTION, SOLUTION INTRAVENOUS CONTINUOUS
Status: DISCONTINUED | OUTPATIENT
Start: 2021-11-19 | End: 2021-11-19 | Stop reason: HOSPADM

## 2021-11-19 RX ORDER — GLYCOPYRROLATE 0.2 MG/ML
INJECTION, SOLUTION INTRAMUSCULAR; INTRAVENOUS PRN
Status: DISCONTINUED | OUTPATIENT
Start: 2021-11-19 | End: 2021-11-19

## 2021-11-19 RX ORDER — KETAMINE HYDROCHLORIDE 10 MG/ML
INJECTION, SOLUTION INTRAMUSCULAR; INTRAVENOUS PRN
Status: DISCONTINUED | OUTPATIENT
Start: 2021-11-19 | End: 2021-11-19

## 2021-11-19 RX ORDER — BUPIVACAINE HYDROCHLORIDE AND EPINEPHRINE 2.5; 5 MG/ML; UG/ML
INJECTION, SOLUTION INFILTRATION; PERINEURAL PRN
Status: DISCONTINUED | OUTPATIENT
Start: 2021-11-19 | End: 2021-11-19 | Stop reason: HOSPADM

## 2021-11-19 RX ORDER — FLUMAZENIL 0.1 MG/ML
0.2 INJECTION, SOLUTION INTRAVENOUS
Status: ACTIVE | OUTPATIENT
Start: 2021-11-19 | End: 2021-11-19

## 2021-11-19 RX ORDER — SODIUM CHLORIDE, SODIUM LACTATE, POTASSIUM CHLORIDE, CALCIUM CHLORIDE 600; 310; 30; 20 MG/100ML; MG/100ML; MG/100ML; MG/100ML
INJECTION, SOLUTION INTRAVENOUS CONTINUOUS
Status: DISCONTINUED | OUTPATIENT
Start: 2021-11-19 | End: 2021-11-20 | Stop reason: HOSPADM

## 2021-11-19 RX ORDER — ONDANSETRON 4 MG/1
4 TABLET, ORALLY DISINTEGRATING ORAL EVERY 30 MIN PRN
Status: DISCONTINUED | OUTPATIENT
Start: 2021-11-19 | End: 2021-11-20 | Stop reason: HOSPADM

## 2021-11-19 RX ORDER — LIDOCAINE HYDROCHLORIDE 10 MG/ML
INJECTION, SOLUTION EPIDURAL; INFILTRATION; INTRACAUDAL; PERINEURAL PRN
Status: DISCONTINUED | OUTPATIENT
Start: 2021-11-19 | End: 2021-11-19 | Stop reason: HOSPADM

## 2021-11-19 RX ORDER — LIDOCAINE HYDROCHLORIDE 20 MG/ML
INJECTION, SOLUTION INFILTRATION; PERINEURAL PRN
Status: DISCONTINUED | OUTPATIENT
Start: 2021-11-19 | End: 2021-11-19

## 2021-11-19 RX ORDER — FENTANYL CITRATE 50 UG/ML
25 INJECTION, SOLUTION INTRAMUSCULAR; INTRAVENOUS EVERY 5 MIN PRN
Status: DISCONTINUED | OUTPATIENT
Start: 2021-11-19 | End: 2021-11-19 | Stop reason: HOSPADM

## 2021-11-19 RX ORDER — ACETAMINOPHEN 325 MG/1
975 TABLET ORAL ONCE
Status: DISCONTINUED | OUTPATIENT
Start: 2021-11-19 | End: 2021-11-19 | Stop reason: HOSPADM

## 2021-11-19 RX ORDER — ONDANSETRON 2 MG/ML
4 INJECTION INTRAMUSCULAR; INTRAVENOUS ONCE
Status: COMPLETED | OUTPATIENT
Start: 2021-11-19 | End: 2021-11-19

## 2021-11-19 RX ORDER — PROPOFOL 10 MG/ML
INJECTION, EMULSION INTRAVENOUS PRN
Status: DISCONTINUED | OUTPATIENT
Start: 2021-11-19 | End: 2021-11-19

## 2021-11-19 RX ORDER — PROCHLORPERAZINE MALEATE 10 MG
10 TABLET ORAL EVERY 6 HOURS PRN
Status: DISCONTINUED | OUTPATIENT
Start: 2021-11-19 | End: 2021-11-20 | Stop reason: HOSPADM

## 2021-11-19 RX ORDER — OXYCODONE HYDROCHLORIDE 5 MG/1
5 TABLET ORAL EVERY 6 HOURS PRN
Qty: 15 TABLET | Refills: 0 | Status: SHIPPED | OUTPATIENT
Start: 2021-11-19 | End: 2021-11-26

## 2021-11-19 RX ORDER — ONDANSETRON 2 MG/ML
4 INJECTION INTRAMUSCULAR; INTRAVENOUS EVERY 30 MIN PRN
Status: DISCONTINUED | OUTPATIENT
Start: 2021-11-19 | End: 2021-11-20 | Stop reason: HOSPADM

## 2021-11-19 RX ORDER — ONDANSETRON 2 MG/ML
4 INJECTION INTRAMUSCULAR; INTRAVENOUS EVERY 6 HOURS PRN
Status: DISCONTINUED | OUTPATIENT
Start: 2021-11-19 | End: 2021-11-20 | Stop reason: HOSPADM

## 2021-11-19 RX ADMIN — ONDANSETRON 4 MG: 2 INJECTION INTRAMUSCULAR; INTRAVENOUS at 11:29

## 2021-11-19 RX ADMIN — GLYCOPYRROLATE 0.2 MG: 0.2 INJECTION, SOLUTION INTRAMUSCULAR; INTRAVENOUS at 11:23

## 2021-11-19 RX ADMIN — PROPOFOL 50 MG: 10 INJECTION, EMULSION INTRAVENOUS at 11:38

## 2021-11-19 RX ADMIN — KETAMINE HYDROCHLORIDE 10 MG: 10 INJECTION, SOLUTION INTRAMUSCULAR; INTRAVENOUS at 11:30

## 2021-11-19 RX ADMIN — PROPOFOL 50 MG: 10 INJECTION, EMULSION INTRAVENOUS at 11:35

## 2021-11-19 RX ADMIN — PROPOFOL 50 MG: 10 INJECTION, EMULSION INTRAVENOUS at 11:26

## 2021-11-19 RX ADMIN — SODIUM CHLORIDE, SODIUM LACTATE, POTASSIUM CHLORIDE, CALCIUM CHLORIDE: 600; 310; 30; 20 INJECTION, SOLUTION INTRAVENOUS at 11:20

## 2021-11-19 RX ADMIN — PROPOFOL 50 MG: 10 INJECTION, EMULSION INTRAVENOUS at 11:29

## 2021-11-19 RX ADMIN — KETAMINE HYDROCHLORIDE 20 MG: 10 INJECTION, SOLUTION INTRAMUSCULAR; INTRAVENOUS at 11:27

## 2021-11-19 RX ADMIN — Medication 0.5 MG: at 11:51

## 2021-11-19 RX ADMIN — LIDOCAINE HYDROCHLORIDE 60 MG: 20 INJECTION, SOLUTION INFILTRATION; PERINEURAL at 11:23

## 2021-11-19 RX ADMIN — PROPOFOL 50 MG: 10 INJECTION, EMULSION INTRAVENOUS at 11:32

## 2021-11-19 RX ADMIN — KETAMINE HYDROCHLORIDE 10 MG: 10 INJECTION, SOLUTION INTRAMUSCULAR; INTRAVENOUS at 11:32

## 2021-11-19 RX ADMIN — PROPOFOL 200 MCG/KG/MIN: 10 INJECTION, EMULSION INTRAVENOUS at 11:25

## 2021-11-19 ASSESSMENT — LIFESTYLE VARIABLES: TOBACCO_USE: 1

## 2021-11-19 ASSESSMENT — ENCOUNTER SYMPTOMS: SEIZURES: 0

## 2021-11-19 ASSESSMENT — MIFFLIN-ST. JEOR: SCORE: 1817.21

## 2021-11-19 NOTE — DISCHARGE INSTRUCTIONS
Anorectal Surgery Instructions      What can I expect after anorectal surgery?  Most anorectal procedures are done as outpatient surgery, and you go home the same day as the procedure. A few surgical procedures will require that you stay in the hospital for about one to three days. No matter where the procedure is done or how long or short it takes, these recommendations will help you heal and feel more comfortable.    Medicines:  The anal area is very sensitive; you can expect to have some pain for up to 2-4 weeks after the procedure. Your doctor will give you a prescription for one or more pain medications. In general, there are two types of medicines that can provide relief.      Narcotic pain relievers. These include Vicodin, Percocet, and Tylenol number 3. These are all prescription medications. These should be used as prescribed and as needed. Because these drugs have side effects (including constipation), you should reduce your use of these medications as tolerated as your pain improves.    Some patients find it easiest to transition away from narcotic drugs by also taking acetaminophen (Tylenol). However, it is important to realize that most narcotic pain relievers also have acetaminophen, and excessive doses of acetaminophen can be dangerous.Accordingly, you can substitute 1000 milligrams of acetaminophen (two Extra Strength Tylenol or similar generic) for any given dose of narcotic, but acetominophen should not be taken in addition to a full narcotic dose that contains acetaminophen. The maximum acceptable dose of acetaminophen is 4000 milligrams.    Refilling prescriptions. If you need additional pain medication, please call the triage nurse at [phone] during normal business hours (8 a.m. to 4 p.m., Monday though Friday) or have your pharmacy fax a refill request to [phone]. If you call after hours or on the weekends, the doctor on call may not know you personally and may not renew narcotic  pain medication by phone. Call your primary care provider for all other medication refills.     Bowel function and Perineal care:  Bowel movements can be very painful. It is important to have regular bowel movements at least every other day and to keep your stool soft. Your doctor may tell you to take a stool softener, such as Colace, once or twice a day. Try to avoid constipation and/or diarrhea as this can make the pain and bleeding worse. A high fiber diet, including at least four servings of fruits or vegetables daily, will help to keep your bowel movements regular and soft. If you have trouble getting enough fiber in your daily diet, a fiber supplement can be considered, including Metamucil, Benefiber, or Citrucel, and can be bought at your local grocery store or pharmacy. It is important to drink six to eight glasses of water or juice everyday when using fiber products.    You can expect to have some bleeding after bowel movements, but it should stop soon after you wipe. Use a wet cloth or perianal pad (Tucks or Preparation H pads) to gently wipe the area after each bowel movement. Do not rub the anal area or use a lot of pressure. Using a spray bottle filled with warm water helps loosen any remaining stool. Blot gently with a soft dry cloth or tissue paper.    If possible, take a tub bath immediately after each bowel movement. Baths should be take at least 3 times daily for the first week to 10 days following your procedure. You should soak in the tub for 10 to 15 minutes each time with water as warm as you can tolerate. Even after you go back to work, it is a good idea to sit in the tub in the morning, after returning from work, and again in the evening before bedtime.    Constipation will cause you to strain when you have a bowel movement. The hard stool will be difficult to pass, will increase pain and bleeding, and will slow down healing. If you have not had a bowel movement within 2 days, take 2  teaspoons of Milk of Magnesia in the morning. If there are no results, repeat this. Stop taking Milk of Magnesia or other laxatives if you begin to have diarrhea.    Diarrhea can occur if too much laxative is taken or for several other reasons. If you have 3 or more loose, watery stools in a 24-hour period, stop taking laxatives. Continue to eat a high fiber diet and to take bulk-forming agents such as Metamucil, Benefiber, or Citrucel. You may take Immodium as directed on the package but please call the triage nurse, [phone], if this was not discussed with you surgeon preoperatively.    If you are still having diarrhea or constipation after you have tried these recommendations, please call the triage nurse line, [phone].    Bleeding/Infection:  Infection around the anal opening is not very common. The anal area has excellent blood supply, which helps the area to heal. Bloody discharge after bowel movements is normal and may last 2 to 4 weeks after your surgery. However, if you bleed between bowel movements and cannot get it to stop, call the triage nurse immediately [phone].    Activity:  After your procedure, there are no restrictions on your activity except restrictions surrounding being on narcotics and in pain, such as no heavy machine operating or driving. You may walk, climb stairs, ride in a car, and sit as tolerated. It is helpful to avoid sitting in one position for long periods (2 or more hours). After some surgeries, you may be told not to perform any lifting (more than 10 pounds) for several weeks after surgery.    When do I need to call the doctor or triage nurse?  If you experience any of the problems listed here, call our triage nurse during business hours ([phone]). The nurse will help you with your problem or have the doctor call you. After hours and on weekends, please call the main hospital number ([phone]) and ask for the colon and rectal surgery person on call. Some is  available to help you 24 hours a day, seven days a week.    Fever greater than 101 degrees    Chills    Foul-smelling drainage    Nausea and vomiting    Diarrhea - greater than 3 water stools in 24 hours    Constipation - no bowel movement after 3 days    Severe bleeding that does not stop soon after a bowel movement    Problems with the incision, including increased pain, swelling, or redness        [facility]  Phone: [phone]                    [facility]  Home Care Following Anesthesia  For 24 hours after surgery:  1. Get plenty of rest.  A responsible adult must stay with you for at least 24 hours after you leave the surgery center.  2. Do not drive or use heavy equipment.  If you have weakness or tingling, don't drive or use heavy equipment until this feeling goes away.   3. Do not drink alcohol.   4. Avoid strenuous or risky activities.  Ask for help when climbing stairs.  5. You may feel lightheaded.  IF so, sit for a few minutes before standing.  Have someone help you get up.   6. If you have nausea (feel sick to your stomach): Drink only clear liquids such as apple juice, ginger ale, broth or 7-Up.  Rest may also help.  Be sure to drink enough fluids.  Move to a regular diet as you feel able.   7. You may have a slight fever.  Call the doctor if your fever is over 100 F (37.7 C) (taken under the tongue) or lasts longer than 24 hours.  8. You may have a dry mouth, a sore throat, muscle aches or trouble sleeping. These should go away after 24 hours.  9. Do not make important or legal decisions.   10. It is recommended to avoid smoking.               Tips for taking pain medications  To get the best pain relief possible, remember these points:    Take pain medications as directed, before pain becomes severe.    Pain medication can upset your stomach: taking it with food may help.    Constipation is a common side effect of pain medication. Drink  plenty of  fluids.    Eat foods high in fiber. Take a stool softener if recommended by your doctor or pharmacist.    Do not drink alcohol, drive or operate machinery while taking pain medications.    Ask about other ways to control pain, such as with heat, ice or relaxation.    Tylenol/Acetaminophen Consumption  To help encourage the safe use of acetaminophen, the makers of TYLENOL  have lowered the maximum daily dose for single-ingredient Extra Strength TYLENOL  (acetaminophen) products sold in the [place] from 8 pills per day (4,000 mg) to 6 pills per day (3,000 mg). The dosing interval has also changed from 2 pills every 4-6 hours to 2 pills every 6 hours.    If you feel your pain relief is insufficient, you may take Tylenol/Acetaminophen in addition to your narcotic pain medication.     Be careful not to exceed 3,000 mg of Tylenol/Acetaminophen in a 24 hour period from all sources.    If you are taking extra strength Tylenol/acetaminophen (500 mg), the maximum dose is 6 tablets in 24 hours.    If you are taking regular strength acetaminophen (325 mg), the maximum dose is 9 tablets in 24 hours.    Call a doctor for any of the followin. Signs of infection (fever, growing tenderness at the surgery site, a large amount of drainage or bleeding, severe pain, foul-smelling drainage, redness, swelling).  2. It has been over 8 to 10 hours since surgery and you are still not able to urinate (pass water).  3. Headache for over 24 hours.  4. Numbness, tingling or weakness the day after surgery (if you had spinal anesthesia).  5. Signs of Covid-19 infection (temperature over 100 degrees, shortness of breath, cough, loss of taste/smell, generalized body aches, persistent headache, chills, sore throat, nausea/vomiting/diarrhea)  Your doctor is:       Dr. Vidhya Hamm, Colon Rectal: [phone]               After Hours and Weekends dial: [phone] and ask for the resident on call for:  Colon Rectal  For  emergency care, call the:  [facility]:  [phone] (TTY for hearing impaired: [phone])

## 2021-11-19 NOTE — OP NOTE
SURGEON: Vidhya Hamm MD      ASSISTANT: None     PREOPERATIVE DIAGNOSIS: Recurrent perirectal abscess, Crohn's disease with anorectal fistula and single seton. Crohn's anorectal stricture about 3 cm from the anal verge.       POSTOPERATIVE DIAGNOSIS: Recurrent perirectal abscess, Crohn's disease, with complex anorectal fistula now with 2 seton. Crohn's anorectal stricture about 3 cm from the anal verge.     PROCEDURE: Examination under anesthesia, debridement of perirectal abscess and fistula with complex tract, placement of new seton.      INDICATIONS: Cody Pickard is a 31 year old male with Crohn's disease who presents to Colorectal Surgery clinic with build up of pressure and a more complex tract on examination. He previously had an urgent examination under anesthesia presenting with an abscess in September 2021. The risks and benefits of surgery were thoroughly discussed with the patient and he agreed to proceed.     DESCRIPTION OF PROCEDURE: The patient was brought to the operating room. After anesthesia was induced, he was carefully placed prone on the operating room table. We prepped and draped the area in the usual sterile fashion. We began the procedure with a timeout and performed an anal block using a mixture 50/50 of bupivacaine and lidocaine with epinephrine with 20 cc. We performed a digital rectal examination and a stricture of the anorectal ring was noted 3 cm from the anal verge similar to before. Anoscopy demonstrated anal tags and induration in the left posterior quadrant with inflammation of the mucosa of the anorectum consistent with his Crohn's disease and the perianal disease. The previous drainage site was opened and there was some purulent drainage and significant proud granulation tissue. This was carefully drained and debrided. A lacrimal probe was used and the fistula tract went deeper and from the left over to the right along with the current posterior midline left  transphincteric fistula. A yellow vessel loop was used to place a seton and secured with 4 separate 2-0 Silk. The abscess cavity and fistula cavity was debrided and had good hemostasis, was carefully packed gently and dressings applied. At the end the case, all instruments and sponge counts were correct x2. The patient was emerged from anesthesia and taken to postoperative anesthesia care unit in good condition.      SPECIMEN: None.      ESTIMATED BLOOD LOSS: Minimal.      URINE OUTPUT: Not measured.      PAUL MARTIN MD   Colon and Rectal Surgery Staff  [facility]

## 2021-11-19 NOTE — ANESTHESIA PREPROCEDURE EVALUATION
Anesthesia Pre-Procedure Evaluation    Patient: Cody Pickard   MRN: [id number] : 1990        Preoperative Diagnosis: Anal fistula [K60.3]    Procedure : Procedure(s):  EXAM UNDER ANESTHESIA, ANUS            Past Medical History:   Diagnosis Date     Anal fistula      Crohn's disease (H)      HTN (hypertension)       Past Surgical History:   Procedure Laterality Date     COLONOSCOPY  06/15/2020     INCISION AND DRAINAGE RECTUM, COMBINED N/A 2021    Procedure: INCISION AND DRAINAGE, RECTUM, placement of Seton;  Surgeon: Vidhya Hamm MD;  Location: [facility] OR     SIGMOIDOSCOPY,BIOPSY  2020      No Known Allergies   Social History     Tobacco Use     Smoking status: Former Smoker     Packs/day: 0.50     Years: 2.50     Pack years: 1.25     Types: Cigarettes     Start date:      Quit date: 2020     Years since quittin.3     Smokeless tobacco: Never Used   Substance Use Topics     Alcohol use: Yes     Comment: occ      Wt Readings from Last 1 Encounters:   21 86.2 kg (190 lb)        Anesthesia Evaluation   Pt has had prior anesthetic. Type: MAC and General.    No history of anesthetic complications       ROS/MED HX  ENT/Pulmonary:     (+) ELIEL risk factors, snores loudly, hypertension, tobacco use (quit in 2020), Past use,     Neurologic:  - neg neurologic ROS  (-) no seizures, no CVA and no TIA   Cardiovascular:     (+) hypertension-----Previous cardiac testing   Echo: Date: Results:    Stress Test: Date: Results:    ECG Reviewed: Date:  Results:  SB  Cath: Date: Results:   (-) murmur   METS/Exercise Tolerance: >4 METS    Hematologic:  - neg hematologic  ROS     Musculoskeletal:  - neg musculoskeletal ROS     GI/Hepatic: Comment: Anal fistula     (+) Inflammatory bowel disease (crohn's disease),  (-) GERD   Renal/Genitourinary:       Endo:  - neg endo ROS     Psychiatric/Substance Use:  - neg psychiatric ROS     Infectious Disease:  - neg infectious disease ROS      Malignancy:  - neg malignancy ROS     Other:  - neg other ROS          Physical Exam    Airway        Mallampati: II   TM distance: > 3 FB   Neck ROM: full   Mouth opening: > 3 cm    Respiratory Devices and Support         Dental  no notable dental history         Cardiovascular          Rhythm and rate: regular and normal (-) no murmur    Pulmonary   pulmonary exam normal        breath sounds clear to auscultation           OUTSIDE LABS:  CBC:   Lab Results   Component Value Date    WBC 6.0 10/27/2021    WBC 18.6 (H) 09/06/2021    HGB 14.0 10/27/2021    HGB 12.9 (L) 09/06/2021    HCT 42.3 10/27/2021    HCT 38.7 (L) 09/06/2021     10/27/2021     09/06/2021     BMP:   Lab Results   Component Value Date     09/06/2021    POTASSIUM 4.0 09/06/2021    CHLORIDE 105 09/06/2021    CO2 26 09/06/2021    BUN 9 09/06/2021    CR 0.78 09/07/2021    CR 0.83 09/06/2021    GLC 94 09/06/2021     COAGS: No results found for: PTT, INR, FIBR  POC: No results found for: BGM, HCG, HCGS  HEPATIC:   Lab Results   Component Value Date    ALBUMIN 3.6 09/06/2021    PROTTOTAL 8.2 09/06/2021    ALT 31 09/06/2021    AST 14 09/06/2021    ALKPHOS 60 09/06/2021    BILITOTAL 0.6 09/06/2021     OTHER:   Lab Results   Component Value Date    MICHELLE 9.3 09/06/2021    CRP <2.9 10/27/2021    SED 5 10/27/2021       Anesthesia Plan    ASA Status:  2   NPO Status:  NPO Appropriate    Anesthesia Type: MAC.     - Reason for MAC: immobility needed, straight local not clinically adequate   Induction: Intravenous.   Maintenance: TIVA.        Consents    Anesthesia Plan(s) and associated risks, benefits, and realistic alternatives discussed. Questions answered and patient/representative(s) expressed understanding.    - Discussed:     - Discussed with:  Patient      - Specific Concerns: Conversion to general with associated airway management, Post op pain .     - Extended Intubation/Ventilatory Support Discussed: No.      - Patient is DNR/DNI  Status: No    Use of blood products discussed: No .     Postoperative Care    Pain management: Multi-modal analgesia.   PONV prophylaxis: Ondansetron (or other 5HT-3), Background Propofol Infusion     Comments:                PAC Discussion and Assessment    ASA Classification: 2  Case is suitable for: ASC  Anesthetic techniques and relevant risks discussed: MAC with GA as backup                  PAC Resident/NP Anesthesia Assessment: Cody is a 31 year old man who is scheduled for EXAM UNDER ANESTHESIA, ANUS on 11/19/21 by Dr. Hamm in treatment of Anal fistula.  PAC referral for risk assessment and optimization for anesthesia with comorbid conditions of crohn'a disease, HTN, former smoker:      Pre-operative considerations:   1.  Cardiac:  Functional status- METS >4.  The patient denies any cardiac symptoms. Low risk surgery with 0.4% (RCRI #) risk of major adverse cardiac event.    ~ HTN - continue lisinopril. The patient had EKG in 2016 with sinus bradycardia (56). Low risk procedure and patient has good METS without symptoms so no further testing indicated.     2.  Pulm:  Airway feasible.  ELIEL risk: Intermediate (male, snore, HTN)   ~ Former smoker - 0.5 ppd - quit in 8/2020.     3. GI:  Risk of PONV score = 2.  If > 2, anti-emetic intervention recommended.   ~ Crohn's disease - followed by Dr. Bowers. The patient does Humira every 7 days which he can continue along with mesalamine. Continue bentyl.   ~ Perianal abscess s/p I&D and seton placement - procedure as above. Continue Tylenol and ultram.     VTE risk: 0.5%    **Please refer to the physical examination documented by the anesthesiologist in the anesthesia record on the day of surgery**     Patient is optimized and is acceptable candidate for the proposed procedure.  No further diagnostic evaluation is needed.       For further details of assessment, testing, and physical exam please see H and P completed on same date     Oksana Armenadriz PA-C        Mid-Level Provider/Resident: Oksana Armendariz PA-C  Date: 11/12/21                                 Mauro Medley MD

## 2021-11-19 NOTE — ANESTHESIA CARE TRANSFER NOTE
Patient: Cody Pickard    Procedure: Procedure(s):  EXAM UNDER ANESTHESIA, ANUS  PLACEMENT OF SETON RECTUM       Diagnosis: Anal fistula [K60.3]  Diagnosis Additional Information: No value filed.    Anesthesia Type:   MAC     Note:    Oropharynx: oropharynx clear of all foreign objects and spontaneously breathing  Level of Consciousness: drowsy  Oxygen Supplementation: room air    Independent Airway: airway patency satisfactory and stable  Dentition: dentition unchanged  Vital Signs Stable: post-procedure vital signs reviewed and stable  Report to RN Given: handoff report given  Patient transferred to: Phase II    Handoff Report: Identifed the Patient, Identified the Reponsible Provider, Reviewed the pertinent medical history, Discussed the surgical course, Reviewed Intra-OP anesthesia mangement and issues during anesthesia, Set expectations for post-procedure period and Allowed opportunity for questions and acknowledgement of understanding      Vitals:  Vitals Value Taken Time   BP     Temp     Pulse     Resp     SpO2         Electronically Signed By: MEENAKSHI Hernandez CRNA  November 19, 2021  11:49 AM

## 2021-11-26 ENCOUNTER — OFFICE VISIT (OUTPATIENT)
Dept: SURGERY | Facility: CLINIC | Age: 31
End: 2021-11-26
Payer: COMMERCIAL

## 2021-11-26 VITALS
SYSTOLIC BLOOD PRESSURE: 134 MMHG | OXYGEN SATURATION: 100 % | HEART RATE: 60 BPM | TEMPERATURE: 98.1 F | HEIGHT: 69 IN | WEIGHT: 191.3 LBS | DIASTOLIC BLOOD PRESSURE: 79 MMHG | BODY MASS INDEX: 28.33 KG/M2

## 2021-11-26 DIAGNOSIS — K60.30 ANAL FISTULA: ICD-10-CM

## 2021-11-26 DIAGNOSIS — Z09 FOLLOW-UP EXAMINATION AFTER COLORECTAL SURGERY: Primary | ICD-10-CM

## 2021-11-26 PROCEDURE — 99024 POSTOP FOLLOW-UP VISIT: CPT | Performed by: NURSE PRACTITIONER

## 2021-11-26 ASSESSMENT — MIFFLIN-ST. JEOR: SCORE: 1823.11

## 2021-11-26 ASSESSMENT — PAIN SCALES - GENERAL: PAINLEVEL: MILD PAIN (2)

## 2021-11-26 NOTE — NURSING NOTE
"Chief Complaint   Patient presents with     Surgical Followup     Post-op follow up, DOS:11/19/2021       Vitals:    11/26/21 1328   BP: 134/79   BP Location: Left arm   Patient Position: Sitting   Cuff Size: Adult Regular   Pulse: 60   Temp: 98.1  F (36.7  C)   TempSrc: Oral   SpO2: 100%   Weight: 191 lb 4.8 oz   Height: 5' 9\"       Body mass index is 28.25 kg/m .       Isabella Sandoval CMA    "

## 2021-11-26 NOTE — PROGRESS NOTES
"Colon and Rectal Surgery Postoperative Clinic Note    RE: Cody Pickard  : 1990  WIL: 2021    Cody Pickard is a very pleasant 31 year old male with Crohn's disease with anorectal fistula with prior seton placement in 2021. He developed recurrent abscess with concern for a branching tract and is now status post examination under anesthesia with debridement of perirectal abscess and fistula with complex tract, placement of new seton on 21 with Dr. Hamm.    Interval history: Cody has been doing well. He denies any pain but has some irritation at the seton site. Drainage continues and this was initially a lot but has slowed down some. No fevers or chills. He continues on Humira.    Physical Examination: Exam was chaperoned by Isabella Sandoval MA   /79 (BP Location: Left arm, Patient Position: Sitting, Cuff Size: Adult Regular)   Pulse 60   Temp 98.1  F (36.7  C) (Oral)   Ht 5' 9\"   Wt 191 lb 4.8 oz   SpO2 100%   BMI 28.25 kg/m    General: alert, oriented, in no acute distress, sitting comfortably  HEENT: mucous membranes moist  Perianal external examination:  Two yellow vessel loop setons in place in the left lateral and a counter seton extending from the left lateral to the posterior midline.     Digital rectal examination: Was deferred.    Anoscopy: Was deferred.    Assessment/Plan:  31 year old male status post examination under anesthesia with debridement of perirectal abscess and fistula with complex tract, placement of new seton on 21 with Dr. Hamm. He is doing well. Continued drainage but this is slowing down. Would like to give him another 4-6 weeks of healing and then have him return to see Dr. Hamm to discuss if there is any further options for his fistula/seton. Encouraged him to contact the clinic in the meantime with any worsening symptoms, questions, or concerns. Patient's questions were answered to his stated satisfaction and he is " in agreement with this plan.    Medical history:  Past Medical History:   Diagnosis Date     Anal fistula      Crohn's disease (H)      HTN (hypertension)        Surgical history:  Past Surgical History:   Procedure Laterality Date     COLONOSCOPY  06/15/2020     EXAM UNDER ANESTHESIA ANUS N/A 11/19/2021    Procedure: EXAM UNDER ANESTHESIA, ANUS;  Surgeon: Vidhya Hamm MD;  Location: [facility] OR     INCISION AND DRAINAGE RECTUM, COMBINED N/A 09/06/2021    Procedure: INCISION AND DRAINAGE, RECTUM, placement of Seton;  Surgeon: Vidhya Hamm MD;  Location: [facility] OR     PLACEMENT OF SETON RECTUM N/A 11/19/2021    Procedure: PLACEMENT OF SETON RECTUM;  Surgeon: Vidhya Hamm MD;  Location: [facility] OR     SIGMOIDOSCOPY,BIOPSY  09/24/2020       Problem list:    Patient Active Problem List    Diagnosis Date Noted     Anal fistula 11/09/2021     Priority: Medium     Added automatically from request for surgery [id number]       Acute post-operative pain 09/07/2021     Priority: Medium     Perianal abscess 09/06/2021     Priority: Medium     Perianal Crohn's disease, with abscess (H) 09/06/2021     Priority: Medium     IBD (inflammatory bowel disease) 02/22/2021     Priority: Medium       Medications:  Current Outpatient Medications   Medication Sig Dispense Refill     acetaminophen (TYLENOL) 325 MG tablet Take 3 tablets (975 mg) by mouth every 8 hours As scheduled for the next 7-10 days, then decrease both dose & frequency to as needed there after. (Patient taking differently: Take 975 mg by mouth every 4 hours as needed As scheduled for the next 7-10 days, then decrease both dose & frequency to as needed there after.) 70 tablet 0     adalimumab (HUMIRA *CF*) 40 MG/0.4ML pen kit Inject 0.4 mLs (40 mg) Subcutaneous every 7 days 4 each 5     calcium carbonate (OS-MICHELLE) 500 MG tablet Take 1 tablet by mouth every morning       dicyclomine (BENTYL) 10 MG capsule Take 1 capsule (10 mg) by mouth 2 times  "daily 90 capsule 1     lisinopril (ZESTRIL) 20 MG tablet Take 20 mg by mouth every morning        mesalamine (LIALDA) 1.2 g EC tablet Take 4 tablets (4,800 mg) by mouth every morning 180 tablet 3     oxyCODONE (ROXICODONE) 5 MG tablet Take 1 tablet (5 mg) by mouth every 6 hours as needed for severe pain 15 tablet 0     vitamin D3 (CHOLECALCIFEROL) 50 mcg (2000 units) tablet Take 1 tablet by mouth every morning       traMADol (ULTRAM) 50 MG tablet Take 1 tablet (50 mg) by mouth every 6 hours as needed for moderate pain or severe pain (Patient not taking: Reported on 2021) 28 tablet 0       Allergies:  No Known Allergies    Family history:  Family History   Problem Relation Age of Onset     Multiple Sclerosis Mother      Ulcerative Colitis Maternal Grandmother      Colon Cancer No family hx of      Inflammatory Bowel Disease No family hx of      Irritable Bowel Syndrome No family hx of      Crohn's Disease No family hx of      Anesthesia Reaction No family hx of      Bleeding Disorder No family hx of      Clotting Disorder No family hx of        Social history:  Social History     Tobacco Use     Smoking status: Former Smoker     Packs/day: 0.50     Years: 2.50     Pack years: 1.25     Types: Cigarettes     Start date:      Quit date: 2020     Years since quittin.3     Smokeless tobacco: Never Used   Substance Use Topics     Alcohol use: Yes     Comment: occ     Marital status: .  Nursing Notes:   Isabella Calderón CMA  2021  1:31 PM  Signed  Chief Complaint   Patient presents with     Surgical Followup     Post-op follow up, DOS:2021       Vitals:    21 1328   BP: 134/79   BP Location: Left arm   Patient Position: Sitting   Cuff Size: Adult Regular   Pulse: 60   Temp: 98.1  F (36.7  C)   TempSrc: Oral   SpO2: 100%   Weight: 191 lb 4.8 oz   Height: 5' 9\"       Body mass index is 28.25 kg/m .       Isabella Sandoval CMA         15 minutes spent on the date of " the encounter doing chart review, history and exam, documentation and further activities as noted above.   This is a postop visit.    MEENAKSHI Bass, NP-C  Colon and Rectal Surgery  [facility]    This note was created using speech recognition software and may contain unintended word substitutions.

## 2021-11-26 NOTE — LETTER
"2021       RE: Cody Pickard  [id number] [address] [place] [id number]     Dear Colleague,    Thank you for referring your patient, Cody Pickard, to the [facility] [place] [facility]. Please see a copy of my visit note below.    Colon and Rectal Surgery Postoperative Clinic Note    RE: Cody Pickard  : 1990  WIL: 2021    Cody Pickard is a very pleasant 31 year old male with Crohn's disease with anorectal fistula with prior seton placement in 2021. He developed recurrent abscess with concern for a branching tract and is now status post examination under anesthesia with debridement of perirectal abscess and fistula with complex tract, placement of new seton on 21 with Dr. Hamm.    Interval history: Cody has been doing well. He denies any pain but has some irritation at the seton site. Drainage continues and this was initially a lot but has slowed down some. No fevers or chills. He continues on Humira.    Physical Examination: Exam was chaperoned by Isabella Sandoval MA   /79 (BP Location: Left arm, Patient Position: Sitting, Cuff Size: Adult Regular)   Pulse 60   Temp 98.1  F (36.7  C) (Oral)   Ht 5' 9\"   Wt 191 lb 4.8 oz   SpO2 100%   BMI 28.25 kg/m    General: alert, oriented, in no acute distress, sitting comfortably  HEENT: mucous membranes moist  Perianal external examination:  Two yellow vessel loop setons in place in the left lateral and a counter seton extending from the left lateral to the posterior midline.     Digital rectal examination: Was deferred.    Anoscopy: Was deferred.    Assessment/Plan:  31 year old male status post examination under anesthesia with debridement of perirectal abscess and fistula with complex tract, placement of new seton on 21 with Dr. Hamm. He is doing well. Continued drainage but this is slowing down. Would like to " give him another 4-6 weeks of healing and then have him return to see Dr. Hamm to discuss if there is any further options for his fistula/seton. Encouraged him to contact the clinic in the meantime with any worsening symptoms, questions, or concerns. Patient's questions were answered to his stated satisfaction and he is in agreement with this plan.    Medical history:  Past Medical History:   Diagnosis Date     Anal fistula      Crohn's disease (H)      HTN (hypertension)        Surgical history:  Past Surgical History:   Procedure Laterality Date     COLONOSCOPY  06/15/2020     EXAM UNDER ANESTHESIA ANUS N/A 11/19/2021    Procedure: EXAM UNDER ANESTHESIA, ANUS;  Surgeon: Vidhya Hamm MD;  Location: [facility] OR     INCISION AND DRAINAGE RECTUM, COMBINED N/A 09/06/2021    Procedure: INCISION AND DRAINAGE, RECTUM, placement of Seton;  Surgeon: Vidhya Hamm MD;  Location: [facility]     PLACEMENT OF SETON RECTUM N/A 11/19/2021    Procedure: PLACEMENT OF SETON RECTUM;  Surgeon: Vidhya Hamm MD;  Location: [facility] OR     SIGMOIDOSCOPY,BIOPSY  09/24/2020       Problem list:    Patient Active Problem List    Diagnosis Date Noted     Anal fistula 11/09/2021     Priority: Medium     Added automatically from request for surgery [id number]       Acute post-operative pain 09/07/2021     Priority: Medium     Perianal abscess 09/06/2021     Priority: Medium     Perianal Crohn's disease, with abscess (H) 09/06/2021     Priority: Medium     IBD (inflammatory bowel disease) 02/22/2021     Priority: Medium       Medications:  Current Outpatient Medications   Medication Sig Dispense Refill     acetaminophen (TYLENOL) 325 MG tablet Take 3 tablets (975 mg) by mouth every 8 hours As scheduled for the next 7-10 days, then decrease both dose & frequency to as needed there after. (Patient taking differently: Take 975 mg by mouth every 4 hours as needed As scheduled for the next 7-10 days, then decrease  both dose & frequency to as needed there after.) 70 tablet 0     adalimumab (HUMIRA *CF*) 40 MG/0.4ML pen kit Inject 0.4 mLs (40 mg) Subcutaneous every 7 days 4 each 5     calcium carbonate (OS-MICHELLE) 500 MG tablet Take 1 tablet by mouth every morning       dicyclomine (BENTYL) 10 MG capsule Take 1 capsule (10 mg) by mouth 2 times daily 90 capsule 1     lisinopril (ZESTRIL) 20 MG tablet Take 20 mg by mouth every morning        mesalamine (LIALDA) 1.2 g EC tablet Take 4 tablets (4,800 mg) by mouth every morning 180 tablet 3     oxyCODONE (ROXICODONE) 5 MG tablet Take 1 tablet (5 mg) by mouth every 6 hours as needed for severe pain 15 tablet 0     vitamin D3 (CHOLECALCIFEROL) 50 mcg (2000 units) tablet Take 1 tablet by mouth every morning       traMADol (ULTRAM) 50 MG tablet Take 1 tablet (50 mg) by mouth every 6 hours as needed for moderate pain or severe pain (Patient not taking: Reported on 2021) 28 tablet 0       Allergies:  No Known Allergies    Family history:  Family History   Problem Relation Age of Onset     Multiple Sclerosis Mother      Ulcerative Colitis Maternal Grandmother      Colon Cancer No family hx of      Inflammatory Bowel Disease No family hx of      Irritable Bowel Syndrome No family hx of      Crohn's Disease No family hx of      Anesthesia Reaction No family hx of      Bleeding Disorder No family hx of      Clotting Disorder No family hx of        Social history:  Social History     Tobacco Use     Smoking status: Former Smoker     Packs/day: 0.50     Years: 2.50     Pack years: 1.25     Types: Cigarettes     Start date:      Quit date: 2020     Years since quittin.3     Smokeless tobacco: Never Used   Substance Use Topics     Alcohol use: Yes     Comment: occ     Marital status: .  Nursing Notes:   Isabella Calderón CMA  2021  1:31 PM  Signed  Chief Complaint   Patient presents with     Surgical Followup     Post-op follow up, DOS:2021       Vitals:  "   11/26/21 1328   BP: 134/79   BP Location: Left arm   Patient Position: Sitting   Cuff Size: Adult Regular   Pulse: 60   Temp: 98.1  F (36.7  C)   TempSrc: Oral   SpO2: 100%   Weight: 191 lb 4.8 oz   Height: 5' 9\"       Body mass index is 28.25 kg/m .       Isabella Sandoval CMA         15 minutes spent on the date of the encounter doing chart review, history and exam, documentation and further activities as noted above.   This is a postop visit.    MEENAKSHI Bass, NP-C  Colon and Rectal Surgery  [facility]    This note was created using speech recognition software and may contain unintended word substitutions.      "

## 2021-12-27 NOTE — PROGRESS NOTES
Colon and Rectal Surgery Postoperative Clinic Note       RE: Cody Pickard  : 1990  WIL: 2022    Cody Pickard is a very pleasant 31 year old male with Crohn's disease with anorectal fistula with prior seton placement in 2021. He developed recurrent abscess with concern for a branching tract and is now status post examination under anesthesia with debridement of perirectal abscess and fistula with complex tract, placement of new seton on 21.    Interval history: Cody has been doing well. He has occasional increase in drainage but typically only has to change the dressing once or twice a day. No significant pain. Some blood at times. He continues on Humira and diarrhea has improved. He is having bowel movements once to twice a day.    PLEASE SEE NOTE BELOW FOR PHYSICAL EXAMINATION, REVIEW OF SYSTEMS, AND OTHER HISTORY.    Assessment/Plan:  31 year old male with Crohn's disease and complex anal fistula. Two setons in place and the site has improved significantly.     I would still like to give him some more time for further healing but would consider simplifying and shortening the branching tract in about 2-3 months with potential removal in the future.     Will have him return in 2-3 months for recheck and to discuss again.     Continue to follow with Dr. Bowers of gastroenterology to ensure Crohn's management is optimized.     Patient's questions were answered to his stated satisfaction and he is in agreement with this plan.    15 minutes spent on the date of the encounter doing chart review, history and exam, documentation and further activities as noted above.  This is a postop visit.    Vidhya Hamm MD  Colon and Rectal Surgery Staff  [facility]    -------------------------------------------------------------------------------------------------------------------    Medical history:  Past Medical History:   Diagnosis Date     Anal fistula      Crohn's  disease (H)      HTN (hypertension)        Surgical history:  Past Surgical History:   Procedure Laterality Date     COLONOSCOPY  06/15/2020     EXAM UNDER ANESTHESIA ANUS N/A 11/19/2021    Procedure: EXAM UNDER ANESTHESIA, ANUS;  Surgeon: Vidhya Hamm MD;  Location: [facility] OR     INCISION AND DRAINAGE RECTUM, COMBINED N/A 09/06/2021    Procedure: INCISION AND DRAINAGE, RECTUM, placement of Seton;  Surgeon: Vidhya Hamm MD;  Location: [facility] OR     PLACEMENT OF SETON RECTUM N/A 11/19/2021    Procedure: PLACEMENT OF SETON RECTUM;  Surgeon: Vidhya Hamm MD;  Location: [facility] OR     SIGMOIDOSCOPY,BIOPSY  09/24/2020       Problem list:    Patient Active Problem List    Diagnosis Date Noted     Anal fistula 11/09/2021     Priority: Medium     Added automatically from request for surgery [id number]       Acute post-operative pain 09/07/2021     Priority: Medium     Perianal abscess 09/06/2021     Priority: Medium     Perianal Crohn's disease, with abscess (H) 09/06/2021     Priority: Medium     IBD (inflammatory bowel disease) 02/22/2021     Priority: Medium       Medications:  Current Outpatient Medications   Medication Sig Dispense Refill     acetaminophen (TYLENOL) 325 MG tablet Take 3 tablets (975 mg) by mouth every 8 hours As scheduled for the next 7-10 days, then decrease both dose & frequency to as needed there after. (Patient taking differently: Take 975 mg by mouth every 4 hours as needed As scheduled for the next 7-10 days, then decrease both dose & frequency to as needed there after.) 70 tablet 0     adalimumab (HUMIRA *CF*) 40 MG/0.4ML pen kit Inject 0.4 mLs (40 mg) Subcutaneous every 7 days 4 each 5     calcium carbonate (OS-MICHELLE) 500 MG tablet Take 1 tablet by mouth every morning       dicyclomine (BENTYL) 10 MG capsule Take 1 capsule (10 mg) by mouth 2 times daily 90 capsule 1     lisinopril (ZESTRIL) 20 MG tablet Take 20 mg by mouth every morning        mesalamine (LIALDA) 1.2  g EC tablet Take 4 tablets (4,800 mg) by mouth every morning 180 tablet 3     vitamin D3 (CHOLECALCIFEROL) 50 mcg (2000 units) tablet Take 1 tablet by mouth every morning       traMADol (ULTRAM) 50 MG tablet Take 1 tablet (50 mg) by mouth every 6 hours as needed for moderate pain or severe pain (Patient not taking: Reported on 2021) 28 tablet 0       Allergies:  No Known Allergies    Family history:  Family History   Problem Relation Age of Onset     Multiple Sclerosis Mother      Ulcerative Colitis Maternal Grandmother      Colon Cancer No family hx of      Inflammatory Bowel Disease No family hx of      Irritable Bowel Syndrome No family hx of      Crohn's Disease No family hx of      Anesthesia Reaction No family hx of      Bleeding Disorder No family hx of      Clotting Disorder No family hx of        Social history:  Social History     Socioeconomic History     Marital status:      Spouse name: Not on file     Number of children: Not on file     Years of education: Not on file     Highest education level: Not on file   Occupational History     Not on file   Tobacco Use     Smoking status: Former Smoker     Packs/day: 0.50     Years: 2.50     Pack years: 1.25     Types: Cigarettes     Start date:      Quit date: 2020     Years since quittin.4     Smokeless tobacco: Never Used   Substance and Sexual Activity     Alcohol use: Yes     Comment: occ     Drug use: Never     Sexual activity: Not on file   Other Topics Concern     Not on file   Social History Narrative     Not on file     Social Determinants of Health     Financial Resource Strain: Not on file   Food Insecurity: Not on file   Transportation Needs: Not on file   Physical Activity: Not on file   Stress: Not on file   Social Connections: Not on file   Intimate Partner Violence: Not on file   Housing Stability: Not on file         Nursing Notes:   Yaima Bergeron  2022  3:50 PM  Signed  Chief Complaint   Patient presents with      "Post-op Visit       Vitals:    01/05/22 1547   BP: 125/78   BP Location: Left arm   Patient Position: Sitting   Cuff Size: Adult Regular   Pulse: 60   SpO2: 100%   Weight: 204 lb   Height: 5' 9\"       Body mass index is 30.13 kg/m .    Yaima Bergeron CMA         Physical Examination:  /78 (BP Location: Left arm, Patient Position: Sitting, Cuff Size: Adult Regular)   Pulse 60   Ht 1.753 m (5' 9\")   Wt 92.5 kg (204 lb)   SpO2 100%   BMI 30.13 kg/m    General: alert, oriented, in no acute distress  HEENT: mucous membranes moist  Perianal external examination:  Two yellow vessel loop setons in place in the left lateral position and extending to the posterior position. Ties external to the tracts. Fistulotomy site well healed with what feels like a cord underlying the scar    "

## 2022-01-05 ENCOUNTER — OFFICE VISIT (OUTPATIENT)
Dept: SURGERY | Facility: CLINIC | Age: 32
End: 2022-01-05
Payer: COMMERCIAL

## 2022-01-05 VITALS
HEART RATE: 60 BPM | DIASTOLIC BLOOD PRESSURE: 78 MMHG | BODY MASS INDEX: 30.21 KG/M2 | WEIGHT: 204 LBS | HEIGHT: 69 IN | SYSTOLIC BLOOD PRESSURE: 125 MMHG | OXYGEN SATURATION: 100 %

## 2022-01-05 DIAGNOSIS — K60.30 ANAL FISTULA: ICD-10-CM

## 2022-01-05 DIAGNOSIS — Z09 FOLLOW-UP EXAMINATION AFTER COLORECTAL SURGERY: Primary | ICD-10-CM

## 2022-01-05 DIAGNOSIS — K50.913 CROHN'S DISEASE WITH FISTULA, UNSPECIFIED GASTROINTESTINAL TRACT LOCATION (H): ICD-10-CM

## 2022-01-05 PROCEDURE — 99024 POSTOP FOLLOW-UP VISIT: CPT | Performed by: COLON & RECTAL SURGERY

## 2022-01-05 ASSESSMENT — MIFFLIN-ST. JEOR: SCORE: 1880.72

## 2022-01-05 ASSESSMENT — PAIN SCALES - GENERAL: PAINLEVEL: NO PAIN (0)

## 2022-01-05 NOTE — LETTER
Date:January 26, 2022      Patient was self referred, no letter generated. Do not send.        [facility] Information

## 2022-01-05 NOTE — NURSING NOTE
"Chief Complaint   Patient presents with     Post-op Visit       Vitals:    01/05/22 1547   BP: 125/78   BP Location: Left arm   Patient Position: Sitting   Cuff Size: Adult Regular   Pulse: 60   SpO2: 100%   Weight: 204 lb   Height: 5' 9\"       Body mass index is 30.13 kg/m .    Yaima Bergeron CMA    "

## 2022-01-05 NOTE — LETTER
2022       RE: Cody Pickard  [id number] [address] [place] [id number]     Dear Colleague,    Thank you for referring your patient, Cody Pickard, to the [facility] COLON AND RECTAL SURGERY CLINIC [place] at [facility]. Please see a copy of my visit note below.    Colon and Rectal Surgery Postoperative Clinic Note       RE: Cody Pickard  : 1990  WIL: 2022    Cody Pickard is a very pleasant 31 year old male with Crohn's disease with anorectal fistula with prior seton placement in 2021. He developed recurrent abscess with concern for a branching tract and is now status post examination under anesthesia with debridement of perirectal abscess and fistula with complex tract, placement of new seton on 21.    Interval history: Cody has been doing well. He has occasional increase in drainage but typically only has to change the dressing once or twice a day. No significant pain. Some blood at times. He continues on Humira and diarrhea has improved. He is having bowel movements once to twice a day.    PLEASE SEE NOTE BELOW FOR PHYSICAL EXAMINATION, REVIEW OF SYSTEMS, AND OTHER HISTORY.    Assessment/Plan:  31 year old male with Crohn's disease and complex anal fistula. Two setons in place and the site has improved significantly.     I would still like to give him some more time for further healing but would consider simplifying and shortening the branching tract in about 2-3 months with potential removal in the future.     Will have him return in 2-3 months for recheck and to discuss again.     Continue to follow with Dr. Bowers of gastroenterology to ensure Crohn's management is optimized.     Patient's questions were answered to his stated satisfaction and he is in agreement with this plan.    15 minutes spent on the date of the encounter doing chart review, history and exam, documentation and further activities as noted above.  This is a  postop visit.    Vidhya Hamm MD  Colon and Rectal Surgery Staff  [facility]    -------------------------------------------------------------------------------------------------------------------    Medical history:  Past Medical History:   Diagnosis Date     Anal fistula      Crohn's disease (H)      HTN (hypertension)        Surgical history:  Past Surgical History:   Procedure Laterality Date     COLONOSCOPY  06/15/2020     EXAM UNDER ANESTHESIA ANUS N/A 11/19/2021    Procedure: EXAM UNDER ANESTHESIA, ANUS;  Surgeon: Vidhya Hamm MD;  Location: [facility] OR     INCISION AND DRAINAGE RECTUM, COMBINED N/A 09/06/2021    Procedure: INCISION AND DRAINAGE, RECTUM, placement of Seton;  Surgeon: Vidhya Hamm MD;  Location: [facility] OR     PLACEMENT OF SETON RECTUM N/A 11/19/2021    Procedure: PLACEMENT OF SETON RECTUM;  Surgeon: Vidhya Hamm MD;  Location: [facility] OR     SIGMOIDOSCOPY,BIOPSY  09/24/2020       Problem list:    Patient Active Problem List    Diagnosis Date Noted     Anal fistula 11/09/2021     Priority: Medium     Added automatically from request for surgery [id number]       Acute post-operative pain 09/07/2021     Priority: Medium     Perianal abscess 09/06/2021     Priority: Medium     Perianal Crohn's disease, with abscess (H) 09/06/2021     Priority: Medium     IBD (inflammatory bowel disease) 02/22/2021     Priority: Medium       Medications:  Current Outpatient Medications   Medication Sig Dispense Refill     acetaminophen (TYLENOL) 325 MG tablet Take 3 tablets (975 mg) by mouth every 8 hours As scheduled for the next 7-10 days, then decrease both dose & frequency to as needed there after. (Patient taking differently: Take 975 mg by mouth every 4 hours as needed As scheduled for the next 7-10 days, then decrease both dose & frequency to as needed there after.) 70 tablet 0     adalimumab (HUMIRA *CF*) 40 MG/0.4ML pen kit Inject 0.4 mLs  (40 mg) Subcutaneous every 7 days 4 each 5     calcium carbonate (OS-MICHELLE) 500 MG tablet Take 1 tablet by mouth every morning       dicyclomine (BENTYL) 10 MG capsule Take 1 capsule (10 mg) by mouth 2 times daily 90 capsule 1     lisinopril (ZESTRIL) 20 MG tablet Take 20 mg by mouth every morning        mesalamine (LIALDA) 1.2 g EC tablet Take 4 tablets (4,800 mg) by mouth every morning 180 tablet 3     vitamin D3 (CHOLECALCIFEROL) 50 mcg (2000 units) tablet Take 1 tablet by mouth every morning       traMADol (ULTRAM) 50 MG tablet Take 1 tablet (50 mg) by mouth every 6 hours as needed for moderate pain or severe pain (Patient not taking: Reported on 2021) 28 tablet 0       Allergies:  No Known Allergies    Family history:  Family History   Problem Relation Age of Onset     Multiple Sclerosis Mother      Ulcerative Colitis Maternal Grandmother      Colon Cancer No family hx of      Inflammatory Bowel Disease No family hx of      Irritable Bowel Syndrome No family hx of      Crohn's Disease No family hx of      Anesthesia Reaction No family hx of      Bleeding Disorder No family hx of      Clotting Disorder No family hx of        Social history:  Social History     Socioeconomic History     Marital status:      Spouse name: Not on file     Number of children: Not on file     Years of education: Not on file     Highest education level: Not on file   Occupational History     Not on file   Tobacco Use     Smoking status: Former Smoker     Packs/day: 0.50     Years: 2.50     Pack years: 1.25     Types: Cigarettes     Start date:      Quit date: 2020     Years since quittin.4     Smokeless tobacco: Never Used   Substance and Sexual Activity     Alcohol use: Yes     Comment: occ     Drug use: Never     Sexual activity: Not on file   Other Topics Concern     Not on file   Social History Narrative     Not on file     Social Determinants of Health     Financial Resource Strain: Not on file   Food  "Insecurity: Not on file   Transportation Needs: Not on file   Physical Activity: Not on file   Stress: Not on file   Social Connections: Not on file   Intimate Partner Violence: Not on file   Housing Stability: Not on file         Nursing Notes:   Yaima Bergeron  1/5/2022  3:50 PM  Signed  Chief Complaint   Patient presents with     Post-op Visit       Vitals:    01/05/22 1547   BP: 125/78   BP Location: Left arm   Patient Position: Sitting   Cuff Size: Adult Regular   Pulse: 60   SpO2: 100%   Weight: 204 lb   Height: 5' 9\"       Body mass index is 30.13 kg/m .    Yaima Bergeron CMA         Physical Examination:  /78 (BP Location: Left arm, Patient Position: Sitting, Cuff Size: Adult Regular)   Pulse 60   Ht 1.753 m (5' 9\")   Wt 92.5 kg (204 lb)   SpO2 100%   BMI 30.13 kg/m    General: alert, oriented, in no acute distress  HEENT: mucous membranes moist  Perianal external examination:  Two yellow vessel loop setons in place in the left lateral position and extending to the posterior position. Ties external to the tracts. Fistulotomy site well healed with what feels like a cord underlying the scar        Again, thank you for allowing me to participate in the care of your patient.      Sincerely,    Vidhya Hamm MD      "

## 2022-01-30 ENCOUNTER — HEALTH MAINTENANCE LETTER (OUTPATIENT)
Age: 32
End: 2022-01-30

## 2022-02-21 ENCOUNTER — PATIENT OUTREACH (OUTPATIENT)
Dept: GASTROENTEROLOGY | Facility: CLINIC | Age: 32
End: 2022-02-21
Payer: COMMERCIAL

## 2022-02-21 NOTE — PROGRESS NOTES
Left a message for patient to call back to see if he would like to move up his appt to today with Dr. Bowers

## 2022-02-24 ENCOUNTER — PATIENT OUTREACH (OUTPATIENT)
Dept: SURGERY | Facility: CLINIC | Age: 32
End: 2022-02-24
Payer: COMMERCIAL

## 2022-02-24 NOTE — PROGRESS NOTES
I called Cody because I was informed that one of his setons fell out. He said his seton didn't fall out just one of his ties came undone. There are four ties on a seton we can replace the one on 3/30/2022. I told him if more ties come off we will see him sooner.

## 2022-02-26 DIAGNOSIS — K60.30 ANAL FISTULA: ICD-10-CM

## 2022-02-26 DIAGNOSIS — K50.111 CROHN'S DISEASE OF LARGE INTESTINE WITH RECTAL BLEEDING (H): ICD-10-CM

## 2022-02-28 ENCOUNTER — VIRTUAL VISIT (OUTPATIENT)
Dept: GASTROENTEROLOGY | Facility: CLINIC | Age: 32
End: 2022-02-28
Payer: COMMERCIAL

## 2022-02-28 DIAGNOSIS — K50.111 CROHN'S DISEASE OF LARGE INTESTINE WITH RECTAL BLEEDING (H): Primary | ICD-10-CM

## 2022-02-28 PROCEDURE — 99213 OFFICE O/P EST LOW 20 MIN: CPT | Mod: 95 | Performed by: INTERNAL MEDICINE

## 2022-02-28 NOTE — PROGRESS NOTES
Cody is a 31 year old who is being evaluated via a billable video visit.      How would you like to obtain your AVS? MyChart  If the video visit is dropped, the invitation should be resent by: Text to cell phone: [phone]  Will anyone else be joining your video visit? Therese CANELA    Video Start Time: 3:47 PM  Video-Visit Details    Type of service:  Video Visit    Video End Time:3:58 PM    Originating Location (pt. Location): Home    Distant Location (provider location):  [facility]     Platform used for Video Visit: Gingr

## 2022-02-28 NOTE — PATIENT INSTRUCTIONS
You are doing great.     Continue Humira and mesalamine for now    Labs in the next month (ordered).     Follow-up in the IBD program in 6 months.

## 2022-02-28 NOTE — PROGRESS NOTES
IBD CLINIC VISIT    CC/REFERRING MD:  Referred Self  REASON FOR CONSULTATION: Colitis    ASSESSMENT/PLAN:  31 year old man with Crohn's disease.     1. Crohn's disease:   Current medication:    - Mesalamine 4.8g daily   - Humira/adalimumab q7 days  Current clinical disease activity: Remission   Current endoscopic disease activity: 9/27/21: Near normal flex sig (left colon erythema and erosions) - mild histology.     He is now in clinical remission and near endoscopic remission with only mild disease on histology.  Perianal disease under control with 2 setons in place.  We discussed the possibility of de-escalating mesalamine, however I favor continuing for at least a year before de-escalating.  He is doing well and I would rather not make any changes and risk having him flare given his severe disease with perianal involvement.    -- Labs: CBC, CRP, ESR, every 6 months, he is due at this time.  -- Follow up with colorectal surgery regarding management of seton     2. IBD dysplasia surveillance: > 1/3 of colon involved. Will need dysplasia surveillance.   -- IBD dysplasia surveillance starting in 2028    3. Red papules: On forehead, hand. Self resolved.    Return to clinic in 3-4 months.     Alfie Bowers MD MS    [facility]  Inflammatory Bowel Disease Program  Division of Gastroenterology, Hepatology and Nutrition  Pager: [phone]    IBD HISTORY  Age at diagnosis: 30  Endoscopic extent of disease: Continuous: rectum to proximal transverse  Histologic Extent of disease: Rectum, descending   Disease phenotype: Extensive   Rose-anal disease: Yes  Prior IBD surgeries: None  Prior IBD Medications:  - Vedolizumab - not dose escalated     DRUG MONITORING  TPMT enzyme activity:  n/a  6-TGN/6-MMPN levels: n/a  Biologic concentration:   Adalimumab: 6.1 on 9/2021, no antibodies     HPI:   Things are going well for him at this time.     He had a 2nd seton placed.     Having 1-2 stools a day. Rarely  three times. Rare blood or diarrhea - diet dependent.     He will have intermittent drainage from the seton.     Prior rash self resolved.    At this point he feels basically back to normal. Some slight fatigue, but unclear if its related to disease or life.     He stopped taking bentyl as he didn't feel he needed it.    HBI:  Overall patient well being (prior day): 0 (Very well)  Abdominal pain (prior day): 0 (None)  Number of liquid or soft stools (prior day): 0 (1 point per stool)  Abdominal mass on exam:   Complications (1 point for each):   None    Remission <5  Mild activity 5-7  Moderate activity 8-16  Severe > 16      sIBDQ:  No flowsheet data found.       ROS:    Constitutional, HEENT, cardiovascular, pulmonary, GI, , musculoskeletal, neuro, skin, endocrine and psych systems are negative, except as otherwise noted.    PHYSICAL EXAMINATION:  Constitutional: aaox3, cooperative, pleasant, not dyspneic/diaphoretic, no acute distress  Vitals reviewed: There were no vitals taken for this visit.  Wt:   Wt Readings from Last 2 Encounters:   01/05/22 92.5 kg (204 lb)   11/26/21 86.8 kg (191 lb 4.8 oz)      Constitutional - general appearance is well and in no acute distress.  Eyes - sunglasses on   Respiratory - No cough, unlabored breathing  Musculoskeletal - range of motion intact: Neck and arms  Skin - No discoloration or lesions visible - pt reports red pustule/papule unable to see via video  Neurological - No tremors, headaches  Psychiatric - No anxiety, alert & oriented    PERTINENT PAST MEDICAL HISTORY:  Past Medical History:   Diagnosis Date     Anal fistula      Crohn's disease (H)      HTN (hypertension)       Inflammatory bowel disease  Hypertension     PREVIOUS SURGERIES:  Past Surgical History:   Procedure Laterality Date     COLONOSCOPY  06/15/2020     EXAM UNDER ANESTHESIA ANUS N/A 11/19/2021    Procedure: EXAM UNDER ANESTHESIA, ANUS;  Surgeon: Vidhya Hamm MD;  [place] [facility]      INCISION AND DRAINAGE RECTUM, COMBINED N/A 09/06/2021    Procedure: INCISION AND DRAINAGE, RECTUM, placement of Seton;  Surgeon: Vidhya Hamm MD;  Location: [facility] OR     PLACEMENT OF SETON RECTUM N/A 11/19/2021    Procedure: PLACEMENT OF SETON RECTUM;  Surgeon: Vidhya Hamm MD;  Location: [facility] OR     SIGMOIDOSCOPY,BIOPSY  09/24/2020     ALLERGIES:   No Known Allergies    PERTINENT MEDICATIONS:    Current Outpatient Medications:      acetaminophen (TYLENOL) 325 MG tablet, Take 3 tablets (975 mg) by mouth every 8 hours As scheduled for the next 7-10 days, then decrease both dose & frequency to as needed there after. (Patient taking differently: Take 975 mg by mouth every 4 hours as needed As scheduled for the next 7-10 days, then decrease both dose & frequency to as needed there after.), Disp: 70 tablet, Rfl: 0     adalimumab (HUMIRA *CF*) 40 MG/0.4ML pen kit, Inject 0.4 mLs (40 mg) Subcutaneous every 7 days, Disp: 4 each, Rfl: 5     calcium carbonate (OS-MICHELLE) 500 MG tablet, Take 1 tablet by mouth every morning, Disp: , Rfl:      dicyclomine (BENTYL) 10 MG capsule, Take 1 capsule (10 mg) by mouth 2 times daily, Disp: 90 capsule, Rfl: 1     lisinopril (ZESTRIL) 20 MG tablet, Take 20 mg by mouth every morning , Disp: , Rfl:      mesalamine (LIALDA) 1.2 g EC tablet, Take 4 tablets (4,800 mg) by mouth every morning, Disp: 180 tablet, Rfl: 3     traMADol (ULTRAM) 50 MG tablet, Take 1 tablet (50 mg) by mouth every 6 hours as needed for moderate pain or severe pain (Patient not taking: Reported on 11/12/2021), Disp: 28 tablet, Rfl: 0     vitamin D3 (CHOLECALCIFEROL) 50 mcg (2000 units) tablet, Take 1 tablet by mouth every morning, Disp: , Rfl:     SOCIAL HISTORY:  Social History     Socioeconomic History     Marital status:      Spouse name: Not on file     Number of children: Not on file     Years of education: Not on file     Highest education level: Not on file   Occupational History      Not on file   Tobacco Use     Smoking status: Former Smoker     Packs/day: 0.50     Years: 2.50     Pack years: 1.25     Types: Cigarettes     Start date:      Quit date: 2020     Years since quittin.5     Smokeless tobacco: Never Used   Substance and Sexual Activity     Alcohol use: Yes     Comment: occ     Drug use: Never     Sexual activity: Not on file   Other Topics Concern     Not on file   Social History Narrative     Not on file     Social Determinants of Health     Financial Resource Strain: Not on file   Food Insecurity: Not on file   Transportation Needs: Not on file   Physical Activity: Not on file   Stress: Not on file   Social Connections: Not on file   Intimate Partner Violence: Not on file   Housing Stability: Not on file       FAMILY HISTORY:  Family History   Problem Relation Age of Onset     Multiple Sclerosis Mother      Ulcerative Colitis Maternal Grandmother      Colon Cancer No family hx of      Inflammatory Bowel Disease No family hx of      Irritable Bowel Syndrome No family hx of      Crohn's Disease No family hx of      Anesthesia Reaction No family hx of      Bleeding Disorder No family hx of      Clotting Disorder No family hx of      No family history of IBD  Past/family/social history reviewed and no changes

## 2022-03-02 RX ORDER — ADALIMUMAB 40MG/0.4ML
40 KIT SUBCUTANEOUS
Qty: 4 EACH | Refills: 4 | Status: SHIPPED | OUTPATIENT
Start: 2022-03-02 | End: 2022-08-09

## 2022-03-02 NOTE — TELEPHONE ENCOUNTER
adalimumab (HUMIRA *CF*) 40 MG/0.4ML pen kit    Inject 0.4 mLs (40 mg) Subcutaneous every 7 days    Last Written Prescription Date:  9/29/21  Last Fill Quantity: 4 ea,   # refills: 5  Last Office Visit : 2/28/22  Continue Humira and mesalamine for now   Labs in the next month (ordered).   Follow-up in the IBD program in 6 months.     Future Office visit:  none    Refill to pharmacy.

## 2022-03-05 DIAGNOSIS — K50.111 CROHN'S DISEASE OF LARGE INTESTINE WITH RECTAL BLEEDING (H): ICD-10-CM

## 2022-03-08 RX ORDER — MESALAMINE 1.2 G/1
TABLET, DELAYED RELEASE ORAL
Qty: 180 TABLET | Refills: 3 | Status: SHIPPED | OUTPATIENT
Start: 2022-03-08 | End: 2022-11-02

## 2022-03-08 NOTE — TELEPHONE ENCOUNTER
MESALAMINE 1.2GM TABLETS  Last Written Prescription Date:   8/17/2021  Last Fill Quantity: 180,   # refills: 3  Last Office Visit :  2/28/2022  Future Office visit:   8/22/2022  180 Tabs, 3 Refills sent to pharm 3/8/2022      Alisha Harrington RN  Central Triage Red Flags/Med Refills

## 2022-03-18 ENCOUNTER — LAB (OUTPATIENT)
Dept: LAB | Facility: CLINIC | Age: 32
End: 2022-03-18
Payer: COMMERCIAL

## 2022-03-18 DIAGNOSIS — K50.111 CROHN'S DISEASE OF LARGE INTESTINE WITH RECTAL BLEEDING (H): ICD-10-CM

## 2022-03-18 LAB
ALBUMIN SERPL-MCNC: 3.9 G/DL (ref 3.4–5)
ALP SERPL-CCNC: 59 U/L (ref 40–150)
ALT SERPL W P-5'-P-CCNC: 30 U/L (ref 0–70)
AST SERPL W P-5'-P-CCNC: 17 U/L (ref 0–45)
BILIRUB DIRECT SERPL-MCNC: 0.2 MG/DL (ref 0–0.2)
BILIRUB SERPL-MCNC: 0.5 MG/DL (ref 0.2–1.3)
CRP SERPL-MCNC: <2.9 MG/L (ref 0–8)
ERYTHROCYTE [DISTWIDTH] IN BLOOD BY AUTOMATED COUNT: 12.7 % (ref 10–15)
ERYTHROCYTE [SEDIMENTATION RATE] IN BLOOD BY WESTERGREN METHOD: 4 MM/HR (ref 0–15)
HCT VFR BLD AUTO: 40.9 % (ref 40–53)
HGB BLD-MCNC: 14 G/DL (ref 13.3–17.7)
MCH RBC QN AUTO: 30.6 PG (ref 26.5–33)
MCHC RBC AUTO-ENTMCNC: 34.2 G/DL (ref 31.5–36.5)
MCV RBC AUTO: 90 FL (ref 78–100)
PLATELET # BLD AUTO: 305 10E3/UL (ref 150–450)
PROT SERPL-MCNC: 7.7 G/DL (ref 6.8–8.8)
RBC # BLD AUTO: 4.57 10E6/UL (ref 4.4–5.9)
WBC # BLD AUTO: 5.5 10E3/UL (ref 4–11)

## 2022-03-18 PROCEDURE — 80076 HEPATIC FUNCTION PANEL: CPT

## 2022-03-18 PROCEDURE — 85027 COMPLETE CBC AUTOMATED: CPT

## 2022-03-18 PROCEDURE — 85652 RBC SED RATE AUTOMATED: CPT

## 2022-03-18 PROCEDURE — 36415 COLL VENOUS BLD VENIPUNCTURE: CPT

## 2022-03-18 PROCEDURE — 86140 C-REACTIVE PROTEIN: CPT

## 2022-03-22 ENCOUNTER — TELEPHONE (OUTPATIENT)
Dept: GASTROENTEROLOGY | Facility: CLINIC | Age: 32
End: 2022-03-22
Payer: COMMERCIAL

## 2022-03-22 NOTE — TELEPHONE ENCOUNTER
Prior Authorization Approval    Authorization Effective Date: 3/22/2022  Authorization Expiration Date: 3/22/2023  Medication: Humira PA Approved  Approved Dose/Quantity: 4 per 28  Reference #: [id number]   Insurance Company: [employer] ([facility]) - Phone [phone] Fax [phone]  Expected CoPay:       CoPay Card Available:      Foundation Assistance Needed:    Which Pharmacy is filling the prescription (Not needed for infusion/clinic administered): [facility] [place] - [address] [place] [address]  Pharmacy Notified:  yes  Patient Notified:  Yes

## 2022-03-22 NOTE — TELEPHONE ENCOUNTER
PA Initiation    Medication: Humira PA Renewal   Insurance Company: [employer] ([facility]) - Phone [phone] Fax [phone]  Pharmacy Filling the Rx: [facility] [place] [facility] [place] - [address] [place] [address]  Filling Pharmacy Phone:    Filling Pharmacy Fax:    Start Date: 3/22/2022

## 2022-05-10 NOTE — PROGRESS NOTES
Colon and Rectal Surgery Postoperative Clinic Note       RE: Cody Pickard  : 1990  WIL: 22    Cody Pickard is a very pleasant 32 year old male with Crohn's disease with anorectal fistula with prior seton placement in 2021. He developed recurrent abscess with concern for a branching tract and is now status post examination under anesthesia with debridement of perirectal abscess and fistula with complex tract, placement of new seton on 21.    Interval history: Cody has been doing well. He has occasional increase in drainage. No significant pain. He continues on Humira and is having one to two bowel movements a day. He continues to follow with Dr. Bowers and is currently on mesalamine and Humira with disease currently in remission.    PLEASE SEE NOTE BELOW FOR PHYSICAL EXAMINATION, REVIEW OF SYSTEMS, AND OTHER HISTORY.    Assessment/Plan:  32 year old male with Crohn's disease and complex anal fistula. Two setons in place and the site has improved significantly.     We discussed simplifying and shortening the branching tract with potential removal in the future but I would like to better understand the fistula tract with a 3T MRI given his continued drainage.      Continue to follow with Dr. Bowers of gastroenterology for Crohn's management    Patient's questions were answered to his stated satisfaction and he is in agreement with this plan.    15 minutes spent on the date of encounter (excluding time performing procedures) performing chart review, history and exam, documentation and further activities as noted above.      Vidhya Hamm MD  Colon and Rectal Surgery Staff  [facility]    -------------------------------------------------------------------------------------------------------------------    Medical history:  Past Medical History:   Diagnosis Date     Anal fistula      Crohn's disease (H)      HTN (hypertension)        Surgical history:  Past  Surgical History:   Procedure Laterality Date     COLONOSCOPY  06/15/2020     EXAM UNDER ANESTHESIA ANUS N/A 11/19/2021    Procedure: EXAM UNDER ANESTHESIA, ANUS;  Surgeon: Vidhya Hamm MD;  Location: [facility] OR     INCISION AND DRAINAGE RECTUM, COMBINED N/A 09/06/2021    Procedure: INCISION AND DRAINAGE, RECTUM, placement of Seton;  Surgeon: Vidhya Hamm MD;  Location: [facility] OR     PLACEMENT OF SETON RECTUM N/A 11/19/2021    Procedure: PLACEMENT OF SETON RECTUM;  Surgeon: Vidhya Hamm MD;  Location: [facility] OR     SIGMOIDOSCOPY,BIOPSY  09/24/2020       Problem list:    Patient Active Problem List    Diagnosis Date Noted     Anal fistula 11/09/2021     Priority: Medium     Added automatically from request for surgery [id number]       Acute post-operative pain 09/07/2021     Priority: Medium     Perianal abscess 09/06/2021     Priority: Medium     Perianal Crohn's disease, with abscess (H) 09/06/2021     Priority: Medium     IBD (inflammatory bowel disease) 02/22/2021     Priority: Medium       Medications:  Current Outpatient Medications   Medication Sig Dispense Refill     adalimumab (HUMIRA *CF* PEN) 40 MG/0.4ML pen kit Inject 0.4 mLs (40 mg) Subcutaneous every 7 days 4 each 4     calcium carbonate (OS-MICHELLE) 500 MG tablet Take 1 tablet by mouth every morning       lisinopril (ZESTRIL) 20 MG tablet Take 20 mg by mouth every morning        mesalamine (LIALDA) 1.2 g DR tablet Take 4 tablets (4,800 mg) by mouth every morning - Oral 180 tablet 3     acetaminophen (TYLENOL) 325 MG tablet Take 3 tablets (975 mg) by mouth every 8 hours As scheduled for the next 7-10 days, then decrease both dose & frequency to as needed there after. (Patient not taking: Reported on 5/11/2022) 70 tablet 0     dicyclomine (BENTYL) 10 MG capsule Take 1 capsule (10 mg) by mouth 2 times daily (Patient not taking: Reported on 5/11/2022) 90 capsule 1     traMADol (ULTRAM) 50 MG tablet Take 1 tablet (50 mg) by mouth  every 6 hours as needed for moderate pain or severe pain (Patient not taking: No sig reported) 28 tablet 0     vitamin D3 (CHOLECALCIFEROL) 50 mcg (2000 units) tablet Take 1 tablet by mouth every morning (Patient not taking: Reported on 2022)         Allergies:  No Known Allergies    Family history:  Family History   Problem Relation Age of Onset     Multiple Sclerosis Mother      Ulcerative Colitis Maternal Grandmother      Colon Cancer No family hx of      Inflammatory Bowel Disease No family hx of      Irritable Bowel Syndrome No family hx of      Crohn's Disease No family hx of      Anesthesia Reaction No family hx of      Bleeding Disorder No family hx of      Clotting Disorder No family hx of        Social history:  Social History     Socioeconomic History     Marital status:      Spouse name: Not on file     Number of children: Not on file     Years of education: Not on file     Highest education level: Not on file   Occupational History     Not on file   Tobacco Use     Smoking status: Former Smoker     Packs/day: 0.50     Years: 2.50     Pack years: 1.25     Types: Cigarettes     Start date:      Quit date: 2020     Years since quittin.7     Smokeless tobacco: Never Used   Substance and Sexual Activity     Alcohol use: Yes     Comment: occ     Drug use: Never     Sexual activity: Not on file   Other Topics Concern     Not on file   Social History Narrative     Not on file     Social Determinants of Health     Financial Resource Strain: Not on file   Food Insecurity: Not on file   Transportation Needs: Not on file   Physical Activity: Not on file   Stress: Not on file   Social Connections: Not on file   Intimate Partner Violence: Not on file   Housing Stability: Not on file         Nursing Notes:   Yaima Bergeron  2022  4:53 PM  Signed  Chief Complaint   Patient presents with     Follow Up     Seton check       Vitals:    22 1649   BP: (!) 151/87   BP Location: Left arm  "  Patient Position: Sitting   Cuff Size: Adult Regular   Pulse: 65   SpO2: 100%   Weight: 215 lb 8 oz   Height: 5' 9\"       Body mass index is 31.82 kg/m .    Yaima Bergeron CMA         Physical Examination:  BP (!) 151/87 (BP Location: Left arm, Patient Position: Sitting, Cuff Size: Adult Regular)   Pulse 65   Ht 5' 9\"   Wt 215 lb 8 oz   SpO2 100%   BMI 31.82 kg/m    General: alert, oriented, in no acute distress  HEENT: mucous membranes moist  Perianal external examination:  Two yellow vessel loop setons in place in the left lateral position and extending to the posterior position. Ties external to the tracts with the posterior seton missing a few ties which were replaced with 2-0 silk. Some hypergranulation tissue at the seton opening and silver nitrate was applied to this.    "

## 2022-05-11 ENCOUNTER — OFFICE VISIT (OUTPATIENT)
Dept: SURGERY | Facility: CLINIC | Age: 32
End: 2022-05-11
Payer: COMMERCIAL

## 2022-05-11 VITALS
HEIGHT: 69 IN | DIASTOLIC BLOOD PRESSURE: 87 MMHG | HEART RATE: 65 BPM | OXYGEN SATURATION: 100 % | WEIGHT: 215.5 LBS | BODY MASS INDEX: 31.92 KG/M2 | SYSTOLIC BLOOD PRESSURE: 151 MMHG

## 2022-05-11 DIAGNOSIS — K60.30 ANAL FISTULA: Primary | ICD-10-CM

## 2022-05-11 PROCEDURE — 99213 OFFICE O/P EST LOW 20 MIN: CPT | Performed by: COLON & RECTAL SURGERY

## 2022-05-11 ASSESSMENT — PAIN SCALES - GENERAL: PAINLEVEL: NO PAIN (0)

## 2022-05-11 NOTE — LETTER
2022       RE: Cody Pickard  [address] [address] [place] [address]  [place] [address]     Dear Colleague,    Thank you for referring your patient, Cody Pickard, to the [facility] COLON AND RECTAL SURGERY CLINIC [place] at [facility]. Please see a copy of my visit note below.    Colon and Rectal Surgery Postoperative Clinic Note       RE: Cody Pickard  : 1990  WIL: 22    Cody Pickard is a very pleasant 32 year old male with Crohn's disease with anorectal fistula with prior seton placement in 2021. He developed recurrent abscess with concern for a branching tract and is now status post examination under anesthesia with debridement of perirectal abscess and fistula with complex tract, placement of new seton on 21.    Interval history: Cody has been doing well. He has occasional increase in drainage. No significant pain. He continues on Humira and is having one to two bowel movements a day. He continues to follow with Dr. Bowers and is currently on mesalamine and Humira with disease currently in remission.    PLEASE SEE NOTE BELOW FOR PHYSICAL EXAMINATION, REVIEW OF SYSTEMS, AND OTHER HISTORY.    Assessment/Plan:  32 year old male with Crohn's disease and complex anal fistula. Two setons in place and the site has improved significantly.     We discussed simplifying and shortening the branching tract with potential removal in the future but I would like to better understand the fistula tract with a 3T MRI given his continued drainage.      Continue to follow with Dr. Bowers of gastroenterology for Crohn's management    Patient's questions were answered to his stated satisfaction and he is in agreement with this plan.    15 minutes spent on the date of encounter (excluding time performing procedures) performing chart review, history and exam, documentation and further activities as noted above.      Vidhya Hamm MD  Colon and Rectal Surgery  Staff  [facility]    -------------------------------------------------------------------------------------------------------------------    Medical history:  Past Medical History:   Diagnosis Date     Anal fistula      Crohn's disease (H)      HTN (hypertension)        Surgical history:  Past Surgical History:   Procedure Laterality Date     COLONOSCOPY  06/15/2020     EXAM UNDER ANESTHESIA ANUS N/A 11/19/2021    Procedure: EXAM UNDER ANESTHESIA, ANUS;  Surgeon: Vidhya Hamm MD;  Location: [facility] OR     INCISION AND DRAINAGE RECTUM, COMBINED N/A 09/06/2021    Procedure: INCISION AND DRAINAGE, RECTUM, placement of Seton;  Surgeon: Vidhya Hamm MD;  Location: [facility] OR     PLACEMENT OF SETON RECTUM N/A 11/19/2021    Procedure: PLACEMENT OF SETON RECTUM;  Surgeon: Vidhya Hamm MD;  Location: [facility] OR     SIGMOIDOSCOPY,BIOPSY  09/24/2020       Problem list:    Patient Active Problem List    Diagnosis Date Noted     Anal fistula 11/09/2021     Priority: Medium     Added automatically from request for surgery [id number]       Acute post-operative pain 09/07/2021     Priority: Medium     Perianal abscess 09/06/2021     Priority: Medium     Perianal Crohn's disease, with abscess (H) 09/06/2021     Priority: Medium     IBD (inflammatory bowel disease) 02/22/2021     Priority: Medium       Medications:  Current Outpatient Medications   Medication Sig Dispense Refill     adalimumab (HUMIRA *CF* PEN) 40 MG/0.4ML pen kit Inject 0.4 mLs (40 mg) Subcutaneous every 7 days 4 each 4     calcium carbonate (OS-MICHELLE) 500 MG tablet Take 1 tablet by mouth every morning       lisinopril (ZESTRIL) 20 MG tablet Take 20 mg by mouth every morning        mesalamine (LIALDA) 1.2 g DR tablet Take 4 tablets (4,800 mg) by mouth every morning - Oral 180 tablet 3     acetaminophen (TYLENOL) 325 MG tablet Take 3 tablets (975 mg) by mouth every 8 hours As scheduled for the next 7-10 days, then  decrease both dose & frequency to as needed there after. (Patient not taking: Reported on 2022) 70 tablet 0     dicyclomine (BENTYL) 10 MG capsule Take 1 capsule (10 mg) by mouth 2 times daily (Patient not taking: Reported on 2022) 90 capsule 1     traMADol (ULTRAM) 50 MG tablet Take 1 tablet (50 mg) by mouth every 6 hours as needed for moderate pain or severe pain (Patient not taking: No sig reported) 28 tablet 0     vitamin D3 (CHOLECALCIFEROL) 50 mcg (2000 units) tablet Take 1 tablet by mouth every morning (Patient not taking: Reported on 2022)         Allergies:  No Known Allergies    Family history:  Family History   Problem Relation Age of Onset     Multiple Sclerosis Mother      Ulcerative Colitis Maternal Grandmother      Colon Cancer No family hx of      Inflammatory Bowel Disease No family hx of      Irritable Bowel Syndrome No family hx of      Crohn's Disease No family hx of      Anesthesia Reaction No family hx of      Bleeding Disorder No family hx of      Clotting Disorder No family hx of        Social history:  Social History     Socioeconomic History     Marital status:      Spouse name: Not on file     Number of children: Not on file     Years of education: Not on file     Highest education level: Not on file   Occupational History     Not on file   Tobacco Use     Smoking status: Former Smoker     Packs/day: 0.50     Years: 2.50     Pack years: 1.25     Types: Cigarettes     Start date:      Quit date: 2020     Years since quittin.7     Smokeless tobacco: Never Used   Substance and Sexual Activity     Alcohol use: Yes     Comment: occ     Drug use: Never     Sexual activity: Not on file   Other Topics Concern     Not on file   Social History Narrative     Not on file     Social Determinants of Health     Financial Resource Strain: Not on file   Food Insecurity: Not on file   Transportation Needs: Not on file   Physical Activity: Not on file   Stress: Not on file  "  Social Connections: Not on file   Intimate Partner Violence: Not on file   Housing Stability: Not on file         Nursing Notes:   Yaima Bergeron  5/11/2022  4:53 PM  Signed  Chief Complaint   Patient presents with     Follow Up     Seton check       Vitals:    05/11/22 1649   BP: (!) 151/87   BP Location: Left arm   Patient Position: Sitting   Cuff Size: Adult Regular   Pulse: 65   SpO2: 100%   Weight: 215 lb 8 oz   Height: 5' 9\"       Body mass index is 31.82 kg/m .    Yaima Bergeron CMA         Physical Examination:  BP (!) 151/87 (BP Location: Left arm, Patient Position: Sitting, Cuff Size: Adult Regular)   Pulse 65   Ht 5' 9\"   Wt 215 lb 8 oz   SpO2 100%   BMI 31.82 kg/m    General: alert, oriented, in no acute distress  HEENT: mucous membranes moist  Perianal external examination:  Two yellow vessel loop setons in place in the left lateral position and extending to the posterior position. Ties external to the tracts with the posterior seton missing a few ties which were replaced with 2-0 silk. Some hypergranulation tissue at the seton opening and silver nitrate was applied to this.        Again, thank you for allowing me to participate in the care of your patient.      Sincerely,    Vidhya Hamm MD      "

## 2022-05-11 NOTE — NURSING NOTE
"Chief Complaint   Patient presents with     Follow Up     Seton check       Vitals:    05/11/22 1649   BP: (!) 151/87   BP Location: Left arm   Patient Position: Sitting   Cuff Size: Adult Regular   Pulse: 65   SpO2: 100%   Weight: 215 lb 8 oz   Height: 5' 9\"       Body mass index is 31.82 kg/m .    Yaima Bergeron CMA    "

## 2022-05-11 NOTE — LETTER
Date:May 25, 2022      Provider requested that no letter be sent. Do not send.       [facility]

## 2022-05-11 NOTE — PATIENT INSTRUCTIONS
Jennifer will call you to schedule your surgery   Appointment you will need: COVID test, pre op physical, MRI   You will need to do a fleet enema prep the day of surgery     NICOLAS Simmons [phone]    Clinic Fax Number [phone]    Surgery Scheduling [phone]    My Chart is available 24 hours a day and is a secure way to access your records and communicate with your care team.  I strongly recommend signing up if you haven't already done so, if you are comfortable with computers.  If you would like to inquire about this or are having problems with My Chart access, you may call [phone] or go online at [email or web address].  Please allow at least 24 hours for a response and extra time on weekends and Holidays.

## 2022-05-23 ENCOUNTER — TELEPHONE (OUTPATIENT)
Dept: SURGERY | Facility: CLINIC | Age: 32
End: 2022-05-23
Payer: COMMERCIAL

## 2022-05-23 NOTE — TELEPHONE ENCOUNTER
"Case Request received to schedule:    Patient Name: Cody Pickard   MRN: [id number]   Case#: [id number]   Surgeon(s) and Role:      * Vidhya Hamm MD - Primary   Date requested: * No dates entered *   Location: [facility] OR   Procedure(s):   EXAM UNDER ANESTHESIA, ANUS, possible fistulotomy (N/A)   FISTULOTOMY, RECTUM (N/A)     Additional Instructions for the Case  Multi-surgeon case:  no  Time in minutes:  45  Anesthesia: MAC  Prep: 2 fleets   Pre-Op: PAC vs PCP  Labs: no  WOC: no  Special equipment: no  Special Instructions: needs MRI before surgery     Schedule with Rachel Mahmood NP 1-2 weeks after surgery.  Schedule with Surgeon 3-4 weeks after Rachel Mahmood NP    On 5/23/2022:  Pending MRI scheduling.  Sent the following [employer] message to patient:    \"Augustine Ross,    My name is Jennifer, and I am one of the schedulers for Dr. Vidhya Hamm. I hope you are well!     I was wondering if you had a specific time-frame in mind when you would like to schedule your surgery with Dr. Hamm?     Please let me know when your MRI Pelvis 3T Fistula Protocol is scheduled, then we can look for an outpatient surgery date sometime after your MRI date.     You may either reply to this message or call me directly at [phone].      Jennifer Guevara   Rose-op Coordinator  [place]  Direct Phone: [phone]\"  "

## 2022-05-25 PROBLEM — K60.30 ANAL FISTULA: Status: ACTIVE | Noted: 2021-11-09

## 2022-06-03 NOTE — TELEPHONE ENCOUNTER
Previously spoke with patient via phone, completed/ confirmed the following scheduling, then sent Surgery Packet to patient via MyChart and Mail including related scheduling information and prep instructions:     Required: Yes, you will need a  18 years or older to drive you home from your procedure as well as stay with you for 24 hours after your procedure    Surgery: Exam under anesthesia, possible fistulotomy    Date: Friday June 17, 2022  Surgeon: Dr. Vidhya Hamm    Time: You will receive a call 1-3 days prior to your surgery with your finalized surgery and arrival time.     Location: [facility] - [address] [place] [address]  [phone]      Upcoming Appointments:     Erickson 10, 2022  8:00 AM  (Arrive by 7:30 AM)  MR PELVIS (INTRAPELVIC ORGANS) WO&W CONTRAST with [id number]  [facility] [place] ([facility] [place] ) [phone]    [address] [place] [address] [address] [facility] [address]     [place] 10, 2022  9:40 AM  (Arrive by 9:25 AM)  PRE-OP with MEENAKSHI Byrd CNP  [facility] Nurse Practitioner's Clinic [facility] [place] ) [phone]    [address] [place] [address]     Jun 13, 2022  1:15 PM  Pre-procedure Covid with MG COVID LAB  [facility] ([facility]) [phone] [address] [address] [place] [address]     Jun 30, 2022  2:00 PM  (Arrive by 1:45 PM)  Post-Op with MEENAKSHI Villeda CNP  [facility] Colon and Rectal Surgery Clinic [facility] [place] ) [phone]    [address] [place] [address] [address] [facility] [address]     Aug 03, 2022  6:30 PM  (Arrive by 6:15 PM)  Video Visit with Vidhya Hamm MD  [facility] Colon and Rectal Surgery Clinic  "[facility] [place] ) [phone]    VIDEO VISIT     Preparation: 2 Fleet Enemas (See \"Day of Surgery\")    Jennifer Vargas  Rose-op Coordinator  [place]  Direct Phone: [phone]    "

## 2022-06-10 ENCOUNTER — OFFICE VISIT (OUTPATIENT)
Dept: FAMILY MEDICINE | Facility: CLINIC | Age: 32
End: 2022-06-10
Payer: COMMERCIAL

## 2022-06-10 ENCOUNTER — LAB (OUTPATIENT)
Dept: LAB | Facility: CLINIC | Age: 32
End: 2022-06-10
Payer: COMMERCIAL

## 2022-06-10 VITALS
WEIGHT: 212.7 LBS | SYSTOLIC BLOOD PRESSURE: 142 MMHG | RESPIRATION RATE: 16 BRPM | HEART RATE: 57 BPM | TEMPERATURE: 98.1 F | OXYGEN SATURATION: 99 % | HEIGHT: 69 IN | BODY MASS INDEX: 31.5 KG/M2 | DIASTOLIC BLOOD PRESSURE: 94 MMHG

## 2022-06-10 DIAGNOSIS — K42.9 UMBILICAL HERNIA WITHOUT OBSTRUCTION AND WITHOUT GANGRENE: ICD-10-CM

## 2022-06-10 DIAGNOSIS — Z01.818 PRE-OP EXAM: Primary | ICD-10-CM

## 2022-06-10 DIAGNOSIS — Z01.818 PRE-OP EXAM: ICD-10-CM

## 2022-06-10 LAB
ANION GAP SERPL CALCULATED.3IONS-SCNC: 4 MMOL/L (ref 3–14)
BUN SERPL-MCNC: 10 MG/DL (ref 7–30)
CALCIUM SERPL-MCNC: 9 MG/DL (ref 8.5–10.1)
CHLORIDE BLD-SCNC: 108 MMOL/L (ref 94–109)
CO2 SERPL-SCNC: 29 MMOL/L (ref 20–32)
CREAT SERPL-MCNC: 0.67 MG/DL (ref 0.66–1.25)
ERYTHROCYTE [DISTWIDTH] IN BLOOD BY AUTOMATED COUNT: 12.1 % (ref 10–15)
GFR SERPL CREATININE-BSD FRML MDRD: >90 ML/MIN/1.73M2
GLUCOSE BLD-MCNC: 103 MG/DL (ref 70–99)
HCT VFR BLD AUTO: 43.3 % (ref 40–53)
HGB BLD-MCNC: 14.6 G/DL (ref 13.3–17.7)
MCH RBC QN AUTO: 30 PG (ref 26.5–33)
MCHC RBC AUTO-ENTMCNC: 33.7 G/DL (ref 31.5–36.5)
MCV RBC AUTO: 89 FL (ref 78–100)
PLATELET # BLD AUTO: 301 10E3/UL (ref 150–450)
POTASSIUM BLD-SCNC: 4.3 MMOL/L (ref 3.4–5.3)
RBC # BLD AUTO: 4.86 10E6/UL (ref 4.4–5.9)
SODIUM SERPL-SCNC: 141 MMOL/L (ref 133–144)
WBC # BLD AUTO: 4.4 10E3/UL (ref 4–11)

## 2022-06-10 PROCEDURE — 80048 BASIC METABOLIC PNL TOTAL CA: CPT | Performed by: PATHOLOGY

## 2022-06-10 PROCEDURE — 99204 OFFICE O/P NEW MOD 45 MIN: CPT | Performed by: NURSE PRACTITIONER

## 2022-06-10 PROCEDURE — 85027 COMPLETE CBC AUTOMATED: CPT | Performed by: PATHOLOGY

## 2022-06-10 PROCEDURE — 36415 COLL VENOUS BLD VENIPUNCTURE: CPT | Performed by: PATHOLOGY

## 2022-06-10 NOTE — PROGRESS NOTES
[facility] NURSE PRACTITIONER[facility]S CLINIC [address]  [address] [facility] [address]  Phone: [phone]  Fax: [phone]  Primary Provider: No Ref-Primary, Physician  Pre-op Performing Provider: RONNY NINA      PREOPERATIVE EVALUATION:  Today's date: 6/10/2022    Cody Pickard is a 32 year old male who presents for a preoperative evaluation.    Surgical Information:  Surgery/Procedure: EXAM UNDER ANESTHESIA, ANUS, POSSIBLE FISTULOTOMY, RECTUM  Surgery Location: [facility] OR  Surgeon: Vidhya Hamm MD  Surgery Date: 6/17/2022  Time of Surgery: 12:20 PM  Where patient plans to recover: At home with family  Fax number for surgical facility: Note does not need to be faxed, will be available electronically in Epic.    Type of Anesthesia Anticipated: Monitor Anesthesia Care    Assessment & Plan     The proposed surgical procedure is considered LOW risk.    Pre-op exam  Physical exam unremarkable. If labs WNL- patient cleared from primary care for procedure.  HTN- BP high- instructed to monitor BP BID x 1 week and report home values back to PCP.  - CBC with platelets; Future  - Basic metabolic panel; Future  - Asymptomatic COVID-19 Virus (Coronavirus) by PCR Nasopharyngeal; Future    Umbilical hernia without obstruction and without gangrene  Symptoms and presentation c/w umbilical hernia. Ordered US to confirm. Instructed patient to go to ED, if he develops signs/symptoms of incarceration (increase in pain, nausea/vomiting, unable to reduce, etc.)  - US Abdomen Limited; Future         Risks and Recommendations:  The patient has the following additional risks and recommendations for perioperative complications:   - No identified additional risk factors other than previously addressed    Medication Instructions:  Patient is to take all scheduled medications on the day of surgery EXCEPT for modifications listed below:   - ACE/ARB: May be continued on the day of surgery.     -Hold mesalamine day of Surgery.    RECOMMENDATION:  APPROVAL GIVEN to proceed with proposed procedure pending review of diagnostic evaluation.    Review of external notes as documented above       Subjective     HPI related to upcoming procedure:  32 year old male presents for pre-op exam. Patient has a history of Crohn's disease with anorectal fistula with prior seton placement in 9/2021. He developed a recurrent abscess and had a new seton placed on 11/19/21 during an exam under anesthesia, anus.  He is scheduled for an exam under anesthesia, anus, possible fistulotomy, rectum with Dr. Hamm on 6/17/22.  Today patient reports 1 week history of bulge in umbilical area. Patient works in concrete which requires frequent heavy lifting. He is able to reduce the bulge. Reports pain intermittently, no other acute concerns/symptoms at time of exam.      Preop Questions 6/10/2022   1. Have you ever had a heart attack or stroke? No   2. Have you ever had surgery on your heart or blood vessels, such as a stent placement, a coronary artery bypass, or surgery on an artery in your head, neck, heart, or legs? No   3. Do you have chest pain with activity? No   4. Do you have a history of  heart failure? No   5. Do you currently have a cold, bronchitis or symptoms of other infection? No   6. Do you have a cough, shortness of breath, or wheezing? No   7. Do you or anyone in your family have previous history of blood clots? No   8. Do you or does anyone in your family have a serious bleeding problem such as prolonged bleeding following surgeries or cuts? No   9. Have you ever had problems with anemia or been told to take iron pills? No   10. Have you had any abnormal blood loss such as black, tarry or bloody stools? No   11. Have you ever had a blood transfusion? No   12. Are you willing to have a blood transfusion if it is medically needed before, during, or after your surgery? Yes   13. Have you or any of your relatives  "ever had problems with anesthesia? No   14. Do you have sleep apnea, excessive snoring or daytime drowsiness? No   15. Do you have any artifical heart valves or other implanted medical devices like a pacemaker, defibrillator, or continuous glucose monitor? No   16. Do you have artificial joints? No   17. Are you allergic to latex? No     Health Care Directive:  Patient does not have a Health Care Directive or Living Will: Discussed advance care planning with patient; however, patient declined at this time.    Preoperative Review of :   reviewed - controlled substances prescribed by other outside provider(s).      Status of Chronic Conditions:  HYPERTENSION - Patient has longstanding history of HTN , currently denies any symptoms referable to elevated blood pressure. Specifically denies chest pain, palpitations, dyspnea, orthopnea, PND or peripheral edema. Blood pressure readings have not been in normal range in clinic. Patient denies home BP monitoring. Current medication regimen is as listed below. Patient denies any side effects of medication.     Review of Systems  CONSTITUTIONAL: NEGATIVE for fever, chills, change in weight  INTEGUMENTARY/SKIN: Endorses \"bump\" in belly button. Denies other rashes.  EYES: NEGATIVE for vision changes or irritation  ENT/MOUTH: NEGATIVE for ear, mouth and throat problems  RESP: NEGATIVE for significant cough or SOB  CV: NEGATIVE for chest pain, palpitations or peripheral edema  GI: NEGATIVE for nausea, abdominal pain, heartburn, or change in bowel habits  : NEGATIVE for frequency, dysuria, or hematuria  MUSCULOSKELETAL: NEGATIVE for significant arthralgias or myalgia  NEURO: NEGATIVE for weakness, dizziness or paresthesias  ENDOCRINE: NEGATIVE for temperature intolerance, skin/hair changes  HEME: NEGATIVE for bleeding problems  PSYCHIATRIC: NEGATIVE for changes in mood or affect      Patient Active Problem List    Diagnosis Date Noted     Anal fistula 11/09/2021     " Priority: Medium     Added automatically from request for surgery [id number]       Acute post-operative pain 2021     Priority: Medium     Perianal abscess 2021     Priority: Medium     Perianal Crohn's disease, with abscess (H) 2021     Priority: Medium     IBD (inflammatory bowel disease) 2021     Priority: Medium      Past Medical History:   Diagnosis Date     Anal fistula      Crohn's disease (H)      HTN (hypertension)      Past Surgical History:   Procedure Laterality Date     COLONOSCOPY  06/15/2020     EXAM UNDER ANESTHESIA ANUS N/A 2021    Procedure: EXAM UNDER ANESTHESIA, ANUS;  Surgeon: Vidhya Hamm MD;  Location: [facility] OR     INCISION AND DRAINAGE RECTUM, COMBINED N/A 2021    Procedure: INCISION AND DRAINAGE, RECTUM, placement of Seton;  Surgeon: Vidhya Hamm MD;  Location: [facility] OR     PLACEMENT OF SETON RECTUM N/A 2021    Procedure: PLACEMENT OF SETON RECTUM;  Surgeon: Vidhya Hamm MD;  Location: [facility] OR     SIGMOIDOSCOPY,BIOPSY  2020     Current Outpatient Medications   Medication     adalimumab (HUMIRA *CF* PEN) 40 MG/0.4ML pen kit     lisinopril (ZESTRIL) 20 MG tablet     mesalamine (LIALDA) 1.2 g DR tablet     No current facility-administered medications for this visit.       No Known Allergies     Social History     Tobacco Use     Smoking status: Former Smoker     Packs/day: 0.50     Years: 2.50     Pack years: 1.25     Types: Cigarettes     Start date:      Quit date: 2020     Years since quittin.8     Smokeless tobacco: Never Used   Substance Use Topics     Alcohol use: Yes     Comment: occ     Family History   Problem Relation Age of Onset     Multiple Sclerosis Mother      Ulcerative Colitis Maternal Grandmother      Colon Cancer No family hx of      Inflammatory Bowel Disease No family hx of      Irritable Bowel Syndrome No family hx of      Crohn's Disease No family hx of      Anesthesia Reaction  "No family hx of      Bleeding Disorder No family hx of      Clotting Disorder No family hx of      History   Drug Use Unknown         Objective   BP (!) 142/94 (BP Location: Right arm, Patient Position: Sitting, Cuff Size: Adult Regular)   Pulse 57   Temp 98.1  F (36.7  C) (Oral)   Resp 16   Ht 1.765 m (5' 9.49\")   Wt 96.5 kg (212 lb 11.2 oz)   SpO2 99%   BMI 30.97 kg/m        Physical Exam    GENERAL APPEARANCE: healthy, alert and no distress     EYES: EOMI,  PERRL     HENT: ear canals and TM's normal and nose and mouth without ulcers or lesions     NECK: no adenopathy, no asymmetry, masses, or scars and thyroid normal to palpation     RESP: lungs clear to auscultation - no rales, rhonchi or wheezes     CV: regular rates and rhythm, normal S1 S2, no S3 or S4 and no murmur, click or rub     ABDOMEN:  soft, nontender, no HSM or masses and bowel sounds normal. Reducible umbilical hernia noted without erythema, nontender on palpation.     MS: extremities normal- no gross deformities noted, no evidence of inflammation in joints, FROM in all extremities.     SKIN: no suspicious lesions or rashes     NEURO: Normal strength and tone, sensory exam grossly normal, mentation intact and speech normal     PSYCH: mentation appears normal. and affect normal/bright     LYMPHATICS: No cervical adenopathy    Recent Labs   Lab Test 03/18/22  0815 10/27/21  1751 09/07/21  0648 09/06/21  1659   HGB 14.0 14.0  --  12.9*    304  --  429   NA  --   --   --  137   POTASSIUM  --   --   --  4.0   CR  --   --  0.78 0.83        Diagnostics:  Labs pending at this time.  Results will be reviewed when available.   No EKG required, no history of coronary heart disease, significant arrhythmia, peripheral arterial disease or other structural heart disease.    Revised Cardiac Risk Index (RCRI):  The patient has the following serious cardiovascular risks for perioperative complications:   - No serious cardiac risks = 0 points     RCRI " Interpretation: 0 points: Class I (very low risk - 0.4% complication rate)    All questions/concerns addressed. Patient stated understanding/agreement to plan of care.         Signed Electronically by: MEENAKSHI Del Cid CNP  Copy of this evaluation report is provided to requesting physician.

## 2022-06-10 NOTE — H&P (VIEW-ONLY)
[facility] [address]  [address] [facility] [address]  Phone: [phone]  Fax: [phone]  Primary Provider: No Ref-Primary, Physician  Pre-op Performing Provider: RONNY NINA      PREOPERATIVE EVALUATION:  Today's date: 6/10/2022    Cody Pickard is a 32 year old male who presents for a preoperative evaluation.    Surgical Information:  Surgery/Procedure: EXAM UNDER ANESTHESIA, ANUS, POSSIBLE FISTULOTOMY, RECTUM  [place] [facility] [place]  Surgeon: Vidhya Hamm MD  Surgery Date: 6/17/2022  Time of Surgery: 12:20 PM  Where patient plans to recover: At home with family  Fax number for surgical facility: Note does not need to be faxed, will be available electronically in Epic.    Type of Anesthesia Anticipated: Monitor Anesthesia Care    Assessment & Plan     The proposed surgical procedure is considered LOW risk.    Pre-op exam  Physical exam unremarkable. If labs WNL- patient cleared from primary care for procedure.  HTN- BP high- instructed to monitor BP BID x 1 week and report home values back to PCP.  - CBC with platelets; Future  - Basic metabolic panel; Future  - Asymptomatic COVID-19 Virus (Coronavirus) by PCR Nasopharyngeal; Future    Umbilical hernia without obstruction and without gangrene  Symptoms and presentation c/w umbilical hernia. Ordered US to confirm. Instructed patient to go to ED, if he develops signs/symptoms of incarceration (increase in pain, nausea/vomiting, unable to reduce, etc.)  - US Abdomen Limited; Future         Risks and Recommendations:  The patient has the following additional risks and recommendations for perioperative complications:   - No identified additional risk factors other than previously addressed    Medication Instructions:  Patient is to take all scheduled medications on the day of surgery EXCEPT for modifications listed below:   - ACE/ARB: May be continued on the day of surgery.     -Hold mesalamine day of Surgery.    RECOMMENDATION:  APPROVAL GIVEN to proceed with proposed procedure pending review of diagnostic evaluation.    Review of external notes as documented above       Subjective     HPI related to upcoming procedure:  32 year old male presents for pre-op exam. Patient has a history of Crohn's disease with anorectal fistula with prior seton placement in 9/2021. He developed a recurrent abscess and had a new seton placed on 11/19/21 during an exam under anesthesia, anus.  He is scheduled for an exam under anesthesia, anus, possible fistulotomy, rectum with Dr. Hamm on 6/17/22.  Today patient reports 1 week history of bulge in umbilical area. Patient works in concrete which requires frequent heavy lifting. He is able to reduce the bulge. Reports pain intermittently, no other acute concerns/symptoms at time of exam.      Preop Questions 6/10/2022   1. Have you ever had a heart attack or stroke? No   2. Have you ever had surgery on your heart or blood vessels, such as a stent placement, a coronary artery bypass, or surgery on an artery in your head, neck, heart, or legs? No   3. Do you have chest pain with activity? No   4. Do you have a history of  heart failure? No   5. Do you currently have a cold, bronchitis or symptoms of other infection? No   6. Do you have a cough, shortness of breath, or wheezing? No   7. Do you or anyone in your family have previous history of blood clots? No   8. Do you or does anyone in your family have a serious bleeding problem such as prolonged bleeding following surgeries or cuts? No   9. Have you ever had problems with anemia or been told to take iron pills? No   10. Have you had any abnormal blood loss such as black, tarry or bloody stools? No   11. Have you ever had a blood transfusion? No   12. Are you willing to have a blood transfusion if it is medically needed before, during, or after your surgery? Yes   13. Have you or any of your relatives  "ever had problems with anesthesia? No   14. Do you have sleep apnea, excessive snoring or daytime drowsiness? No   15. Do you have any artifical heart valves or other implanted medical devices like a pacemaker, defibrillator, or continuous glucose monitor? No   16. Do you have artificial joints? No   17. Are you allergic to latex? No     Health Care Directive:  Patient does not have a Health Care Directive or Living Will: Discussed advance care planning with patient; however, patient declined at this time.    Preoperative Review of :   reviewed - controlled substances prescribed by other outside provider(s).      Status of Chronic Conditions:  HYPERTENSION - Patient has longstanding history of HTN , currently denies any symptoms referable to elevated blood pressure. Specifically denies chest pain, palpitations, dyspnea, orthopnea, PND or peripheral edema. Blood pressure readings have not been in normal range in clinic. Patient denies home BP monitoring. Current medication regimen is as listed below. Patient denies any side effects of medication.     Review of Systems  CONSTITUTIONAL: NEGATIVE for fever, chills, change in weight  INTEGUMENTARY/SKIN: Endorses \"bump\" in belly button. Denies other rashes.  EYES: NEGATIVE for vision changes or irritation  ENT/MOUTH: NEGATIVE for ear, mouth and throat problems  RESP: NEGATIVE for significant cough or SOB  CV: NEGATIVE for chest pain, palpitations or peripheral edema  GI: NEGATIVE for nausea, abdominal pain, heartburn, or change in bowel habits  : NEGATIVE for frequency, dysuria, or hematuria  MUSCULOSKELETAL: NEGATIVE for significant arthralgias or myalgia  NEURO: NEGATIVE for weakness, dizziness or paresthesias  ENDOCRINE: NEGATIVE for temperature intolerance, skin/hair changes  HEME: NEGATIVE for bleeding problems  PSYCHIATRIC: NEGATIVE for changes in mood or affect      Patient Active Problem List    Diagnosis Date Noted     Anal fistula 11/09/2021     " Priority: Medium     Added automatically from request for surgery [id number]       Acute post-operative pain 2021     Priority: Medium     Perianal abscess 2021     Priority: Medium     Perianal Crohn's disease, with abscess (H) 2021     Priority: Medium     IBD (inflammatory bowel disease) 2021     Priority: Medium      Past Medical History:   Diagnosis Date     Anal fistula      Crohn's disease (H)      HTN (hypertension)      Past Surgical History:   Procedure Laterality Date     COLONOSCOPY  06/15/2020     EXAM UNDER ANESTHESIA ANUS N/A 2021    Procedure: EXAM UNDER ANESTHESIA, ANUS;  Surgeon: Vidhya Hamm MD;  Location: [facility] OR     INCISION AND DRAINAGE RECTUM, COMBINED N/A 2021    Procedure: INCISION AND DRAINAGE, RECTUM, placement of Seton;  Surgeon: Vidhya Hamm MD;  Location: [facility] OR     PLACEMENT OF SETON RECTUM N/A 2021    Procedure: PLACEMENT OF SETON RECTUM;  Surgeon: Vidhya Hamm MD;  Location: [facility] OR     SIGMOIDOSCOPY,BIOPSY  2020     Current Outpatient Medications   Medication     adalimumab (HUMIRA *CF* PEN) 40 MG/0.4ML pen kit     lisinopril (ZESTRIL) 20 MG tablet     mesalamine (LIALDA) 1.2 g DR tablet     No current facility-administered medications for this visit.       No Known Allergies     Social History     Tobacco Use     Smoking status: Former Smoker     Packs/day: 0.50     Years: 2.50     Pack years: 1.25     Types: Cigarettes     Start date:      Quit date: 2020     Years since quittin.8     Smokeless tobacco: Never Used   Substance Use Topics     Alcohol use: Yes     Comment: occ     Family History   Problem Relation Age of Onset     Multiple Sclerosis Mother      Ulcerative Colitis Maternal Grandmother      Colon Cancer No family hx of      Inflammatory Bowel Disease No family hx of      Irritable Bowel Syndrome No family hx of      Crohn's Disease No family hx of      Anesthesia Reaction  "No family hx of      Bleeding Disorder No family hx of      Clotting Disorder No family hx of      History   Drug Use Unknown         Objective   BP (!) 142/94 (BP Location: Right arm, Patient Position: Sitting, Cuff Size: Adult Regular)   Pulse 57   Temp 98.1  F (36.7  C) (Oral)   Resp 16   Ht 1.765 m (5' 9.49\")   Wt 96.5 kg (212 lb 11.2 oz)   SpO2 99%   BMI 30.97 kg/m        Physical Exam    GENERAL APPEARANCE: healthy, alert and no distress     EYES: EOMI,  PERRL     HENT: ear canals and TM's normal and nose and mouth without ulcers or lesions     NECK: no adenopathy, no asymmetry, masses, or scars and thyroid normal to palpation     RESP: lungs clear to auscultation - no rales, rhonchi or wheezes     CV: regular rates and rhythm, normal S1 S2, no S3 or S4 and no murmur, click or rub     ABDOMEN:  soft, nontender, no HSM or masses and bowel sounds normal. Reducible umbilical hernia noted without erythema, nontender on palpation.     MS: extremities normal- no gross deformities noted, no evidence of inflammation in joints, FROM in all extremities.     SKIN: no suspicious lesions or rashes     NEURO: Normal strength and tone, sensory exam grossly normal, mentation intact and speech normal     PSYCH: mentation appears normal. and affect normal/bright     LYMPHATICS: No cervical adenopathy    Recent Labs   Lab Test 03/18/22  0815 10/27/21  1751 09/07/21  0648 09/06/21  1659   HGB 14.0 14.0  --  12.9*    304  --  429   NA  --   --   --  137   POTASSIUM  --   --   --  4.0   CR  --   --  0.78 0.83        Diagnostics:  Labs pending at this time.  Results will be reviewed when available.   No EKG required, no history of coronary heart disease, significant arrhythmia, peripheral arterial disease or other structural heart disease.    Revised Cardiac Risk Index (RCRI):  The patient has the following serious cardiovascular risks for perioperative complications:   - No serious cardiac risks = 0 points     RCRI " Interpretation: 0 points: Class I (very low risk - 0.4% complication rate)    All questions/concerns addressed. Patient stated understanding/agreement to plan of care.         Signed Electronically by: MEENAKSHI Del Cid CNP  Copy of this evaluation report is provided to requesting physician.

## 2022-06-13 ENCOUNTER — LAB (OUTPATIENT)
Dept: LAB | Facility: CLINIC | Age: 32
End: 2022-06-13
Payer: COMMERCIAL

## 2022-06-13 DIAGNOSIS — Z01.818 PRE-OP EXAM: ICD-10-CM

## 2022-06-13 PROCEDURE — U0005 INFEC AGEN DETEC AMPLI PROBE: HCPCS

## 2022-06-13 PROCEDURE — U0003 INFECTIOUS AGENT DETECTION BY NUCLEIC ACID (DNA OR RNA); SEVERE ACUTE RESPIRATORY SYNDROME CORONAVIRUS 2 (SARS-COV-2) (CORONAVIRUS DISEASE [COVID-19]), AMPLIFIED PROBE TECHNIQUE, MAKING USE OF HIGH THROUGHPUT TECHNOLOGIES AS DESCRIBED BY CMS-2020-01-R: HCPCS

## 2022-06-14 ENCOUNTER — ANESTHESIA EVENT (OUTPATIENT)
Dept: SURGERY | Facility: AMBULATORY SURGERY CENTER | Age: 32
End: 2022-06-14
Payer: COMMERCIAL

## 2022-06-14 ENCOUNTER — TELEPHONE (OUTPATIENT)
Dept: NURSING | Facility: CLINIC | Age: 32
End: 2022-06-14
Payer: COMMERCIAL

## 2022-06-14 LAB — SARS-COV-2 RNA RESP QL NAA+PROBE: POSITIVE

## 2022-06-14 NOTE — TELEPHONE ENCOUNTER
Patient classified as COVID treatment eligible by Epic high risk algorithm:  Yes    Coronavirus (COVID-19) Notification    Reason for call  Notify of POSITIVE COVID-19 lab result, assess symptoms,  review [facility] recommendations    Lab Result   Lab test for 2019-nCoV rRt-PCR or SARS-COV-2 PCR  Oropharyngeal AND/OR nasopharyngeal swabs were POSITIVE for 2019-nCoV RNA [OR] SARS-COV-2 RNA (COVID-19) RNA     We have been unable to reach patient by phone at this time to notify of their Positive COVID-19 result.    Left voicemail message requesting a call back to [phone] [facility] for results.        A Positive COVID-19 letter will be sent via [employer] or the mail. (Exception, no letters sent to Presurgerical/Preprocedure Patients)    Mckenzie Love LPN

## 2022-06-15 NOTE — TELEPHONE ENCOUNTER
Coronavirus (COVID-19) Notification    Caller Name (Patient, parent, daughter/son, grandparent, etc)  The patient      Reason for call  Notify of Positive Coronavirus (COVID-19) lab results, assess symptoms,  review [facility] recommendations    Lab Result    Lab test:  2019-nCoV rRt-PCR or SARS-CoV-2 PCR    Oropharyngeal AND/OR nasopharyngeal swabs is POSITIVE for 2019-nCoV RNA/SARS-COV-2 PCR (COVID-19 virus)      Gather patient reported symptoms   Assessment   Current Symptoms at time of phone call, reported by patient: (if no symptoms, document: No symptoms] None    Date of symptom(s) onset (if applicable) NA     If at time of call, Patients symptoms have worsened, the Patient should contact 911 or have someone drive them to Emergency Dept promptly:      If Patient calling 911, inform 911 personal that you have tested positive for the Coronavirus (COVID-19).  Place mask on and await 911 to arrive.    If Emergency Dept, If possible, please have another adult drive you to the Emergency Dept but you need to wear mask when in contact with other people.      Treatment Options:   Patient classified as COVID treatment eligible by Epic high risk algorithm: Yes  Is the patient symptomatic at the time of result notification? No    Review information with Patient    Your result was positive. This means you have COVID-19 (coronavirus).    How can I protect others?    These guidelines are for isolating before returning to work, school or .    If you DO have symptoms    Stay home and away from others     For at least 5 days after your symptoms started, AND    You are fever free for 24 hours (with no medicine that reduces fever), AND    Your other symptoms are better    Wear a mask for 10 full days anytime you are around others    If you DON'T have symptoms    Stay home and away from others for at least 5 days after your positive test    Wear a mask for 10 full days anytime you are around others    There may be  different guidelines for healthcare facilities.  Please check with the specific sites before arriving.    If you have been told by a doctor that you were severely ill with COVID-19 or are immunocompromised, you should isolate for at least 10 days.    You should not go back to work until you meet the guidelines above for ending your home isolation. You don't need to be retested for COVID-19 before going back to work--studies show that you won't spread the virus if it's been at least 10 days since your symptoms started (or 20 days, if you have a weak immune system).    Employers, schools, and daycares: This is an official notice for this person's medical guidelines for returning in-person.  They must meet the above guidelines before going back to work, school or  in person.    You will receive a positive COVID-19 letter via [employer] or the mail soon with additional self-care information (exception, no letters will be sent to presurgical/preprocedure patients).    Would you like me to review some of that information with you now?  No    If you were tested for an upcoming procedure, please contact your provider for next steps.    Valentina Martinez LPN

## 2022-06-17 ENCOUNTER — ANESTHESIA (OUTPATIENT)
Dept: SURGERY | Facility: AMBULATORY SURGERY CENTER | Age: 32
End: 2022-06-17
Payer: COMMERCIAL

## 2022-06-17 ENCOUNTER — TELEPHONE (OUTPATIENT)
Dept: SURGERY | Facility: CLINIC | Age: 32
End: 2022-06-17
Payer: COMMERCIAL

## 2022-06-17 NOTE — TELEPHONE ENCOUNTER
Case Request received to schedule:    Patient Name: Cody Pickard   MRN: [id number]   Case#: [id number]   Surgeon(s) and Role:      * Vidhya Hamm MD - Primary   Date requested: * No dates entered *   [place] [facility] [place]   Procedure(s):   EXAM UNDER ANESTHESIA, ANUS, possible fistulotomy (N/A)   FISTULOTOMY, RECTUM (N/A)    Additional Instructions for the Case  Please schedule patient to follow with Dr. Hamm on Fri 6/17/2022 at the [facility]. Thanks!  Multi-surgeon case:  no  Time in minutes:  45  Anesthesia: MAC  Prep: 2 fleets   Pre-Op: PAC vs PCP  Labs: no  WOC: no  Special equipment: no  Special Instructions: needs MRI before surgery     Schedule with Rachel Mahmood NP 1-2 weeks after surgery.  Schedule with Surgeon 3-4 weeks after Rachel Mahmood NP    Spoke with patient via phone, completed the following rescheduling, then sent Surgery Packet to patient via [employer] and Mail including related scheduling information and prep instructions:     Required: Yes, you will need a  18 years or older to drive you home from your procedure as well as stay with you for 24 hours after your procedure     Surgery: Exam under anesthesia, possible fistulotomy     Date: Friday July 8, 2022                  Surgeon: Dr. Vidhya Hamm     Time: You will receive a call 1-3 days prior to your surgery with your finalized surgery and arrival time.      Location: [facility] - [address]--[address] [place] [address]  [phone]       Upcoming / Related Appointments:      Erickson 10, 2022  8:00 AM  (Arrive by 7:30 AM)  MR PELVIS (INTRAPELVIC ORGANS) WO&W CONTRAST with [id number]  [facility] [place] ([facility] ) [phone]     [address] [place] [id number]      Erickson 10, 2022  9:40 AM  (Arrive by 9:25 AM)  PRE-OP with MEENAKSHI Byrd  "CNP  [facility] [place] ([facility] ) [phone]     [address] [address]      Jun 13, 2022  1:15 PM  Pre-procedure Covid with [facility] COVID LAB  [facility] ([facility]) Asymptomatic COVID PCR Test was POSITIVE  - reschedule surgery after 10 day waiting period  - no need for another COVID test for 90 days      Jul 13, 2022 3:15 PM  (Arrive by 3:00 PM)  Post-Op with MEENAKSHI Villeda CNP  [facility] [place] ([facility] ) [phone]     [address] [address]      Aug 03, 2022  6:30 PM   (Arrive by 6:15 PM)  Video Visit with Vidhya Hamm MD  [facility] [place] ([facility] ) [phone]     VIDEO VISIT      Preparation: 2 Fleet [employer] (See \"Day of Surgery\")    Jennifer Vargas  Rose-op Coordinator  [place]  Direct Phone: [phone]        "

## 2022-07-08 ENCOUNTER — HOSPITAL ENCOUNTER (OUTPATIENT)
Facility: AMBULATORY SURGERY CENTER | Age: 32
Discharge: HOME | End: 2022-07-08
Attending: COLON & RECTAL SURGERY
Payer: COMMERCIAL

## 2022-07-08 VITALS
HEIGHT: 69 IN | WEIGHT: 212 LBS | HEART RATE: 55 BPM | BODY MASS INDEX: 31.4 KG/M2 | DIASTOLIC BLOOD PRESSURE: 66 MMHG | RESPIRATION RATE: 18 BRPM | TEMPERATURE: 97.3 F | SYSTOLIC BLOOD PRESSURE: 117 MMHG | OXYGEN SATURATION: 100 %

## 2022-07-08 DIAGNOSIS — K60.30 ANAL FISTULA: Primary | ICD-10-CM

## 2022-07-08 DIAGNOSIS — K61.0 PERIANAL ABSCESS: ICD-10-CM

## 2022-07-08 PROCEDURE — 46280 REMOVE ANAL FIST COMPLEX: CPT | Performed by: COLON & RECTAL SURGERY

## 2022-07-08 PROCEDURE — 46280 REMOVE ANAL FIST COMPLEX: CPT

## 2022-07-08 RX ORDER — PROPOFOL 10 MG/ML
INJECTION, EMULSION INTRAVENOUS CONTINUOUS PRN
Status: DISCONTINUED | OUTPATIENT
Start: 2022-07-08 | End: 2022-07-08

## 2022-07-08 RX ORDER — HYDROMORPHONE HYDROCHLORIDE 1 MG/ML
0.2 INJECTION, SOLUTION INTRAMUSCULAR; INTRAVENOUS; SUBCUTANEOUS EVERY 10 MIN PRN
Status: DISCONTINUED | OUTPATIENT
Start: 2022-07-08 | End: 2022-07-09 | Stop reason: HOSPADM

## 2022-07-08 RX ORDER — LIDOCAINE HYDROCHLORIDE 10 MG/ML
INJECTION, SOLUTION EPIDURAL; INFILTRATION; INTRACAUDAL; PERINEURAL PRN
Status: DISCONTINUED | OUTPATIENT
Start: 2022-07-08 | End: 2022-07-08 | Stop reason: HOSPADM

## 2022-07-08 RX ORDER — OXYCODONE HYDROCHLORIDE 5 MG/1
5 TABLET ORAL EVERY 6 HOURS PRN
Qty: 12 TABLET | Refills: 0 | Status: SHIPPED | OUTPATIENT
Start: 2022-07-08 | End: 2022-07-11

## 2022-07-08 RX ORDER — KETAMINE HYDROCHLORIDE 10 MG/ML
INJECTION INTRAMUSCULAR; INTRAVENOUS PRN
Status: DISCONTINUED | OUTPATIENT
Start: 2022-07-08 | End: 2022-07-08

## 2022-07-08 RX ORDER — ACETAMINOPHEN 325 MG/1
975 TABLET ORAL ONCE
Status: COMPLETED | OUTPATIENT
Start: 2022-07-08 | End: 2022-07-08

## 2022-07-08 RX ORDER — ONDANSETRON 4 MG/1
4 TABLET, ORALLY DISINTEGRATING ORAL EVERY 30 MIN PRN
Status: DISCONTINUED | OUTPATIENT
Start: 2022-07-08 | End: 2022-07-09 | Stop reason: HOSPADM

## 2022-07-08 RX ORDER — LIDOCAINE HYDROCHLORIDE 20 MG/ML
INJECTION, SOLUTION INFILTRATION; PERINEURAL PRN
Status: DISCONTINUED | OUTPATIENT
Start: 2022-07-08 | End: 2022-07-08

## 2022-07-08 RX ORDER — SODIUM CHLORIDE, SODIUM LACTATE, POTASSIUM CHLORIDE, CALCIUM CHLORIDE 600; 310; 30; 20 MG/100ML; MG/100ML; MG/100ML; MG/100ML
INJECTION, SOLUTION INTRAVENOUS CONTINUOUS
Status: DISCONTINUED | OUTPATIENT
Start: 2022-07-08 | End: 2022-07-09 | Stop reason: HOSPADM

## 2022-07-08 RX ORDER — GABAPENTIN 300 MG/1
300 CAPSULE ORAL
Status: COMPLETED | OUTPATIENT
Start: 2022-07-08 | End: 2022-07-08

## 2022-07-08 RX ORDER — ONDANSETRON 2 MG/ML
4 INJECTION INTRAMUSCULAR; INTRAVENOUS ONCE
Status: DISCONTINUED | OUTPATIENT
Start: 2022-07-08 | End: 2022-07-09 | Stop reason: HOSPADM

## 2022-07-08 RX ORDER — OXYCODONE HYDROCHLORIDE 5 MG/1
5 TABLET ORAL EVERY 4 HOURS PRN
Status: DISCONTINUED | OUTPATIENT
Start: 2022-07-08 | End: 2022-07-09 | Stop reason: HOSPADM

## 2022-07-08 RX ORDER — DEXAMETHASONE SODIUM PHOSPHATE 4 MG/ML
INJECTION, SOLUTION INTRA-ARTICULAR; INTRALESIONAL; INTRAMUSCULAR; INTRAVENOUS; SOFT TISSUE PRN
Status: DISCONTINUED | OUTPATIENT
Start: 2022-07-08 | End: 2022-07-08

## 2022-07-08 RX ORDER — FENTANYL CITRATE 50 UG/ML
25 INJECTION, SOLUTION INTRAMUSCULAR; INTRAVENOUS EVERY 5 MIN PRN
Status: DISCONTINUED | OUTPATIENT
Start: 2022-07-08 | End: 2022-07-09 | Stop reason: HOSPADM

## 2022-07-08 RX ORDER — ACETAMINOPHEN 325 MG/1
975 TABLET ORAL ONCE
Status: DISCONTINUED | OUTPATIENT
Start: 2022-07-08 | End: 2022-07-09 | Stop reason: HOSPADM

## 2022-07-08 RX ORDER — BUPIVACAINE HYDROCHLORIDE AND EPINEPHRINE 2.5; 5 MG/ML; UG/ML
INJECTION, SOLUTION EPIDURAL; INFILTRATION; INTRACAUDAL; PERINEURAL PRN
Status: DISCONTINUED | OUTPATIENT
Start: 2022-07-08 | End: 2022-07-08 | Stop reason: HOSPADM

## 2022-07-08 RX ORDER — LIDOCAINE 40 MG/G
CREAM TOPICAL
Status: DISCONTINUED | OUTPATIENT
Start: 2022-07-08 | End: 2022-07-09 | Stop reason: HOSPADM

## 2022-07-08 RX ORDER — FENTANYL CITRATE 50 UG/ML
INJECTION, SOLUTION INTRAMUSCULAR; INTRAVENOUS PRN
Status: DISCONTINUED | OUTPATIENT
Start: 2022-07-08 | End: 2022-07-08

## 2022-07-08 RX ORDER — ONDANSETRON 2 MG/ML
INJECTION INTRAMUSCULAR; INTRAVENOUS PRN
Status: DISCONTINUED | OUTPATIENT
Start: 2022-07-08 | End: 2022-07-08

## 2022-07-08 RX ORDER — ONDANSETRON 2 MG/ML
4 INJECTION INTRAMUSCULAR; INTRAVENOUS EVERY 30 MIN PRN
Status: DISCONTINUED | OUTPATIENT
Start: 2022-07-08 | End: 2022-07-09 | Stop reason: HOSPADM

## 2022-07-08 RX ORDER — MEPERIDINE HYDROCHLORIDE 25 MG/ML
12.5 INJECTION INTRAMUSCULAR; INTRAVENOUS; SUBCUTANEOUS
Status: DISCONTINUED | OUTPATIENT
Start: 2022-07-08 | End: 2022-07-09 | Stop reason: HOSPADM

## 2022-07-08 RX ORDER — ALBUTEROL SULFATE 0.83 MG/ML
2.5 SOLUTION RESPIRATORY (INHALATION) EVERY 4 HOURS PRN
Status: DISCONTINUED | OUTPATIENT
Start: 2022-07-08 | End: 2022-07-09 | Stop reason: HOSPADM

## 2022-07-08 RX ORDER — HALOPERIDOL 5 MG/ML
1 INJECTION INTRAMUSCULAR
Status: DISCONTINUED | OUTPATIENT
Start: 2022-07-08 | End: 2022-07-09 | Stop reason: HOSPADM

## 2022-07-08 RX ADMIN — PROPOFOL 200 MCG/KG/MIN: 10 INJECTION, EMULSION INTRAVENOUS at 09:25

## 2022-07-08 RX ADMIN — GABAPENTIN 300 MG: 300 CAPSULE ORAL at 08:27

## 2022-07-08 RX ADMIN — LIDOCAINE HYDROCHLORIDE 60 MG: 20 INJECTION, SOLUTION INFILTRATION; PERINEURAL at 09:26

## 2022-07-08 RX ADMIN — KETAMINE HYDROCHLORIDE 30 MG: 10 INJECTION INTRAMUSCULAR; INTRAVENOUS at 09:26

## 2022-07-08 RX ADMIN — ONDANSETRON 4 MG: 2 INJECTION INTRAMUSCULAR; INTRAVENOUS at 09:28

## 2022-07-08 RX ADMIN — OXYCODONE HYDROCHLORIDE 5 MG: 5 TABLET ORAL at 10:06

## 2022-07-08 RX ADMIN — ACETAMINOPHEN 975 MG: 325 TABLET ORAL at 08:26

## 2022-07-08 RX ADMIN — DEXAMETHASONE SODIUM PHOSPHATE 4 MG: 4 INJECTION, SOLUTION INTRA-ARTICULAR; INTRALESIONAL; INTRAMUSCULAR; INTRAVENOUS; SOFT TISSUE at 09:27

## 2022-07-08 RX ADMIN — SODIUM CHLORIDE, SODIUM LACTATE, POTASSIUM CHLORIDE, CALCIUM CHLORIDE: 600; 310; 30; 20 INJECTION, SOLUTION INTRAVENOUS at 08:26

## 2022-07-08 RX ADMIN — FENTANYL CITRATE 25 MCG: 50 INJECTION, SOLUTION INTRAMUSCULAR; INTRAVENOUS at 09:32

## 2022-07-08 NOTE — ANESTHESIA PREPROCEDURE EVALUATION
Anesthesia Pre-Procedure Evaluation    Patient: Cody Pickard   MRN: [id number] : 1990        Procedure : Procedure(s):  EXAM UNDER ANESTHESIA, ANUS  partial FISTULOTOMY, RECTUM, seton replacement x2          Past Medical History:   Diagnosis Date     Anal fistula      Crohn's disease (H)      HTN (hypertension)       Past Surgical History:   Procedure Laterality Date     COLONOSCOPY  06/15/2020     EXAM UNDER ANESTHESIA ANUS N/A 2021    Procedure: EXAM UNDER ANESTHESIA, ANUS;  Surgeon: Vidhya Hamm MD;  Location: [facility] OR     INCISION AND DRAINAGE RECTUM, COMBINED N/A 2021    Procedure: INCISION AND DRAINAGE, RECTUM, placement of Seton;  Surgeon: Vidhya Hamm MD;  Location: [facility] OR     PLACEMENT OF SETON RECTUM N/A 2021    Procedure: PLACEMENT OF SETON RECTUM;  Surgeon: Vidhya Hamm MD;  Location: [facility] OR     SIGMOIDOSCOPY,BIOPSY  2020      No Known Allergies   Social History     Tobacco Use     Smoking status: Former Smoker     Packs/day: 0.50     Years: 2.50     Pack years: 1.25     Types: Cigarettes     Start date:      Quit date: 2020     Years since quittin.9     Smokeless tobacco: Never Used   Substance Use Topics     Alcohol use: Yes     Comment: occ      Wt Readings from Last 1 Encounters:   22 96.2 kg (212 lb)        Anesthesia Evaluation   Pt has had prior anesthetic. Type: General.        ROS/MED HX  ENT/Pulmonary:  - neg pulmonary ROS     Neurologic:  - neg neurologic ROS     Cardiovascular:     (+) hypertension-----    METS/Exercise Tolerance:     Hematologic:  - neg hematologic  ROS     Musculoskeletal:  - neg musculoskeletal ROS     GI/Hepatic:  - neg GI/hepatic ROS     Renal/Genitourinary:  - neg Renal ROS     Endo:  - neg endo ROS     Psychiatric/Substance Use:  - neg psychiatric ROS     Infectious Disease:  - neg infectious disease ROS     Malignancy:       Other:            Physical Exam    Airway  airway exam  normal           Respiratory Devices and Support         Dental  no notable dental history         Cardiovascular   cardiovascular exam normal          Pulmonary   pulmonary exam normal                OUTSIDE LABS:  CBC:   Lab Results   Component Value Date    WBC 4.4 06/10/2022    WBC 5.5 03/18/2022    HGB 14.6 06/10/2022    HGB 14.0 03/18/2022    HCT 43.3 06/10/2022    HCT 40.9 03/18/2022     06/10/2022     03/18/2022     BMP:   Lab Results   Component Value Date     06/10/2022     09/06/2021    POTASSIUM 4.3 06/10/2022    POTASSIUM 4.0 09/06/2021    CHLORIDE 108 06/10/2022    CHLORIDE 105 09/06/2021    CO2 29 06/10/2022    CO2 26 09/06/2021    BUN 10 06/10/2022    BUN 9 09/06/2021    CR 0.67 06/10/2022    CR 0.78 09/07/2021     (H) 06/10/2022    GLC 94 09/06/2021     COAGS: No results found for: PTT, INR, FIBR  POC: No results found for: BGM, HCG, HCGS  HEPATIC:   Lab Results   Component Value Date    ALBUMIN 3.9 03/18/2022    PROTTOTAL 7.7 03/18/2022    ALT 30 03/18/2022    AST 17 03/18/2022    ALKPHOS 59 03/18/2022    BILITOTAL 0.5 03/18/2022     OTHER:   Lab Results   Component Value Date    MICHELLE 9.0 06/10/2022    CRP <2.9 03/18/2022    SED 4 03/18/2022       Anesthesia Plan    ASA Status:  2   NPO Status:  NPO Appropriate    Anesthesia Type: MAC.     - Reason for MAC: straight local not clinically adequate   Induction: Intravenous.   Maintenance: TIVA.        Consents    Anesthesia Plan(s) and associated risks, benefits, and realistic alternatives discussed. Questions answered and patient/representative(s) expressed understanding.     - Discussed: Risks, Benefits and Alternatives for BOTH SEDATION and the PROCEDURE were discussed     - Discussed with:  Patient         Postoperative Care    Pain management: Oral pain medications.   PONV prophylaxis: Ondansetron (or other 5HT-3), Dexamethasone or Solumedrol     Comments:                Abdi Tilley MD, MD

## 2022-07-08 NOTE — ANESTHESIA CARE TRANSFER NOTE
Patient: Cody Pickard    Procedure: Procedure(s):  EXAM UNDER ANESTHESIA, ANUS  partial FISTULOTOMY, RECTUM, seton replacement x2       Diagnosis: Anal fistula [K60.3]  Diagnosis Additional Information: No value filed.    Anesthesia Type:   No value filed.     Note:    Oropharynx: oropharynx clear of all foreign objects and spontaneously breathing  Level of Consciousness: drowsy  Oxygen Supplementation: room air    Independent Airway: airway patency satisfactory and stable  Dentition: dentition unchanged  Vital Signs Stable: post-procedure vital signs reviewed and stable  Report to RN Given: handoff report given  Patient transferred to: Phase II    Handoff Report: Identifed the Patient, Identified the Reponsible Provider, Reviewed the pertinent medical history, Discussed the surgical course, Reviewed Intra-OP anesthesia mangement and issues during anesthesia, Set expectations for post-procedure period and Allowed opportunity for questions and acknowledgement of understanding      Vitals:  Vitals Value Taken Time   BP     Temp     Pulse     Resp     SpO2         Electronically Signed By: MEENAKSHI Murillo CRNA  July 8, 2022  9:56 AM

## 2022-07-08 NOTE — INTERVAL H&P NOTE
"I have reviewed the surgical (or preoperative) H&P that is linked to this encounter, and examined the patient. There are no significant changes    Clinical Conditions Present on Arrival:  Clinically Significant Risk Factors Present on Admission                   # Obesity: Estimated body mass index is 30.87 kg/m  as calculated from the following:    Height as of this encounter: 1.765 m (5' 9.49\").    Weight as of this encounter: 96.2 kg (212 lb).       "

## 2022-07-08 NOTE — OP NOTE
SURGEON: Vidhya Hamm MD      ASSISTANT: None     PREOPERATIVE DIAGNOSIS: Recurrent perirectal abscess, Crohn's disease with anorectal fistula and two setons. Crohn's anorectal stricture about 3 cm from the anal verge.      POSTOPERATIVE DIAGNOSIS: Recurrent perirectal abscesses, Crohn's disease, with complex anorectal fistula with 2 replaced setons and partial fistulotomy. Crohn's anorectal stricture about 3 cm from the anal verge.     PROCEDURE: Examination under anesthesia, debridement of perirectal abscess and fistula with complex tract, partial fistulotomy, replacement of both setons.      INDICATIONS: Cody Pickard is a 32 year old male with Crohn's disease who presents to Colorectal Surgery clinic with build up of pressure and a more complex tract on examination. He previously had an urgent examination under anesthesia presenting with an abscess in September 2021 and subsequent examination under anesthesia requiring an additional seton November 2021.  We had an Magnetic Resonance Imaging (MRI) demonstrating an abscess associated with the left posterior fistula. We are hoping to be able to perform completion fistulotomy on the right to left seton, but this may be too extensive for today The risks and benefits of surgery were thoroughly discussed with the patient and he agreed to proceed.     DESCRIPTION OF PROCEDURE: The patient was brought to the operating room. After anesthesia was induced, he was carefully placed prone on the operating room table. We prepped and draped the area in the usual sterile fashion. We began the procedure with a timeout and performed an anal block using a mixture 50/50 of bupivacaine and lidocaine with epinephrine with 20 cc. We performed a digital rectal examination and a stricture of the anorectal ring was noted 3 cm from the anal verge similar to before. Anoscopy demonstrated anal tags and induration in the left posterior quadrant with inflammation of the mucosa of  the anorectum consistent with his Crohn's disease and the perianal disease. The two setons were in place the left sided site with 2 setons coming out had some purulent drainage. The right to left seton was carefully opened about 50% for a partial fistulotomy and the abscess and tract debrided with a curette, irrigation and suction. The granulation tissue was removed. The 2 setons were replaced with yellow vessel loops and secured with 4 separate 2-0 Silk. There was good hemostasis, and dressings were applied. At the end the case, all instruments and sponge counts were correct x2. The patient was emerged from anesthesia and taken to postoperative anesthesia care unit in good condition.      SPECIMEN: None.      ESTIMATED BLOOD LOSS: Minimal.      URINE OUTPUT: Not measured.      PAUL MARTIN MD   Colon and Rectal Surgery Staff  [facility]

## 2022-07-08 NOTE — ANESTHESIA POSTPROCEDURE EVALUATION
Patient: Cody Pickard    Procedure: Procedure(s):  EXAM UNDER ANESTHESIA, ANUS  partial FISTULOTOMY, RECTUM, seton replacement x2       Anesthesia Type:  MAC    Note:  Disposition: Outpatient   Postop Pain Control: Uneventful            Sign Out: Well controlled pain   PONV: No   Neuro/Psych: Uneventful            Sign Out: Acceptable/Baseline neuro status   Airway/Respiratory: Uneventful            Sign Out: Acceptable/Baseline resp. status   CV/Hemodynamics: Uneventful            Sign Out: Acceptable CV status; No obvious hypovolemia; No obvious fluid overload   Other NRE: NONE   DID A NON-ROUTINE EVENT OCCUR? No           Last vitals:  Vitals Value Taken Time   /66 07/08/22 1044   Temp 36.3  C (97.3  F) 07/08/22 1015   Pulse 55 07/08/22 1044   Resp 18 07/08/22 1030   SpO2 100 % 07/08/22 1030       Electronically Signed By: Abdi Tilley MD, MD  July 8, 2022  11:00 AM

## 2022-07-08 NOTE — DISCHARGE INSTRUCTIONS
Anorectal Surgery Instructions      What can I expect after anorectal surgery?  Most anorectal procedures are done as outpatient surgery, and you go home the same day as the procedure. A few surgical procedures will require that you stay in the hospital for about one to three days. No matter where the procedure is done or how long or short it takes, these recommendations will help you heal and feel more comfortable.    Medicines:  The anal area is very sensitive; you can expect to have some pain for up to 2-4 weeks after the procedure. Different pain medicines can provide you relief.  Narcotic pain relievers. These include Vicodin, Percocet, and Tylenol number 3. These are all prescription medications. These should be used as prescribed and as needed. Because these drugs have side effects (including constipation), you should reduce your use of these medications as tolerated as your pain improves.  Some patients find it easiest to transition away from narcotic drugs by also taking acetaminophen (Tylenol). However, it is important to realize that most narcotic pain relievers also have acetaminophen, and excessive doses of acetaminophen can be dangerous.Accordingly, you can substitute 1000 milligrams of acetaminophen (two Extra Strength Tylenol or similar generic) for any given dose of narcotic, but acetominophen should not be taken in addition to a full narcotic dose that contains acetaminophen. The maximum acceptable dose of acetaminophen is 4000 milligrams.  Refilling prescriptions. If you need additional pain medication, please call the triage nurse at [phone] during normal business hours (8 a.m. to 4 p.m., Monday though Friday) or have your pharmacy fax a refill request to [phone]. If you call after hours or on the weekends, the doctor on call may not know you personally and may not renew narcotic pain medication by phone. Call your primary care provider for all other medication refills.     Bowel  function and Perineal care:  Bowel movements can be very painful. It is important to have regular bowel movements at least every other day and to keep your stool soft. Your doctor may tell you to take a stool softener, such as Colace, once or twice a day. Try to avoid constipation and/or diarrhea as this can make the pain and bleeding worse. A high fiber diet, including at least four servings of fruits or vegetables daily, will help to keep your bowel movements regular and soft. If you have trouble getting enough fiber in your daily diet, a fiber supplement can be considered, including Metamucil, Benefiber, or Citrucel, and can be bought at your local grocery store or pharmacy. It is important to drink six to eight glasses of water or juice everyday when using fiber products.    You can expect to have some bleeding after bowel movements, but it should stop soon after you wipe. Use a wet cloth or perianal pad (Tucks or Preparation H pads) to gently wipe the area after each bowel movement. Do not rub the anal area or use a lot of pressure. Using a spray bottle filled with warm water helps loosen any remaining stool. Blot gently with a soft dry cloth or tissue paper.    If possible, take a tub bath immediately after each bowel movement. Baths should be take at least 3 times daily for the first week to 10 days following your procedure. You should soak in the tub for 10 to 15 minutes each time with water as warm as you can tolerate. Even after you go back to work, it is a good idea to sit in the tub in the morning, after returning from work, and again in the evening before bedtime.    Constipation will cause you to strain when you have a bowel movement. The hard stool will be difficult to pass, will increase pain and bleeding, and will slow down healing. If you have not had a bowel movement within 2 days, take 2 teaspoons of Milk of Magnesia in the morning. If there are no results, repeat this. Stop taking Milk of  Magnesia or other laxatives if you begin to have diarrhea.    Diarrhea can occur if too much laxative is taken or for several other reasons. If you have 3 or more loose, watery stools in a 24-hour period, stop taking laxatives. Continue to eat a high fiber diet and to take bulk-forming agents such as Metamucil, Benefiber, or Citrucel. You may take Immodium as directed on the package but please call the triage nurse, [phone], if this was not discussed with you surgeon preoperatively.    If you are still having diarrhea or constipation after you have tried these recommendations, please call the triage nurse line, [phone].    Bleeding/Infection:  Infection around the anal opening is not very common. The anal area has excellent blood supply, which helps the area to heal. Bloody discharge after bowel movements is normal and may last 2 to 4 weeks after your surgery. However, if you bleed between bowel movements and cannot get it to stop, call the triage nurse immediately [phone].    Activity:  After your procedure, there are no restrictions on your activity except restrictions surrounding being on narcotics and in pain, such as no heavy machine operating or driving. You may walk, climb stairs, ride in a car, and sit as tolerated. It is helpful to avoid sitting in one position for long periods (2 or more hours). After some surgeries, you may be told not to perform any lifting (more than 10 pounds) for several weeks after surgery.    When do I need to call the doctor or triage nurse?  If you experience any of the problems listed here, call our triage nurse during business hours ([phone]). The nurse will help you with your problem or have the doctor call you. After hours and on weekends, please call the main hospital number ([phone]) and ask for the colon and rectal surgery person on call. Some is available to help you 24 hours a day, seven days a week.  Fever greater than 101  "degrees  Chills  Foul-smelling drainage  Nausea and vomiting  Diarrhea - greater than 3 water stools in 24 hours  Constipation - no bowel movement after 3 days  Severe bleeding that does not stop soon after a bowel movement  Problems with the incision, including increased pain, swelling, or redness        Colon and Rectal Surgery Clinic  Phone: [phone]    [facility]  Home Care Following Anesthesia  For 24 hours after surgery:  Get plenty of rest.  A responsible adult must stay with you for at least 24 hours after you leave the surgery center.  Do not drive or use heavy equipment.  If you have weakness or tingling, don't drive or use heavy equipment until this feeling goes away.   Do not drink alcohol.   Avoid strenuous or risky activities.  Ask for help when climbing stairs.  You may feel lightheaded.  IF so, sit for a few minutes before standing.  Have someone help you get up.   If you have nausea (feel sick to your stomach): Drink only clear liquids such as apple juice, ginger ale, broth or 7-Up.  Rest may also help.  Be sure to drink enough fluids.  Move to a regular diet as you feel able.   You may have a slight fever.  Call the doctor if your fever is over 100 F (37.7 C) (taken under the tongue) or lasts longer than 24 hours.  You may have a dry mouth, a sore throat, muscle aches or trouble sleeping. These should go away after 24 hours.  Do not make important or legal decisions.   It is recommended to avoid smoking.        Today you received a Marcaine or bupivacaine block to numb the nerves near your surgery site.  This is a block using local anesthetic or \"numbing\" medication injected around the nerves to anesthetize or \"numb\" the area supplied by those nerves.  This block is injected into the muscle layer near your surgical site.  The medication may numb the location where you had surgery for 6-18 hours, but may last up to 24 hours.  If your surgical site is an arm " or leg you should be careful with your affected limb, since it is possible to injure your limb without being aware of it due to the numbing.  Until full feeling returns, you should guard against bumping or hitting your limb, and avoid extreme hot or cold temperatures on the skin.  As the block wears off, the feeling will return as a tingling or prickly sensation near your surgical site.  You will experience more discomfort from your incision as the feeling returns.  You may want to take a pain pill (a narcotic or Tylenol if this was prescribed by your surgeon) when you start to experience mild pain before the pain beccomes more severe.  If your pain medications do not control your pain you should notifiy your surgeon.    Tips for taking pain medications  To get the best pain relief possible, remember these points:  Take pain medications as directed, before pain becomes severe.  Pain medication can upset your stomach: taking it with food may help.  Constipation is a common side effect of pain medication. Drink plenty of  fluids.  Eat foods high in fiber. Take a stool softener if recommended by your doctor or pharmacist.  Do not drink alcohol, drive or operate machinery while taking pain medications.  Ask about other ways to control pain, such as with heat, ice or relaxation.    Tylenol/Acetaminophen Consumption  To help encourage the safe use of acetaminophen, the makers of TYLENOL  have lowered the maximum daily dose for single-ingredient Extra Strength TYLENOL  (acetaminophen) products sold in the [place] from 8 pills per day (4,000 mg) to 6 pills per day (3,000 mg). The dosing interval has also changed from 2 pills every 4-6 hours to 2 pills every 6 hours.  If you feel your pain relief is insufficient, you may take Tylenol/Acetaminophen in addition to your narcotic pain medication.   Be careful not to exceed 3,000 mg of Tylenol/Acetaminophen in a 24 hour period from all sources.  If you are taking extra strength  Tylenol/acetaminophen (500 mg), the maximum dose is 6 tablets in 24 hours.  If you are taking regular strength acetaminophen (325 mg), the maximum dose is 9 tablets in 24 hours.  Tylenol last given at 8:26am. Next available dose at 2:26pm.     Call a doctor for any of the following:  Signs of infection (fever, growing tenderness at the surgery site, a large amount of drainage or bleeding, severe pain, foul-smelling drainage, redness, swelling).  It has been over 8 to 10 hours since surgery and you are still not able to urinate (pass water).  Headache for over 24 hours.  Numbness, tingling or weakness the day after surgery (if you had spinal anesthesia).  Signs of Covid-19 infection (temperature over 100 degrees, shortness of breath, cough, loss of taste/smell, generalized body aches, persistent headache, chills, sore throat, nausea/vomiting/diarrhea)  Your doctor is:       Dr. Vidhya Hamm, Colon Rectal: [phone]               Or dial [phone] and ask for the resident on call for:  Colon Rectal  For emergency care, call the:  [place] [facility]:  [phone] (TTY for hearing impaired: [phone])

## 2022-07-13 ENCOUNTER — OFFICE VISIT (OUTPATIENT)
Dept: SURGERY | Facility: CLINIC | Age: 32
End: 2022-07-13
Payer: COMMERCIAL

## 2022-07-13 VITALS
TEMPERATURE: 99.1 F | WEIGHT: 212 LBS | DIASTOLIC BLOOD PRESSURE: 80 MMHG | HEART RATE: 66 BPM | HEIGHT: 70 IN | OXYGEN SATURATION: 99 % | BODY MASS INDEX: 30.35 KG/M2 | SYSTOLIC BLOOD PRESSURE: 139 MMHG

## 2022-07-13 DIAGNOSIS — K50.913 CROHN'S DISEASE WITH FISTULA, UNSPECIFIED GASTROINTESTINAL TRACT LOCATION (H): ICD-10-CM

## 2022-07-13 DIAGNOSIS — Z09 FOLLOW-UP EXAMINATION AFTER COLORECTAL SURGERY: Primary | ICD-10-CM

## 2022-07-13 PROCEDURE — 99024 POSTOP FOLLOW-UP VISIT: CPT | Performed by: NURSE PRACTITIONER

## 2022-07-13 ASSESSMENT — PAIN SCALES - GENERAL: PAINLEVEL: MILD PAIN (2)

## 2022-07-13 NOTE — LETTER
"2022       RE: Cody Pickard  [address] [address] [place] [id number]     Dear Colleague,    Thank you for referring your patient, Cody Pickard, to the [facility] [place] [facility]. Please see a copy of my visit note below.    Colon and Rectal Surgery Postoperative Clinic Note    RE: Cody Pickard  : 1990  WIL: 2022    Cody Pickard is a very pleasant 32 year old male with a history of perianal Crohn's and 2 prior setons now status post examination under anesthesia, debridement of perirectal abscess and fistula with complex tract, partial fistulotomy, replacement of both setons with Dr. Hamm on 22.    Interval history: Cody has been doing ok. He reports soreness from the surgery and purulent drainage from the external fistulotomy site. He also believes that the ends of the setons are too long and poke him.    He continues to follow with Dr. Bowers and is currently on mesalamine and Humira.    Physical Examination: Exam was chaperoned by Susan Stern PA-C   /80 (BP Location: Left arm, Patient Position: Sitting, Cuff Size: Adult Regular)   Pulse 66   Temp 99.1  F (37.3  C) (Oral)   Ht 5' 9.5\"   Wt 212 lb   SpO2 99%   BMI 30.86 kg/m    General: alert, oriented, in no acute distress, sitting comfortably  HEENT: moist mucous membranes    Perianal external examination:  One yellow vessel loop seton in place to the left lateral with a counter draining yellow vessel loop seton to the posterior. External fistulotomy site with small amount of purulent tissue and granulation tissue. No erythema or fluctuance.     Digital rectal examination: Was deferred.    Anoscopy: Was deferred.    Assessment/Plan:  32 year old male with a history of perianal Crohn's and 2 prior setons now status post examination under anesthesia, debridement of perirectal abscess and fistula with complex tract, partial " fistulotomy, replacement of both setons with Dr. Hamm on 7/8/22. He appears to be healing well. I trimmed the ends of his setons to reduce irritation from them. Continue tucking gauze between his buttocks.Cody also mentioned that he had a small umbilical hernia. I recommended that he follow up with general surgery for this if it becomes symptomatic. He will follow up with Dr. Hamm on 8/3/22. I advised him to reach out to Dr. Bowers to ensure he is happy with his current Crohn's management. Patient's questions were answered to his stated satisfaction and he is in agreement with this plan.     Medical history:  Past Medical History:   Diagnosis Date     Anal fistula      Crohn's disease (H)      HTN (hypertension)        Surgical history:  Past Surgical History:   Procedure Laterality Date     COLONOSCOPY  06/15/2020     EXAM UNDER ANESTHESIA ANUS N/A 11/19/2021    Procedure: EXAM UNDER ANESTHESIA, ANUS;  Surgeon: Vidhya Hamm MD;  Location: [facility] OR     EXAM UNDER ANESTHESIA ANUS N/A 7/8/2022    Procedure: EXAM UNDER ANESTHESIA, ANUS;  Surgeon: Vidhya Hamm MD;  Location: [facility] OR     FISTULOTOMY RECTUM N/A 7/8/2022    Procedure: partial FISTULOTOMY, RECTUM, seton replacement x2;  Surgeon: Vidhya Hamm MD;  Location: [facility] OR     INCISION AND DRAINAGE RECTUM, COMBINED N/A 09/06/2021    Procedure: INCISION AND DRAINAGE, RECTUM, placement of Seton;  Surgeon: Vidhya Hamm MD;  Location: [facility] OR     PLACEMENT OF SETON RECTUM N/A 11/19/2021    Procedure: PLACEMENT OF SETON RECTUM;  Surgeon: Vidhya Hamm MD;  Location: [facility] OR     SIGMOIDOSCOPY,BIOPSY  09/24/2020       Problem list:    Patient Active Problem List    Diagnosis Date Noted     Anal fistula 11/09/2021     Priority: Medium     Added automatically from request for surgery [id number]       Acute post-operative pain 09/07/2021     Priority: Medium     Perianal abscess 09/06/2021      "Priority: Medium     Perianal Crohn's disease, with abscess (H) 2021     Priority: Medium     IBD (inflammatory bowel disease) 2021     Priority: Medium       Medications:  Current Outpatient Medications   Medication Sig Dispense Refill     adalimumab (HUMIRA *CF* PEN) 40 MG/0.4ML pen kit Inject 0.4 mLs (40 mg) Subcutaneous every 7 days 4 each 4     lisinopril (ZESTRIL) 20 MG tablet Take 20 mg by mouth every morning        mesalamine (LIALDA) 1.2 g DR tablet Take 4 tablets (4,800 mg) by mouth every morning - Oral 180 tablet 3       Allergies:  No Known Allergies    Family history:  Family History   Problem Relation Age of Onset     Multiple Sclerosis Mother      Ulcerative Colitis Maternal Grandmother      Colon Cancer No family hx of      Inflammatory Bowel Disease No family hx of      Irritable Bowel Syndrome No family hx of      Crohn's Disease No family hx of      Anesthesia Reaction No family hx of      Bleeding Disorder No family hx of      Clotting Disorder No family hx of        Social history:  Social History     Tobacco Use     Smoking status: Former Smoker     Packs/day: 0.50     Years: 2.50     Pack years: 1.25     Types: Cigarettes     Start date:      Quit date: 2020     Years since quittin.9     Smokeless tobacco: Never Used   Substance Use Topics     Alcohol use: Yes     Comment: occ     Marital status: .    Nursing Notes:   Yaima Bergeron  2022  3:13 PM  Signed  Chief Complaint   Patient presents with     Post-op Visit       Vitals:    22 1509   BP: 139/80   BP Location: Left arm   Patient Position: Sitting   Cuff Size: Adult Regular   Pulse: 66   Temp: 99.1  F (37.3  C)   TempSrc: Oral   SpO2: 99%   Weight: 212 lb   Height: 5' 9.5\"       Body mass index is 30.86 kg/m .    Yaima Bergeron CMA    15 minutes spent on the date of the encounter doing chart review, history and exam, documentation and further activities as noted above.   This is a postop " visit.      MEENAKSHI Bass, NP-C  Colon and Rectal Surgery  [facility]      This note was created using speech recognition software and may contain unintended word substitutions.

## 2022-07-13 NOTE — PROGRESS NOTES
"Colon and Rectal Surgery Postoperative Clinic Note    RE: Cody Pickard  : 1990  WIL: 2022    Cody Pickard is a very pleasant 32 year old male with a history of perianal Crohn's and 2 prior setons now status post examination under anesthesia, debridement of perirectal abscess and fistula with complex tract, partial fistulotomy, replacement of both setons with Dr. Hamm on 22.    Interval history: Cody has been doing ok. He reports soreness from the surgery and purulent drainage from the external fistulotomy site. He also believes that the ends of the setons are too long and poke him.    He continues to follow with Dr. Bowers and is currently on mesalamine and Humira.    Physical Examination: Exam was chaperoned by Susan Stern PA-C   /80 (BP Location: Left arm, Patient Position: Sitting, Cuff Size: Adult Regular)   Pulse 66   Temp 99.1  F (37.3  C) (Oral)   Ht 5' 9.5\"   Wt 212 lb   SpO2 99%   BMI 30.86 kg/m    General: alert, oriented, in no acute distress, sitting comfortably  HEENT: moist mucous membranes    Perianal external examination:  One yellow vessel loop seton in place to the left lateral with a counter draining yellow vessel loop seton to the posterior. External fistulotomy site with small amount of purulent tissue and granulation tissue. No erythema or fluctuance.     Digital rectal examination: Was deferred.    Anoscopy: Was deferred.    Assessment/Plan:  32 year old male with a history of perianal Crohn's and 2 prior setons now status post examination under anesthesia, debridement of perirectal abscess and fistula with complex tract, partial fistulotomy, replacement of both setons with Dr. Hamm on 22. He appears to be healing well. I trimmed the ends of his setons to reduce irritation from them. Continue tucking gauze between his buttocks.Cody also mentioned that he had a small umbilical hernia. I recommended that he follow up with general surgery for " this if it becomes symptomatic. He will follow up with Dr. Hamm on 8/3/22. I advised him to reach out to Dr. Bowers to ensure he is happy with his current Crohn's management. Patient's questions were answered to his stated satisfaction and he is in agreement with this plan.     Medical history:  Past Medical History:   Diagnosis Date     Anal fistula      Crohn's disease (H)      HTN (hypertension)        Surgical history:  Past Surgical History:   Procedure Laterality Date     COLONOSCOPY  06/15/2020     EXAM UNDER ANESTHESIA ANUS N/A 11/19/2021    Procedure: EXAM UNDER ANESTHESIA, ANUS;  Surgeon: Vidhya Hamm MD;  Location: [facility] OR     EXAM UNDER ANESTHESIA ANUS N/A 7/8/2022    Procedure: EXAM UNDER ANESTHESIA, ANUS;  Surgeon: Vidhya Hamm MD;  Location: [facility] OR     FISTULOTOMY RECTUM N/A 7/8/2022    Procedure: partial FISTULOTOMY, RECTUM, seton replacement x2;  Surgeon: Vidhya Hamm MD;  Location: [facility] OR     INCISION AND DRAINAGE RECTUM, COMBINED N/A 09/06/2021    Procedure: INCISION AND DRAINAGE, RECTUM, placement of Seton;  Surgeon: Vidhya Hamm MD;  Location: [facility]     PLACEMENT OF SETON RECTUM N/A 11/19/2021    Procedure: PLACEMENT OF SETON RECTUM;  Surgeon: Vidhya Hamm MD;  Location: [facility] OR     SIGMOIDOSCOPY,BIOPSY  09/24/2020       Problem list:    Patient Active Problem List    Diagnosis Date Noted     Anal fistula 11/09/2021     Priority: Medium     Added automatically from request for surgery [id number]       Acute post-operative pain 09/07/2021     Priority: Medium     Perianal abscess 09/06/2021     Priority: Medium     Perianal Crohn's disease, with abscess (H) 09/06/2021     Priority: Medium     IBD (inflammatory bowel disease) 02/22/2021     Priority: Medium       Medications:  Current Outpatient Medications   Medication Sig Dispense Refill     adalimumab (HUMIRA *CF* PEN) 40 MG/0.4ML pen kit Inject 0.4 mLs (40 mg)  "Subcutaneous every 7 days 4 each 4     lisinopril (ZESTRIL) 20 MG tablet Take 20 mg by mouth every morning        mesalamine (LIALDA) 1.2 g DR tablet Take 4 tablets (4,800 mg) by mouth every morning - Oral 180 tablet 3       Allergies:  No Known Allergies    Family history:  Family History   Problem Relation Age of Onset     Multiple Sclerosis Mother      Ulcerative Colitis Maternal Grandmother      Colon Cancer No family hx of      Inflammatory Bowel Disease No family hx of      Irritable Bowel Syndrome No family hx of      Crohn's Disease No family hx of      Anesthesia Reaction No family hx of      Bleeding Disorder No family hx of      Clotting Disorder No family hx of        Social history:  Social History     Tobacco Use     Smoking status: Former Smoker     Packs/day: 0.50     Years: 2.50     Pack years: 1.25     Types: Cigarettes     Start date:      Quit date: 2020     Years since quittin.9     Smokeless tobacco: Never Used   Substance Use Topics     Alcohol use: Yes     Comment: occ     Marital status: .    Nursing Notes:   Yaima Bergeron  2022  3:13 PM  Signed  Chief Complaint   Patient presents with     Post-op Visit       Vitals:    22 1509   BP: 139/80   BP Location: Left arm   Patient Position: Sitting   Cuff Size: Adult Regular   Pulse: 66   Temp: 99.1  F (37.3  C)   TempSrc: Oral   SpO2: 99%   Weight: 212 lb   Height: 5' 9.5\"       Body mass index is 30.86 kg/m .    Yaima Bergeron CMA         15 minutes spent on the date of the encounter doing chart review, history and exam, documentation and further activities as noted above.   This is a postop visit.    MEENAKSHI Bass, NP-C  Colon and Rectal Surgery  [facility]    This note was created using speech recognition software and may contain unintended word substitutions.      "

## 2022-07-13 NOTE — NURSING NOTE
"Chief Complaint   Patient presents with     Post-op Visit       Vitals:    07/13/22 1509   BP: 139/80   BP Location: Left arm   Patient Position: Sitting   Cuff Size: Adult Regular   Pulse: 66   Temp: 99.1  F (37.3  C)   TempSrc: Oral   SpO2: 99%   Weight: 212 lb   Height: 5' 9.5\"       Body mass index is 30.86 kg/m .    Yaima Bergeron CMA    "

## 2022-07-14 ENCOUNTER — PATIENT OUTREACH (OUTPATIENT)
Dept: GASTROENTEROLOGY | Facility: CLINIC | Age: 32
End: 2022-07-14

## 2022-07-14 NOTE — TELEPHONE ENCOUNTER
Patient recently seen by Ms Ramos in colon and rectal   patikash left a message that he was to check in with Dr Bowers   Left a message for patient to call to follow up and or give update on stools, overall health, etc on my chart   That will check with Dr Bowers on timing of follow up

## 2022-07-20 ENCOUNTER — PATIENT OUTREACH (OUTPATIENT)
Dept: GASTROENTEROLOGY | Facility: CLINIC | Age: 32
End: 2022-07-20

## 2022-07-20 NOTE — TELEPHONE ENCOUNTER
Peterson - It is OK to get this patient on earlier with fellow.     Or I have time Aug 8th at 2:20

## 2022-07-27 NOTE — PROGRESS NOTES
Colon and Rectal Surgery Consult Clinic Note      RE: Cody Pickard  : 1990  WIL: 8/3/2022      Cody Pickard is a very pleasant 32 year old male with a history of perianal Crohn's and 2 prior setons now status post examination under anesthesia, debridement of perirectal abscess and fistula with complex tract, partial fistulotomy, replacement of both setons on 22.  He continues to follow with Dr. Bowers and is currently on mesalamine and Humira.    Interval History:  Cody has been doing well. He has some drainage but this is much less. Some pain at the posterior seton site at times. He is soaking in a tub bath twice a day for about 20 minutes. He is having bowel movements about 2-3 times a day.    PLEASE SEE NOTE BELOW FOR PHYSICAL EXAMINATION, REVIEW OF SYSTEMS, AND OTHER HISTORY.    Assessment/Plan: 32 year old male with Crohn's disease and complex anorectal fistula. The counter seton is fairly deep into the postanal space but we could consider shortening this tract over time with plan to eventually remove it. He had some hypergranulation tissue at the external posterior seton site that I applied silver nitrate to. I would like this to be better healed before trying to shorten the tract. Would like him to continue with warm tub baths and start tugging on his seton. Will set him up in about 2 months for an examination under anesthesia with attempt to shorten the tract. Continue to follow with Dr. Bowers for Crohn's management.    15 minutes spent on the date of encounter (excluding time performing procedures) performing chart review, history and exam, documentation and further activities as noted above.    Vidhya Hamm MD  Colon and Rectal Surgery Staff  [facility]      -------------------------------------------------------------------------------------------------------------------          Medical history:  Past Medical History:   Diagnosis Date     Anal fistula       Crohn's disease (H)      HTN (hypertension)        Surgical history:  Past Surgical History:   Procedure Laterality Date     COLONOSCOPY  06/15/2020     EXAM UNDER ANESTHESIA ANUS N/A 11/19/2021    Procedure: EXAM UNDER ANESTHESIA, ANUS;  Surgeon: Vidhya Hamm MD;  Location: [facility] OR     EXAM UNDER ANESTHESIA ANUS N/A 7/8/2022    Procedure: EXAM UNDER ANESTHESIA, ANUS;  Surgeon: Vidhya Hamm MD;  Location: [facility] OR     FISTULOTOMY RECTUM N/A 7/8/2022    Procedure: partial FISTULOTOMY, RECTUM, seton replacement x2;  Surgeon: Vidhya Hamm MD;  Location: [facility] OR     INCISION AND DRAINAGE RECTUM, COMBINED N/A 09/06/2021    Procedure: INCISION AND DRAINAGE, RECTUM, placement of Seton;  Surgeon: Vidhya Hamm MD;  Location: [facility]     PLACEMENT OF SETON RECTUM N/A 11/19/2021    Procedure: PLACEMENT OF SETON RECTUM;  Surgeon: Vidhya Hamm MD;  Location: [facility] OR     SIGMOIDOSCOPY,BIOPSY  09/24/2020       Problem list:    Patient Active Problem List    Diagnosis Date Noted     Anal fistula 11/09/2021     Priority: Medium     Added automatically from request for surgery [id number]       Acute post-operative pain 09/07/2021     Priority: Medium     Perianal abscess 09/06/2021     Priority: Medium     Perianal Crohn's disease, with abscess (H) 09/06/2021     Priority: Medium     IBD (inflammatory bowel disease) 02/22/2021     Priority: Medium       Medications:  Current Outpatient Medications   Medication Sig Dispense Refill     adalimumab (HUMIRA *CF* PEN) 40 MG/0.4ML pen kit Inject 0.4 mLs (40 mg) Subcutaneous every 7 days 4 each 4     lisinopril (ZESTRIL) 20 MG tablet Take 20 mg by mouth every morning        mesalamine (LIALDA) 1.2 g DR tablet Take 4 tablets (4,800 mg) by mouth every morning - Oral 180 tablet 3       Allergies:  No Known Allergies    Family history:  Family History   Problem Relation Age of Onset     Multiple Sclerosis Mother      Ulcerative  "Colitis Maternal Grandmother      Colon Cancer No family hx of      Inflammatory Bowel Disease No family hx of      Irritable Bowel Syndrome No family hx of      Crohn's Disease No family hx of      Anesthesia Reaction No family hx of      Bleeding Disorder No family hx of      Clotting Disorder No family hx of        Social history:  Social History     Socioeconomic History     Marital status:      Spouse name: Not on file     Number of children: Not on file     Years of education: Not on file     Highest education level: Not on file   Occupational History     Not on file   Tobacco Use     Smoking status: Former Smoker     Packs/day: 0.50     Years: 2.50     Pack years: 1.25     Types: Cigarettes     Start date:      Quit date: 2020     Years since quittin.0     Smokeless tobacco: Never Used   Substance and Sexual Activity     Alcohol use: Yes     Comment: occ     Drug use: Never     Sexual activity: Not on file   Other Topics Concern     Not on file   Social History Narrative     Not on file     Social Determinants of Health     Financial Resource Strain: Not on file   Food Insecurity: Not on file   Transportation Needs: Not on file   Physical Activity: Not on file   Stress: Not on file   Social Connections: Not on file   Intimate Partner Violence: Not on file   Housing Stability: Not on file         Nursing Notes:   Yaima Bergeron  8/3/2022  5:13 PM  Signed  Chief Complaint   Patient presents with     Post-op Visit       Vitals:    22 1712   BP: (!) 141/80   BP Location: Left arm   Patient Position: Sitting   Cuff Size: Adult Regular   Pulse: 84   SpO2: 97%   Weight: 217 lb   Height: 5' 9.5\"       Body mass index is 31.59 kg/m .    Yaima Bergeron CMA         Physical Examination:  BP (!) 141/80 (BP Location: Left arm, Patient Position: Sitting, Cuff Size: Adult Regular)   Pulse 84   Ht 5' 9.5\"   Wt 217 lb   SpO2 97%   BMI 31.59 kg/m    General: alert, oriented, in no acute distress, " sitting comfortably  HEENT: moist mucous membranes     Perianal external examination:  One yellow vessel loop seton in place to the left lateral with a counter draining yellow vessel loop seton to the posterior. External fistulotomy site with small amount of purulent tissue and granulation tissue. No erythema or fluctuance.      Digital rectal examination: Was deferred.     Anoscopy: Was deferred.

## 2022-08-03 ENCOUNTER — OFFICE VISIT (OUTPATIENT)
Dept: SURGERY | Facility: CLINIC | Age: 32
End: 2022-08-03
Payer: COMMERCIAL

## 2022-08-03 VITALS
OXYGEN SATURATION: 97 % | DIASTOLIC BLOOD PRESSURE: 80 MMHG | BODY MASS INDEX: 31.07 KG/M2 | HEART RATE: 84 BPM | WEIGHT: 217 LBS | HEIGHT: 70 IN | SYSTOLIC BLOOD PRESSURE: 141 MMHG

## 2022-08-03 DIAGNOSIS — K60.30 ANAL FISTULA: ICD-10-CM

## 2022-08-03 DIAGNOSIS — K50.913 CROHN'S DISEASE WITH FISTULA, UNSPECIFIED GASTROINTESTINAL TRACT LOCATION (H): Primary | ICD-10-CM

## 2022-08-03 PROCEDURE — 99024 POSTOP FOLLOW-UP VISIT: CPT | Performed by: COLON & RECTAL SURGERY

## 2022-08-03 ASSESSMENT — PAIN SCALES - GENERAL: PAINLEVEL: NO PAIN (0)

## 2022-08-03 NOTE — LETTER
8/3/2022       RE: Cody Pickard  [id number] [address] [place] [id number]     Dear Colleague,    Thank you for referring your patient, Cody Pickard, to the [facility] COLON AND RECTAL SURGERY CLINIC [place] [facility]. Please see a copy of my visit note below.    Colon and Rectal Surgery Consult Clinic Note      RE: Cody Pickard  : 1990  WIL: 8/3/2022      Cody Pickard is a very pleasant 32 year old male with a history of perianal Crohn's and 2 prior setons now status post examination under anesthesia, debridement of perirectal abscess and fistula with complex tract, partial fistulotomy, replacement of both setons on 22.  He continues to follow with Dr. Bowers and is currently on mesalamine and Humira.    Interval History:  Cody has been doing well. He has some drainage but this is much less. Some pain at the posterior seton site at times. He is soaking in a tub bath twice a day for about 20 minutes. He is having bowel movements about 2-3 times a day.    PLEASE SEE NOTE BELOW FOR PHYSICAL EXAMINATION, REVIEW OF SYSTEMS, AND OTHER HISTORY.    Assessment/Plan: 32 year old male with Crohn's disease and complex anorectal fistula. The counter seton is fairly deep into the postanal space but we could consider shortening this tract over time with plan to eventually remove it. He had some hypergranulation tissue at the external posterior seton site that I applied silver nitrate to. I would like this to be better healed before trying to shorten the tract. Would like him to continue with warm tub baths and start tugging on his seton. Will set him up in about 2 months for an examination under anesthesia with attempt to shorten the tract. Continue to follow with Dr. Bowers for Crohn's management.    15 minutes spent on the date of encounter (excluding time performing procedures) performing chart review, history and exam, documentation and further  activities as noted above.    Vidhya Hamm MD  Colon and Rectal Surgery Staff  [facility]      -------------------------------------------------------------------------------------------------------------------          Medical history:  Past Medical History:   Diagnosis Date     Anal fistula      Crohn's disease (H)      HTN (hypertension)        Surgical history:  Past Surgical History:   Procedure Laterality Date     COLONOSCOPY  06/15/2020     EXAM UNDER ANESTHESIA ANUS N/A 11/19/2021    Procedure: EXAM UNDER ANESTHESIA, ANUS;  Surgeon: Vidhya Hamm MD;  Location: [facility] OR     EXAM UNDER ANESTHESIA ANUS N/A 7/8/2022    Procedure: EXAM UNDER ANESTHESIA, ANUS;  Surgeon: Vidhya Hamm MD;  Location: [facility] OR     FISTULOTOMY RECTUM N/A 7/8/2022    Procedure: partial FISTULOTOMY, RECTUM, seton replacement x2;  Surgeon: Vidhya Hamm MD;  Location: [facility] OR     INCISION AND DRAINAGE RECTUM, COMBINED N/A 09/06/2021    Procedure: INCISION AND DRAINAGE, RECTUM, placement of Seton;  Surgeon: Vidhya Hamm MD;  Location: [facility]     PLACEMENT OF SETON RECTUM N/A 11/19/2021    Procedure: PLACEMENT OF SETON RECTUM;  Surgeon: Vidhya Hamm MD;  Location: [facility] OR     SIGMOIDOSCOPY,BIOPSY  09/24/2020       Problem list:    Patient Active Problem List    Diagnosis Date Noted     Anal fistula 11/09/2021     Priority: Medium     Added automatically from request for surgery [id number]       Acute post-operative pain 09/07/2021     Priority: Medium     Perianal abscess 09/06/2021     Priority: Medium     Perianal Crohn's disease, with abscess (H) 09/06/2021     Priority: Medium     IBD (inflammatory bowel disease) 02/22/2021     Priority: Medium       Medications:  Current Outpatient Medications   Medication Sig Dispense Refill     adalimumab (HUMIRA *CF* PEN) 40 MG/0.4ML pen kit Inject 0.4 mLs (40 mg) Subcutaneous every 7 days 4 each 4      lisinopril (ZESTRIL) 20 MG tablet Take 20 mg by mouth every morning        mesalamine (LIALDA) 1.2 g DR tablet Take 4 tablets (4,800 mg) by mouth every morning - Oral 180 tablet 3       Allergies:  No Known Allergies    Family history:  Family History   Problem Relation Age of Onset     Multiple Sclerosis Mother      Ulcerative Colitis Maternal Grandmother      Colon Cancer No family hx of      Inflammatory Bowel Disease No family hx of      Irritable Bowel Syndrome No family hx of      Crohn's Disease No family hx of      Anesthesia Reaction No family hx of      Bleeding Disorder No family hx of      Clotting Disorder No family hx of        Social history:  Social History     Socioeconomic History     Marital status:      Spouse name: Not on file     Number of children: Not on file     Years of education: Not on file     Highest education level: Not on file   Occupational History     Not on file   Tobacco Use     Smoking status: Former Smoker     Packs/day: 0.50     Years: 2.50     Pack years: 1.25     Types: Cigarettes     Start date:      Quit date: 2020     Years since quittin.0     Smokeless tobacco: Never Used   Substance and Sexual Activity     Alcohol use: Yes     Comment: occ     Drug use: Never     Sexual activity: Not on file   Other Topics Concern     Not on file   Social History Narrative     Not on file     Social Determinants of Health     Financial Resource Strain: Not on file   Food Insecurity: Not on file   Transportation Needs: Not on file   Physical Activity: Not on file   Stress: Not on file   Social Connections: Not on file   Intimate Partner Violence: Not on file   Housing Stability: Not on file         Nursing Notes:   Yaima Bergeron  8/3/2022  5:13 PM  Signed  Chief Complaint   Patient presents with     Post-op Visit       Vitals:    22 1712   BP: (!) 141/80   BP Location: Left arm   Patient Position: Sitting   Cuff Size: Adult Regular   Pulse: 84   SpO2: 97%  "  Weight: 217 lb   Height: 5' 9.5\"       Body mass index is 31.59 kg/m .    Yaima Bergeron CMA         Physical Examination:  BP (!) 141/80 (BP Location: Left arm, Patient Position: Sitting, Cuff Size: Adult Regular)   Pulse 84   Ht 5' 9.5\"   Wt 217 lb   SpO2 97%   BMI 31.59 kg/m    General: alert, oriented, in no acute distress, sitting comfortably  HEENT: moist mucous membranes     Perianal external examination:  One yellow vessel loop seton in place to the left lateral with a counter draining yellow vessel loop seton to the posterior. External fistulotomy site with small amount of purulent tissue and granulation tissue. No erythema or fluctuance.      Digital rectal examination: Was deferred.     Anoscopy: Was deferred.          Again, thank you for allowing me to participate in the care of your patient.      Sincerely,    Vidhya Hamm MD      "

## 2022-08-03 NOTE — NURSING NOTE
"Chief Complaint   Patient presents with     Post-op Visit       Vitals:    08/03/22 1712   BP: (!) 141/80   BP Location: Left arm   Patient Position: Sitting   Cuff Size: Adult Regular   Pulse: 84   SpO2: 97%   Weight: 217 lb   Height: 5' 9.5\"       Body mass index is 31.59 kg/m .    Yaima Bergeron CMA    "

## 2022-08-03 NOTE — LETTER
Date:August 4, 2022      Provider requested that no letter be sent. Do not send.       [facility]

## 2022-08-09 ENCOUNTER — PATIENT OUTREACH (OUTPATIENT)
Dept: GASTROENTEROLOGY | Facility: CLINIC | Age: 32
End: 2022-08-09

## 2022-08-09 DIAGNOSIS — K60.30 ANAL FISTULA: ICD-10-CM

## 2022-08-09 DIAGNOSIS — K50.111 CROHN'S DISEASE OF LARGE INTESTINE WITH RECTAL BLEEDING (H): ICD-10-CM

## 2022-08-09 RX ORDER — ADALIMUMAB 40MG/0.4ML
40 KIT SUBCUTANEOUS
Qty: 4 EACH | Refills: 4 | Status: SHIPPED | OUTPATIENT
Start: 2022-08-09 | End: 2022-12-16

## 2022-08-09 NOTE — TELEPHONE ENCOUNTER
Patient calls for refill of humira   Due on Monday the 15th   Refilled   Patient has follow up with Dr Robinson Cheng in June   In basket to Brian Espino that patient due for humira on the 15th

## 2022-08-15 ENCOUNTER — VIRTUAL VISIT (OUTPATIENT)
Dept: GASTROENTEROLOGY | Facility: CLINIC | Age: 32
End: 2022-08-15
Payer: COMMERCIAL

## 2022-08-15 DIAGNOSIS — K50.111 CROHN'S DISEASE OF LARGE INTESTINE WITH RECTAL BLEEDING (H): Primary | ICD-10-CM

## 2022-08-15 PROCEDURE — 99213 OFFICE O/P EST LOW 20 MIN: CPT | Mod: 95 | Performed by: INTERNAL MEDICINE

## 2022-08-15 NOTE — PROGRESS NOTES
Cody is a 32 year old who is being evaluated via a billable video visit.      How would you like to obtain your AVS? [employer]  If the video visit is dropped, the invitation should be resent by: Text to cell phone: [phone]  Will anyone else be joining your video visit? No        Video-Visit Details    Video Start Time: 3:40 PM    Type of service:  Video Visit    Video End Time:3:55 PM    Originating Location (pt. Location): Home    Distant Location (provider location):  [facility]     Platform used for Video Visit: [employer]

## 2022-08-15 NOTE — PROGRESS NOTES
IBD CLINIC VISIT    CC/REFERRING MD:  Referred Self  REASON FOR CONSULTATION: Colitis    ASSESSMENT/PLAN:  31 year old man with Crohn's disease.     1. Crohn's disease:   Current medication:    - Mesalamine 4.8g daily   - Humira/adalimumab q7 days  Current clinical disease activity: Remission   Current endoscopic disease activity: 9/27/21: Near normal flex sig (left colon erythema and erosions) - mild histology.     He is now in clinical remission and near endoscopic remission with only mild disease on histology.  Perianal disease under control with 2 setons in place.  We discussed the possibility of de-escalating mesalamine, however I favor continuing for at least a year before de-escalating.      Will discuss with colorectal surgery team if there is progression of perianal disease. If so, will work to optimize his medical regimen further.     -- Labs: CBC, CRP, ESR, every 6 months, up to date  -- Follow up with colorectal surgery - Dr. Bowers to discuss with CRS team    2. IBD dysplasia surveillance: > 1/3 of colon involved. Will need dysplasia surveillance.   -- IBD dysplasia surveillance starting in 2028    3. Red papules: On forehead, hand. Self resolved.    Return to clinic in 3-4 months.     Alfie Bowers MD MS    [facility]  Inflammatory Bowel Disease Program  Division of Gastroenterology, Hepatology and Nutrition  Pager: [phone]    IBD HISTORY  Age at diagnosis: 30  Endoscopic extent of disease: Continuous: rectum to proximal transverse  Histologic Extent of disease: Rectum, descending   Disease phenotype: Extensive   Rose-anal disease: Yes  Prior IBD surgeries: None  Prior IBD Medications:  - Vedolizumab - not dose escalated     DRUG MONITORING  TPMT enzyme activity:  n/a  6-TGN/6-MMPN levels: n/a  Biologic concentration:   Adalimumab: 6.1 on 9/2021, no antibodies      HPI:   Overall doing well.     Having one to two stools in the morning and some time a third later in the  night. No diarrhea. Stools are formed. Did have one self limited episode of loose stools. No blood in the stools.     No urgency.     Potentially had oral aphthous ulcers, but resolved.     HBI:  Overall patient well being (prior day): 0 (Very well)  Abdominal pain (prior day): 0 (None)  Number of liquid or soft stools (prior day): 0 (1 point per stool)  Abdominal mass on exam:   Complications (1 point for each):   None    Remission <5  Mild activity 5-7  Moderate activity 8-16  Severe > 16      sIBDQ:  IBDQ Score Date IBDQ - Total Score  (Higher score better)   2/28/2022 64          ROS:    Constitutional, HEENT, cardiovascular, pulmonary, GI, , musculoskeletal, neuro, skin, endocrine and psych systems are negative, except as otherwise noted.    PHYSICAL EXAMINATION:  Constitutional: aaox3, cooperative, pleasant, not dyspneic/diaphoretic, no acute distress  Vitals reviewed: There were no vitals taken for this visit.  Wt:   Wt Readings from Last 2 Encounters:   08/03/22 98.4 kg (217 lb)   07/13/22 96.2 kg (212 lb)      Constitutional - general appearance is well and in no acute distress.  Eyes - sunglasses on   Respiratory - No cough, unlabored breathing  Musculoskeletal - range of motion intact: Neck and arms  Skin - No discoloration or lesions visible - pt reports red pustule/papule unable to see via video  Neurological - No tremors, headaches  Psychiatric - No anxiety, alert & oriented    PERTINENT PAST MEDICAL HISTORY:  Past Medical History:   Diagnosis Date     Anal fistula      Crohn's disease (H)      HTN (hypertension)       Inflammatory bowel disease  Hypertension     PREVIOUS SURGERIES:  Past Surgical History:   Procedure Laterality Date     COLONOSCOPY  06/15/2020     EXAM UNDER ANESTHESIA ANUS N/A 11/19/2021    Procedure: EXAM UNDER ANESTHESIA, ANUS;  Surgeon: Vidhya Hamm MD;  Location: [facility] OR     EXAM UNDER ANESTHESIA ANUS N/A 7/8/2022    Procedure: EXAM UNDER ANESTHESIA, ANUS;   Surgeon: Vidhya Hamm MD;  Location: [facility] OR     FISTULOTOMY RECTUM N/A 2022    Procedure: partial FISTULOTOMY, RECTUM, seton replacement x2;  Surgeon: Vidhya Hamm MD;  Location: [facility] OR     INCISION AND DRAINAGE RECTUM, COMBINED N/A 2021    Procedure: INCISION AND DRAINAGE, RECTUM, placement of Seton;  Surgeon: Vidhya Hamm MD;  Location: [facility] OR     PLACEMENT OF SETON RECTUM N/A 2021    Procedure: PLACEMENT OF SETON RECTUM;  Surgeon: Vidhya Hamm MD;  Location: [facility] OR     SIGMOIDOSCOPY,BIOPSY  2020     ALLERGIES:   No Known Allergies    PERTINENT MEDICATIONS:    Current Outpatient Medications:      adalimumab (HUMIRA *CF* PEN) 40 MG/0.4ML pen kit, Inject 0.4 mLs (40 mg) Subcutaneous every 7 days, Disp: 4 each, Rfl: 4     lisinopril (ZESTRIL) 20 MG tablet, Take 20 mg by mouth every morning , Disp: , Rfl:      mesalamine (LIALDA) 1.2 g DR tablet, Take 4 tablets (4,800 mg) by mouth every morning - Oral, Disp: 180 tablet, Rfl: 3    SOCIAL HISTORY:  Social History     Socioeconomic History     Marital status:      Spouse name: Not on file     Number of children: Not on file     Years of education: Not on file     Highest education level: Not on file   Occupational History     Not on file   Tobacco Use     Smoking status: Former Smoker     Packs/day: 0.50     Years: 2.50     Pack years: 1.25     Types: Cigarettes     Start date:      Quit date: 2020     Years since quittin.0     Smokeless tobacco: Never Used   Substance and Sexual Activity     Alcohol use: Yes     Comment: occ     Drug use: Never     Sexual activity: Not on file   Other Topics Concern     Not on file   Social History Narrative     Not on file     Social Determinants of Health     Financial Resource Strain: Not on file   Food Insecurity: Not on file   Transportation Needs: Not on file   Physical Activity: Not on file   Stress: Not on file   Social Connections:  Not on file   Intimate Partner Violence: Not on file   Housing Stability: Not on file       FAMILY HISTORY:  Family History   Problem Relation Age of Onset     Multiple Sclerosis Mother      Ulcerative Colitis Maternal Grandmother      Colon Cancer No family hx of      Inflammatory Bowel Disease No family hx of      Irritable Bowel Syndrome No family hx of      Crohn's Disease No family hx of      Anesthesia Reaction No family hx of      Bleeding Disorder No family hx of      Clotting Disorder No family hx of      No family history of IBD  Past/family/social history reviewed and no changes

## 2022-08-22 ENCOUNTER — DOCUMENTATION ONLY (OUTPATIENT)
Dept: GASTROENTEROLOGY | Facility: CLINIC | Age: 32
End: 2022-08-22

## 2022-08-22 ENCOUNTER — DOCUMENTATION ONLY (OUTPATIENT)
Dept: LAB | Facility: CLINIC | Age: 32
End: 2022-08-22

## 2022-08-22 ENCOUNTER — PATIENT OUTREACH (OUTPATIENT)
Dept: GASTROENTEROLOGY | Facility: CLINIC | Age: 32
End: 2022-08-22

## 2022-08-22 DIAGNOSIS — K50.111 CROHN'S DISEASE OF LARGE INTESTINE WITH RECTAL BLEEDING (H): Primary | ICD-10-CM

## 2022-08-22 NOTE — PROGRESS NOTES
Patient is requesting the following;   humira level plus cbd, lft, esr, and crp    Please order future labs for today's (8-22) appointment. @ 9918.   Thanks, Nicki GARAY

## 2022-08-22 NOTE — TELEPHONE ENCOUNTER
Alfie Bowers MD [employer], Ann, RN  Hey can we coordinate a Humira trough for him?        Patient will do labs today including humira monitoring labs and humira level       Scheduled for follow up video with Dr Bowers

## 2022-08-23 ENCOUNTER — MEDICAL CORRESPONDENCE (OUTPATIENT)
Dept: HEALTH INFORMATION MANAGEMENT | Facility: CLINIC | Age: 32
End: 2022-08-23

## 2022-08-29 ENCOUNTER — LAB (OUTPATIENT)
Dept: LAB | Facility: CLINIC | Age: 32
End: 2022-08-29
Payer: COMMERCIAL

## 2022-08-29 DIAGNOSIS — K50.111 CROHN'S DISEASE OF LARGE INTESTINE WITH RECTAL BLEEDING (H): ICD-10-CM

## 2022-08-29 PROCEDURE — 36415 COLL VENOUS BLD VENIPUNCTURE: CPT

## 2022-08-29 PROCEDURE — 80145 DRUG ASSAY ADALIMUMAB: CPT

## 2022-08-29 PROCEDURE — 82542 COL CHROMOTOGRAPHY QUAL/QUAN: CPT

## 2022-08-29 PROCEDURE — 99000 SPECIMEN HANDLING OFFICE-LAB: CPT

## 2022-09-06 LAB
ADALIMUMAB AB SERPL IA-MCNC: <1.7 U/ML
ADALIMUMAB SERPL-MCNC: 28 UG/ML

## 2022-09-24 ENCOUNTER — HEALTH MAINTENANCE LETTER (OUTPATIENT)
Age: 32
End: 2022-09-24

## 2022-10-14 ENCOUNTER — TELEPHONE (OUTPATIENT)
Dept: SURGERY | Facility: CLINIC | Age: 32
End: 2022-10-14

## 2022-10-14 NOTE — TELEPHONE ENCOUNTER
Case Request received to schedule:    Patient Name: Cody Pickard   MRN: [id number]   Case#: [id number]   Surgeon(s) and Role:      * Vihdya Hamm MD - Primary   Date requested: * No dates entered *   Location: [facility] OR   Procedure(s):   EXAM UNDER ANESTHESIA, ANUS, possible fistulotomy (N/A)   FISTULOTOMY, RECTUM (N/A)    Additional Instructions for the Case  Multi-surgeon case:  no  Time in minutes:  30  Anesthesia: MAC  Prep: 2 fleets  Pre-Op: PAC vs PCP  Labs: no  WOC: no  Special equipment: no  Special Instructions: schedule in October     Schedule with Rachel Mahmood NP 2-3 weeks after surgery.    Patient is scheduled for surgery with Dr. Hamm    Spoke with: Cody     Date of Surgery: 12/16/2022    [facility]    Informed patient they will need an adult  Yes    H&P: Scheduled with PAC Video Visit on 12/9/2022 at 11:00 AM    Pre-procedure COVID-19 Test: Home COVID Antigen Test    Post-op appointment: with Rachel Mahmood NP     Surgery packet: MyChart only is ok per patient     Jennifer Vargas  Rose-op Coordinator  [place]  Direct Phone: [phone]

## 2022-10-17 NOTE — TELEPHONE ENCOUNTER
FUTURE VISIT INFORMATION      SURGERY INFORMATION:    Date: 22    Location: [facility] or    Surgeon:  Vidhya Hamm MD    Anesthesia Type:  MAC    Procedure: EXAM UNDER ANESTHESIA, ANUS, POSSIBLE FISTULOTOMY, RECTUM    RECORDS REQUESTED FROM:       Primary Care Provider: Nash Morales MD- [facility]    Most recent EKG+ Tracin16- [facility]

## 2022-10-28 DIAGNOSIS — K50.111 CROHN'S DISEASE OF LARGE INTESTINE WITH RECTAL BLEEDING (H): ICD-10-CM

## 2022-11-02 ENCOUNTER — PATIENT OUTREACH (OUTPATIENT)
Dept: GASTROENTEROLOGY | Facility: CLINIC | Age: 32
End: 2022-11-02

## 2022-11-02 DIAGNOSIS — K50.111 CROHN'S DISEASE OF LARGE INTESTINE WITH RECTAL BLEEDING (H): Primary | ICD-10-CM

## 2022-11-02 RX ORDER — MESALAMINE 1.2 G/1
TABLET, DELAYED RELEASE ORAL
Qty: 120 TABLET | Refills: 4 | Status: SHIPPED | OUTPATIENT
Start: 2022-11-02 | End: 2023-05-09

## 2022-11-02 NOTE — TELEPHONE ENCOUNTER
mesalamine (LIALDA) 1.2 g DR tablet   Last Written Prescription Date:  3/8/22  Last Fill Quantity: 180,   # refills: 3  Last Office Visit :8/15/22  Future Office visit: 11/21/22

## 2022-11-02 NOTE — TELEPHONE ENCOUNTER
Patient has follow up with Dr Bowers   Will complete labs the 3rd   Added one time order for vitamin b12   Patient has creatinine in June of 2022

## 2022-11-14 ENCOUNTER — LAB (OUTPATIENT)
Dept: LAB | Facility: CLINIC | Age: 32
End: 2022-11-14
Payer: COMMERCIAL

## 2022-11-14 DIAGNOSIS — K50.111 CROHN'S DISEASE OF LARGE INTESTINE WITH RECTAL BLEEDING (H): ICD-10-CM

## 2022-11-14 LAB
ALBUMIN SERPL-MCNC: 4.3 G/DL (ref 3.4–5)
ALP SERPL-CCNC: 76 U/L (ref 40–150)
ALT SERPL W P-5'-P-CCNC: 27 U/L (ref 0–70)
AST SERPL W P-5'-P-CCNC: 21 U/L (ref 0–45)
BASOPHILS # BLD AUTO: 0.1 10E3/UL (ref 0–0.2)
BASOPHILS NFR BLD AUTO: 1 %
BILIRUB DIRECT SERPL-MCNC: 0.2 MG/DL (ref 0–0.2)
BILIRUB SERPL-MCNC: 0.6 MG/DL (ref 0.2–1.3)
CRP SERPL-MCNC: 3.4 MG/L (ref 0–8)
EOSINOPHIL # BLD AUTO: 0.2 10E3/UL (ref 0–0.7)
EOSINOPHIL NFR BLD AUTO: 2 %
ERYTHROCYTE [DISTWIDTH] IN BLOOD BY AUTOMATED COUNT: 12.3 % (ref 10–15)
ERYTHROCYTE [SEDIMENTATION RATE] IN BLOOD BY WESTERGREN METHOD: 6 MM/HR (ref 0–15)
HCT VFR BLD AUTO: 43 % (ref 40–53)
HGB BLD-MCNC: 14.5 G/DL (ref 13.3–17.7)
IMM GRANULOCYTES # BLD: 0 10E3/UL
IMM GRANULOCYTES NFR BLD: 0 %
LYMPHOCYTES # BLD AUTO: 2.3 10E3/UL (ref 0.8–5.3)
LYMPHOCYTES NFR BLD AUTO: 34 %
MCH RBC QN AUTO: 29.8 PG (ref 26.5–33)
MCHC RBC AUTO-ENTMCNC: 33.7 G/DL (ref 31.5–36.5)
MCV RBC AUTO: 89 FL (ref 78–100)
MONOCYTES # BLD AUTO: 0.7 10E3/UL (ref 0–1.3)
MONOCYTES NFR BLD AUTO: 10 %
NEUTROPHILS # BLD AUTO: 3.6 10E3/UL (ref 1.6–8.3)
NEUTROPHILS NFR BLD AUTO: 53 %
NRBC # BLD AUTO: 0 10E3/UL
NRBC BLD AUTO-RTO: 0 /100
PLATELET # BLD AUTO: 360 10E3/UL (ref 150–450)
PROT SERPL-MCNC: 8.1 G/DL (ref 6.8–8.8)
RBC # BLD AUTO: 4.86 10E6/UL (ref 4.4–5.9)
VIT B12 SERPL-MCNC: 504 PG/ML (ref 232–1245)
WBC # BLD AUTO: 6.8 10E3/UL (ref 4–11)

## 2022-11-14 PROCEDURE — 82607 VITAMIN B-12: CPT

## 2022-11-14 PROCEDURE — 86140 C-REACTIVE PROTEIN: CPT

## 2022-11-14 PROCEDURE — 80076 HEPATIC FUNCTION PANEL: CPT

## 2022-11-14 PROCEDURE — 36415 COLL VENOUS BLD VENIPUNCTURE: CPT

## 2022-11-14 PROCEDURE — 85652 RBC SED RATE AUTOMATED: CPT

## 2022-11-14 PROCEDURE — 85025 COMPLETE CBC W/AUTO DIFF WBC: CPT

## 2022-11-21 ENCOUNTER — PATIENT OUTREACH (OUTPATIENT)
Dept: GASTROENTEROLOGY | Facility: CLINIC | Age: 32
End: 2022-11-21

## 2022-11-21 ENCOUNTER — VIRTUAL VISIT (OUTPATIENT)
Dept: GASTROENTEROLOGY | Facility: CLINIC | Age: 32
End: 2022-11-21
Payer: COMMERCIAL

## 2022-11-21 ENCOUNTER — PATIENT OUTREACH (OUTPATIENT)
Dept: SURGERY | Facility: CLINIC | Age: 32
End: 2022-11-21

## 2022-11-21 VITALS — BODY MASS INDEX: 32.02 KG/M2 | WEIGHT: 220 LBS

## 2022-11-21 DIAGNOSIS — K50.111 CROHN'S DISEASE OF LARGE INTESTINE WITH RECTAL BLEEDING (H): Primary | ICD-10-CM

## 2022-11-21 PROCEDURE — 99213 OFFICE O/P EST LOW 20 MIN: CPT | Mod: 95 | Performed by: INTERNAL MEDICINE

## 2022-11-21 RX ORDER — AMLODIPINE BESYLATE 5 MG/1
5 TABLET ORAL EVERY MORNING
COMMUNITY
Start: 2022-11-02

## 2022-11-21 ASSESSMENT — PAIN SCALES - GENERAL: PAINLEVEL: NO PAIN (0)

## 2022-11-21 NOTE — PROGRESS NOTES
"Cody Pickard is a 32 year old male who is being evaluated via a billable video visit.      The patient has been notified of following:     \"This video visit will be conducted via a call between you and your physician/provider. We have found that certain health care needs can be provided without the need for an in-person physical exam.  This service lets us provide the care you need with a video conversation.  If a prescription is necessary we can send it directly to your pharmacy.  If lab work is needed we can place an order for that and you can then stop by our lab to have the test done at a later time.    If during the course of the call the physician/provider feels a video visit is not appropriate, you will not be charged for this service.\"     Patient confirmed that they are in [place] for today's visit Yes    Video-Visit Details  Type of service:  Video Visit    Video Start Time: 3:43 PM  Video End Time:  3:55 PM    Originating Location (pt. Location): Home    Distant Location (provider location):  [facility]     Platform used: [employer]     Provider location: On-site              "

## 2022-11-21 NOTE — PROGRESS NOTES
Patient's seton broke and fell out. Plans for the OR for possible fistulotomy on 12/16 with Dr. Hamm. Discussed with Rachel Mahmood NP. She would like to see him before the OR to check on his hypergranulation tissue near seton site. Scheduled appt.

## 2022-11-21 NOTE — TELEPHONE ENCOUNTER
Patient left a message this weekend that one of his seton fell out on Friday night  One is still intact  Patient does have surgical procedure scheduled  with  Dr Gagan Jackson on December 16  Follow up appointment in [facility]  Patient has video visit with Dr Bowers today the 21 st  States drainage from seton after bowel movement  Yellowish in color   Stools are semi formed   States overall doing well   Will route this message to Dr. Gagan Jackson . RN Care coordinator

## 2022-11-21 NOTE — PROGRESS NOTES
IBD CLINIC VISIT    CC/REFERRING MD:  Referred Self  REASON FOR CONSULTATION: Colitis    ASSESSMENT/PLAN:  32 year old male with Crohn's disease.     1. Crohn's disease:   Current medication:    - Mesalamine 4.8g daily   - Humira/adalimumab q7 days  Current clinical disease activity: Remission   Current endoscopic disease activity: 9/27/21: Near normal flex sig (left colon erythema and erosions) - mild histology.     Luminal symptoms inactive.  I believe he is in clinical remission with some occasional symptoms related to dietary indiscretions.  His seton fell out and he is talked to the colorectal team about replacing it and this is being coordinated.    We did discuss potentially endoscopically restaging him, we will hold on this time as he is getting his seton replaced.  We will start with a fecal calprotectin in January.  Based on the results of that and his symptoms we will consider colonoscopy in the future.    -- Recommend continue mesalamine at this time  -- Recommend continuing Humira  -- Seton placement per colorectal surgery  -- Fecal calprotectin in January  -- Follow-up to review fecal calprotectin and GI symptoms in February with DIEUDONNE    2. IBD dysplasia surveillance: > 1/3 of colon involved. Will need dysplasia surveillance.   -- IBD dysplasia surveillance starting in 2028    3. Red papules: On forehead, hand. Self resolved.    Return to clinic in 3-4 months.     Alfie Bowers MD MS    [facility]  Inflammatory Bowel Disease Program  Division of Gastroenterology, Hepatology and Nutrition  Pager: [phone]    IBD HISTORY  Age at diagnosis: 30  Endoscopic extent of disease: Continuous: rectum to proximal transverse  Histologic Extent of disease: Rectum, descending   Disease phenotype: Extensive   Rose-anal disease: Yes  Prior IBD surgeries: None  Prior IBD Medications:  - Vedolizumab - not dose escalated     DRUG MONITORING  TPMT enzyme activity:  n/a  6-TGN/6-MMPN levels:  n/a  Biologic concentration:   Adalimumab: 6.1 on 9/2021, no antibodies      HPI:   Had one of his setons snap and fall out. Having some drainage but no abscess or infection.    Otherwise he is doing OK.     He notes that he is gaining weight. He thinks he is up 220. Otherwise he is feeling well.     Stools are normal. Has 1-2 in AM. Will have an occasional episode of liquid stools. Although stools are never solid.       sIBDQ:  IBDQ Score Date IBDQ - Total Score  (Higher score better)   2/28/2022 64          ROS:    Constitutional, HEENT, cardiovascular, pulmonary, GI, , musculoskeletal, neuro, skin, endocrine and psych systems are negative, except as otherwise noted.    PHYSICAL EXAMINATION:  Constitutional: aaox3, cooperative, pleasant, not dyspneic/diaphoretic, no acute distress  Vitals reviewed: Wt 99.8 kg (220 lb)   BMI 32.02 kg/m    Wt:   Wt Readings from Last 2 Encounters:   11/21/22 99.8 kg (220 lb)   08/03/22 98.4 kg (217 lb)      Constitutional - general appearance is well and in no acute distress.  Eyes - sunglasses on   Respiratory - No cough, unlabored breathing  Musculoskeletal - range of motion intact: Neck and arms  Skin - No discoloration or lesions visible - pt reports red pustule/papule unable to see via video  Neurological - No tremors, headaches  Psychiatric - No anxiety, alert & oriented    PERTINENT PAST MEDICAL HISTORY:  Past Medical History:   Diagnosis Date     Anal fistula      Crohn's disease (H)      HTN (hypertension)       Inflammatory bowel disease  Hypertension     PREVIOUS SURGERIES:  Past Surgical History:   Procedure Laterality Date     COLONOSCOPY  06/15/2020     EXAM UNDER ANESTHESIA ANUS N/A 11/19/2021    Procedure: EXAM UNDER ANESTHESIA, ANUS;  Surgeon: Vidhya Hamm MD;  Location: [facility]     EXAM UNDER ANESTHESIA ANUS N/A 7/8/2022    Procedure: EXAM UNDER ANESTHESIA, ANUS;  Surgeon: Vidhya Hamm MD;  Location: [facility] OR     FISTULOTOMY RECTUM N/A  2022    Procedure: partial FISTULOTOMY, RECTUM, seton replacement x2;  Surgeon: Vidhya Hamm MD;  Location: [facility] OR     INCISION AND DRAINAGE RECTUM, COMBINED N/A 2021    Procedure: INCISION AND DRAINAGE, RECTUM, placement of Seton;  Surgeon: Vidhya Hamm MD;  Location: [facility] OR     PLACEMENT OF SETON RECTUM N/A 2021    Procedure: PLACEMENT OF SETON RECTUM;  Surgeon: Vidhya Hamm MD;  Location: [facility] OR     SIGMOIDOSCOPY,BIOPSY  2020     ALLERGIES:   No Known Allergies    PERTINENT MEDICATIONS:    Current Outpatient Medications:      adalimumab (HUMIRA *CF* PEN) 40 MG/0.4ML pen kit, Inject 0.4 mLs (40 mg) Subcutaneous every 7 days, Disp: 4 each, Rfl: 4     lisinopril (ZESTRIL) 20 MG tablet, Take 20 mg by mouth every morning , Disp: , Rfl:      mesalamine (LIALDA) 1.2 g DR tablet, TAKE 4 TABLETS(4800 MG) BY MOUTH EVERY MORNING, Disp: 120 tablet, Rfl: 4    SOCIAL HISTORY:  Social History     Socioeconomic History     Marital status:      Spouse name: Not on file     Number of children: Not on file     Years of education: Not on file     Highest education level: Not on file   Occupational History     Not on file   Tobacco Use     Smoking status: Former     Packs/day: 0.50     Years: 2.50     Pack years: 1.25     Types: Cigarettes     Start date:      Quit date: 2020     Years since quittin.3     Smokeless tobacco: Never   Substance and Sexual Activity     Alcohol use: Yes     Comment: occ     Drug use: Never     Sexual activity: Not on file   Other Topics Concern     Not on file   Social History Narrative     Not on file     Social Determinants of Health     Financial Resource Strain: Not on file   Food Insecurity: Not on file   Transportation Needs: Not on file   Physical Activity: Not on file   Stress: Not on file   Social Connections: Not on file   Intimate Partner Violence: Not on file   Housing Stability: Not on file       FAMILY  HISTORY:  Family History   Problem Relation Age of Onset     Multiple Sclerosis Mother      Ulcerative Colitis Maternal Grandmother      Colon Cancer No family hx of      Inflammatory Bowel Disease No family hx of      Irritable Bowel Syndrome No family hx of      Crohn's Disease No family hx of      Anesthesia Reaction No family hx of      Bleeding Disorder No family hx of      Clotting Disorder No family hx of      No family history of IBD  Past/family/social history reviewed and no changes

## 2022-11-21 NOTE — PATIENT INSTRUCTIONS
-- Colorectal follow-up for seton replacement / reevaluation     -- Stool test for inflammation in January     -- Follow-up in 3-4 months with DIEUDONNE to review symptoms.

## 2022-11-30 ENCOUNTER — MYC MEDICAL ADVICE (OUTPATIENT)
Dept: SURGERY | Facility: CLINIC | Age: 32
End: 2022-11-30

## 2022-12-08 ENCOUNTER — OFFICE VISIT (OUTPATIENT)
Dept: SURGERY | Facility: CLINIC | Age: 32
End: 2022-12-08
Payer: COMMERCIAL

## 2022-12-08 VITALS
BODY MASS INDEX: 32.01 KG/M2 | SYSTOLIC BLOOD PRESSURE: 118 MMHG | HEART RATE: 69 BPM | HEIGHT: 70 IN | WEIGHT: 223.6 LBS | DIASTOLIC BLOOD PRESSURE: 55 MMHG

## 2022-12-08 DIAGNOSIS — K50.913 CROHN'S DISEASE WITH FISTULA, UNSPECIFIED GASTROINTESTINAL TRACT LOCATION (H): ICD-10-CM

## 2022-12-08 DIAGNOSIS — K60.30 ANAL FISTULA: Primary | ICD-10-CM

## 2022-12-08 PROCEDURE — 99212 OFFICE O/P EST SF 10 MIN: CPT | Performed by: NURSE PRACTITIONER

## 2022-12-08 ASSESSMENT — PAIN SCALES - GENERAL: PAINLEVEL: NO PAIN (0)

## 2022-12-08 NOTE — PROGRESS NOTES
Colon and Rectal Surgery Consult Clinic Note      RE: Cody Pickard  : 1990  WIL: 2022    Cody Pickard is a very pleasant 32 year old male with a history of perianal Crohn's and 2 prior setons now status post examination under anesthesia, debridement of perirectal abscess and fistula with complex tract, partial fistulotomy, replacement of both setons on 22.  He continues to follow with Dr. Bowers and is currently on mesalamine and Humira.    Interval History:  Cody has been doing well. His seton fell out about a week ago. He had one day that he thought the wound opened up more but no significant pain. No fevers or chills. Only a small amount of blood from prior seton site but continue drainage from current seton.    PLEASE SEE NOTE BELOW FOR PHYSICAL EXAMINATION, REVIEW OF SYSTEMS, AND OTHER HISTORY.    Assessment/Plan: 32 year old male with Crohn's disease and complex anorectal fistula. Hypergranulation tissue has improved at the counter seton site but seton has unfortunately fallen out. The prior counter seton is fairly deep into the postanal space but drainage has decreased and so has the hypergranulation tissue. Planning for examination under anesthesia next week with Dr. Hamm to consider shortening this tract over time with plan to eventually remove it. Continue to follow with Dr. Bowers for Crohn's management.    10 minutes spent on the date of encounter (excluding time performing procedures) performing chart review, history and exam, documentation and further activities as noted above.    MEENAKSHI Bass, NP-C  Colon and Rectal Surgery  [facility]      -------------------------------------------------------------------------------------------------------------------          Medical history:  Past Medical History:   Diagnosis Date     Anal fistula      Crohn's disease (H)      HTN (hypertension)        Surgical history:  Past Surgical History:    Procedure Laterality Date     COLONOSCOPY  06/15/2020     EXAM UNDER ANESTHESIA ANUS N/A 11/19/2021    Procedure: EXAM UNDER ANESTHESIA, ANUS;  Surgeon: Vidhya Hamm MD;  Location: [facility] OR     EXAM UNDER ANESTHESIA ANUS N/A 7/8/2022    Procedure: EXAM UNDER ANESTHESIA, ANUS;  Surgeon: Vidhya Hamm MD;  Location: [facility] OR     FISTULOTOMY RECTUM N/A 7/8/2022    Procedure: partial FISTULOTOMY, RECTUM, seton replacement x2;  Surgeon: Vidhya Hamm MD;  Location: [facility] OR     INCISION AND DRAINAGE RECTUM, COMBINED N/A 09/06/2021    Procedure: INCISION AND DRAINAGE, RECTUM, placement of Seton;  Surgeon: Vidhya Hamm MD;  Location: [facility] OR     PLACEMENT OF SETON RECTUM N/A 11/19/2021    Procedure: PLACEMENT OF SETON RECTUM;  Surgeon: Vidhya Hamm MD;  Location: [facility] OR     SIGMOIDOSCOPY,BIOPSY  09/24/2020       Problem list:    Patient Active Problem List    Diagnosis Date Noted     Anal fistula 11/09/2021     Priority: Medium     Added automatically from request for surgery [id number]       Acute post-operative pain 09/07/2021     Priority: Medium     Perianal abscess 09/06/2021     Priority: Medium     Perianal Crohn's disease, with abscess (H) 09/06/2021     Priority: Medium     IBD (inflammatory bowel disease) 02/22/2021     Priority: Medium       Medications:  Current Outpatient Medications   Medication Sig Dispense Refill     adalimumab (HUMIRA *CF* PEN) 40 MG/0.4ML pen kit Inject 0.4 mLs (40 mg) Subcutaneous every 7 days 4 each 4     amLODIPine (NORVASC) 5 MG tablet        lisinopril (ZESTRIL) 20 MG tablet Take 20 mg by mouth every morning        mesalamine (LIALDA) 1.2 g DR tablet TAKE 4 TABLETS(4800 MG) BY MOUTH EVERY MORNING 120 tablet 4       Allergies:  No Known Allergies    Family history:  Family History   Problem Relation Age of Onset     Multiple Sclerosis Mother      Ulcerative Colitis Maternal Grandmother      Colon Cancer No family hx of   "    Inflammatory Bowel Disease No family hx of      Irritable Bowel Syndrome No family hx of      Crohn's Disease No family hx of      Anesthesia Reaction No family hx of      Bleeding Disorder No family hx of      Clotting Disorder No family hx of        Social history:  Social History     Socioeconomic History     Marital status:      Spouse name: Not on file     Number of children: Not on file     Years of education: Not on file     Highest education level: Not on file   Occupational History     Not on file   Tobacco Use     Smoking status: Former     Packs/day: 0.50     Years: 2.50     Pack years: 1.25     Types: Cigarettes     Start date:      Quit date: 2020     Years since quittin.3     Smokeless tobacco: Never   Substance and Sexual Activity     Alcohol use: Yes     Comment: occ     Drug use: Never     Sexual activity: Not on file   Other Topics Concern     Not on file   Social History Narrative     Not on file     Social Determinants of Health     Financial Resource Strain: Not on file   Food Insecurity: Not on file   Transportation Needs: Not on file   Physical Activity: Not on file   Stress: Not on file   Social Connections: Not on file   Intimate Partner Violence: Not on file   Housing Stability: Not on file         Nursing Notes:   Modesto Whittington, EMT  2022 10:08 AM  Signed  Chief Complaint   Patient presents with     RECHECK     Wound Check       Vitals:    22 1006   BP: 118/55   BP Location: Left arm   Patient Position: Sitting   Cuff Size: Adult Large   Pulse: 69   Weight: 223 lb 9.6 oz   Height: 5' 10\"       Body mass index is 32.08 kg/m .     [place]esto Whittington, EMT- P         Physical Examination:  /55 (BP Location: Left arm, Patient Position: Sitting, Cuff Size: Adult Large)   Pulse 69   Ht 5' 10\"   Wt 223 lb 9.6 oz   BMI 32.08 kg/m    General: alert, oriented, in no acute distress, sitting comfortably  HEENT: moist mucous membranes     Perianal external " examination:  One yellow vessel loop seton in place to the left lateral. Prior counter drain no longer in place. Small opening at the prior seton site with no hypergranulation tissue, erythema, or drainage. Some purulent drainage from current seton.     Digital rectal examination: Was deferred.     Anoscopy: Was deferred.

## 2022-12-08 NOTE — LETTER
Date:December 8, 2022      Provider requested that no letter be sent. Do not send.       [facility]

## 2022-12-08 NOTE — NURSING NOTE
"Chief Complaint   Patient presents with     RECHECK     Wound Check       Vitals:    12/08/22 1006   BP: 118/55   BP Location: Left arm   Patient Position: Sitting   Cuff Size: Adult Large   Pulse: 69   Weight: 223 lb 9.6 oz   Height: 5' 10\"       Body mass index is 32.08 kg/m .     [place] Whittington, EMT- P    "

## 2022-12-08 NOTE — LETTER
2022       RE: Cody Pickard  [address] [address] [place] [id number]     Dear Colleague,    Thank you for referring your patient, Cody Pickard, to the [facility] COLON AND RECTAL SURGERY CLINIC [place] at [facility]. Please see a copy of my visit note below.    Colon and Rectal Surgery Consult Clinic Note      RE: Cody Pickard  : 1990  WIL: 2022    Cody Pickard is a very pleasant 32 year old male with a history of perianal Crohn's and 2 prior setons now status post examination under anesthesia, debridement of perirectal abscess and fistula with complex tract, partial fistulotomy, replacement of both setons on 22.  He continues to follow with Dr. Bowers and is currently on mesalamine and Humira.    Interval History:  Cody has been doing well. His seton fell out about a week ago. He had one day that he thought the wound opened up more but no significant pain. No fevers or chills. Only a small amount of blood from prior seton site but continue drainage from current seton.    PLEASE SEE NOTE BELOW FOR PHYSICAL EXAMINATION, REVIEW OF SYSTEMS, AND OTHER HISTORY.    Assessment/Plan: 32 year old male with Crohn's disease and complex anorectal fistula. Hypergranulation tissue has improved at the counter seton site but seton has unfortunately fallen out. The prior counter seton is fairly deep into the postanal space but drainage has decreased and so has the hypergranulation tissue. Planning for examination under anesthesia next week with Dr. Hamm to consider shortening this tract over time with plan to eventually remove it. Continue to follow with Dr. Bowers for Crohn's management.    10 minutes spent on the date of encounter (excluding time performing procedures) performing chart review, history and exam, documentation and further activities as noted above.    MEENAKSHI Bass, NP-C  Colon and Rectal Surgery  [place]  [facility]      -------------------------------------------------------------------------------------------------------------------          Medical history:  Past Medical History:   Diagnosis Date     Anal fistula      Crohn's disease (H)      HTN (hypertension)        Surgical history:  Past Surgical History:   Procedure Laterality Date     COLONOSCOPY  06/15/2020     EXAM UNDER ANESTHESIA ANUS N/A 11/19/2021    Procedure: EXAM UNDER ANESTHESIA, ANUS;  Surgeon: Vidhya Hamm MD;  Location: [facility] OR     EXAM UNDER ANESTHESIA ANUS N/A 7/8/2022    Procedure: EXAM UNDER ANESTHESIA, ANUS;  Surgeon: Vidhya Hamm MD;  Location: [facility] OR     FISTULOTOMY RECTUM N/A 7/8/2022    Procedure: partial FISTULOTOMY, RECTUM, seton replacement x2;  Surgeon: Vidhya Hamm MD;  Location: [facility] OR     INCISION AND DRAINAGE RECTUM, COMBINED N/A 09/06/2021    Procedure: INCISION AND DRAINAGE, RECTUM, placement of Seton;  Surgeon: Vidhya Hamm MD;  Location: [facility] OR     PLACEMENT OF SETON RECTUM N/A 11/19/2021    Procedure: PLACEMENT OF SETON RECTUM;  Surgeon: Vidhya Hamm MD;  Location: [facility] OR     SIGMOIDOSCOPY,BIOPSY  09/24/2020       Problem list:    Patient Active Problem List    Diagnosis Date Noted     Anal fistula 11/09/2021     Priority: Medium     Added automatically from request for surgery [id number]       Acute post-operative pain 09/07/2021     Priority: Medium     Perianal abscess 09/06/2021     Priority: Medium     Perianal Crohn's disease, with abscess (H) 09/06/2021     Priority: Medium     IBD (inflammatory bowel disease) 02/22/2021     Priority: Medium       Medications:  Current Outpatient Medications   Medication Sig Dispense Refill     adalimumab (HUMIRA *CF* PEN) 40 MG/0.4ML pen kit Inject 0.4 mLs (40 mg) Subcutaneous every 7 days 4 each 4     amLODIPine (NORVASC) 5 MG tablet        lisinopril (ZESTRIL) 20 MG tablet Take 20 mg by mouth every  "morning        mesalamine (LIALDA) 1.2 g DR tablet TAKE 4 TABLETS(4800 MG) BY MOUTH EVERY MORNING 120 tablet 4       Allergies:  No Known Allergies    Family history:  Family History   Problem Relation Age of Onset     Multiple Sclerosis Mother      Ulcerative Colitis Maternal Grandmother      Colon Cancer No family hx of      Inflammatory Bowel Disease No family hx of      Irritable Bowel Syndrome No family hx of      Crohn's Disease No family hx of      Anesthesia Reaction No family hx of      Bleeding Disorder No family hx of      Clotting Disorder No family hx of        Social history:  Social History     Socioeconomic History     Marital status:      Spouse name: Not on file     Number of children: Not on file     Years of education: Not on file     Highest education level: Not on file   Occupational History     Not on file   Tobacco Use     Smoking status: Former     Packs/day: 0.50     Years: 2.50     Pack years: 1.25     Types: Cigarettes     Start date:      Quit date: 2020     Years since quittin.3     Smokeless tobacco: Never   Substance and Sexual Activity     Alcohol use: Yes     Comment: occ     Drug use: Never     Sexual activity: Not on file   Other Topics Concern     Not on file   Social History Narrative     Not on file     Social Determinants of Health     Financial Resource Strain: Not on file   Food Insecurity: Not on file   Transportation Needs: Not on file   Physical Activity: Not on file   Stress: Not on file   Social Connections: Not on file   Intimate Partner Violence: Not on file   Housing Stability: Not on file         Nursing Notes:   [place] Whittington, EMT  2022 10:08 AM  Signed  Chief Complaint   Patient presents with     RECHECK     Wound Check       Vitals:    22 1006   BP: 118/55   BP Location: Left arm   Patient Position: Sitting   Cuff Size: Adult Large   Pulse: 69   Weight: 223 lb 9.6 oz   Height: 5' 10\"       Body mass index is 32.08 kg/m Deric Salvador" "Pk, EMT- P         Physical Examination:  /55 (BP Location: Left arm, Patient Position: Sitting, Cuff Size: Adult Large)   Pulse 69   Ht 5' 10\"   Wt 223 lb 9.6 oz   BMI 32.08 kg/m    General: alert, oriented, in no acute distress, sitting comfortably  HEENT: moist mucous membranes     Perianal external examination:  One yellow vessel loop seton in place to the left lateral. Prior counter drain no longer in place. Small opening at the prior seton site with no hypergranulation tissue, erythema, or drainage. Some purulent drainage from current seton.     Digital rectal examination: Was deferred.     Anoscopy: Was deferred.          Again, thank you for allowing me to participate in the care of your patient.      Sincerely,    MEENAKSHI Lance CNP      "

## 2022-12-09 ENCOUNTER — ANESTHESIA EVENT (OUTPATIENT)
Dept: SURGERY | Facility: AMBULATORY SURGERY CENTER | Age: 32
End: 2022-12-09
Payer: COMMERCIAL

## 2022-12-09 ENCOUNTER — VIRTUAL VISIT (OUTPATIENT)
Dept: SURGERY | Facility: CLINIC | Age: 32
End: 2022-12-09
Payer: COMMERCIAL

## 2022-12-09 ENCOUNTER — PRE VISIT (OUTPATIENT)
Dept: SURGERY | Facility: CLINIC | Age: 32
End: 2022-12-09

## 2022-12-09 DIAGNOSIS — Z01.818 PREOP EXAMINATION: Primary | ICD-10-CM

## 2022-12-09 DIAGNOSIS — K60.30 ANAL FISTULA: ICD-10-CM

## 2022-12-09 PROCEDURE — 99214 OFFICE O/P EST MOD 30 MIN: CPT | Mod: 95 | Performed by: CLINICAL NURSE SPECIALIST

## 2022-12-09 ASSESSMENT — PAIN SCALES - GENERAL: PAINLEVEL: NO PAIN (0)

## 2022-12-09 ASSESSMENT — ENCOUNTER SYMPTOMS
DYSRHYTHMIAS: 0
SEIZURES: 0

## 2022-12-09 ASSESSMENT — LIFESTYLE VARIABLES: TOBACCO_USE: 1

## 2022-12-09 NOTE — PROGRESS NOTES
Cody is a 32 year old who is being evaluated via a billable video visit.      How would you like to obtain your AVS? MyChart      HPI       Review of Systems         Objective    Vitals - Patient Reported  Pain Score: No Pain (0)        Physical Exam         DARÍO Valero LPN

## 2022-12-09 NOTE — H&P
Pre-Operative H & P     CC:  Preoperative exam to assess for increased cardiopulmonary risk while undergoing surgery and anesthesia.    Date of Encounter: 12/9/2022  Primary Care Physician:  No Ref-Primary, Physician     Reason for visit:   Encounter Diagnoses   Name Primary?     Preop examination Yes     Anal fistula        HPI  Cody Pickard is a 32 year old male who presents for pre-operative H & P in preparation for  Procedure Information     Case: [id number] Date/Time: 12/16/22 1200    Procedures:       EXAM UNDER ANESTHESIA, ANUS, (Anus)      POSSIBLE FISTULOTOMY, RECTUM (Anus)    Anesthesia type: MAC    Diagnosis:       Crohn's disease with fistula, unspecified gastrointestinal tract location (H) [K50.913]      Anal fistula [K60.3]    Pre-op diagnosis:       Crohn's disease with fistula, unspecified gastrointestinal tract location (H) [K50.913]      Anal fistula [K60.3]    Location: [facility] OR  / [facility]    Providers: Vidhya aHmm MD        History is obtained from the patient and chart review    Patient with history of Crohn's disease, currently followed by Dr. Bowers, and on mesalamine and Humira. He also has history of complex anorectal fistula, s/p 2 prior setons and is status post examination under anesthesia, debridement of perirectal abscess and fistula with complex tract, partial fistulotomy, replacement of both setons on 7/8/22. He continues his follow up with Colon and Rectal surgery and reported that his seton fell out at his last visit. Above procedure planned for further evaluation and management with setons, and consideration for shortening his fistula tract over time.     His history also includes HYPERTENSION, medically managed and history of smoking.      Hx of abnormal bleeding or anti-platelet use: Denies.       Past Medical History  Past Medical History:   Diagnosis Date     Anal fistula      Crohn's disease (H)      HTN  (hypertension)        Past Surgical History  Past Surgical History:   Procedure Laterality Date     COLONOSCOPY  06/15/2020     EXAM UNDER ANESTHESIA ANUS N/A 11/19/2021    Procedure: EXAM UNDER ANESTHESIA, ANUS;  Surgeon: Vidhya Hamm MD;  Location: [facility] OR     EXAM UNDER ANESTHESIA ANUS N/A 7/8/2022    Procedure: EXAM UNDER ANESTHESIA, ANUS;  Surgeon: Vidhya Hamm MD;  Location: [facility] OR     FISTULOTOMY RECTUM N/A 7/8/2022    Procedure: partial FISTULOTOMY, RECTUM, seton replacement x2;  Surgeon: Vidhya Hamm MD;  Location: [facility] OR     INCISION AND DRAINAGE RECTUM, COMBINED N/A 09/06/2021    Procedure: INCISION AND DRAINAGE, RECTUM, placement of Seton;  Surgeon: Vidhya Hamm MD;  Location: [facility] OR     PLACEMENT OF SETON RECTUM N/A 11/19/2021    Procedure: PLACEMENT OF SETON RECTUM;  Surgeon: Vidhya Hamm MD;  Location: [facility] OR     SIGMOIDOSCOPY,BIOPSY  09/24/2020       Prior to Admission Medications  Current Outpatient Medications   Medication Sig Dispense Refill     amLODIPine (NORVASC) 5 MG tablet Take 5 mg by mouth every morning       lisinopril (ZESTRIL) 20 MG tablet Take 20 mg by mouth every morning        mesalamine (LIALDA) 1.2 g DR tablet TAKE 4 TABLETS(4800 MG) BY MOUTH EVERY MORNING (Patient taking differently: 1,200 mg every morning TAKE 4 TABLETS(4800 MG) BY MOUTH EVERY MORNING) 120 tablet 4     adalimumab (HUMIRA *CF* PEN) 40 MG/0.4ML pen kit Inject 0.4 mLs (40 mg) Subcutaneous every 7 days (Patient taking differently: Inject 40 mg Subcutaneous every 7 days Monday) 4 each 4       Allergies  No Known Allergies    Social History  Social History     Socioeconomic History     Marital status:      Spouse name: Not on file     Number of children: Not on file     Years of education: Not on file     Highest education level: Not on file   Occupational History     Not on file   Tobacco Use     Smoking status: Former     Packs/day: 0.50      Years: 2.50     Pack years: 1.25     Types: Cigarettes     Start date:      Quit date: 2020     Years since quittin.3     Smokeless tobacco: Never   Substance and Sexual Activity     Alcohol use: Yes     Comment: occ     Drug use: Never     Sexual activity: Not on file   Other Topics Concern     Not on file   Social History Narrative     Not on file     Social Determinants of Health     Financial Resource Strain: Not on file   Food Insecurity: Not on file   Transportation Needs: Not on file   Physical Activity: Not on file   Stress: Not on file   Social Connections: Not on file   Intimate Partner Violence: Not on file   Housing Stability: Not on file       Family History  Family History   Problem Relation Age of Onset     Multiple Sclerosis Mother      Ulcerative Colitis Maternal Grandmother      Colon Cancer No family hx of      Inflammatory Bowel Disease No family hx of      Irritable Bowel Syndrome No family hx of      Crohn's Disease No family hx of      Anesthesia Reaction No family hx of      Bleeding Disorder No family hx of      Clotting Disorder No family hx of        Review of Systems  The complete review of systems is negative other than noted in the HPI or here.   Anesthesia Evaluation   Pt has had prior anesthetic. Type: General and MAC.    No history of anesthetic complications       ROS/MED HX  ENT/Pulmonary:     (+) ELIEL risk factors, hypertension, tobacco use, Past use,  (-) recent URI   Neurologic:    (-) no seizures   Cardiovascular:     (+) hypertension-range: reports stable does not self monitor/ ----Previous cardiac testing   Echo: Date: Results:    Stress Test: Date: Results:    ECG Reviewed: Date:  Results:  NSR  Cath: Date: Results:   (-) taking anticoagulants/antiplatelets, [place] and arrhythmias   METS/Exercise Tolerance: >4 METS    Hematologic:    (-) history of blood clots and history of blood transfusion   Musculoskeletal:  - neg musculoskeletal ROS     GI/Hepatic: Comment:  Anal fistula  Crohn's disease    (+) Inflammatory bowel disease,     Renal/Genitourinary:  - neg Renal ROS     Endo:  - neg endo ROS     Psychiatric/Substance Use:  - neg psychiatric ROS     Infectious Disease:  - neg infectious disease ROS     Malignancy:  - neg malignancy ROS     Other:  - neg other ROS          Virtual visit -  No vitals were obtained    Physical Exam  Constitutional: Awake, alert, no apparent distress, and appears stated age.  HENT: Normocephalic  Respiratory: non labored breathing; no cough   Neurologic: Oriented to name, place and time.   Neuropsychiatric: Calm, cooperative. Normal affect.      Prior Labs/Diagnostic Studies   All labs and imaging personally reviewed   Lab Results   Component Value Date    WBC 6.8 11/14/2022     Lab Results   Component Value Date    RBC 4.86 11/14/2022     Lab Results   Component Value Date    HGB 14.5 11/14/2022     Lab Results   Component Value Date    HCT 43.0 11/14/2022     Lab Results   Component Value Date    MCV 89 11/14/2022     Lab Results   Component Value Date    MCH 29.8 11/14/2022     Lab Results   Component Value Date    MCHC 33.7 11/14/2022     Lab Results   Component Value Date    RDW 12.3 11/14/2022     Lab Results   Component Value Date     11/14/2022     Last Comprehensive Metabolic Panel:  Sodium   Date Value Ref Range Status   06/10/2022 141 133 - 144 mmol/L Final     Potassium   Date Value Ref Range Status   06/10/2022 4.3 3.4 - 5.3 mmol/L Final     Chloride   Date Value Ref Range Status   06/10/2022 108 94 - 109 mmol/L Final     Carbon Dioxide (CO2)   Date Value Ref Range Status   06/10/2022 29 20 - 32 mmol/L Final     Anion Gap   Date Value Ref Range Status   06/10/2022 4 3 - 14 mmol/L Final     Glucose   Date Value Ref Range Status   06/10/2022 103 (H) 70 - 99 mg/dL Final     Urea Nitrogen   Date Value Ref Range Status   06/10/2022 10 7 - 30 mg/dL Final     Creatinine   Date Value Ref Range Status   06/10/2022 0.67 0.66 - 1.25  mg/dL Final     GFR Estimate   Date Value Ref Range Status   06/10/2022 >90 >60 mL/min/1.73m2 Final     Comment:     Effective December 21, 2021 eGFRcr in adults is calculated using the 2021 CKD-EPI creatinine equation which includes age and gender (Vanessa et al., NEJ, DOI: 10.1056/ODAMex1898837)     Calcium   Date Value Ref Range Status   06/10/2022 9.0 8.5 - 10.1 mg/dL Final     Bilirubin Total   Date Value Ref Range Status   11/14/2022 0.6 0.2 - 1.3 mg/dL Final     Alkaline Phosphatase   Date Value Ref Range Status   11/14/2022 76 40 - 150 U/L Final     ALT   Date Value Ref Range Status   11/14/2022 27 0 - 70 U/L Final     AST   Date Value Ref Range Status   11/14/2022 21 0 - 45 U/L Final     EKG 2018 Normal sinus rhythm    MR Pelvis 6/10/22                                                     Impression:   1. Transsphincteric perianal fistula with cutaneous openings in the  right and left gluteal clefts, and internal opening at 5:00 position  approximately 3 cm above the anal verge.  2. Of the two abscesses detected previously, abscess on the right side  has resolved and the abscess in the left ischioanal fossa has  decreased in size.    The patient's records and results personally reviewed by this provider.     Outside records reviewed from: Care Everywhere      Assessment      Cody Pickard is a 32 year old male seen as a PAC referral for risk assessment and optimization for anesthesia.    Plan/Recommendations  Pt will be optimized for the proposed procedure.  See below for details on the assessment, risk, and preoperative recommendations    NEUROLOGY  - No history of TIA, CVA or seizure    -Post Op delirium risk factors:  No risk identified    ENT  - No current airway concerns.  Will need to be reassessed day of surgery.  Mallampati: Unable to assess  TM: Unable to assess    CARDIAC  HYPERTENSION. Blood pressure range in clinic 130-140s/80-90s. No longer monitors at home. Reports BP well controlled. Will take  "amlodipine and lisinopril on DOS. No other cardiac history, symptoms or meds. Good activity tolerance.   - METS (Metabolic Equivalents)>4    RCRI: 0.4% risk of serious cardiac events    PULMONARY  Denies asthma, cough or shortness of breath    Intermediate risk for ELIEL  - Tobacco History      History   Smoking Status     Former     Packs/day: 0.50     Years: 2.50     Types: Cigarettes     Start date: 2018     Quit date: 08/2020   Smokeless Tobacco     Never       GI: Denies GERD.  Crohn's disease with complex anorectal fistula s/p multiple procedures.   Will remain on Humira taken on Mondays, and will take mesalamine on DOS.   PONV Low Risk  Total Score: 1           1 AN PONV: Patient is not a current smoker        /RENAL  - Baseline Creatinine 0.67    ENDOCRINE    - BMI: Estimated body mass index is 32.08 kg/m  as calculated from the following:    Height as of 12/8/22: 1.778 m (5' 10\").    Weight as of 12/8/22: 101.4 kg (223 lb 9.6 oz).  Obesity (BMI >30)  - No history of Diabetes Mellitus Random blood glucose  in past year    HEME  VTE Low Risk 0.26%            Total Score: 0      Denies history of blood clots  Denies history of blood transfusion    Final plan will be decided by the assigned anesthesia provider on the date of service.       The patient is optimized for their procedure. AVS with information on surgery time/arrival time, meds and NPO status given by nursing staff. No further diagnostic testing indicated.    Please refer to the physical examination documented by the anesthesiologist in the anesthesia record on the day of surgery.    Video-Visit Details    Type of service:  Video Visit    Provider received verbal consent for a Video Visit from the patient? 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

## 2022-12-09 NOTE — PATIENT INSTRUCTIONS
Preparing for Your Surgery      Name:  Cody Pickard   MRN:  [id number]   :  1990   Today's Date:  2022         Arriving for surgery:  Surgery date:  22  Arrival time:  10:30 am    Restrictions due to COVID 19:    Effective 2022:  2 visitors may accompany patient and wait in the Surgery Waiting Room.  All visitors must wear a mask and social distance.      parking is available for anyone with mobility limitations or disabilities. (Monday- Friday 7 am- 5 pm)    Please come to:    [facility]  [address] [place] [id number]    Please check in on the 5th floor at the Ambulatory Surgery Center.      What can I eat or drink?    -  You may eat and drink normally until 8 hours prior to arrival  time. (Until 02:30 am the morning of your suregery)  -  You may have clear liquids until 2 hours prior to arrival  time. (Until 08:30 am)    Examples of clear liquids:  Water  Clear broth  Juices (apple, white grape, white cranberry  and cider) without pulp  Noncarbonated, powder based beverages  (lemonade and Yannick-Aid)  Sodas (Sprite, 7-Up, ginger ale and seltzer)  Coffee or tea (without milk or cream)  Gatorade    --No alcohol for at least 24 hours before surgery.    Which medicines can I take?    Hold Aspirin for 7 days before surgery.   Hold Multivitamins for 7 days before surgery.  Hold Supplements for 7 days before surgery.  Hold Ibuprofen (Advil, Motrin) for 1 day before surgery--unless otherwise directed by surgeon.  Hold Naproxen (Aleve) for 4 days before surgery.      -  PLEASE TAKE the following medications the day of surgery:   Take your morning medications    How do I prepare myself?  - Please take 2 showers before surgery using Scrubcare or Hibiclens soap.    Use this soap only from the neck to your toes.     Leave the soap on your skin for one minute--then rinse thoroughly.      You may use your own shampoo and conditioner. No other hair products.   -  Please remove all jewelry and body piercings.  - No lotions, deodorants or fragrance.  - No makeup or fingernail polish.   - Bring your ID and insurance card.    -If you have a Deep Brain Stimulator, a Spinal Cord Stimulator, or any implanted Neuro Device, you must bring the remote to the Surgery Center.         ALL PATIENTS ARE REQUIRED TO HAVE A RESPONSIBLE ADULT TO DRIVE AND BE IN ATTENDANCE WITH THEM FOR 24 HOURS FOLLOWING SURGERY.     Covid testing policy as of 12/06/2022  Your surgeon will notify and schedule you for a COVID test if one is needed before surgery--please direct any questions or COVID symptoms to your surgeon      Questions or Concerns:    -For questions regarding the day of surgery, please contact the Ambulatory Surgery Center at [phone].    -If you have health changes between today and your surgery, please contact your surgeon.     - For questions after surgery, please contact your surgeon's office.

## 2022-12-16 ENCOUNTER — ANESTHESIA (OUTPATIENT)
Dept: SURGERY | Facility: AMBULATORY SURGERY CENTER | Age: 32
End: 2022-12-16
Payer: COMMERCIAL

## 2022-12-16 ENCOUNTER — HOSPITAL ENCOUNTER (OUTPATIENT)
Facility: AMBULATORY SURGERY CENTER | Age: 32
Discharge: HOME | End: 2022-12-16
Attending: COLON & RECTAL SURGERY
Payer: COMMERCIAL

## 2022-12-16 VITALS
BODY MASS INDEX: 32.08 KG/M2 | DIASTOLIC BLOOD PRESSURE: 61 MMHG | SYSTOLIC BLOOD PRESSURE: 108 MMHG | WEIGHT: 223.55 LBS | HEART RATE: 94 BPM | RESPIRATION RATE: 16 BRPM | OXYGEN SATURATION: 100 % | TEMPERATURE: 96.7 F

## 2022-12-16 DIAGNOSIS — K50.114 PERIANAL CROHN'S DISEASE, WITH ABSCESS (H): ICD-10-CM

## 2022-12-16 DIAGNOSIS — K50.111 CROHN'S DISEASE OF LARGE INTESTINE WITH RECTAL BLEEDING (H): ICD-10-CM

## 2022-12-16 DIAGNOSIS — K60.30 ANAL FISTULA: ICD-10-CM

## 2022-12-16 PROCEDURE — 46280 REMOVE ANAL FIST COMPLEX: CPT

## 2022-12-16 PROCEDURE — 46280 REMOVE ANAL FIST COMPLEX: CPT | Performed by: COLON & RECTAL SURGERY

## 2022-12-16 RX ORDER — HYDROMORPHONE HYDROCHLORIDE 1 MG/ML
0.4 INJECTION, SOLUTION INTRAMUSCULAR; INTRAVENOUS; SUBCUTANEOUS EVERY 5 MIN PRN
Status: DISCONTINUED | OUTPATIENT
Start: 2022-12-16 | End: 2022-12-17 | Stop reason: HOSPADM

## 2022-12-16 RX ORDER — ACETAMINOPHEN 325 MG/1
975 TABLET ORAL ONCE
Status: COMPLETED | OUTPATIENT
Start: 2022-12-16 | End: 2022-12-16

## 2022-12-16 RX ORDER — OXYCODONE HYDROCHLORIDE 5 MG/1
10 TABLET ORAL EVERY 4 HOURS PRN
Status: DISCONTINUED | OUTPATIENT
Start: 2022-12-16 | End: 2022-12-17 | Stop reason: HOSPADM

## 2022-12-16 RX ORDER — ONDANSETRON 2 MG/ML
4 INJECTION INTRAMUSCULAR; INTRAVENOUS ONCE
Status: COMPLETED | OUTPATIENT
Start: 2022-12-16 | End: 2022-12-16

## 2022-12-16 RX ORDER — ONDANSETRON 2 MG/ML
4 INJECTION INTRAMUSCULAR; INTRAVENOUS EVERY 30 MIN PRN
Status: DISCONTINUED | OUTPATIENT
Start: 2022-12-16 | End: 2022-12-17 | Stop reason: HOSPADM

## 2022-12-16 RX ORDER — FENTANYL CITRATE 50 UG/ML
50 INJECTION, SOLUTION INTRAMUSCULAR; INTRAVENOUS EVERY 5 MIN PRN
Status: DISCONTINUED | OUTPATIENT
Start: 2022-12-16 | End: 2022-12-17 | Stop reason: HOSPADM

## 2022-12-16 RX ORDER — PROPOFOL 10 MG/ML
INJECTION, EMULSION INTRAVENOUS CONTINUOUS PRN
Status: DISCONTINUED | OUTPATIENT
Start: 2022-12-16 | End: 2022-12-16

## 2022-12-16 RX ORDER — HYDROMORPHONE HYDROCHLORIDE 1 MG/ML
0.2 INJECTION, SOLUTION INTRAMUSCULAR; INTRAVENOUS; SUBCUTANEOUS EVERY 5 MIN PRN
Status: DISCONTINUED | OUTPATIENT
Start: 2022-12-16 | End: 2022-12-17 | Stop reason: HOSPADM

## 2022-12-16 RX ORDER — LIDOCAINE 40 MG/G
CREAM TOPICAL
Status: DISCONTINUED | OUTPATIENT
Start: 2022-12-16 | End: 2022-12-17 | Stop reason: HOSPADM

## 2022-12-16 RX ORDER — PROPOFOL 10 MG/ML
INJECTION, EMULSION INTRAVENOUS PRN
Status: DISCONTINUED | OUTPATIENT
Start: 2022-12-16 | End: 2022-12-16

## 2022-12-16 RX ORDER — ONDANSETRON 4 MG/1
4 TABLET, ORALLY DISINTEGRATING ORAL EVERY 30 MIN PRN
Status: DISCONTINUED | OUTPATIENT
Start: 2022-12-16 | End: 2022-12-17 | Stop reason: HOSPADM

## 2022-12-16 RX ORDER — MEPERIDINE HYDROCHLORIDE 25 MG/ML
12.5 INJECTION INTRAMUSCULAR; INTRAVENOUS; SUBCUTANEOUS
Status: DISCONTINUED | OUTPATIENT
Start: 2022-12-16 | End: 2022-12-17 | Stop reason: HOSPADM

## 2022-12-16 RX ORDER — ONDANSETRON 2 MG/ML
INJECTION INTRAMUSCULAR; INTRAVENOUS PRN
Status: DISCONTINUED | OUTPATIENT
Start: 2022-12-16 | End: 2022-12-16

## 2022-12-16 RX ORDER — FENTANYL CITRATE 50 UG/ML
25 INJECTION, SOLUTION INTRAMUSCULAR; INTRAVENOUS
Status: DISCONTINUED | OUTPATIENT
Start: 2022-12-16 | End: 2022-12-17 | Stop reason: HOSPADM

## 2022-12-16 RX ORDER — FENTANYL CITRATE 50 UG/ML
25 INJECTION, SOLUTION INTRAMUSCULAR; INTRAVENOUS EVERY 5 MIN PRN
Status: DISCONTINUED | OUTPATIENT
Start: 2022-12-16 | End: 2022-12-17 | Stop reason: HOSPADM

## 2022-12-16 RX ORDER — SODIUM CHLORIDE, SODIUM LACTATE, POTASSIUM CHLORIDE, CALCIUM CHLORIDE 600; 310; 30; 20 MG/100ML; MG/100ML; MG/100ML; MG/100ML
INJECTION, SOLUTION INTRAVENOUS CONTINUOUS
Status: DISCONTINUED | OUTPATIENT
Start: 2022-12-16 | End: 2022-12-17 | Stop reason: HOSPADM

## 2022-12-16 RX ORDER — ALBUTEROL SULFATE 0.83 MG/ML
2.5 SOLUTION RESPIRATORY (INHALATION) EVERY 6 HOURS PRN
Status: DISCONTINUED | OUTPATIENT
Start: 2022-12-16 | End: 2022-12-17 | Stop reason: HOSPADM

## 2022-12-16 RX ORDER — LIDOCAINE HYDROCHLORIDE 20 MG/ML
INJECTION, SOLUTION INFILTRATION; PERINEURAL PRN
Status: DISCONTINUED | OUTPATIENT
Start: 2022-12-16 | End: 2022-12-16

## 2022-12-16 RX ORDER — GLYCOPYRROLATE 0.2 MG/ML
INJECTION, SOLUTION INTRAMUSCULAR; INTRAVENOUS PRN
Status: DISCONTINUED | OUTPATIENT
Start: 2022-12-16 | End: 2022-12-16

## 2022-12-16 RX ORDER — ADALIMUMAB 40MG/0.4ML
40 KIT SUBCUTANEOUS
Start: 2022-12-16 | End: 2023-01-04

## 2022-12-16 RX ORDER — OXYCODONE HYDROCHLORIDE 5 MG/1
5 TABLET ORAL EVERY 4 HOURS PRN
Status: DISCONTINUED | OUTPATIENT
Start: 2022-12-16 | End: 2022-12-17 | Stop reason: HOSPADM

## 2022-12-16 RX ORDER — KETAMINE HYDROCHLORIDE 10 MG/ML
INJECTION INTRAMUSCULAR; INTRAVENOUS PRN
Status: DISCONTINUED | OUTPATIENT
Start: 2022-12-16 | End: 2022-12-16

## 2022-12-16 RX ADMIN — ALBUTEROL SULFATE 2.5 MG: 0.83 SOLUTION RESPIRATORY (INHALATION) at 12:09

## 2022-12-16 RX ADMIN — PROPOFOL 70 MG: 10 INJECTION, EMULSION INTRAVENOUS at 11:33

## 2022-12-16 RX ADMIN — ONDANSETRON 4 MG: 2 INJECTION INTRAMUSCULAR; INTRAVENOUS at 11:36

## 2022-12-16 RX ADMIN — OXYCODONE HYDROCHLORIDE 5 MG: 5 TABLET ORAL at 12:25

## 2022-12-16 RX ADMIN — ONDANSETRON 4 MG: 2 INJECTION INTRAMUSCULAR; INTRAVENOUS at 11:13

## 2022-12-16 RX ADMIN — PROPOFOL 200 MCG/KG/MIN: 10 INJECTION, EMULSION INTRAVENOUS at 11:33

## 2022-12-16 RX ADMIN — SODIUM CHLORIDE, SODIUM LACTATE, POTASSIUM CHLORIDE, CALCIUM CHLORIDE: 600; 310; 30; 20 INJECTION, SOLUTION INTRAVENOUS at 11:07

## 2022-12-16 RX ADMIN — KETAMINE HYDROCHLORIDE 20 MG: 10 INJECTION INTRAMUSCULAR; INTRAVENOUS at 11:37

## 2022-12-16 RX ADMIN — LIDOCAINE HYDROCHLORIDE 100 MG: 20 INJECTION, SOLUTION INFILTRATION; PERINEURAL at 11:33

## 2022-12-16 RX ADMIN — ACETAMINOPHEN 975 MG: 325 TABLET ORAL at 11:07

## 2022-12-16 RX ADMIN — GLYCOPYRROLATE 0.2 MG: 0.2 INJECTION, SOLUTION INTRAMUSCULAR; INTRAVENOUS at 11:27

## 2022-12-16 NOTE — ANESTHESIA POSTPROCEDURE EVALUATION
Patient: Cody Pickard    Procedure: Procedure(s):  EXAM UNDER ANESTHESIA, ANUS, Partial Fistuotomy seton x2  POSSIBLE FISTULOTOMY, RECTUM       Anesthesia Type:  MAC    Note:  Disposition: Outpatient   Postop Pain Control: Uneventful            Sign Out: Well controlled pain   PONV: No   Neuro/Psych: Uneventful            Sign Out: Acceptable/Baseline neuro status   Airway/Respiratory: Uneventful            Sign Out: Acceptable/Baseline resp. status   CV/Hemodynamics: Uneventful            Sign Out: Acceptable CV status; No obvious hypovolemia; No obvious fluid overload   Other NRE: NONE   DID A NON-ROUTINE EVENT OCCUR? No           Last vitals:  Vitals Value Taken Time   /76 12/16/22 1250   Temp 36.3  C (97.3  F) 12/16/22 1230   Pulse 63 12/16/22 1249   Resp 16 12/16/22 1250   SpO2 100 % 12/16/22 1250   Vitals shown include unvalidated device data.    Electronically Signed By: Nash Hicks MD, MD  December 16, 2022  1:35 PM

## 2022-12-16 NOTE — DISCHARGE INSTRUCTIONS
Anorectal Surgery Instructions      What can I expect after anorectal surgery?  Most anorectal procedures are done as outpatient surgery, and you go home the same day as the procedure. A few surgical procedures will require that you stay in the hospital for about one to three days. No matter where the procedure is done or how long or short it takes, these recommendations will help you heal and feel more comfortable.    Medicines:  The anal area is very sensitive; you can expect to have some pain for up to 2-4 weeks after the procedure. Your doctor will give you a prescription for one or more pain medications. In general, there are two types of medicines that can provide relief.    Some patients find it easiest to transition away from narcotic drugs by also taking acetaminophen (Tylenol). However, it is important to realize that most narcotic pain relievers also have acetaminophen, and excessive doses of acetaminophen can be dangerous.Accordingly, you can substitute 1000 milligrams of acetaminophen (two Extra Strength Tylenol or similar generic) for any given dose of narcotic, but acetominophen should not be taken in addition to a full narcotic dose that contains acetaminophen. The maximum acceptable dose of acetaminophen is 4000 milligrams.  Refilling prescriptions. If you need additional pain medication, please call the triage nurse at [phone] during normal business hours (8 a.m. to 4 p.m., Monday though Friday) or have your pharmacy fax a refill request to [phone]. If you call after hours or on the weekends, the doctor on call may not know you personally and may not renew narcotic pain medication by phone. Call your primary care provider for all other medication refills.     Bowel function and Perineal care:  Bowel movements can be very painful. It is important to have regular bowel movements at least every other day and to keep your stool soft. Your doctor may tell you to take a stool softener, such  as Colace, once or twice a day. Try to avoid constipation and/or diarrhea as this can make the pain and bleeding worse. A high fiber diet, including at least four servings of fruits or vegetables daily, will help to keep your bowel movements regular and soft. If you have trouble getting enough fiber in your daily diet, a fiber supplement can be considered, including Metamucil, Benefiber, or Citrucel, and can be bought at your local grocery store or pharmacy. It is important to drink six to eight glasses of water or juice everyday when using fiber products.    You can expect to have some bleeding after bowel movements, but it should stop soon after you wipe. Use a wet cloth or perianal pad (Tucks or Preparation H pads) to gently wipe the area after each bowel movement. Do not rub the anal area or use a lot of pressure. Using a spray bottle filled with warm water helps loosen any remaining stool. Blot gently with a soft dry cloth or tissue paper.    If possible, take a tub bath immediately after each bowel movement. Baths should be take at least 3 times daily for the first week to 10 days following your procedure. You should soak in the tub for 10 to 15 minutes each time with water as warm as you can tolerate. Even after you go back to work, it is a good idea to sit in the tub in the morning, after returning from work, and again in the evening before bedtime.    Constipation will cause you to strain when you have a bowel movement. The hard stool will be difficult to pass, will increase pain and bleeding, and will slow down healing. If you have not had a bowel movement within 2 days, take 2 teaspoons of Milk of Magnesia in the morning. If there are no results, repeat this. Stop taking Milk of Magnesia or other laxatives if you begin to have diarrhea.    Diarrhea can occur if too much laxative is taken or for several other reasons. If you have 3 or more loose, watery stools in a 24-hour period, stop taking laxatives.  Continue to eat a high fiber diet and to take bulk-forming agents such as Metamucil, Benefiber, or Citrucel. You may take Immodium as directed on the package but please call the triage nurse, [phone], if this was not discussed with you surgeon preoperatively.    If you are still having diarrhea or constipation after you have tried these recommendations, please call the triage nurse line, [phone].    Bleeding/Infection:  Infection around the anal opening is not very common. The anal area has excellent blood supply, which helps the area to heal. Bloody discharge after bowel movements is normal and may last 2 to 4 weeks after your surgery. However, if you bleed between bowel movements and cannot get it to stop, call the triage nurse immediately [phone].    Activity:  After your procedure, there are no restrictions on your activity except restrictions surrounding being on narcotics and in pain, such as no heavy machine operating or driving. You may walk, climb stairs, ride in a car, and sit as tolerated. It is helpful to avoid sitting in one position for long periods (2 or more hours). After some surgeries, you may be told not to perform any lifting (more than 10 pounds) for several weeks after surgery.    When do I need to call the doctor or triage nurse?  If you experience any of the problems listed here, call our triage nurse during business hours ([phone]). The nurse will help you with your problem or have the doctor call you. After hours and on weekends, please call the main hospital number ([phone]) and ask for the colon and rectal surgery person on call. Some is available to help you 24 hours a day, seven days a week.  Fever greater than 101 degrees  Chills  Foul-smelling drainage  Nausea and vomiting  Diarrhea - greater than 3 water stools in 24 hours  Constipation - no bowel movement after 3 days  Severe bleeding that does not stop soon after a bowel movement  Problems with the  "incision, including increased pain, swelling, or redness        [facility]  Phone: [phone]      [facility]  Home Care Following Anesthesia  For 24 hours after surgery:  Get plenty of rest.  A responsible adult must stay with you for at least 24 hours after you leave the surgery center.  Do not drive or use heavy equipment.  If you have weakness or tingling, don't drive or use heavy equipment until this feeling goes away.   Do not drink alcohol.   Avoid strenuous or risky activities.  Ask for help when climbing stairs.  You may feel lightheaded.  IF so, sit for a few minutes before standing.  Have someone help you get up.   If you have nausea (feel sick to your stomach): Drink only clear liquids such as apple juice, ginger ale, broth or 7-Up.  Rest may also help.  Be sure to drink enough fluids.  Move to a regular diet as you feel able.   You may have a slight fever.  Call the doctor if your fever is over 100 F (37.7 C) (taken under the tongue) or lasts longer than 24 hours.  You may have a dry mouth, a sore throat, muscle aches or trouble sleeping. These should go away after 24 hours.  Do not make important or legal decisions.   It is recommended to avoid smoking.        Today you received a Marcaine or bupivacaine block to numb the nerves near your surgery site.  This is a block using local anesthetic or \"numbing\" medication injected around the nerves to anesthetize or \"numb\" the area supplied by those nerves.  This block is injected into the muscle layer near your surgical site.  The medication may numb the location where you had surgery for 6-18 hours, but may last up to 24 hours.  If your surgical site is an arm or leg you should be careful with your affected limb, since it is possible to injure your limb without being aware of it due to the numbing.  Until full feeling returns, you should guard against bumping or hitting your limb, and avoid " extreme hot or cold temperatures on the skin.  As the block wears off, the feeling will return as a tingling or prickly sensation near your surgical site.  You will experience more discomfort from your incision as the feeling returns.  You may want to take a pain pill (a narcotic or Tylenol if this was prescribed by your surgeon) when you start to experience mild pain before the pain beccomes more severe.  If your pain medications do not control your pain you should notifiy your surgeon.    Tips for taking pain medications  To get the best pain relief possible, remember these points:  Take pain medications as directed, before pain becomes severe.  Pain medication can upset your stomach: taking it with food may help.  Constipation is a common side effect of pain medication. Drink plenty of  fluids.  Eat foods high in fiber. Take a stool softener if recommended by your doctor or pharmacist.  Do not drink alcohol, drive or operate machinery while taking pain medications.  Ask about other ways to control pain, such as with heat, ice or relaxation.    Tylenol/Acetaminophen Consumption  To help encourage the safe use of acetaminophen, the makers of TYLENOL  have lowered the maximum daily dose for single-ingredient Extra Strength TYLENOL  (acetaminophen) products sold in the [place] from 8 pills per day (4,000 mg) to 6 pills per day (3,000 mg). The dosing interval has also changed from 2 pills every 4-6 hours to 2 pills every 6 hours.  If you feel your pain relief is insufficient, you may take Tylenol/Acetaminophen in addition to your narcotic pain medication.   Be careful not to exceed 3,000 mg of Tylenol/Acetaminophen in a 24 hour period from all sources.  If you are taking extra strength Tylenol/acetaminophen (500 mg), the maximum dose is 6 tablets in 24 hours.  If you are taking regular strength acetaminophen (325 mg), the maximum dose is 9 tablets in 24 hours.  975 mg of Tylenol given 11:00 am next dose may be given  after 5:00 pm    Call a doctor for any of the following:  Signs of infection (fever, growing tenderness at the surgery site, a large amount of drainage or bleeding, severe pain, foul-smelling drainage, redness, swelling).  It has been over 8 to 10 hours since surgery and you are still not able to urinate (pass water).  Headache for over 24 hours.  Signs of Covid-19 infection (temperature over 100 degrees, shortness of breath, cough, loss of taste/smell, generalized body aches, persistent headache, chills, sore throat, nausea/vomiting/diarrhea)  Your doctor is:       Dr. Vidhya Hamm, Colon Rectal: [phone]               Or dial [phone] and ask for the resident on call for:  Colon Rectal  For emergency care, call the:  [facility]:  [phone] (TTY for hearing impaired: [phone])

## 2022-12-16 NOTE — ANESTHESIA CARE TRANSFER NOTE
Patient: Cody Pickard    Procedure: Procedure(s):  EXAM UNDER ANESTHESIA, ANUS, Partial Fistuotomy seton x2  POSSIBLE FISTULOTOMY, RECTUM       Diagnosis: Crohn's disease with fistula, unspecified gastrointestinal tract location (H) [K50.913]  Anal fistula [K60.3]  Diagnosis Additional Information: No value filed.    Anesthesia Type:   MAC     Note:    Oropharynx: oropharynx clear of all foreign objects and spontaneously breathing  Level of Consciousness: awake  Oxygen Supplementation: face mask  Level of Supplemental Oxygen (L/min / FiO2): 8  Independent Airway: airway patency satisfactory and stable  Dentition: dentition unchanged  Vital Signs Stable: post-procedure vital signs reviewed and stable  Report to RN Given: handoff report given  Patient transferred to: PACU  Comments: Informed PACU RN about intraop event and reported to have chest X-ray as ordered by Dr. Hicks; patient awake and alert; VSS  Handoff Report: Identifed the Patient, Identified the Reponsible Provider, Reviewed the pertinent medical history, Discussed the surgical course, Reviewed Intra-OP anesthesia mangement and issues during anesthesia, Set expectations for post-procedure period and Allowed opportunity for questions and acknowledgement of understanding      Vitals:  Vitals Value Taken Time   BP 96/70    Temp     Pulse 77 12/16/22 1211   Resp 13 12/16/22 1211   SpO2 100 % 12/16/22 1211   Vitals shown include unvalidated device data.    Electronically Signed By: MEENAKSHI Colon CRNA  December 16, 2022  12:12 PM

## 2022-12-16 NOTE — ANESTHESIA PREPROCEDURE EVALUATION
Anesthesia Pre-Procedure Evaluation    Patient: Cody Pickard   MRN: [id number] : 1990        Procedure : Procedure(s):  EXAM UNDER ANESTHESIA, ANUS,  POSSIBLE FISTULOTOMY, RECTUM          Past Medical History:   Diagnosis Date     Anal fistula      Crohn's disease (H)      HTN (hypertension)       Past Surgical History:   Procedure Laterality Date     COLONOSCOPY  06/15/2020     EXAM UNDER ANESTHESIA ANUS N/A 2021    Procedure: EXAM UNDER ANESTHESIA, ANUS;  Surgeon: Vidhya Hamm MD;  Location: [facility] OR     EXAM UNDER ANESTHESIA ANUS N/A 2022    Procedure: EXAM UNDER ANESTHESIA, ANUS;  Surgeon: Vidhya Hamm MD;  Location: [facility] OR     FISTULOTOMY RECTUM N/A 2022    Procedure: partial FISTULOTOMY, RECTUM, seton replacement x2;  Surgeon: Vidhya Hamm MD;  Location: [facility] OR     INCISION AND DRAINAGE RECTUM, COMBINED N/A 2021    Procedure: INCISION AND DRAINAGE, RECTUM, placement of Seton;  Surgeon: Vidhya Hamm MD;  Location: [facility] OR     PLACEMENT OF SETON RECTUM N/A 2021    Procedure: PLACEMENT OF SETON RECTUM;  Surgeon: Vidhya Hamm MD;  Location: [facility] OR     SIGMOIDOSCOPY,BIOPSY  2020      No Known Allergies   Social History     Tobacco Use     Smoking status: Former     Packs/day: 0.50     Years: 2.50     Pack years: 1.25     Types: Cigarettes     Start date:      Quit date: 2020     Years since quittin.3     Smokeless tobacco: Never   Substance Use Topics     Alcohol use: Yes     Comment: occ      Wt Readings from Last 1 Encounters:   22 101.4 kg (223 lb 9.6 oz)        Anesthesia Evaluation            ROS/MED HX  ENT/Pulmonary:       Neurologic:       Cardiovascular:     (+) hypertension-----    METS/Exercise Tolerance:     Hematologic:       Musculoskeletal:       GI/Hepatic:     (+) Inflammatory bowel disease, bowel prep,     Renal/Genitourinary:       Endo:       Psychiatric/Substance Use:        Infectious Disease:       Malignancy:       Other:               OUTSIDE LABS:  CBC:   Lab Results   Component Value Date    WBC 6.8 11/14/2022    WBC 4.4 06/10/2022    HGB 14.5 11/14/2022    HGB 14.6 06/10/2022    HCT 43.0 11/14/2022    HCT 43.3 06/10/2022     11/14/2022     06/10/2022     BMP:   Lab Results   Component Value Date     06/10/2022     09/06/2021    POTASSIUM 4.3 06/10/2022    POTASSIUM 4.0 09/06/2021    CHLORIDE 108 06/10/2022    CHLORIDE 105 09/06/2021    CO2 29 06/10/2022    CO2 26 09/06/2021    BUN 10 06/10/2022    BUN 9 09/06/2021    CR 0.67 06/10/2022    CR 0.78 09/07/2021     (H) 06/10/2022    GLC 94 09/06/2021     COAGS: No results found for: PTT, INR, FIBR  POC: No results found for: BGM, HCG, HCGS  HEPATIC:   Lab Results   Component Value Date    ALBUMIN 4.3 11/14/2022    PROTTOTAL 8.1 11/14/2022    ALT 27 11/14/2022    AST 21 11/14/2022    ALKPHOS 76 11/14/2022    BILITOTAL 0.6 11/14/2022     OTHER:   Lab Results   Component Value Date    MICHELLE 9.0 06/10/2022    CRP 3.4 11/14/2022    SED 6 11/14/2022       Anesthesia Plan    ASA Status:  2      Anesthesia Type: MAC.     - Reason for MAC: immobility needed, straight local not clinically adequate              Consents    Anesthesia Plan(s) and associated risks, benefits, and realistic alternatives discussed. Questions answered and patient/representative(s) expressed understanding.     - Discussed: Risks, Benefits and Alternatives for BOTH SEDATION and the PROCEDURE were discussed     - Discussed with:  Patient      - Extended Intubation/Ventilatory Support Discussed: No.      - Patient is DNR/DNI Status: No    Use of blood products discussed: No .     Postoperative Care    Pain management: IV analgesics.   PONV prophylaxis: Ondansetron (or other 5HT-3), Dexamethasone or Solumedrol     Comments:           H&P reviewed: Unable to attach H&P to encounter due to EHR limitations. H&P Update: appropriate H&P  reviewed, patient examined. No interval changes since H&P (within 30 days).         Nash Hicks MD, MD

## 2022-12-16 NOTE — OP NOTE
SURGEON: Vidhya Hamm MD     ASSISTANT: Kristofer Malhotra MD Surgery Resident    PREOPERATIVE DIAGNOSIS: Crohn's disease and complex anorectal fistula.    POSTOPERATIVE DIAGNOSIS: Crohn's disease and complex anorectal fistula. Placement of seton that broke.     PROCEDURE: Examination under anesthesia, debridement of perirectal fistula with complex tract, partial fistulotomy, placement of seton that broke, replacement of seton.    INDICATIONS: Cody Pickard is a 32 year old male with Crohn's disease and complex anorectal fistula. He was seen in Colorectal Surgery clinic and there was improved hypergranulation tissue at the counter seton site but seton has unfortunately fallen out. The prior counter seton is fairly deep into the postanal space but drainage has decreased and so has the hypergranulation tissue. We are planning examination under anesthesia next week with debridement, replacement of the seton and potentially considering shortening this tract over time with plan to eventually remove it. His previous examination under anesthesia was 7/8/2022 and there was on examination a stricture of the anorectal ring at 3 cm with anal skin tags, and induration of the left posterior quadrant with a left sided posterior seton and a left to right seton. A seton at home broke and he is here for examination under anesthesia. The risks and benefits of surgery were thoroughly discussed with the patient and he agreed to proceed.     DESCRIPTION OF PROCEDURE: The patient was brought to the operating room, placed prone on the operating room table. Deep sedation was induced with intravenous medicines with deep sedation and monitored anesthesia care. We prepped and draped the area in the usual sterile fashion. We began the procedure with a timeout and we performed anoscopy which demonstrated anal tags and minimal induration and some purulence in the left posterior quadrant. This was consistent with his Crohn's disease and  the perianal disease. There was only one seton in the left with purulent drainage. The previous fistulotomy was healed and the tract was debrided with a curette, irrigation and suction. The granulation tissue was removed. A single seton was placed posteriorly in the tract where it had fallen out. A partial fistulotomy was performed to shorten the track posteriorly. The old seton was replaced on the left. In both cases, white vessel loops were placed and secured with 4 separate 2-0 Silk. There was good hemostasis, and dressings were applied. At the end the case, all instruments and sponge counts were correct x2. The patient was emerged from anesthesia and taken to postoperative anesthesia care unit in good condition. During the case, the patient threw up a small amount of clear liquid. To be cautious, we turned him supine and completed the procedure in lithotomy. At the end the case, all instruments and sponge counts were correct x2. The patient was emerged from anesthesia and taken to postoperative anesthesia care unit in good condition.     SPECIMEN: None.     ESTIMATED BLOOD LOSS: Minimal.     URINE OUTPUT: Not measured.     PAUL MARTIN MD   Colon and Rectal Surgery Staff  [facility]

## 2022-12-20 ENCOUNTER — MYC MEDICAL ADVICE (OUTPATIENT)
Dept: SURGERY | Facility: CLINIC | Age: 32
End: 2022-12-20

## 2022-12-22 DIAGNOSIS — K50.111 CROHN'S DISEASE OF LARGE INTESTINE WITH RECTAL BLEEDING (H): ICD-10-CM

## 2022-12-22 DIAGNOSIS — K60.30 ANAL FISTULA: ICD-10-CM

## 2022-12-23 RX ORDER — ADALIMUMAB 40MG/0.4ML
KIT SUBCUTANEOUS
OUTPATIENT
Start: 2022-12-23

## 2022-12-28 ENCOUNTER — OFFICE VISIT (OUTPATIENT)
Dept: SURGERY | Facility: CLINIC | Age: 32
End: 2022-12-28
Payer: COMMERCIAL

## 2022-12-28 VITALS
HEIGHT: 70 IN | DIASTOLIC BLOOD PRESSURE: 80 MMHG | WEIGHT: 218.9 LBS | BODY MASS INDEX: 31.34 KG/M2 | SYSTOLIC BLOOD PRESSURE: 131 MMHG | HEART RATE: 68 BPM | OXYGEN SATURATION: 97 %

## 2022-12-28 DIAGNOSIS — Z09 FOLLOW-UP EXAMINATION AFTER COLORECTAL SURGERY: Primary | ICD-10-CM

## 2022-12-28 DIAGNOSIS — K50.913 CROHN'S DISEASE WITH FISTULA, UNSPECIFIED GASTROINTESTINAL TRACT LOCATION (H): ICD-10-CM

## 2022-12-28 DIAGNOSIS — K60.30 ANAL FISTULA: ICD-10-CM

## 2022-12-28 PROCEDURE — 99024 POSTOP FOLLOW-UP VISIT: CPT | Performed by: NURSE PRACTITIONER

## 2022-12-28 ASSESSMENT — PAIN SCALES - GENERAL: PAINLEVEL: MILD PAIN (2)

## 2022-12-28 NOTE — LETTER
"2022       RE: Cody Pickard  [id number] [address] [place] [id number]       Dear Colleague,    Thank you for referring your patient, Cody Pickard, to the [facility] COLON AND RECTAL SURGERY CLINIC [place] at [facility]. Please see a copy of my visit note below.    Colon and Rectal Surgery Postoperative Clinic Note    RE: Cody Pickard  : 1990  WIL: 2022    Cody Pickard is a very pleasant 32 year old male with Crohn's disease and complex anorectal fistula who is now status post examination under anesthesia with debridement of perirectal fistula with complex tract, partial fistulotomy, placement of seton that broke, replacement of seton on 22 with Dr. Hamm.    Interval history: Cody has been doing well. No significant pain but setons feel too long and he would like these shortened.  Some continued drainage.  No fevers or chills.  No difficulty with bowel movements.    Physical Examination: Exam was chaperoned by Modesto Whittington, EMT-P   /80 (BP Location: Left arm, Patient Position: Sitting, Cuff Size: Adult Large)   Pulse 68   Ht 5' 10\"   Wt 218 lb 14.4 oz   SpO2 97%   BMI 31.41 kg/m    General: Alert, oriented, in no acute distress, sitting comfortably  HEENT: mucous membranes moist  Perianal external examination:  1 yellow vessel loop seton into white vessel loop setons in place.  The 2 white setons were shortened per patient request.  No hypergranulation tissue, induration, fluctuance, or erythema.    Digital rectal examination: Was deferred.    Anoscopy: Was deferred.    Assessment/Plan:  32 year old male status post examination under anesthesia with debridement of perirectal fistula with complex tract, partial fistulotomy, placement of seton that broke, replacement of seton on 22 with Dr. Hamm.  He is doing well.  I shortened 2 of his setons per his request.  Some continued drainage but no " significant drainage and no significant pain.  No difficulty with bowel movements.  We will have a follow-up with Dr. Hamm in about 2 months to discuss further surgical plans for his fistula tract. Continue to follow with Dr. Bowers for medical management of Crohn's.    Medical history:  Past Medical History:   Diagnosis Date     Anal fistula      Crohn's disease (H)      HTN (hypertension)        Surgical history:  Past Surgical History:   Procedure Laterality Date     COLONOSCOPY  06/15/2020     EXAM UNDER ANESTHESIA ANUS N/A 11/19/2021    Procedure: EXAM UNDER ANESTHESIA, ANUS;  Surgeon: Vidhya Hamm MD;  Location: [facility] OR     EXAM UNDER ANESTHESIA ANUS N/A 7/8/2022    Procedure: EXAM UNDER ANESTHESIA, ANUS;  Surgeon: Vidhya Hamm MD;  Location: [facility] OR     EXAM UNDER ANESTHESIA ANUS N/A 12/16/2022    Procedure: EXAM UNDER ANESTHESIA, ANUS, Partial Fistuotomy seton x2;  Surgeon: Vidhya Hamm MD;  Location: [facility] OR     FISTULOTOMY RECTUM N/A 7/8/2022    Procedure: partial FISTULOTOMY, RECTUM, seton replacement x2;  Surgeon: Vidhya Hamm MD;  Location: [facility] OR     FISTULOTOMY RECTUM N/A 12/16/2022    Procedure: POSSIBLE FISTULOTOMY, RECTUM;  Surgeon: Vidhya Hamm MD;  Location: [facility] OR     INCISION AND DRAINAGE RECTUM, COMBINED N/A 09/06/2021    Procedure: INCISION AND DRAINAGE, RECTUM, placement of Seton;  Surgeon: Vidhya Hamm MD;  [facility]     PLACEMENT OF SETON RECTUM N/A 11/19/2021    Procedure: PLACEMENT OF SETON RECTUM;  Surgeon: Vidhya Hamm MD;  Location: [facility] OR     SIGMOIDOSCOPY,BIOPSY  09/24/2020       Problem list:  Patient Active Problem List    Diagnosis Date Noted     Anal fistula 11/09/2021     Priority: Medium     Added automatically from request for surgery [id number]       Acute post-operative pain 09/07/2021     Priority: Medium     Perianal abscess 09/06/2021     Priority: Medium      "Perianal Crohn's disease, with abscess (H) 2021     Priority: Medium     IBD (inflammatory bowel disease) 2021     Priority: Medium       Medications:  Current Outpatient Medications   Medication Sig Dispense Refill     adalimumab (HUMIRA *CF* PEN) 40 MG/0.4ML pen kit Inject 0.4 mLs (40 mg) Subcutaneous every 7 days Monday       amLODIPine (NORVASC) 5 MG tablet Take 5 mg by mouth every morning       lisinopril (ZESTRIL) 20 MG tablet Take 20 mg by mouth every morning        mesalamine (LIALDA) 1.2 g DR tablet TAKE 4 TABLETS(4800 MG) BY MOUTH EVERY MORNING (Patient taking differently: 1,200 mg every morning TAKE 4 TABLETS(4800 MG) BY MOUTH EVERY MORNING) 120 tablet 4       Allergies:  No Known Allergies    Family history:  Family History   Problem Relation Age of Onset     Multiple Sclerosis Mother      Ulcerative Colitis Maternal Grandmother      Colon Cancer No family hx of      Inflammatory Bowel Disease No family hx of      Irritable Bowel Syndrome No family hx of      Crohn's Disease No family hx of      Anesthesia Reaction No family hx of      Bleeding Disorder No family hx of      Clotting Disorder No family hx of        Social history:  Social History     Tobacco Use     Smoking status: Former     Packs/day: 0.50     Years: 2.50     Pack years: 1.25     Types: Cigarettes     Start date:      Quit date: 2020     Years since quittin.4     Smokeless tobacco: Never   Substance Use Topics     Alcohol use: Yes     Comment: occ     Marital status: .    Nursing Notes:   Modesto Whittington EMT  2022  9:57 AM  Signed  Chief Complaint   Patient presents with     Surgical Followup     DOS 2022       Vitals:    22 0955   BP: 131/80   BP Location: Left arm   Patient Position: Sitting   Cuff Size: Adult Large   Pulse: 68   SpO2: 97%   Weight: 218 lb 14.4 oz   Height: 5' 10\"       Body mass index is 31.41 kg/m .     Modesto Whittington, EMT- P    15 minutes spent on the date of the " encounter doing chart review, history and exam, documentation and further activities as noted above.   This is a postop visit.    This note was created using speech recognition software and may contain unintended word substitutions.        Again, thank you for allowing me to participate in the care of your patient.      Sincerely,    MEENAKSHI Lance CNP

## 2022-12-28 NOTE — NURSING NOTE
"Chief Complaint   Patient presents with     Surgical Followup     DOS 12/16/2022       Vitals:    12/28/22 0955   BP: 131/80   BP Location: Left arm   Patient Position: Sitting   Cuff Size: Adult Large   Pulse: 68   SpO2: 97%   Weight: 218 lb 14.4 oz   Height: 5' 10\"       Body mass index is 31.41 kg/m .     Modesto Whittington, EMT- P    "

## 2022-12-28 NOTE — PROGRESS NOTES
"Colon and Rectal Surgery Postoperative Clinic Note    RE: Cody Pickard  : 1990  WIL: 2022    Cody Pickard is a very pleasant 32 year old male with Crohn's disease and complex anorectal fistula who is now status post examination under anesthesia with debridement of perirectal fistula with complex tract, partial fistulotomy, placement of seton that broke, replacement of seton on 22 with Dr. Hamm.    Interval history: Cody has been doing well. No significant pain but setons feel too long and he would like these shortened.  Some continued drainage.  No fevers or chills.  No difficulty with bowel movements.    Physical Examination: Exam was chaperoned by Modesto Whittington, EMT-P   /80 (BP Location: Left arm, Patient Position: Sitting, Cuff Size: Adult Large)   Pulse 68   Ht 5' 10\"   Wt 218 lb 14.4 oz   SpO2 97%   BMI 31.41 kg/m    General: Alert, oriented, in no acute distress, sitting comfortably  HEENT: mucous membranes moist  Perianal external examination:  1 yellow vessel loop seton into white vessel loop setons in place.  The 2 white setons were shortened per patient request.  No hypergranulation tissue, induration, fluctuance, or erythema.    Digital rectal examination: Was deferred.    Anoscopy: Was deferred.    Assessment/Plan:  32 year old male status post examination under anesthesia with debridement of perirectal fistula with complex tract, partial fistulotomy, placement of seton that broke, replacement of seton on 22 with Dr. Hamm.  He is doing well.  I shortened 2 of his setons per his request.  Some continued drainage but no significant drainage and no significant pain.  No difficulty with bowel movements.  We will have a follow-up with Dr. Hamm in about 2 months to discuss further surgical plans for his fistula tract. Continue to follow with Dr. Bowers for medical management of Crohn's.    Medical history:  Past Medical History:   Diagnosis Date     " Anal fistula      Crohn's disease (H)      HTN (hypertension)        Surgical history:  Past Surgical History:   Procedure Laterality Date     COLONOSCOPY  06/15/2020     EXAM UNDER ANESTHESIA ANUS N/A 11/19/2021    Procedure: EXAM UNDER ANESTHESIA, ANUS;  Surgeon: Vidhya Hamm MD;  Location: [facility] OR     EXAM UNDER ANESTHESIA ANUS N/A 7/8/2022    Procedure: EXAM UNDER ANESTHESIA, ANUS;  Surgeon: Vidhya Hamm MD;  Location: [facility] OR     EXAM UNDER ANESTHESIA ANUS N/A 12/16/2022    Procedure: EXAM UNDER ANESTHESIA, ANUS, Partial Fistuotomy seton x2;  Surgeon: Vidhya Hamm MD;  Location: [facility] OR     FISTULOTOMY RECTUM N/A 7/8/2022    Procedure: partial FISTULOTOMY, RECTUM, seton replacement x2;  Surgeon: Vidhya Hamm MD;  Location: [facility]     FISTULOTOMY RECTUM N/A 12/16/2022    Procedure: POSSIBLE FISTULOTOMY, RECTUM;  Surgeon: Vidhya Hamm MD;  Location: [facility] OR     INCISION AND DRAINAGE RECTUM, COMBINED N/A 09/06/2021    Procedure: INCISION AND DRAINAGE, RECTUM, placement of Seton;  Surgeon: Vidhya Hamm MD;  [facility]     PLACEMENT OF SETON RECTUM N/A 11/19/2021    Procedure: PLACEMENT OF SETON RECTUM;  Surgeon: Vidhya Hamm MD;  Location: [facility] OR     SIGMOIDOSCOPY,BIOPSY  09/24/2020       Problem list:  Patient Active Problem List    Diagnosis Date Noted     Anal fistula 11/09/2021     Priority: Medium     Added automatically from request for surgery [id number]       Acute post-operative pain 09/07/2021     Priority: Medium     Perianal abscess 09/06/2021     Priority: Medium     Perianal Crohn's disease, with abscess (H) 09/06/2021     Priority: Medium     IBD (inflammatory bowel disease) 02/22/2021     Priority: Medium       Medications:  Current Outpatient Medications   Medication Sig Dispense Refill     adalimumab (HUMIRA *CF* PEN) 40 MG/0.4ML pen kit Inject 0.4 mLs (40 mg) Subcutaneous every 7 days Monday    "    amLODIPine (NORVASC) 5 MG tablet Take 5 mg by mouth every morning       lisinopril (ZESTRIL) 20 MG tablet Take 20 mg by mouth every morning        mesalamine (LIALDA) 1.2 g DR tablet TAKE 4 TABLETS(4800 MG) BY MOUTH EVERY MORNING (Patient taking differently: 1,200 mg every morning TAKE 4 TABLETS(4800 MG) BY MOUTH EVERY MORNING) 120 tablet 4       Allergies:  No Known Allergies    Family history:  Family History   Problem Relation Age of Onset     Multiple Sclerosis Mother      Ulcerative Colitis Maternal Grandmother      Colon Cancer No family hx of      Inflammatory Bowel Disease No family hx of      Irritable Bowel Syndrome No family hx of      Crohn's Disease No family hx of      Anesthesia Reaction No family hx of      Bleeding Disorder No family hx of      Clotting Disorder No family hx of        Social history:  Social History     Tobacco Use     Smoking status: Former     Packs/day: 0.50     Years: 2.50     Pack years: 1.25     Types: Cigarettes     Start date:      Quit date: 2020     Years since quittin.4     Smokeless tobacco: Never   Substance Use Topics     Alcohol use: Yes     Comment: occ     Marital status: .    Nursing Notes:   Modesto Whittington EMT  2022  9:57 AM  Signed  Chief Complaint   Patient presents with     Surgical Followup     DOS 2022       Vitals:    22 0955   BP: 131/80   BP Location: Left arm   Patient Position: Sitting   Cuff Size: Adult Large   Pulse: 68   SpO2: 97%   Weight: 218 lb 14.4 oz   Height: 5' 10\"       Body mass index is 31.41 kg/m .     SUSAN Em- P         15 minutes spent on the date of the encounter doing chart review, history and exam, documentation and further activities as noted above.   This is a postop visit.    MEENAKSHI Bass, NP-C  Colon and Rectal Surgery  [facility]    This note was created using speech recognition software and may contain unintended word " substitutions.

## 2023-01-02 ENCOUNTER — PATIENT OUTREACH (OUTPATIENT)
Dept: GASTROENTEROLOGY | Facility: CLINIC | Age: 33
End: 2023-01-02

## 2023-01-02 DIAGNOSIS — K60.30 ANAL FISTULA: ICD-10-CM

## 2023-01-02 DIAGNOSIS — K50.111 CROHN'S DISEASE OF LARGE INTESTINE WITH RECTAL BLEEDING (H): ICD-10-CM

## 2023-01-02 NOTE — TELEPHONE ENCOUNTER
Patient had called over the weekend but did not leave a message  Patient said he thought he had a fever but called the after office number   Patient said he is doing fine and has no symptoms or questions

## 2023-01-03 NOTE — TELEPHONE ENCOUNTER
LAST ENTRY IS BY [facility] (NO PRINT OUT) - [facility], WHO IS TO BE MANAGING AND SENDING THE NEEDED REFILL?

## 2023-01-04 ENCOUNTER — PATIENT OUTREACH (OUTPATIENT)
Dept: GASTROENTEROLOGY | Facility: CLINIC | Age: 33
End: 2023-01-04

## 2023-01-04 DIAGNOSIS — K60.30 ANAL FISTULA: ICD-10-CM

## 2023-01-04 DIAGNOSIS — K50.111 CROHN'S DISEASE OF LARGE INTESTINE WITH RECTAL BLEEDING (H): ICD-10-CM

## 2023-01-04 RX ORDER — ADALIMUMAB 40MG/0.4ML
40 KIT SUBCUTANEOUS
Qty: 4 EACH | Refills: 5 | Status: SHIPPED | OUTPATIENT
Start: 2023-01-04 | End: 2023-05-09

## 2023-01-04 RX ORDER — ADALIMUMAB 40MG/0.4ML
KIT SUBCUTANEOUS
OUTPATIENT
Start: 2023-01-04

## 2023-02-09 ENCOUNTER — TELEPHONE (OUTPATIENT)
Dept: GASTROENTEROLOGY | Facility: CLINIC | Age: 33
End: 2023-02-09
Payer: COMMERCIAL

## 2023-02-09 NOTE — TELEPHONE ENCOUNTER
Called and left voice message for Pt. Called Pt to help get them scheduled for an appointment with Abdiaziz Villar in May. Writer left call back number.

## 2023-02-09 NOTE — TELEPHONE ENCOUNTER
Patient has seen by Dr Bowers in November to follow up with PA in 3 months   My chart message to patient and also message to ao to contact patient to set up appointment this month or March

## 2023-02-21 PROCEDURE — 83993 ASSAY FOR CALPROTECTIN FECAL: CPT | Mod: VID | Performed by: INTERNAL MEDICINE

## 2023-02-22 LAB — CALPROTECTIN STL-MCNT: 5.4 MG/KG (ref 0–49.9)

## 2023-03-02 NOTE — TELEPHONE ENCOUNTER
Called Pt back to help get him schedule for a follow up appointment with Aurea or Abdiaziz in May. Per Pt, Friday works better for appointments. Pt is now scheduled with Aurea on 5/12/2023 at 4pm for a video visit.

## 2023-03-24 ENCOUNTER — PATIENT OUTREACH (OUTPATIENT)
Dept: GASTROENTEROLOGY | Facility: CLINIC | Age: 33
End: 2023-03-24
Payer: COMMERCIAL

## 2023-03-24 ENCOUNTER — TELEPHONE (OUTPATIENT)
Dept: GASTROENTEROLOGY | Facility: CLINIC | Age: 33
End: 2023-03-24
Payer: COMMERCIAL

## 2023-03-24 NOTE — TELEPHONE ENCOUNTER
Patient calls to report he needs a new prior for humira. Has one more pen   In basket to start a new prior

## 2023-03-24 NOTE — TELEPHONE ENCOUNTER
Prior Authorization Approval    Authorization Effective Date: 3/24/2023  Authorization Expiration Date: 3/24/2024  Medication: Humira weekly dosing approved  Approved Dose/Quantity: 4/28  Reference #: [id number]   Insurance Company: [employer] ([facility]) - Phone [phone] Fax [phone]  Expected CoPay:       CoPay Card Available:      Foundation Assistance Needed:    Which Pharmacy is filling the prescription (Not needed for infusion/clinic administered):    Pharmacy Notified:    Patient Notified:  Sent My [employer] message

## 2023-03-24 NOTE — TELEPHONE ENCOUNTER
PA Initiation    Medication: Humira weekly dosing initiated  Insurance Company: [employer] ([facility]) - Phone [phone] Fax [phone]  Pharmacy Filling the Rx:    Filling Pharmacy Phone:    Filling Pharmacy Fax:    Start Date: 3/24/2023      [id number]

## 2023-04-07 NOTE — PROGRESS NOTES
Colon and Rectal Surgery Postoperative Clinic Note     Referring provider:  No referring provider defined for this encounter.       RE: Cody Pickard  : 1990  WIL: 2023      Cody Pickard is a very pleasant 33 year old male with Crohn's disease and complex anorectal fistula who is now about 4 months status post examination under anesthesia with debridement of perirectal fistula with complex tract, partial fistulotomy, placement of seton that broke, replacement of seton.    Interval history: Currently on Humira, which was recently approved for weekly dosing. He follows with Dr. Bowers. Reports some drainage but overall doing well.       PLEASE SEE NOTE BELOW FOR PHYSICAL EXAMINATION, REVIEW OF SYSTEMS, AND OTHER HISTORY.    Assessment/Plan:  33 year old male with Crohn's disease and complex anorectal fistula who is now about 4 months status post examination under anesthesia with debridement of perirectal fistula with complex tract, partial fistulotomy, placement of seton that broke, replacement of seton.  Today on examination, there is some purulent drainage but one of the 3 setons which is nondependent was removed in the left posterior position (into the internal orifice). The posterior one (into the internal orifice remains) as does the left posterior position in the ischiorectal fossa.   We will have him return to clinic in 3 months. He will contact us as needed in the interim and start to tug on the seton in the next few weeks.    20 minutes spent on the date of encounter performing chart review, history and exam, documentation and further activities as noted above.     Vidhya Hamm MD  Colon and Rectal Surgery Staff  [facility]    -------------------------------------------------------------------------------------------------------------------    Medical history:  Past Medical History:   Diagnosis Date     Anal fistula      Crohn's disease (H)      HTN  (hypertension)        Surgical history:  Past Surgical History:   Procedure Laterality Date     COLONOSCOPY  06/15/2020     EXAM UNDER ANESTHESIA ANUS N/A 11/19/2021    Procedure: EXAM UNDER ANESTHESIA, ANUS;  Surgeon: Vidhya Hamm MD;  Location: [facility] OR     EXAM UNDER ANESTHESIA ANUS N/A 7/8/2022    Procedure: EXAM UNDER ANESTHESIA, ANUS;  Surgeon: Vidhya Hamm MD;  Location: [facility] OR     EXAM UNDER ANESTHESIA ANUS N/A 12/16/2022    Procedure: EXAM UNDER ANESTHESIA, ANUS, Partial Fistuotomy seton x2;  Surgeon: Vidhya Hamm MD;  Location: [facility] OR     FISTULOTOMY RECTUM N/A 7/8/2022    Procedure: partial FISTULOTOMY, RECTUM, seton replacement x2;  Surgeon: Vidhya Hamm MD;  Location: [facility] OR     FISTULOTOMY RECTUM N/A 12/16/2022    Procedure: POSSIBLE FISTULOTOMY, RECTUM;  Surgeon: Vidhya Hamm MD;  Location: [facility] OR     INCISION AND DRAINAGE RECTUM, COMBINED N/A 09/06/2021    Procedure: INCISION AND DRAINAGE, RECTUM, placement of Seton;  Surgeon: Vidhya Hamm MD;  [facility]     PLACEMENT OF SETON RECTUM N/A 11/19/2021    Procedure: PLACEMENT OF SETON RECTUM;  Surgeon: Vidhya Hamm MD;  Location: [facility] OR     SIGMOIDOSCOPY,BIOPSY  09/24/2020       Problem list:    Patient Active Problem List    Diagnosis Date Noted     Anal fistula 11/09/2021     Priority: Medium     Added automatically from request for surgery [id number]       Acute post-operative pain 09/07/2021     Priority: Medium     Perianal abscess 09/06/2021     Priority: Medium     Perianal Crohn's disease, with abscess (H) 09/06/2021     Priority: Medium     IBD (inflammatory bowel disease) 02/22/2021     Priority: Medium       Medications:  Current Outpatient Medications   Medication Sig Dispense Refill     adalimumab (HUMIRA *CF* PEN) 40 MG/0.4ML pen kit Inject 0.4 mLs (40 mg) Subcutaneous every 7 days Monday 4 each 5     amLODIPine (NORVASC) 5 MG tablet  Take 5 mg by mouth every morning       lisinopril (ZESTRIL) 20 MG tablet Take 20 mg by mouth every morning        mesalamine (LIALDA) 1.2 g DR tablet TAKE 4 TABLETS(4800 MG) BY MOUTH EVERY MORNING (Patient taking differently: 1,200 mg every morning TAKE 4 TABLETS(4800 MG) BY MOUTH EVERY MORNING) 120 tablet 4       Allergies:  No Known Allergies    Family history:  Family History   Problem Relation Age of Onset     Multiple Sclerosis Mother      Ulcerative Colitis Maternal Grandmother      Colon Cancer No family hx of      Inflammatory Bowel Disease No family hx of      Irritable Bowel Syndrome No family hx of      Crohn's Disease No family hx of      Anesthesia Reaction No family hx of      Bleeding Disorder No family hx of      Clotting Disorder No family hx of        Social history:  Social History     Socioeconomic History     Marital status:      Spouse name: Not on file     Number of children: Not on file     Years of education: Not on file     Highest education level: Not on file   Occupational History     Not on file   Tobacco Use     Smoking status: Former     Packs/day: 0.50     Years: 2.50     Pack years: 1.25     Types: Cigarettes     Start date:      Quit date: 2020     Years since quittin.6     Smokeless tobacco: Never   Vaping Use     Vaping status: Not on file   Substance and Sexual Activity     Alcohol use: Yes     Comment: occ     Drug use: Never     Sexual activity: Not on file   Other Topics Concern     Not on file   Social History Narrative     Not on file     Social Determinants of Health     Financial Resource Strain: Not on file   Food Insecurity: Not on file   Transportation Needs: Not on file   Physical Activity: Not on file   Stress: Not on file   Social Connections: Not on file   Intimate Partner Violence: Not on file   Housing Stability: Not on file         Nursing Notes:   Des Trammell, EMT  2023  6:39 PM  Signed  Chief Complaint   Patient presents with      Post-op Visit       Vitals:    04/12/23 1837   BP: 132/78   BP Location: Left arm   Patient Position: Sitting   Cuff Size: Adult Large   Pulse: 80   SpO2: 99%       There is no height or weight on file to calculate BMI.    Des Trammell EMT-P       Physical Examination:  /78 (BP Location: Left arm, Patient Position: Sitting, Cuff Size: Adult Large)   Pulse 80   SpO2 99%   General: alert, oriented, in no acute distress, sitting comfortably  Perianal external examination: three setons in place. No digital rectal examination was performed. Minimal purulent material.  left posterior seton into the internal orifice. This was removed.  left posterior seton in the ischiorectal fossa. All 4 ties intact.  Posterior seton into the internal orifice. All 4 ties intact.

## 2023-04-08 DIAGNOSIS — K50.111 CROHN'S DISEASE OF LARGE INTESTINE WITH RECTAL BLEEDING (H): ICD-10-CM

## 2023-04-12 ENCOUNTER — OFFICE VISIT (OUTPATIENT)
Dept: SURGERY | Facility: CLINIC | Age: 33
End: 2023-04-12
Payer: COMMERCIAL

## 2023-04-12 VITALS — DIASTOLIC BLOOD PRESSURE: 78 MMHG | HEART RATE: 80 BPM | SYSTOLIC BLOOD PRESSURE: 132 MMHG | OXYGEN SATURATION: 99 %

## 2023-04-12 DIAGNOSIS — K60.30 ANAL FISTULA: Primary | ICD-10-CM

## 2023-04-12 DIAGNOSIS — K50.913 CROHN'S DISEASE WITH FISTULA, UNSPECIFIED GASTROINTESTINAL TRACT LOCATION (H): ICD-10-CM

## 2023-04-12 PROCEDURE — 99024 POSTOP FOLLOW-UP VISIT: CPT | Performed by: COLON & RECTAL SURGERY

## 2023-04-12 ASSESSMENT — PAIN SCALES - GENERAL: PAINLEVEL: NO PAIN (0)

## 2023-04-12 NOTE — NURSING NOTE
Chief Complaint   Patient presents with     Post-op Visit       Vitals:    04/12/23 1837   BP: 132/78   BP Location: Left arm   Patient Position: Sitting   Cuff Size: Adult Large   Pulse: 80   SpO2: 99%       There is no height or weight on file to calculate BMI.    Des Trammell EMT-P

## 2023-04-12 NOTE — LETTER
2023       RE: Cody Pickard  [id number] [address] [place] [id number]       Dear Colleague,    Thank you for referring your patient, Cody Pickard, to the [facility] [place] at [facility]. Please see a copy of my visit note below.    Colon and Rectal Surgery Postoperative Clinic Note     Referring provider:  No referring provider defined for this encounter.       RE: Cody Pickard  : 1990  WIL: 2023      Cody Pickard is a very pleasant 33 year old male with Crohn's disease and complex anorectal fistula who is now about 4 months status post examination under anesthesia with debridement of perirectal fistula with complex tract, partial fistulotomy, placement of seton that broke, replacement of seton.    Interval history: Currently on Humira, which was recently approved for weekly dosing. He follows with Dr. Bowers. Reports some drainage but overall doing well.       PLEASE SEE NOTE BELOW FOR PHYSICAL EXAMINATION, REVIEW OF SYSTEMS, AND OTHER HISTORY.    Assessment/Plan:  33 year old male with Crohn's disease and complex anorectal fistula who is now about 4 months status post examination under anesthesia with debridement of perirectal fistula with complex tract, partial fistulotomy, placement of seton that broke, replacement of seton.  Today on examination, there is some purulent drainage but one of the 3 setons which is nondependent was removed in the left posterior position (into the internal orifice). The posterior one (into the internal orifice remains) as does the left posterior position in the ischiorectal fossa.   We will have him return to clinic in 3 months. He will contact us as needed in the interim and start to tug on the seton in the next few weeks.    20 minutes spent on the date of encounter performing chart review, history and exam, documentation and further activities as noted above.      Vidhya Hamm MD  Colon and Rectal Surgery Staff  [facility]    -------------------------------------------------------------------------------------------------------------------    Medical history:  Past Medical History:   Diagnosis Date    Anal fistula     Crohn's disease (H)     HTN (hypertension)        Surgical history:  Past Surgical History:   Procedure Laterality Date    COLONOSCOPY  06/15/2020    EXAM UNDER ANESTHESIA ANUS N/A 11/19/2021    Procedure: EXAM UNDER ANESTHESIA, ANUS;  Surgeon: Vidhya Hamm MD;  Location: [facility] OR    EXAM UNDER ANESTHESIA ANUS N/A 7/8/2022    Procedure: EXAM UNDER ANESTHESIA, ANUS;  Surgeon: Vidhya Hamm MD;  Location: [facility] OR    EXAM UNDER ANESTHESIA ANUS N/A 12/16/2022    Procedure: EXAM UNDER ANESTHESIA, ANUS, Partial Fistuotomy seton x2;  Surgeon: Vidhya Hamm MD;  Location: [facility] OR    FISTULOTOMY RECTUM N/A 7/8/2022    Procedure: partial FISTULOTOMY, RECTUM, seton replacement x2;  Surgeon: Vidhya Hamm MD;  Location: [facility] OR    FISTULOTOMY RECTUM N/A 12/16/2022    Procedure: POSSIBLE FISTULOTOMY, RECTUM;  Surgeon: Vidhya Hamm MD;  Location: [facility] OR    INCISION AND DRAINAGE RECTUM, COMBINED N/A 09/06/2021    Procedure: INCISION AND DRAINAGE, RECTUM, placement of Seton;  Surgeon: Vidhya Hamm MD;  Location: [facility]    PLACEMENT OF SETON RECTUM N/A 11/19/2021    Procedure: PLACEMENT OF SETON RECTUM;  Surgeon: Vidhya Hamm MD;  Location: [facility] OR    SIGMOIDOSCOPY,BIOPSY  09/24/2020       Problem list:    Patient Active Problem List    Diagnosis Date Noted    Anal fistula 11/09/2021     Priority: Medium     Added automatically from request for surgery [id number]      Acute post-operative pain 09/07/2021     Priority: Medium    Perianal abscess 09/06/2021     Priority: Medium    Perianal Crohn's disease, with abscess (H) 09/06/2021     Priority:  Medium    IBD (inflammatory bowel disease) 2021     Priority: Medium       Medications:  Current Outpatient Medications   Medication Sig Dispense Refill    adalimumab (HUMIRA *CF* PEN) 40 MG/0.4ML pen kit Inject 0.4 mLs (40 mg) Subcutaneous every 7 days Monday 4 each 5    amLODIPine (NORVASC) 5 MG tablet Take 5 mg by mouth every morning      lisinopril (ZESTRIL) 20 MG tablet Take 20 mg by mouth every morning       mesalamine (LIALDA) 1.2 g DR tablet TAKE 4 TABLETS(4800 MG) BY MOUTH EVERY MORNING (Patient taking differently: 1,200 mg every morning TAKE 4 TABLETS(4800 MG) BY MOUTH EVERY MORNING) 120 tablet 4       Allergies:  No Known Allergies    Family history:  Family History   Problem Relation Age of Onset    Multiple Sclerosis Mother     Ulcerative Colitis Maternal Grandmother     Colon Cancer No family hx of     Inflammatory Bowel Disease No family hx of     Irritable Bowel Syndrome No family hx of     Crohn's Disease No family hx of     Anesthesia Reaction No family hx of     Bleeding Disorder No family hx of     Clotting Disorder No family hx of        Social history:  Social History     Socioeconomic History    Marital status:      Spouse name: Not on file    Number of children: Not on file    Years of education: Not on file    Highest education level: Not on file   Occupational History    Not on file   Tobacco Use    Smoking status: Former     Packs/day: 0.50     Years: 2.50     Pack years: 1.25     Types: Cigarettes     Start date:      Quit date: 2020     Years since quittin.6    Smokeless tobacco: Never   Vaping Use    Vaping status: Not on file   Substance and Sexual Activity    Alcohol use: Yes     Comment: occ    Drug use: Never    Sexual activity: Not on file   Other Topics Concern    Not on file   Social History Narrative    Not on file     Social Determinants of Health     Financial Resource Strain: Not on file   Food Insecurity: Not on file   Transportation Needs: Not on  file   Physical Activity: Not on file   Stress: Not on file   Social Connections: Not on file   Intimate Partner Violence: Not on file   Housing Stability: Not on file         Nursing Notes:   Des Trammell, EMT  4/12/2023  6:39 PM  Signed  Chief Complaint   Patient presents with    Post-op Visit       Vitals:    04/12/23 1837   BP: 132/78   BP Location: Left arm   Patient Position: Sitting   Cuff Size: Adult Large   Pulse: 80   SpO2: 99%       There is no height or weight on file to calculate BMI.       Physical Examination:  /78 (BP Location: Left arm, Patient Position: Sitting, Cuff Size: Adult Large)   Pulse 80   SpO2 99%   General: alert, oriented, in no acute distress, sitting comfortably  Perianal external examination: three setons in place. No digital rectal examination was performed. Minimal purulent material.  left posterior seton into the internal orifice. This was removed.  left posterior seton in the ischiorectal fossa. All 4 ties intact.  Posterior seton into the internal orifice. All 4 ties intact.        Again, thank you for allowing me to participate in the care of your patient.      Sincerely,    Vidhya Hamm MD

## 2023-05-08 ENCOUNTER — HEALTH MAINTENANCE LETTER (OUTPATIENT)
Age: 33
End: 2023-05-08

## 2023-05-09 ENCOUNTER — PATIENT OUTREACH (OUTPATIENT)
Dept: GASTROENTEROLOGY | Facility: CLINIC | Age: 33
End: 2023-05-09
Payer: COMMERCIAL

## 2023-05-09 DIAGNOSIS — K50.111 CROHN'S DISEASE OF LARGE INTESTINE WITH RECTAL BLEEDING (H): ICD-10-CM

## 2023-05-09 DIAGNOSIS — K60.30 ANAL FISTULA: ICD-10-CM

## 2023-05-09 RX ORDER — ADALIMUMAB 40MG/0.4ML
40 KIT SUBCUTANEOUS
Qty: 4 EACH | Refills: 2 | Status: SHIPPED | OUTPATIENT
Start: 2023-05-09 | End: 2023-07-20

## 2023-05-09 RX ORDER — MESALAMINE 1.2 G/1
TABLET, DELAYED RELEASE ORAL
Qty: 120 TABLET | Refills: 4 | Status: SHIPPED | OUTPATIENT
Start: 2023-05-09 | End: 2023-10-17

## 2023-05-09 NOTE — TELEPHONE ENCOUNTER
Patient requesting refills of meslamine and humira   Patient due for labs and clinic appt   Patient will try to complete labs this week    Hold spot on July 19 with Ms Hull   Message to team to schedule for video visit

## 2023-05-12 ENCOUNTER — LAB (OUTPATIENT)
Dept: LAB | Facility: CLINIC | Age: 33
End: 2023-05-12
Payer: COMMERCIAL

## 2023-05-12 DIAGNOSIS — K60.30 ANAL FISTULA: ICD-10-CM

## 2023-05-12 DIAGNOSIS — K50.111 CROHN'S DISEASE OF LARGE INTESTINE WITH RECTAL BLEEDING (H): ICD-10-CM

## 2023-05-12 LAB
ALBUMIN SERPL-MCNC: 4.2 G/DL (ref 3.4–5)
ALP SERPL-CCNC: 64 U/L (ref 40–150)
ALT SERPL W P-5'-P-CCNC: 58 U/L (ref 0–70)
AST SERPL W P-5'-P-CCNC: 28 U/L (ref 0–45)
BASOPHILS # BLD AUTO: 0.1 10E3/UL (ref 0–0.2)
BASOPHILS NFR BLD AUTO: 1 %
BILIRUB DIRECT SERPL-MCNC: 0.2 MG/DL (ref 0–0.2)
BILIRUB SERPL-MCNC: 0.7 MG/DL (ref 0.2–1.3)
CREAT SERPL-MCNC: 0.84 MG/DL (ref 0.66–1.25)
CRP SERPL-MCNC: <2.9 MG/L (ref 0–8)
EOSINOPHIL # BLD AUTO: 0.2 10E3/UL (ref 0–0.7)
EOSINOPHIL NFR BLD AUTO: 2 %
ERYTHROCYTE [DISTWIDTH] IN BLOOD BY AUTOMATED COUNT: 12.6 % (ref 10–15)
ERYTHROCYTE [SEDIMENTATION RATE] IN BLOOD BY WESTERGREN METHOD: 4 MM/HR (ref 0–15)
GFR SERPL CREATININE-BSD FRML MDRD: >90 ML/MIN/1.73M2
HCT VFR BLD AUTO: 43.6 % (ref 40–53)
HGB BLD-MCNC: 14.5 G/DL (ref 13.3–17.7)
IMM GRANULOCYTES # BLD: 0 10E3/UL
IMM GRANULOCYTES NFR BLD: 0 %
LYMPHOCYTES # BLD AUTO: 2.9 10E3/UL (ref 0.8–5.3)
LYMPHOCYTES NFR BLD AUTO: 38 %
MCH RBC QN AUTO: 29.8 PG (ref 26.5–33)
MCHC RBC AUTO-ENTMCNC: 33.3 G/DL (ref 31.5–36.5)
MCV RBC AUTO: 90 FL (ref 78–100)
MONOCYTES # BLD AUTO: 0.7 10E3/UL (ref 0–1.3)
MONOCYTES NFR BLD AUTO: 10 %
NEUTROPHILS # BLD AUTO: 3.8 10E3/UL (ref 1.6–8.3)
NEUTROPHILS NFR BLD AUTO: 49 %
NRBC # BLD AUTO: 0 10E3/UL
NRBC BLD AUTO-RTO: 0 /100
PLATELET # BLD AUTO: 375 10E3/UL (ref 150–450)
PROT SERPL-MCNC: 7.8 G/DL (ref 6.8–8.8)
RBC # BLD AUTO: 4.87 10E6/UL (ref 4.4–5.9)
WBC # BLD AUTO: 7.6 10E3/UL (ref 4–11)

## 2023-05-12 PROCEDURE — 80076 HEPATIC FUNCTION PANEL: CPT

## 2023-05-12 PROCEDURE — 85652 RBC SED RATE AUTOMATED: CPT

## 2023-05-12 PROCEDURE — 82565 ASSAY OF CREATININE: CPT

## 2023-05-12 PROCEDURE — 36415 COLL VENOUS BLD VENIPUNCTURE: CPT

## 2023-05-12 PROCEDURE — 85025 COMPLETE CBC W/AUTO DIFF WBC: CPT

## 2023-05-12 PROCEDURE — 86140 C-REACTIVE PROTEIN: CPT

## 2023-06-22 ENCOUNTER — PATIENT OUTREACH (OUTPATIENT)
Dept: GASTROENTEROLOGY | Facility: CLINIC | Age: 33
End: 2023-06-22
Payer: COMMERCIAL

## 2023-06-22 ENCOUNTER — TELEPHONE (OUTPATIENT)
Dept: SURGERY | Facility: CLINIC | Age: 33
End: 2023-06-22

## 2023-06-22 ENCOUNTER — TELEPHONE (OUTPATIENT)
Dept: SURGERY | Facility: CLINIC | Age: 33
End: 2023-06-22
Payer: COMMERCIAL

## 2023-06-22 NOTE — TELEPHONE ENCOUNTER
"On 6/22/2023 at 7:07 AM:  Patient left vm msg requesting a clinic appt tomorrow 6/23/2023 with Rachel Mahmood NP, for seton maintenance? Possibly retying the seton or \"putting in more knots.\"    Forwarded message to Rachel Mahmood NP and clinic coordinators.    Jennifer Vargas  Rose-op Coordinator  [facility]  Direct Phone: [phone]      "

## 2023-06-22 NOTE — TELEPHONE ENCOUNTER
Cody called and left a voicemail that his string on his seton is loose or has fallen out and he would like to see if someone can put a knot on it.    Spoke with Cody, he states that he saw his know come out of his seton and he is worry that his seton will fall out.     Per chart review he had his seton place in April 2023 by our CRS team. Ask pt if he called [facility] since they would be able to do the knot as he requested. Gave Cody [facility] contact # to call.

## 2023-06-22 NOTE — TELEPHONE ENCOUNTER
Patient's seton only has 2 knots, it typically has 3 knots - he said last time this came undone very quickly and he does not want it to fall out and have to go in for an EUA to replace. He will see Susan tomorrow for reinforcing of his seton

## 2023-06-22 NOTE — TELEPHONE ENCOUNTER
[facility] Call Center    Phone Message    May a detailed message be left on voicemail: yes     Reason for Call: Other: Pt is requesting a call back please to discuss his seton, Pt is wondering if anyone would have time to put a knot in it tomorrow. Please advise. Thank you!     Action Taken: Message routed to:  [facility]al     Travel Screening: Not Applicable

## 2023-06-23 ENCOUNTER — OFFICE VISIT (OUTPATIENT)
Dept: SURGERY | Facility: CLINIC | Age: 33
End: 2023-06-23
Payer: COMMERCIAL

## 2023-06-23 VITALS — OXYGEN SATURATION: 99 % | HEART RATE: 71 BPM | SYSTOLIC BLOOD PRESSURE: 133 MMHG | DIASTOLIC BLOOD PRESSURE: 85 MMHG

## 2023-06-23 DIAGNOSIS — K60.30 ANAL FISTULA: Primary | ICD-10-CM

## 2023-06-23 PROCEDURE — 99212 OFFICE O/P EST SF 10 MIN: CPT

## 2023-06-23 ASSESSMENT — PAIN SCALES - GENERAL: PAINLEVEL: NO PAIN (0)

## 2023-06-23 NOTE — NURSING NOTE
Chief Complaint   Patient presents with     RECHECK       Vitals:    06/23/23 1239   BP: 133/85   BP Location: Left arm   Patient Position: Sitting   Cuff Size: Adult Regular   Pulse: 71   SpO2: 99%       There is no height or weight on file to calculate BMI.    Des Trammell EMT-P

## 2023-06-23 NOTE — PROGRESS NOTES
Colon and Rectal Surgery Follow-Up Clinic Note    RE: Cody Pickard  : 1990  WIL: 2023    Cody Pickard is a very pleasant 33 year old male with Crohn's disease here for follow-up of anal fistula.     Interval history: Last in clinic about 2 months ago. Currently has 2 setons (LP to ischiorectal fossa, and posterior to internal orifice). Reports he noticed that one of his setons only has two ties on it and is worried that the seton will fall out. He has an appt with Dr. Hamm in a few weeks to discuss next steps for the setons.    Physical Examination: Exam was chaperoned by Des Trammell, EMT-P   /85 (BP Location: Left arm, Patient Position: Sitting, Cuff Size: Adult Regular)   Pulse 71   SpO2 99%   General: alert, oriented, in no acute distress, sitting comfortably  HEENT: moist mucous membranes    Perianal external examination:  Two white vessel loop setons. One in posterior midline to internal orifice, another from posterior to left posterior. The posterior to internal seton has 2 ties missing, and these were replaced. The other seton has 4 ties.     Assessment/Plan: 33 year old male with anal fistula s/p seton x2. Two silk ties missing so these were replaced. We opted not to exchange the setons today as he has follow up with Dr. Hamm in less than a month to discuss next steps. He is otherwise doing well and has no concerns. Return to clinic as needed. Patient's questions were answered to his stated satisfaction and he is in agreement with this plan.     Medical history:  Past Medical History:   Diagnosis Date     Anal fistula      Crohn's disease (H)      HTN (hypertension)        Surgical history:  Past Surgical History:   Procedure Laterality Date     COLONOSCOPY  06/15/2020     EXAM UNDER ANESTHESIA ANUS N/A 2021    Procedure: EXAM UNDER ANESTHESIA, ANUS;  Surgeon: Vidhya Hamm MD;  Location: [facility] OR     EXAM UNDER ANESTHESIA ANUS N/A 2022     Procedure: EXAM UNDER ANESTHESIA, ANUS;  Surgeon: Vidhya Hamm MD;  Location: [facility] OR     EXAM UNDER ANESTHESIA ANUS N/A 12/16/2022    Procedure: EXAM UNDER ANESTHESIA, ANUS, Partial Fistuotomy seton x2;  Surgeon: Vidhya Hamm MD;  Location: [facility] OR     FISTULOTOMY RECTUM N/A 7/8/2022    Procedure: partial FISTULOTOMY, RECTUM, seton replacement x2;  Surgeon: Vidhya Hamm MD;  Location: [facility] OR     FISTULOTOMY RECTUM N/A 12/16/2022    Procedure: POSSIBLE FISTULOTOMY, RECTUM;  Surgeon: Vidhya Hamm MD;  Location: [facility] OR     INCISION AND DRAINAGE RECTUM, COMBINED N/A 09/06/2021    Procedure: INCISION AND DRAINAGE, RECTUM, placement of Seton;  Surgeon: Vidhya Hamm MD;  Location: [facility] OR     PLACEMENT OF SETON RECTUM N/A 11/19/2021    Procedure: PLACEMENT OF SETON RECTUM;  Surgeon: Vidhya Hamm MD;  Location: [facility] OR     SIGMOIDOSCOPY,BIOPSY  09/24/2020       Problem list:    Patient Active Problem List    Diagnosis Date Noted     Anal fistula 11/09/2021     Priority: Medium     Added automatically from request for surgery [id number]       Acute post-operative pain 09/07/2021     Priority: Medium     Perianal abscess 09/06/2021     Priority: Medium     Perianal Crohn's disease, with abscess (H) 09/06/2021     Priority: Medium     IBD (inflammatory bowel disease) 02/22/2021     Priority: Medium       Medications:  Current Outpatient Medications   Medication Sig Dispense Refill     adalimumab (HUMIRA *CF* PEN) 40 MG/0.4ML pen kit Inject 0.4 mLs (40 mg) Subcutaneous every 7 days Additional refills after labs and clinic appt 4 each 2     amLODIPine (NORVASC) 5 MG tablet Take 5 mg by mouth every morning       lisinopril (ZESTRIL) 20 MG tablet Take 20 mg by mouth every morning        mesalamine (LIALDA) 1.2 g DR tablet TAKE 4 TABLETS(4800 MG) BY MOUTH EVERY MORNING 120 tablet 4       Allergies:  No Known Allergies    Family history:  Family  History   Problem Relation Age of Onset     Multiple Sclerosis Mother      Ulcerative Colitis Maternal Grandmother      Colon Cancer No family hx of      Inflammatory Bowel Disease No family hx of      Irritable Bowel Syndrome No family hx of      Crohn's Disease No family hx of      Anesthesia Reaction No family hx of      Bleeding Disorder No family hx of      Clotting Disorder No family hx of        Social history:  Social History     Tobacco Use     Smoking status: Former     Packs/day: 0.50     Years: 2.50     Pack years: 1.25     Types: Cigarettes     Start date:      Quit date: 2020     Years since quittin.8     Smokeless tobacco: Never   Substance Use Topics     Alcohol use: Yes     Comment: occ     Marital status: .    Nursing Notes:   Des Trammell, EMT  2023 12:40 PM  Signed  Chief Complaint   Patient presents with     RECHECK       Vitals:    23 1239   BP: 133/85   BP Location: Left arm   Patient Position: Sitting   Cuff Size: Adult Regular   Pulse: 71   SpO2: 99%       There is no height or weight on file to calculate BMI.    Des Trammell EMT-P         10 minutes spent on the date of encounter performing chart review, history and exam, documentation and further activities as noted above.     -----------------------------------------------------  Susan Stern PA-C  Colon and Rectal Surgery  [facility]

## 2023-06-23 NOTE — LETTER
2023       RE: Cody Pickard  [address] [address] [place] [id number]       Dear Colleague,    Thank you for referring your patient, Cody Pickard, to the [facility] [place] [facility]. Please see a copy of my visit note below.    Colon and Rectal Surgery Follow-Up Clinic Note    RE: Cody Pickard  : 1990  WIL: 2023    Cody Pickard is a very pleasant 33 year old male with Crohn's disease here for follow-up of anal fistula.     Interval history: Last in clinic about 2 months ago. Currently has 2 setons (LP to ischiorectal fossa, and posterior to internal orifice). Reports he noticed that one of his setons only has two ties on it and is worried that the seton will fall out. He has an appt with Dr. Hamm in a few weeks to discuss next steps for the setons.    Physical Examination: Exam was chaperoned by Des Trammell, EMT-P   /85 (BP Location: Left arm, Patient Position: Sitting, Cuff Size: Adult Regular)   Pulse 71   SpO2 99%   General: alert, oriented, in no acute distress, sitting comfortably  HEENT: moist mucous membranes    Perianal external examination:  Two white vessel loop setons. One in posterior midline to internal orifice, another from posterior to left posterior. The posterior to internal seton has 2 ties missing, and these were replaced. The other seton has 4 ties.     Assessment/Plan: 33 year old male with anal fistula s/p seton x2. Two silk ties missing so these were replaced. We opted not to exchange the setons today as he has follow up with Dr. Hamm in less than a month to discuss next steps. He is otherwise doing well and has no concerns. Return to clinic as needed. Patient's questions were answered to his stated satisfaction and he is in agreement with this plan.     Medical history:  Past Medical History:   Diagnosis Date    Anal fistula     Crohn's disease (H)      HTN (hypertension)        Surgical history:  Past Surgical History:   Procedure Laterality Date    COLONOSCOPY  06/15/2020    EXAM UNDER ANESTHESIA ANUS N/A 11/19/2021    Procedure: EXAM UNDER ANESTHESIA, ANUS;  Surgeon: Vidhya Hamm MD;  Location: [facility] OR    EXAM UNDER ANESTHESIA ANUS N/A 7/8/2022    Procedure: EXAM UNDER ANESTHESIA, ANUS;  Surgeon: Vidhya Hamm MD;  Location: [facility] OR    EXAM UNDER ANESTHESIA ANUS N/A 12/16/2022    Procedure: EXAM UNDER ANESTHESIA, ANUS, Partial Fistuotomy seton x2;  Surgeon: Vidhya Hamm MD;  Location: [facility] OR    FISTULOTOMY RECTUM N/A 7/8/2022    Procedure: partial FISTULOTOMY, RECTUM, seton replacement x2;  Surgeon: Vidhya Hamm MD;  Location: [facility] OR    FISTULOTOMY RECTUM N/A 12/16/2022    Procedure: POSSIBLE FISTULOTOMY, RECTUM;  Surgeon: Vidhya Hamm MD;  Location: [facility] OR    INCISION AND DRAINAGE RECTUM, COMBINED N/A 09/06/2021    Procedure: INCISION AND DRAINAGE, RECTUM, placement of Seton;  Surgeon: Vidhya Hamm MD;  Location: [facility] OR    PLACEMENT OF SETON RECTUM N/A 11/19/2021    Procedure: PLACEMENT OF SETON RECTUM;  Surgeon: Vidhya Hamm MD;  Location: [facility] OR    SIGMOIDOSCOPY,BIOPSY  09/24/2020       Problem list:    Patient Active Problem List    Diagnosis Date Noted    Anal fistula 11/09/2021     Priority: Medium     Added automatically from request for surgery [id number]      Acute post-operative pain 09/07/2021     Priority: Medium    Perianal abscess 09/06/2021     Priority: Medium    Perianal Crohn's disease, with abscess (H) 09/06/2021     Priority: Medium    IBD (inflammatory bowel disease) 02/22/2021     Priority: Medium       Medications:  Current Outpatient Medications   Medication Sig Dispense Refill    adalimumab (HUMIRA *CF* PEN) 40 MG/0.4ML pen kit Inject 0.4 mLs (40 mg) Subcutaneous every 7 days Additional refills after labs and clinic appt 4 each 2     amLODIPine (NORVASC) 5 MG tablet Take 5 mg by mouth every morning      lisinopril (ZESTRIL) 20 MG tablet Take 20 mg by mouth every morning       mesalamine (LIALDA) 1.2 g DR tablet TAKE 4 TABLETS(4800 MG) BY MOUTH EVERY MORNING 120 tablet 4       Allergies:  No Known Allergies    Family history:  Family History   Problem Relation Age of Onset    Multiple Sclerosis Mother     Ulcerative Colitis Maternal Grandmother     Colon Cancer No family hx of     Inflammatory Bowel Disease No family hx of     Irritable Bowel Syndrome No family hx of     Crohn's Disease No family hx of     Anesthesia Reaction No family hx of     Bleeding Disorder No family hx of     Clotting Disorder No family hx of        Social history:  Social History     Tobacco Use    Smoking status: Former     Packs/day: 0.50     Years: 2.50     Pack years: 1.25     Types: Cigarettes     Start date:      Quit date: 2020     Years since quittin.8    Smokeless tobacco: Never   Substance Use Topics    Alcohol use: Yes     Comment: occ     Marital status: .    Nursing Notes:   Des Trammell EMT  2023 12:40 PM  Signed  Chief Complaint   Patient presents with    RECHECK       Vitals:    23 1239   BP: 133/85   BP Location: Left arm   Patient Position: Sitting   Cuff Size: Adult Regular   Pulse: 71   SpO2: 99%       There is no height or weight on file to calculate BMI.    Des Trammell EMT-P       10 minutes spent on the date of encounter performing chart review, history and exam, documentation and further activities as noted above.     -----------------------------------------------------        Again, thank you for allowing me to participate in the care of your patient.      Sincerely,    Susan Stern PA-C

## 2023-06-28 ENCOUNTER — VIRTUAL VISIT (OUTPATIENT)
Dept: GASTROENTEROLOGY | Facility: CLINIC | Age: 33
End: 2023-06-28
Payer: COMMERCIAL

## 2023-06-28 DIAGNOSIS — J34.89 NASAL PAIN: Primary | ICD-10-CM

## 2023-06-28 DIAGNOSIS — K50.111 CROHN'S DISEASE OF LARGE INTESTINE WITH RECTAL BLEEDING (H): ICD-10-CM

## 2023-06-28 PROCEDURE — 99214 OFFICE O/P EST MOD 30 MIN: CPT | Mod: VID | Performed by: PHYSICIAN ASSISTANT

## 2023-06-28 ASSESSMENT — ENCOUNTER SYMPTOMS
SINUS PAIN: 0
POOR WOUND HEALING: 1
SINUS CONGESTION: 1
NAIL CHANGES: 0
SKIN CHANGES: 0

## 2023-06-28 NOTE — PROGRESS NOTES
Virtual Visit Details    Type of service:  Video Visit     Originating Location (pt. Location): Home    Distant Location (provider location):  Off-site  Platform used for Video Visit: Jennifer     Start 207  Stop  227 pm    IBD CLINIC VISIT    CC/REFERRING MD:  Referred Self  REASON FOR CONSULTATION: Colitis    ASSESSMENT/PLAN:  33 year old male with Crohn's disease.     1. Crohn's disease:   Current medication:    - Mesalamine 4.8g daily   - Humira/adalimumab q7 days  Current clinical disease activity: Remission   Current endoscopic disease activity: 9/27/21: Near normal flex sig (left colon erythema and erosions) - mild histology.     Luminal symptoms inactive.  I believe he is in clinical remission.  He is working with CRS for seton placement and possible fistulotomy in the future. I have sent a message to Dr. Hamm regarding a possible flex sig during the time of the fistulotomy. If this is not done or unable to be done at the same time, we will coordinate a scope later this year for restaging him.     -- Recommend continue mesalamine at this time  -- Recommend continuing Humira  -- Seton placement/fistulotomy per colorectal surgery    2. IBD dysplasia surveillance: > 1/3 of colon involved. Will need dysplasia surveillance.   -- IBD dysplasia surveillance starting in 2028    Return to clinic in 6 months.     Abdiaziz Hull PA-C  Division of Gastroenterology, Hepatology and Nutrition  [facility]     IBD HISTORY  Age at diagnosis: 30  Endoscopic extent of disease: Continuous: rectum to proximal transverse  Histologic Extent of disease: Rectum, descending   Disease phenotype: Extensive   Rose-anal disease: Yes  Prior IBD surgeries: None  Prior IBD Medications:  - Vedolizumab - not dose escalated     DRUG MONITORING  TPMT enzyme activity:  n/a  6-TGN/6-MMPN levels: n/a  Biologic concentration:   Adalimumab: 6.1 on 9/2021, no antibodies      HPI:   Currently having 2-3 stools per day, soft formed.  No blood in the stool. No fevers. Occasional abdominal pain from a hernia.  Weight is stable.     Currently has 2 setons (LP to ischiorectal fossa, and posterior to internal orifice). Reports he noticed that one of his setons only has two ties on it and is worried that the seton will fall out. He has an appt with Dr. Hamm in a few weeks to discuss next steps for the setons. Continues to have drainage, especially after a BM from anorectal fistula.       sIBDQ:  IBDQ Score Date IBDQ - Total Score  (Higher score better)   6/28/2023   2:05 PM 69   2/28/2022   3:33 PM 64          ROS:    Constitutional, HEENT, cardiovascular, pulmonary, GI, , musculoskeletal, neuro, skin, endocrine and psych systems are negative, except as otherwise noted.    PHYSICAL EXAMINATION:  Constitutional: aaox3, cooperative, pleasant, not dyspneic/diaphoretic, no acute distress  Vitals reviewed: There were no vitals taken for this visit.  Wt:   Wt Readings from Last 2 Encounters:   12/28/22 99.3 kg (218 lb 14.4 oz)   12/16/22 101.4 kg (223 lb 8.7 oz)      Constitutional - general appearance is well and in no acute distress.  Eyes - sunglasses on   Respiratory - No cough, unlabored breathing  Musculoskeletal - range of motion intact: Neck and arms  Skin - No discoloration or lesions visible - pt reports red pustule/papule unable to see via video  Neurological - No tremors, headaches  Psychiatric - No anxiety, alert & oriented    PERTINENT PAST MEDICAL HISTORY:  Past Medical History:   Diagnosis Date     Anal fistula      Crohn's disease (H)      HTN (hypertension)       Inflammatory bowel disease  Hypertension     PREVIOUS SURGERIES:  Past Surgical History:   Procedure Laterality Date     COLONOSCOPY  06/15/2020     EXAM UNDER ANESTHESIA ANUS N/A 11/19/2021    Procedure: EXAM UNDER ANESTHESIA, ANUS;  Surgeon: Vidhya Hamm MD;  Location: [facility] OR     EXAM UNDER ANESTHESIA ANUS N/A 7/8/2022    Procedure: EXAM UNDER  ANESTHESIA, ANUS;  Surgeon: Vidhya Hamm MD;  Location: [facility] OR     EXAM UNDER ANESTHESIA ANUS N/A 2022    Procedure: EXAM UNDER ANESTHESIA, ANUS, Partial Fistuotomy seton x2;  Surgeon: Vidhya Hamm MD;  Location: [facility] OR     FISTULOTOMY RECTUM N/A 2022    Procedure: partial FISTULOTOMY, RECTUM, seton replacement x2;  Surgeon: Vidhya Hamm MD;  Location: [facility] OR     FISTULOTOMY RECTUM N/A 2022    Procedure: POSSIBLE FISTULOTOMY, RECTUM;  Surgeon: Vidhya Hamm MD;  Location: [facility] OR     INCISION AND DRAINAGE RECTUM, COMBINED N/A 2021    Procedure: INCISION AND DRAINAGE, RECTUM, placement of Seton;  Surgeon: Vidhya Hamm MD;  Location: [facility] OR     PLACEMENT OF SETON RECTUM N/A 2021    Procedure: PLACEMENT OF SETON RECTUM;  Surgeon: Vidhya Hamm MD;  Location: [facility] OR     SIGMOIDOSCOPY,BIOPSY  2020     ALLERGIES:   No Known Allergies    PERTINENT MEDICATIONS:    Current Outpatient Medications:      adalimumab (HUMIRA *CF* PEN) 40 MG/0.4ML pen kit, Inject 0.4 mLs (40 mg) Subcutaneous every 7 days Additional refills after labs and clinic appt, Disp: 4 each, Rfl: 2     amLODIPine (NORVASC) 5 MG tablet, Take 5 mg by mouth every morning, Disp: , Rfl:      lisinopril (ZESTRIL) 20 MG tablet, Take 20 mg by mouth every morning , Disp: , Rfl:      mesalamine (LIALDA) 1.2 g DR tablet, TAKE 4 TABLETS(4800 MG) BY MOUTH EVERY MORNING, Disp: 120 tablet, Rfl: 4    SOCIAL HISTORY:  Social History     Socioeconomic History     Marital status:      Spouse name: Not on file     Number of children: Not on file     Years of education: Not on file     Highest education level: Not on file   Occupational History     Not on file   Tobacco Use     Smoking status: Former     Packs/day: 0.50     Years: 2.50     Pack years: 1.25     Types: Cigarettes     Start date:      Quit date: 2020     Years since quittin.9      Smokeless tobacco: Never   Substance and Sexual Activity     Alcohol use: Yes     Comment: occ     Drug use: Never     Sexual activity: Not on file   Other Topics Concern     Not on file   Social History Narrative     Not on file     Social Determinants of Health     Financial Resource Strain: Not on file   Food Insecurity: Not on file   Transportation Needs: Not on file   Physical Activity: Not on file   Stress: Not on file   Social Connections: Not on file   Intimate Partner Violence: Not on file   Housing Stability: Not on file       FAMILY HISTORY:  Family History   Problem Relation Age of Onset     Multiple Sclerosis Mother      Ulcerative Colitis Maternal Grandmother      Colon Cancer No family hx of      Inflammatory Bowel Disease No family hx of      Irritable Bowel Syndrome No family hx of      Crohn's Disease No family hx of      Anesthesia Reaction No family hx of      Bleeding Disorder No family hx of      Clotting Disorder No family hx of      No family history of IBD  Past/family/social history reviewed and no changes            Answers for HPI/ROS submitted by the patient on 6/28/2023  General Symptoms: No  Skin Symptoms: Yes  HENT Symptoms: Yes  EYE SYMPTOMS: No  HEART SYMPTOMS: No  LUNG SYMPTOMS: No  INTESTINAL SYMPTOMS: No  URINARY SYMPTOMS: No  REPRODUCTIVE SYMPTOMS: No  SKELETAL SYMPTOMS: No  BLOOD SYMPTOMS: No  NERVOUS SYSTEM SYMPTOMS: No  MENTAL HEALTH SYMPTOMS: No  Ear pain: No  Ear discharge: No  Hearing loss: No  Tinnitus: No  Nosebleeds: No  Congestion: Yes  Sinus pain: No  Changes in hair: No  Changes in moles/birth marks: No  Itching: No  Rashes: No  Changes in nails: No  Acne: No  Change in facial hair: No  Warts: No  Non-healing sores: Yes

## 2023-06-28 NOTE — NURSING NOTE
Is the patient currently in the state of [place]? YES    Visit mode:VIDEO    If the visit is dropped, the patient can be reconnected by: VIDEO VISIT: Text to cell phone: [phone]    Will anyone else be joining the visit? NO      How would you like to obtain your AVS? MyChart    Are changes needed to the allergy or medication list? NO    Reason for visit: RECHECK

## 2023-06-28 NOTE — PATIENT INSTRUCTIONS
It was a pleasure taking care of you today.  I've included a brief summary of our discussion and care plan from today's visit below.  Please review this information with your primary care provider.  ______________________________________________________________________    My recommendations are summarized as follows:    -- Continue humira every week  -- Continue lialda 4.8g daily  -- Labs every 3 months  -- Next endoscopic assessment: this year some time  -- Patient with IBD we recommend supplementation vitamin D 1000 units daily and calcium 500 mg twice daily.  -- Vaccines/immunizations to be updated: flu shot yearly, pneumonia, shingles, COVID series, tetanus  -- Yearly Dermatology visit for skin check while on immunosuppressive therapy. Can call [phone] to schedule.  -- No NSAIDs (ibuprofen, or anything containing ibuprofen)       For additional resources about inflammatory bowel disease visit [email or web address]      Return to GI Clinic in 6 months to review your progress.    ______________________________________________________________________    How do I schedule labs, imaging studies, or procedures that were ordered in clinic today?     Labs: To schedule lab appointment at the [facility], use my chart or call [phone]. If you have a [facility] lab closer to home where you are regularly seen you can give them a call.     Procedures: If a colonoscopy, upper endoscopy, breath test, esophageal manometry, or pH impedence was ordered today, our endoscopy team will call you to schedule this. If you have not heard from our endoscopy team within a week, please call [phone] to schedule.     Imaging Studies: If you were scheduled for a CT scan, X-ray, MRI, ultrasound, HIDA scan or other imaging study, please call [phone] to have this scheduled.     Referral: If a referral to another specialty was ordered, expect a phone call or follow instructions above. If you have  not heard from anyone regarding your referral in a week, please call our clinic to check the status.     Who do I call with any questions after my visit?  Please be in touch if there are any further questions that arise following today's visit.  There are multiple ways to contact your gastroenterology care team.      During business hours, you may reach a Gastroenterology nurse at [phone]    To schedule or reschedule an appointment, please call [phone].     You can always send a secure message through [employer].  [employer] messages are answered by your nurse or doctor typically within 24 hours.  Please allow extra time on weekends and holidays.      For urgent/emergent questions after business hours, you may reach the on-call GI Fellow by contacting the [facility]  at [phone].     How will I get the results of any tests ordered?    You will receive all of your results.  If you have signed up for [employer], any tests ordered at your visit will be available to you after your physician reviews them.  Typically this takes 1-2 weeks.  If there are urgent results that require a change in your care plan, your physician or nurse will call you to discuss the next steps.      What is [employer]?  [employer] is a secure way for you to access all of your healthcare records from the [facility].  It is a web based computer program, so you can sign on to it from any location.  It also allows you to send secure messages to your care team.  I recommend signing up for [employer] access if you have not already done so and are comfortable with using a computer.         Sincerely,    Abdiaziz Hull PA-C  [facility]  Division of Gastroenterology

## 2023-06-28 NOTE — LETTER
6/28/2023         RE: Cody Pickard  [address] [address] [place] [id number]        Dear Colleague,    Thank you for referring your patient, Cody Pickard, to the [facility] [place]. Please see a copy of my visit note below.    IBD CLINIC VISIT    CC/REFERRING MD:  Referred Self  REASON FOR CONSULTATION: Colitis    ASSESSMENT/PLAN:  33 year old male with Crohn's disease.     1. Crohn's disease:   Current medication:    - Mesalamine 4.8g daily   - Humira/adalimumab q7 days  Current clinical disease activity: Remission   Current endoscopic disease activity: 9/27/21: Near normal flex sig (left colon erythema and erosions) - mild histology.     Luminal symptoms inactive.  I believe he is in clinical remission.  He is working with CRS for seton placement and possible fistulotomy in the future. I have sent a message to Dr. Hamm regarding a possible flex sig during the time of the fistulotomy. If this is not done or unable to be done at the same time, we will coordinate a scope later this year for restaging him.     -- Recommend continue mesalamine at this time  -- Recommend continuing Humira  -- Seton placement/fistulotomy per colorectal surgery    2. IBD dysplasia surveillance: > 1/3 of colon involved. Will need dysplasia surveillance.   -- IBD dysplasia surveillance starting in 2028    Return to clinic in 6 months.     DIANA EliasC  Division of Gastroenterology, Hepatology and Nutrition  [facility]     IBD HISTORY  Age at diagnosis: 30  Endoscopic extent of disease: Continuous: rectum to proximal transverse  Histologic Extent of disease: Rectum, descending   Disease phenotype: Extensive   Rose-anal disease: Yes  Prior IBD surgeries: None  Prior IBD Medications:  - Vedolizumab - not dose escalated     DRUG MONITORING  TPMT enzyme activity:  n/a  6-TGN/6-MMPN levels: n/a  Biologic concentration:   Adalimumab: 6.1 on 9/2021, no antibodies      HPI:    Currently having 2-3 stools per day, soft formed. No blood in the stool. No fevers. Occasional abdominal pain from a hernia.  Weight is stable.     Currently has 2 setons (LP to ischiorectal fossa, and posterior to internal orifice). Reports he noticed that one of his setons only has two ties on it and is worried that the seton will fall out. He has an appt with Dr. Hamm in a few weeks to discuss next steps for the setons. Continues to have drainage, especially after a BM from anorectal fistula.       sIBDQ:  IBDQ Score Date IBDQ - Total Score  (Higher score better)   6/28/2023   2:05 PM 69   2/28/2022   3:33 PM 64          ROS:    Constitutional, HEENT, cardiovascular, pulmonary, GI, , musculoskeletal, neuro, skin, endocrine and psych systems are negative, except as otherwise noted.    PHYSICAL EXAMINATION:  Constitutional: aaox3, cooperative, pleasant, not dyspneic/diaphoretic, no acute distress  Vitals reviewed: There were no vitals taken for this visit.  Wt:   Wt Readings from Last 2 Encounters:   12/28/22 99.3 kg (218 lb 14.4 oz)   12/16/22 101.4 kg (223 lb 8.7 oz)      Constitutional - general appearance is well and in no acute distress.  Eyes - sunglasses on   Respiratory - No cough, unlabored breathing  Musculoskeletal - range of motion intact: Neck and arms  Skin - No discoloration or lesions visible - pt reports red pustule/papule unable to see via video  Neurological - No tremors, headaches  Psychiatric - No anxiety, alert & oriented    PERTINENT PAST MEDICAL HISTORY:  Past Medical History:   Diagnosis Date    Anal fistula     Crohn's disease (H)     HTN (hypertension)       Inflammatory bowel disease  Hypertension     PREVIOUS SURGERIES:  Past Surgical History:   Procedure Laterality Date    COLONOSCOPY  06/15/2020    EXAM UNDER ANESTHESIA ANUS N/A 11/19/2021    Procedure: EXAM UNDER ANESTHESIA, ANUS;  Surgeon: Vidhya Hamm MD;  Location: [facility] [place]    EXAM UNDER ANESTHESIA ANUS  N/A 7/8/2022    Procedure: EXAM UNDER ANESTHESIA, ANUS;  Surgeon: Vidhya Hamm MD;  Location: [facility] OR    EXAM UNDER ANESTHESIA ANUS N/A 12/16/2022    Procedure: EXAM UNDER ANESTHESIA, ANUS, Partial Fistuotomy seton x2;  Surgeon: Vidhya Hamm MD;  Location: [facility] OR    FISTULOTOMY RECTUM N/A 7/8/2022    Procedure: partial FISTULOTOMY, RECTUM, seton replacement x2;  Surgeon: Vidhya Hamm MD;  Location: [facility] OR    FISTULOTOMY RECTUM N/A 12/16/2022    Procedure: POSSIBLE FISTULOTOMY, RECTUM;  Surgeon: Vidhya Hamm MD;  Location: [facility] OR    INCISION AND DRAINAGE RECTUM, COMBINED N/A 09/06/2021    Procedure: INCISION AND DRAINAGE, RECTUM, placement of Seton;  Surgeon: Vidhya Hamm MD;  Location: [facility] OR    PLACEMENT OF SETON RECTUM N/A 11/19/2021    Procedure: PLACEMENT OF SETON RECTUM;  Surgeon: Vidhya Hamm MD;  Location: [facility] OR    SIGMOIDOSCOPY,BIOPSY  09/24/2020     ALLERGIES:   No Known Allergies    PERTINENT MEDICATIONS:    Current Outpatient Medications:     adalimumab (HUMIRA *CF* PEN) 40 MG/0.4ML pen kit, Inject 0.4 mLs (40 mg) Subcutaneous every 7 days Additional refills after labs and clinic appt, Disp: 4 each, Rfl: 2    amLODIPine (NORVASC) 5 MG tablet, Take 5 mg by mouth every morning, Disp: , Rfl:     lisinopril (ZESTRIL) 20 MG tablet, Take 20 mg by mouth every morning , Disp: , Rfl:     mesalamine (LIALDA) 1.2 g DR tablet, TAKE 4 TABLETS(4800 MG) BY MOUTH EVERY MORNING, Disp: 120 tablet, Rfl: 4    SOCIAL HISTORY:  Social History     Socioeconomic History    Marital status:      Spouse name: Not on file    Number of children: Not on file    Years of education: Not on file    Highest education level: Not on file   Occupational History    Not on file   Tobacco Use    Smoking status: Former     Packs/day: 0.50     Years: 2.50     Pack years: 1.25     Types: Cigarettes     Start date: 2018     Quit date: 08/2020     Years  since quittin.9    Smokeless tobacco: Never   Substance and Sexual Activity    Alcohol use: Yes     Comment: occ    Drug use: Never    Sexual activity: Not on file   Other Topics Concern    Not on file   Social History Narrative    Not on file     Social Determinants of Health     Financial Resource Strain: Not on file   Food Insecurity: Not on file   Transportation Needs: Not on file   Physical Activity: Not on file   Stress: Not on file   Social Connections: Not on file   Intimate Partner Violence: Not on file   Housing Stability: Not on file       FAMILY HISTORY:  Family History   Problem Relation Age of Onset    Multiple Sclerosis Mother     Ulcerative Colitis Maternal Grandmother     Colon Cancer No family hx of     Inflammatory Bowel Disease No family hx of     Irritable Bowel Syndrome No family hx of     Crohn's Disease No family hx of     Anesthesia Reaction No family hx of     Bleeding Disorder No family hx of     Clotting Disorder No family hx of      No family history of IBD  Past/family/social history reviewed and no changes            Answers for HPI/ROS submitted by the patient on 2023  General Symptoms: No  Skin Symptoms: Yes  HENT Symptoms: Yes  EYE SYMPTOMS: No  HEART SYMPTOMS: No  LUNG SYMPTOMS: No  INTESTINAL SYMPTOMS: No  URINARY SYMPTOMS: No  REPRODUCTIVE SYMPTOMS: No  SKELETAL SYMPTOMS: No  BLOOD SYMPTOMS: No  NERVOUS SYSTEM SYMPTOMS: No  MENTAL HEALTH SYMPTOMS: No  Ear pain: No  Ear discharge: No  Hearing loss: No  Tinnitus: No  Nosebleeds: No  Congestion: Yes  Sinus pain: No  Changes in hair: No  Changes in moles/birth marks: No  Itching: No  Rashes: No  Changes in nails: No  Acne: No  Change in facial hair: No  Warts: No  Non-healing sores: Yes          Again, thank you for allowing me to participate in the care of your patient.      Sincerely,    Abdiaziz Hull PA-C

## 2023-06-30 ENCOUNTER — TELEPHONE (OUTPATIENT)
Dept: GASTROENTEROLOGY | Facility: CLINIC | Age: 33
End: 2023-06-30
Payer: COMMERCIAL

## 2023-07-07 RX ORDER — MESALAMINE 1.2 G/1
TABLET, DELAYED RELEASE ORAL
Qty: 120 TABLET | Refills: 4 | OUTPATIENT
Start: 2023-07-07

## 2023-07-10 NOTE — TELEPHONE ENCOUNTER
Spoke with patient and scheduled 6 mo follow-up appointment with Abdiaziz Hull for 1/2/24 per the follow-up orders

## 2023-07-17 DIAGNOSIS — K60.30 ANAL FISTULA: ICD-10-CM

## 2023-07-17 DIAGNOSIS — K50.111 CROHN'S DISEASE OF LARGE INTESTINE WITH RECTAL BLEEDING (H): ICD-10-CM

## 2023-07-19 ENCOUNTER — OFFICE VISIT (OUTPATIENT)
Dept: SURGERY | Facility: CLINIC | Age: 33
End: 2023-07-19
Payer: COMMERCIAL

## 2023-07-19 VITALS — OXYGEN SATURATION: 100 % | HEART RATE: 75 BPM | DIASTOLIC BLOOD PRESSURE: 78 MMHG | SYSTOLIC BLOOD PRESSURE: 126 MMHG

## 2023-07-19 DIAGNOSIS — K50.913 CROHN'S DISEASE WITH FISTULA, UNSPECIFIED GASTROINTESTINAL TRACT LOCATION (H): ICD-10-CM

## 2023-07-19 DIAGNOSIS — K60.30 ANAL FISTULA: Primary | ICD-10-CM

## 2023-07-19 PROCEDURE — 99213 OFFICE O/P EST LOW 20 MIN: CPT | Performed by: COLON & RECTAL SURGERY

## 2023-07-19 ASSESSMENT — PAIN SCALES - GENERAL: PAINLEVEL: NO PAIN (0)

## 2023-07-19 NOTE — PATIENT INSTRUCTIONS
You can call Jennifer, our surgery scheduler, directly to start the scheduling process.    Appointment you will need in prep: pre op physical with our anesthesia team or your PCP and a MRI  You will need to do a full bowel prep the day before surgery. I have sent these to your pharmacy. You will receive a surgery packet through [employer] and mail.     NICOLAS Simmons [phone]    Clinic Fax Number [phone]    Surgery Scheduling (Jennifer) [phone]    My Chart is available 24 hours a day and is a secure way to access your records and communicate with your care team.  I strongly recommend signing up if you haven't already done so, if you are comfortable with computers.  If you would like to inquire about this or are having problems with My Chart access, you may call [phone] or go online at [email or web address].  Please allow at least 24 hours for a response and extra time on weekends and Holidays.

## 2023-07-19 NOTE — NURSING NOTE
Chief Complaint   Patient presents with     Follow Up       Vitals:    07/19/23 1708   BP: 126/78   BP Location: Left arm   Patient Position: Sitting   Cuff Size: Adult Regular   Pulse: 75   SpO2: 100%       There is no height or weight on file to calculate BMI.    [place] EMT-P

## 2023-07-19 NOTE — PROGRESS NOTES
Colon and Rectal Surgery Follow-up Clinic Note      RE: Cody Pickard  : 1990  WIL: 2023      Cody Pickard is a very pleasant 33 year old male with Crohn's disease here for follow-up of anal fistula with 2 prior setons, most recently about 7 months s/p debridement of perirectal fistula with complex tract, partial fistulotomy, seton x1, and seton exchange x1.    Interval history: Doing well. Minimal pain if any. If there is pain, it is a bump behind the shared seton exit by the tailbone. Drainage is the same 3 months ago. Continues to follow with Dr. Bowers for Crohn's. Currently on mesalamine and weekly Humira with clinical remission. Last flexible sigmoidoscopy was 2021 and due for surveillance soon; wondering if it can be done at same time of a fistulotomy. If not, he will schedule through GI.    PLEASE SEE NOTE BELOW FOR PHYSICAL EXAMINATION, REVIEW OF SYSTEMS, AND OTHER HISTORY.    Assessment/Plan: 33 year old male with Crohn's disease and anal fistula s/p setons x2. On exam he is looking better but the drainage is not improving and there is some induration, indicating the fistula is still active. We discussed getting another MRI to reevaluate the fistula, followed by examination under anesthesia for debridement with possible fistulotomy. Colonoscopy can be done at the same time. He asked about hyperbaric oxygen therapy and this may be helpful after surgery of which there is not a clear indication but this treatment can be helpful in some circumstances. Patient's questions were answered to his stated satisfaction and he is in agreement with this plan.       20 minutes spent on the date of encounter performing chart review, history and exam, documentation and further activities as noted above.     Vidhya Hamm MD  Colon and Rectal Surgery Staff  [facility]    -------------------------------------------------------------------------------------------------------------------    Medical history:  Past Medical History:   Diagnosis Date     Anal fistula      Crohn's disease (H)      HTN (hypertension)        Surgical history:  Past Surgical History:   Procedure Laterality Date     COLONOSCOPY  06/15/2020     EXAM UNDER ANESTHESIA ANUS N/A 11/19/2021    Procedure: EXAM UNDER ANESTHESIA, ANUS;  Surgeon: Vidhya Hamm MD;  Location: [facility] OR     EXAM UNDER ANESTHESIA ANUS N/A 7/8/2022    Procedure: EXAM UNDER ANESTHESIA, ANUS;  Surgeon: Vidhya Hamm MD;  Location: [facility] OR     EXAM UNDER ANESTHESIA ANUS N/A 12/16/2022    Procedure: EXAM UNDER ANESTHESIA, ANUS, Partial Fistuotomy seton x2;  Surgeon: Vidhya Hamm MD;  Location: [facility] OR     FISTULOTOMY RECTUM N/A 7/8/2022    Procedure: partial FISTULOTOMY, RECTUM, seton replacement x2;  Surgeon: Vidhya Hamm MD;  Location: [facility] OR     FISTULOTOMY RECTUM N/A 12/16/2022    Procedure: POSSIBLE FISTULOTOMY, RECTUM;  Surgeon: Vidhya Hamm MD;  Location: [facility] OR     INCISION AND DRAINAGE RECTUM, COMBINED N/A 09/06/2021    Procedure: INCISION AND DRAINAGE, RECTUM, placement of Seton;  Surgeon: Vidhya Hamm MD;  [facility]     PLACEMENT OF SETON RECTUM N/A 11/19/2021    Procedure: PLACEMENT OF SETON RECTUM;  Surgeon: Vidhya Hamm MD;  Location: [facility] OR     SIGMOIDOSCOPY,BIOPSY  09/24/2020       Problem list:      Patient Active Problem List    Diagnosis Date Noted     Anal fistula 11/09/2021     Priority: Medium     Added automatically from request for surgery [id number]       Acute post-operative pain 09/07/2021     Priority: Medium     Perianal abscess 09/06/2021     Priority: Medium     Perianal Crohn's disease, with abscess (H) 09/06/2021     Priority: Medium     IBD (inflammatory bowel disease) 02/22/2021     Priority: Medium        Medications:  Current Outpatient Medications   Medication Sig Dispense Refill     amLODIPine (NORVASC) 5 MG tablet Take 5 mg by mouth every morning       lisinopril (ZESTRIL) 20 MG tablet Take 20 mg by mouth every morning        mesalamine (LIALDA) 1.2 g DR tablet TAKE 4 TABLETS(4800 MG) BY MOUTH EVERY MORNING 120 tablet 4     adalimumab (HUMIRA *CF* PEN) 40 MG/0.4ML pen kit Inject 0.4 mLs (40 mg) Subcutaneous every 7 days 4 each 5       Allergies:  No Known Allergies    Family history:  Family History   Problem Relation Age of Onset     Multiple Sclerosis Mother      Ulcerative Colitis Maternal Grandmother      Colon Cancer No family hx of      Inflammatory Bowel Disease No family hx of      Irritable Bowel Syndrome No family hx of      Crohn's Disease No family hx of      Anesthesia Reaction No family hx of      Bleeding Disorder No family hx of      Clotting Disorder No family hx of        Social history:  Social History     Socioeconomic History     Marital status:      Spouse name: Not on file     Number of children: Not on file     Years of education: Not on file     Highest education level: Not on file   Occupational History     Not on file   Tobacco Use     Smoking status: Former     Packs/day: 0.50     Years: 2.50     Pack years: 1.25     Types: Cigarettes     Start date:      Quit date: 2020     Years since quittin.9     Smokeless tobacco: Never   Substance and Sexual Activity     Alcohol use: Yes     Comment: occ     Drug use: Never     Sexual activity: Not on file   Other Topics Concern     Not on file   Social History Narrative     Not on file     Social Determinants of Health     Financial Resource Strain: Not on file   Food Insecurity: Not on file   Transportation Needs: Not on file   Physical Activity: Not on file   Stress: Not on file   Social Connections: Not on file   Intimate Partner Violence: Not on file   Housing Stability: Not on file         Nursing Notes:   Jp  Des EMT  7/19/2023  5:09 PM  Signed  Chief Complaint   Patient presents with     Follow Up       Vitals:    07/19/23 1708   BP: 126/78   BP Location: Left arm   Patient Position: Sitting   Cuff Size: Adult Regular   Pulse: 75   SpO2: 100%       There is no height or weight on file to calculate BMI.    Des Trammell EMT-P       Physical Examination:  /78 (BP Location: Left arm, Patient Position: Sitting, Cuff Size: Adult Regular)   Pulse 75   SpO2 100%   General: alert, oriented, in no acute distress, sitting comfortably  Perianal external examination:  Two white vessel loop setons in place. One in posterior midline to internal orifice with 3 ties, 1 replaced. Another seton left posterior in ischiorectal fossa with 4 ties. MInimal induration posterior to the posterior midline orifice but unable to probe a tract with a lacrimal probe.  Digital rectal examination: Was deferred.  Anoscopy: Was deferred.

## 2023-07-19 NOTE — LETTER
2023       RE: Cody Pickard  [address] [address] [place] [id number]     Dear Colleague,    Thank you for referring your patient, Cody Pickard, to the [facility] COLON AND RECTAL SURGERY CLINIC [place] at [facility]. Please see a copy of my visit note below.    Colon and Rectal Surgery Follow-up Clinic Note      RE: Cody Pickard  : 1990  WIL: 2023      Cody Pickard is a very pleasant 33 year old male with Crohn's disease here for follow-up of anal fistula with 2 prior setons, most recently about 7 months s/p debridement of perirectal fistula with complex tract, partial fistulotomy, seton x1, and seton exchange x1.    Interval history: Doing well. Minimal pain if any. If there is pain, it is a bump behind the shared seton exit by the tailbone. Drainage is the same 3 months ago. Continues to follow with Dr. Bowers for Crohn's. Currently on mesalamine and weekly Humira with clinical remission. Last flexible sigmoidoscopy was 2021 and due for surveillance soon; wondering if it can be done at same time of a fistulotomy. If not, he will schedule through GI.    PLEASE SEE NOTE BELOW FOR PHYSICAL EXAMINATION, REVIEW OF SYSTEMS, AND OTHER HISTORY.    Assessment/Plan: 33 year old male with Crohn's disease and anal fistula s/p setons x2. On exam he is looking better but the drainage is not improving and there is some induration, indicating the fistula is still active. We discussed getting another MRI to reevaluate the fistula, followed by examination under anesthesia for debridement with possible fistulotomy. Colonoscopy can be done at the same time. He asked about hyperbaric oxygen therapy and this may be helpful after surgery of which there is not a clear indication but this treatment can be helpful in some circumstances. Patient's questions were answered to his stated satisfaction and he is in agreement with this plan.       20 minutes spent  on the date of encounter performing chart review, history and exam, documentation and further activities as noted above.     Vidhya Hamm MD  Colon and Rectal Surgery Staff  [facility]    -------------------------------------------------------------------------------------------------------------------    Medical history:  Past Medical History:   Diagnosis Date    Anal fistula     Crohn's disease (H)     HTN (hypertension)        Surgical history:  Past Surgical History:   Procedure Laterality Date    COLONOSCOPY  06/15/2020    EXAM UNDER ANESTHESIA ANUS N/A 11/19/2021    Procedure: EXAM UNDER ANESTHESIA, ANUS;  Surgeon: Vidhya Hamm MD;  Location: [facility] OR    EXAM UNDER ANESTHESIA ANUS N/A 7/8/2022    Procedure: EXAM UNDER ANESTHESIA, ANUS;  Surgeon: Vidhya Hamm MD;  Location: [facility] OR    EXAM UNDER ANESTHESIA ANUS N/A 12/16/2022    Procedure: EXAM UNDER ANESTHESIA, ANUS, Partial Fistuotomy seton x2;  Surgeon: Vidhya Hamm MD;  Location: [facility] OR    FISTULOTOMY RECTUM N/A 7/8/2022    Procedure: partial FISTULOTOMY, RECTUM, seton replacement x2;  Surgeon: Vidhya Hamm MD;  Location: [facility] OR    FISTULOTOMY RECTUM N/A 12/16/2022    Procedure: POSSIBLE FISTULOTOMY, RECTUM;  Surgeon: Vidhya Hamm MD;  Location: [facility] OR    INCISION AND DRAINAGE RECTUM, COMBINED N/A 09/06/2021    Procedure: INCISION AND DRAINAGE, RECTUM, placement of Seton;  Surgeon: Vidhya Hamm MD;  [facility]    PLACEMENT OF SETON RECTUM N/A 11/19/2021    Procedure: PLACEMENT OF SETON RECTUM;  Surgeon: Vidhya Hamm MD;  Location: [facility] OR    SIGMOIDOSCOPY,BIOPSY  09/24/2020       Problem list:      Patient Active Problem List    Diagnosis Date Noted    Anal fistula 11/09/2021     Priority: Medium     Added automatically from request for surgery [id number]      Acute post-operative pain 09/07/2021     Priority: Medium     Perianal abscess 2021     Priority: Medium    Perianal Crohn's disease, with abscess (H) 2021     Priority: Medium    IBD (inflammatory bowel disease) 2021     Priority: Medium       Medications:  Current Outpatient Medications   Medication Sig Dispense Refill    amLODIPine (NORVASC) 5 MG tablet Take 5 mg by mouth every morning      lisinopril (ZESTRIL) 20 MG tablet Take 20 mg by mouth every morning       mesalamine (LIALDA) 1.2 g DR tablet TAKE 4 TABLETS(4800 MG) BY MOUTH EVERY MORNING 120 tablet 4    adalimumab (HUMIRA *CF* PEN) 40 MG/0.4ML pen kit Inject 0.4 mLs (40 mg) Subcutaneous every 7 days 4 each 5       Allergies:  No Known Allergies    Family history:  Family History   Problem Relation Age of Onset    Multiple Sclerosis Mother     Ulcerative Colitis Maternal Grandmother     Colon Cancer No family hx of     Inflammatory Bowel Disease No family hx of     Irritable Bowel Syndrome No family hx of     Crohn's Disease No family hx of     Anesthesia Reaction No family hx of     Bleeding Disorder No family hx of     Clotting Disorder No family hx of        Social history:  Social History     Socioeconomic History    Marital status:      Spouse name: Not on file    Number of children: Not on file    Years of education: Not on file    Highest education level: Not on file   Occupational History    Not on file   Tobacco Use    Smoking status: Former     Packs/day: 0.50     Years: 2.50     Pack years: 1.25     Types: Cigarettes     Start date:      Quit date: 2020     Years since quittin.9    Smokeless tobacco: Never   Substance and Sexual Activity    Alcohol use: Yes     Comment: occ    Drug use: Never    Sexual activity: Not on file   Other Topics Concern    Not on file   Social History Narrative    Not on file     Social Determinants of Health     Financial Resource Strain: Not on file   Food Insecurity: Not on file   Transportation Needs: Not on file   Physical Activity: Not  on file   Stress: Not on file   Social Connections: Not on file   Intimate Partner Violence: Not on file   Housing Stability: Not on file         Nursing Notes:   Des Trammell, EMT  7/19/2023  5:09 PM  Signed  Chief Complaint   Patient presents with    Follow Up       Vitals:    07/19/23 1708   BP: 126/78   BP Location: Left arm   Patient Position: Sitting   Cuff Size: Adult Regular   Pulse: 75   SpO2: 100%       There is no height or weight on file to calculate BMI.    Des Tarmmell EMT-P       Physical Examination:  /78 (BP Location: Left arm, Patient Position: Sitting, Cuff Size: Adult Regular)   Pulse 75   SpO2 100%   General: alert, oriented, in no acute distress, sitting comfortably  Perianal external examination:  Two white vessel loop setons in place. One in posterior midline to internal orifice with 3 ties, 1 replaced. Another seton left posterior in ischiorectal fossa with 4 ties. MInimal induration posterior to the posterior midline orifice but unable to probe a tract with a lacrimal probe.  Digital rectal examination: Was deferred.  Anoscopy: Was deferred.        Again, thank you for allowing me to participate in the care of your patient.      Sincerely,    Vidhya Hamm MD

## 2023-07-20 RX ORDER — ADALIMUMAB 40MG/0.4ML
40 KIT SUBCUTANEOUS
Qty: 4 EACH | Refills: 5 | Status: SHIPPED | OUTPATIENT
Start: 2023-07-20 | End: 2023-12-27

## 2023-07-20 NOTE — TELEPHONE ENCOUNTER
Humira Pen 40 MG/0.4ML Subcutaneous Pen-injector Kit  Last Written Prescription Date:   5/9/2023  Last Fill Quantity: 4,   # refills: 2  Last Office Visit :  6/28/2023  Future Office visit:   1/2/2024  4 Each, 5 Refills sent to thomas Harrington RN  Central Triage Red Flags/Med Refills    Lab Results   Component Value Date    WBC 7.6 05/12/2023     Lab Results   Component Value Date    RBC 4.87 05/12/2023     Lab Results   Component Value Date    HGB 14.5 05/12/2023     Lab Results   Component Value Date    HCT 43.6 05/12/2023     No components found for: MCT  Lab Results   Component Value Date    MCV 90 05/12/2023     Lab Results   Component Value Date    MCH 29.8 05/12/2023     Lab Results   Component Value Date    MCHC 33.3 05/12/2023     Lab Results   Component Value Date    RDW 12.6 05/12/2023     Lab Results   Component Value Date     05/12/2023     Erythrocyte Sedimentation Rate   Date Value Ref Range Status   05/12/2023 4 0 - 15 mm/hr Final     Lab Results   Component Value Date    AST 28 05/12/2023     Lab Results   Component Value Date    ALT 58 05/12/2023     No results found for: BILICONJ   Lab Results   Component Value Date    BILITOTAL 0.7 05/12/2023     Lab Results   Component Value Date    ALBUMIN 4.2 05/12/2023     Lab Results   Component Value Date    PROTTOTAL 7.8 05/12/2023      Lab Results   Component Value Date    ALKPHOS 64 05/12/2023

## 2023-07-29 ENCOUNTER — ANCILLARY PROCEDURE (OUTPATIENT)
Dept: MRI IMAGING | Facility: CLINIC | Age: 33
End: 2023-07-29
Attending: COLON & RECTAL SURGERY
Payer: COMMERCIAL

## 2023-07-29 DIAGNOSIS — K60.30 ANAL FISTULA: ICD-10-CM

## 2023-07-29 PROCEDURE — A9585 GADOBUTROL INJECTION: HCPCS | Mod: JZ | Performed by: RADIOLOGY

## 2023-07-29 PROCEDURE — 72197 MRI PELVIS W/O & W/DYE: CPT | Performed by: RADIOLOGY

## 2023-07-29 RX ORDER — GADOBUTROL 604.72 MG/ML
10 INJECTION INTRAVENOUS ONCE
Status: COMPLETED | OUTPATIENT
Start: 2023-07-29 | End: 2023-07-29

## 2023-07-29 RX ADMIN — GADOBUTROL 10 ML: 604.72 INJECTION INTRAVENOUS at 07:54

## 2023-07-29 NOTE — DISCHARGE INSTRUCTIONS
MRI Contrast Discharge Instructions    The IV contrast you received today will pass out of your body in your  urine. This will happen in the next 24 hours. You will not feel this process.  Your urine will not change color.    Drink at least 4 extra glasses of water or juice today (unless your doctor  has restricted your fluids). This reduces the stress on your kidneys.  You may take your regular medicines.    If you are on dialysis: It is best to have dialysis today.    If you have a reaction: Most reactions happen right away. If you have  any new symptoms after leaving the hospital (such as hives or swelling),  call your hospital at the correct number below. Or call your family doctor.  If you have breathing distress or wheezing, call 911.    Special instructions: ***    I have read and understand the above information.    Signature:______________________________________ Date:___________    Staff:__________________________________________ Date:___________     Time:__________    [place] Rad[facility] Departments:    ___[place]: [phone]  ___[facility]: [phone]  ___[place]: [phone] ___[facility]: [phone]  ___[facility]: [phone]  ___[facility]: [phone]  ___[phone]  ___[facility]: [phone]  ___[phone]

## 2023-08-17 ENCOUNTER — TELEPHONE (OUTPATIENT)
Dept: SURGERY | Facility: CLINIC | Age: 33
End: 2023-08-17
Payer: COMMERCIAL

## 2023-08-17 PROBLEM — K50.913 CROHN'S DISEASE WITH FISTULA, UNSPECIFIED GASTROINTESTINAL TRACT LOCATION (H): Status: ACTIVE | Noted: 2023-07-19

## 2023-08-17 NOTE — CONFIDENTIAL NOTE
On 8/17/2023 at 10:16 PM:  Spoke with patient. Outpatient surgery/colonoscopy scheduled with Dr. Hamm on DOS Friday September 1, 2023. Related appointments also scheduled. Surgery Packet including appointment info and prep instructions sent via [employer] and Mail.    Jennifer Vargas  Rose-op Coordinator  [facility]  Direct Phone: [phone]

## 2023-08-17 NOTE — TELEPHONE ENCOUNTER
FUTURE VISIT INFORMATION      SURGERY INFORMATION:  Date: 9/1/23  [facility]  Surgeon:  Vidhya Hamm MD   Anesthesia Type:  MAC  Procedure: Debridement of fistula tract POSSIBLE FISTULOTOMY, RECTUM POSSIBLE COLONOSCOPY   Consult: ov 7/19/23    RECORDS REQUESTED FROM:       Primary Care Provider: Nash Morales MD  - [place]

## 2023-08-21 ENCOUNTER — ANESTHESIA EVENT (OUTPATIENT)
Dept: SURGERY | Facility: AMBULATORY SURGERY CENTER | Age: 33
End: 2023-08-21
Payer: COMMERCIAL

## 2023-08-21 ENCOUNTER — VIRTUAL VISIT (OUTPATIENT)
Dept: SURGERY | Facility: CLINIC | Age: 33
End: 2023-08-21
Payer: COMMERCIAL

## 2023-08-21 ENCOUNTER — PRE VISIT (OUTPATIENT)
Dept: SURGERY | Facility: CLINIC | Age: 33
End: 2023-08-21

## 2023-08-21 DIAGNOSIS — K50.913 CROHN'S DISEASE WITH FISTULA, UNSPECIFIED GASTROINTESTINAL TRACT LOCATION (H): ICD-10-CM

## 2023-08-21 DIAGNOSIS — K60.30 ANAL FISTULA: ICD-10-CM

## 2023-08-21 DIAGNOSIS — Z01.818 PRE-OP EVALUATION: Primary | ICD-10-CM

## 2023-08-21 PROCEDURE — 99213 OFFICE O/P EST LOW 20 MIN: CPT | Mod: 95 | Performed by: NURSE PRACTITIONER

## 2023-08-21 ASSESSMENT — PAIN SCALES - GENERAL: PAINLEVEL: NO PAIN (0)

## 2023-08-21 ASSESSMENT — LIFESTYLE VARIABLES: TOBACCO_USE: 1

## 2023-08-21 NOTE — PROGRESS NOTES
Cody is a 33 year old who is being evaluated via a billable video visit.      How would you like to obtain your AVS? MyChart  If the video visit is dropped, the invitation should be resent by: Text to cell phone: [phone]        HPI               Review of Systems               Physical Exam

## 2023-08-21 NOTE — PATIENT INSTRUCTIONS
Preparing for Your Surgery      Name:  Cody Pickard   MRN:  [id number]   :  1990   Today's Date:  2023       Arriving for surgery:  Surgery date:  23  Arrival time:  07:15 am      Please come to:     [facility] [address] [address]     Parking is available in front of the Clinics and Surgery Center building from 5:30AM to 8:00PM.  -  Proceed to the 5th floor to check into the Ambulatory Surgery Center.              >> There will be patient concierges on the 1st and 5th floor, for assistance or an escort, if you would like.              >> Please call [phone] with any questions.    What can I eat or drink?  -  You may eat and drink normally up to 8 hours prior to arrival time.   -  You may have clear liquids until 2 hours prior to arrival time.     Examples of clear liquids:  Water  Clear broth  Juices (apple, white grape, white cranberry  and cider) without pulp  Noncarbonated, powder based beverages  (lemonade and Yannick-Aid)  Sodas (Sprite, 7-Up, ginger ale and seltzer)  Coffee or tea (without milk or cream)  Gatorade    -  No Alcohol or cannabis products for at least 24 hours before surgery.     Which medicines can I take?    Hold Aspirin for 7 days before surgery.   Hold Multivitamins for 7 days before surgery.  Hold Supplements for 7 days before surgery.  Hold Ibuprofen (Advil, Motrin) for 1 day(s) before surgery--unless otherwise directed by surgeon.  Hold Naproxen (Aleve) for 4 days before surgery.      -  PLEASE TAKE these medications the day of surgery:  Tylenol if needed; take morning medications.    How do I prepare myself?  - Please take 2 showers (one the night prior to surgery and one the morning of surgery) using Scrubcare or Hibiclens soap.    Use this soap only from the neck to your toes.     Leave the soap on your skin for one minute--then rinse thoroughly.      You may use your own shampoo and  conditioner. No other hair products.   - Please remove all jewelry and body piercings.  - No lotions, deodorants or fragrance.  - No makeup or fingernail polish.   - Bring your ID and insurance card.    -If you have a Deep Brain Stimulator, Spinal Cord Stimulator, or any Neuro Stimulator device---you must bring the remote control to the hospital.      ALL PATIENTS GOING HOME THE SAME DAY OF SURGERY ARE REQUIRED TO HAVE A RESPONSIBLE ADULT TO DRIVE AND BE IN ATTENDANCE WITH THEM FOR 24 HOURS FOLLOWING SURGERY.    Covid testing policy as of 12/06/2022  Your surgeon will notify and schedule you for a COVID test if one is needed before surgery--please direct any questions or COVID symptoms to your surgeon      Questions or Concerns:    - For any questions regarding the day of surgery or your hospital stay, please contact the Pre Admission Nursing Office at [phone].       - If you have health changes between today and your surgery, please call your surgeon.       - For questions after surgery, please call your surgeons office.           Current Visitor Guidelines    You may have 2 visitors in the pre op area.    Visiting hours: 8 a.m. to 8:30 p.m.    You may have four visitors during your inpatient hospital stay.    Patients confirmed or suspected to have symptoms of COVID 19 or flu:     No visitors allowed for adult patients.   Children (under age 18) can have 1 named visitor.     People who are sick or showing symptoms of COVID 19 or flu:    Are not allowed to visit patients--we can only make exceptions in special situations.       Please follow these guidelines for your visit:          Please maintain social distance          Masking is optional--however at times you may be asked to wear a mask for the safety of yourself and others     Clean your hands with alcohol hand . Do this when you arrive at and leave the building and patient room,    And again after you touch your mask or anything in the  room.     Go directly to and from the room you are visiting.     Stay in the patient s room during your visit. Limit going to other places in the hospital as much as possible     Leave bags and jackets at home or in the car.     For everyone s health, please don t come and go during your visit. That includes for smoking   during your visit.

## 2023-08-21 NOTE — H&P
Pre-Operative H & P     CC:  Preoperative exam to assess for increased cardiopulmonary risk while undergoing surgery and anesthesia.    Date of Encounter: 8/21/2023  Primary Care Physician:  No Ref-Primary, Physician     Reason for visit:   Encounter Diagnoses   Name Primary?    Pre-op evaluation Yes    Crohn's disease with fistula, unspecified gastrointestinal tract location (H)     Anal fistula        CORDELIA Pickard is a 33 year old male who presents for pre-operative H & P in preparation for  Procedure Information       Case: [id number] Date/Time: 09/01/23 0845    Procedures:       Debridement of fistula tract (Perineum)      POSSIBLE FISTULOTOMY, RECTUM (Anus)      POSSIBLE COLONOSCOPY (Rectum)    Anesthesia type: MAC    Diagnosis:       Anal fistula [K60.3]      Crohn's disease with fistula, unspecified gastrointestinal tract location (H) [K50.913]    Pre-op diagnosis:       Anal fistula [K60.3]      Crohn's disease with fistula, unspecified gastrointestinal tract location (H) [K50.913]    Location: [place] [address] / [facility]    Providers: Vidhya Hamm MD            The patient presents to the PAC virtually today in preparation for the above scheduled procedure with comorbid conditions including HTN, Crohn's disease and anal fistula.    The patient is followed in the Colon and Rectal surgery clinic and is s/p debridement of perirectal fistula with complex tract, partial fistulotomy, seton x1, and seton exchange x1 on 12/16/2022 with Dr. Hamm. The patient followed up with Dr. Hamm on 7/19/2023 with exam indicating fistula is still active given drainage and induration. Dr. Hamm counseled the patient ont he findings and treatment options.  He has now been scheduled for the procedure as listed above.        History is obtained from the patient and chart review    Hx of abnormal bleeding or anti-platelet use: denies      Past  Medical History  Past Medical History:   Diagnosis Date    Anal fistula     Crohn's disease (H)     HTN (hypertension)        Past Surgical History  Past Surgical History:   Procedure Laterality Date    COLONOSCOPY  06/15/2020    EXAM UNDER ANESTHESIA ANUS N/A 11/19/2021    Procedure: EXAM UNDER ANESTHESIA, ANUS;  Surgeon: Vidhya Hamm MD;  Location: [facility] OR    EXAM UNDER ANESTHESIA ANUS N/A 7/8/2022    Procedure: EXAM UNDER ANESTHESIA, ANUS;  Surgeon: Vidhya Hamm MD;  Location: [facility] OR    EXAM UNDER ANESTHESIA ANUS N/A 12/16/2022    Procedure: EXAM UNDER ANESTHESIA, ANUS, Partial Fistuotomy seton x2;  Surgeon: Vidhya Hamm MD;  Location: [facility] OR    FISTULOTOMY RECTUM N/A 7/8/2022    Procedure: partial FISTULOTOMY, RECTUM, seton replacement x2;  Surgeon: Vidhya Hamm MD;  Location: [facility] OR    FISTULOTOMY RECTUM N/A 12/16/2022    Procedure: POSSIBLE FISTULOTOMY, RECTUM;  Surgeon: Vidhya Hamm MD;  Location: [facility] OR    INCISION AND DRAINAGE RECTUM, COMBINED N/A 09/06/2021    Procedure: INCISION AND DRAINAGE, RECTUM, placement of Seton;  Surgeon: Vidhya Hamm MD;  Location: [facility]    PLACEMENT OF SETON RECTUM N/A 11/19/2021    Procedure: PLACEMENT OF SETON RECTUM;  Surgeon: Vidhya Hamm MD;  Location: [facility] OR    SIGMOIDOSCOPY,BIOPSY  09/24/2020       Prior to Admission Medications  Current Outpatient Medications   Medication Sig Dispense Refill    adalimumab (HUMIRA *CF* PEN) 40 MG/0.4ML pen kit Inject 0.4 mLs (40 mg) Subcutaneous every 7 days (Patient taking differently: Inject 40 mg Subcutaneous every 7 days Monday's) 4 each 5    amLODIPine (NORVASC) 5 MG tablet Take 5 mg by mouth every morning      lisinopril (ZESTRIL) 20 MG tablet Take 20 mg by mouth every morning       mesalamine (LIALDA) 1.2 g DR tablet TAKE 4 TABLETS(4800 MG) BY MOUTH EVERY MORNING (Patient taking differently: Take 4,800 mg by mouth every morning  TAKE 4 TABLETS(4800 MG) BY MOUTH EVERY MORNING) 120 tablet 4       Allergies  No Known Allergies    Social History  Social History     Socioeconomic History    Marital status:      Spouse name: Not on file    Number of children: Not on file    Years of education: Not on file    Highest education level: Not on file   Occupational History    Not on file   Tobacco Use    Smoking status: Former     Packs/day: 0.50     Years: 2.50     Pack years: 1.25     Types: Cigarettes     Start date: 2018     Quit date: 08/2020     Years since quitting: 3.0    Smokeless tobacco: Never   Substance and Sexual Activity    Alcohol use: Yes     Alcohol/week: 2.0 standard drinks of alcohol     Types: 2 Cans of beer per week    Drug use: Never    Sexual activity: Not on file   Other Topics Concern    Not on file   Social History Narrative    Not on file     Social Determinants of Health     Financial Resource Strain: Not on file   Food Insecurity: Not on file   Transportation Needs: Not on file   Physical Activity: Not on file   Stress: Not on file   Social Connections: Not on file   Intimate Partner Violence: Not on file   Housing Stability: Not on file       Family History  Family History   Problem Relation Age of Onset    Multiple Sclerosis Mother     Ulcerative Colitis Maternal Grandmother     Colon Cancer No family hx of     Inflammatory Bowel Disease No family hx of     Irritable Bowel Syndrome No family hx of     Crohn's Disease No family hx of     Anesthesia Reaction No family hx of     Bleeding Disorder No family hx of     Clotting Disorder No family hx of        Review of Systems  The complete review of systems is negative other than noted in the HPI or here.   Anesthesia Evaluation   Pt has had prior anesthetic. Type: General and MAC.    History of anesthetic complications  - .  Reports with his 12/16/22 anesthesia, he woke up with his teeth hurting and his tongue was numb.  This eventually resolved..    ROS/MED  HX  ENT/Pulmonary:     (+)     ELIEL risk factors,  hypertension,         tobacco use, Past use,                      Neurologic:  - neg neurologic ROS     Cardiovascular: Comment: Denies cardiac symptoms including chest pain, SOB, palpitations, syncope, [place], orthopnea, or PND.        (+)  hypertension- -   -  - -                                 Previous cardiac testing  (-) taking anticoagulants/antiplatelets   METS/Exercise Tolerance: >4 METS Comment: Enjoys fishing and hunting.    Hematologic:  - neg hematologic  ROS     Musculoskeletal:  - neg musculoskeletal ROS     GI/Hepatic: Comment: Crohn disease   Anal fistula      Renal/Genitourinary:  - neg Renal ROS     Endo:  - neg endo ROS     Psychiatric/Substance Use:  - neg psychiatric ROS     Infectious Disease:  - neg infectious disease ROS     Malignancy:  - neg malignancy ROS     Other:  - neg other ROS          Virtual visit -  No vitals were obtained    Physical Exam  Constitutional: Awake, alert, cooperative, no apparent distress, and appears stated age.  Eyes: Pupils equal  HENT: Normocephalic  Respiratory: non labored breathing   Neurologic: Awake, alert, oriented to name, place and time.   Neuropsychiatric: Calm, cooperative. Normal affect.      Prior Labs/Diagnostic Studies   All labs and imaging personally reviewed    Latest Reference Range & Units 05/12/23 14:32   Creatinine 0.66 - 1.25 mg/dL 0.84   GFR Estimate >60 mL/min/1.73m2 >90   Albumin 3.4 - 5.0 g/dL 4.2   Protein Total 6.8 - 8.8 g/dL 7.8   Alkaline Phosphatase 40 - 150 U/L 64   ALT 0 - 70 U/L 58   AST 0 - 45 U/L 28   Bilirubin Direct 0.0 - 0.2 mg/dL 0.2   Bilirubin Total 0.2 - 1.3 mg/dL 0.7   CRP Inflammation 0.0 - 8.0 mg/L <2.9   WBC 4.0 - 11.0 10e3/uL 7.6   Hemoglobin 13.3 - 17.7 g/dL 14.5   Hematocrit 40.0 - 53.0 % 43.6   Platelet Count 150 - 450 10e3/uL 375   RBC Count 4.40 - 5.90 10e6/uL 4.87   MCV 78 - 100 fL 90   MCH 26.5 - 33.0 pg 29.8   MCHC 31.5 - 36.5 g/dL 33.3   RDW 10.0 - 15.0 %  12.6   % Neutrophils % 49   % Lymphocytes % 38   % Monocytes % 10   % Eosinophils % 2   % Basophils % 1   Absolute Basophils 0.0 - 0.2 10e3/uL 0.1   Absolute Eosinophils 0.0 - 0.7 10e3/uL 0.2   Absolute Immature Granulocytes <=0.4 10e3/uL 0.0   Absolute Lymphocytes 0.8 - 5.3 10e3/uL 2.9   Absolute Monocytes 0.0 - 1.3 10e3/uL 0.7   % Immature Granulocytes % 0   Absolute Neutrophils 1.6 - 8.3 10e3/uL 3.8   Absolute NRBCs 10e3/uL 0.0   NRBCs per 100 WBC <1 /100 0   Sed Rate 0 - 15 mm/hr 4         PROCEDURES  EKG   Sinus bradycardia   Otherwise normal ECG   No previous ECGs available   Confirmed by WAN JORDAN ([id number]),  Elisabeth Jose ([id number])   on 6/28/2016 8:32:42 AM       The patient's records and results personally reviewed by this provider.     Outside records reviewed from: Care Everywhere      Assessment    Cody Pickard is a 33 year old male seen as a PAC referral for risk assessment and optimization for anesthesia.    Plan/Recommendations  Pt will be optimized for the proposed procedure.  See below for details on the assessment, risk, and preoperative recommendations    NEUROLOGY  - No history of TIA, CVA or seizure    -Post Op delirium risk factors:  No risk identified    ENT  - Reports with his 12/16/22 anesthesia, he woke up with his teeth hurting and his tongue was numb. This eventually resolved.   Mallampati: Unable to assess  TM: Unable to assess    CARDIAC  - No history of CAD and Afib    - HTN   ~ managed with amlodipine   ~ stable per review of record    - METS (Metabolic Equivalents)  Patient performs 4 or more METS exercise without symptoms            Total Score: 0      - RCRI-Very low risk: Class 1 0.4% complication rate            Total Score: 0        PULMONARY  - ELIEL Low Risk            Total Score: 2    ELIEL: Hypertension    ELIEL: Male      - Denies asthma or inhaler use    - Tobacco History    History   Smoking Status    Former    Packs/day: 0.50    Years: 2.50    Types:  "Cigarettes    Start date: 2018    Quit date: 08/2020   Smokeless Tobacco    Never       GI  - PONV Low Risk  Total Score: 1           1 AN PONV: Patient is not a current smoker      - Denies GERD    COLORECTAL  - Anal fistula    ~  s/p debridement  perirectal fistula with complex tract, partial fistulotomy, seton x1, and seton exchange x1 on 12/16/2022 with Dr. Hamm.    ~ above procedure scheduled    -  Crohn's disease  ~ follows with Dr. Bowers  ~ managing with  mesalamine and weekly Humira (8/14/2023) with clinical remission.     /RENAL  - Baseline Creatinine  see above     ENDOCRINE    - BMI: Estimated body mass index is 31.41 kg/m  as calculated from the following:    Height as of 12/28/22: 1.778 m (5' 10\").    Weight as of 12/28/22: 99.3 kg (218 lb 14.4 oz).   ~ obesity, consideration for careful positioning     - No history of Diabetes Mellitus    HEME  - VTE Low Risk 0.5%            Total Score: 2    VTE: Male      - No history of abnormal bleeding or antiplatelet use.    - denies h/o blood transfusion  Hemoglobin   Date Value Ref Range Status   05/12/2023 14.5 13.3 - 17.7 g/dL Final   ]      Different anesthesia methods/types have been discussed with the patient, but they are aware that the final plan will be decided by the assigned anesthesia provider on the date of service.      The patient is optimized for their procedure. AVS with information on surgery time/arrival time, meds and NPO status given by nursing staff. No further diagnostic testing indicated.    Please refer to the physical examination documented by the anesthesiologist in the anesthesia record on the day of surgery.    Video-Visit Details    Type of service:  Video Visit    Provider received verbal consent for a Video Visit from the patient? Yes     Originating Location (pt. Location): Parked in their car    Distant Location (provider location):  Off-site  Mode of Communication:  Video Conference via [facility]  On the day of service: "     Prep time: 6 minutes  Visit time: 10 minutes  Documentation time: 8 minutes  ------------------------------------------  Total time: 24 minutes      MEENAKSHI King CNP  Preoperative Assessment Center  [facility]  Clinic and Surgery Center  Phone: [phone]  Fax: [phone]

## 2023-09-01 ENCOUNTER — HOSPITAL ENCOUNTER (OUTPATIENT)
Facility: AMBULATORY SURGERY CENTER | Age: 33
Discharge: HOME | End: 2023-09-01
Attending: COLON & RECTAL SURGERY
Payer: COMMERCIAL

## 2023-09-01 ENCOUNTER — ANESTHESIA (OUTPATIENT)
Dept: SURGERY | Facility: AMBULATORY SURGERY CENTER | Age: 33
End: 2023-09-01
Payer: COMMERCIAL

## 2023-09-01 VITALS
OXYGEN SATURATION: 100 % | HEIGHT: 70 IN | WEIGHT: 210 LBS | BODY MASS INDEX: 30.06 KG/M2 | DIASTOLIC BLOOD PRESSURE: 68 MMHG | SYSTOLIC BLOOD PRESSURE: 124 MMHG | RESPIRATION RATE: 16 BRPM | TEMPERATURE: 97 F | HEART RATE: 59 BPM

## 2023-09-01 DIAGNOSIS — K60.30 ANAL FISTULA: Primary | ICD-10-CM

## 2023-09-01 PROCEDURE — 46280 REMOVE ANAL FIST COMPLEX: CPT | Performed by: COLON & RECTAL SURGERY

## 2023-09-01 PROCEDURE — 46280 REMOVE ANAL FIST COMPLEX: CPT

## 2023-09-01 RX ORDER — ACETAMINOPHEN 325 MG/1
975 TABLET ORAL ONCE
Status: DISCONTINUED | OUTPATIENT
Start: 2023-09-01 | End: 2023-09-02 | Stop reason: HOSPADM

## 2023-09-01 RX ORDER — OXYCODONE HYDROCHLORIDE 5 MG/1
5 TABLET ORAL
Status: DISCONTINUED | OUTPATIENT
Start: 2023-09-01 | End: 2023-09-02 | Stop reason: HOSPADM

## 2023-09-01 RX ORDER — ONDANSETRON 2 MG/ML
4 INJECTION INTRAMUSCULAR; INTRAVENOUS ONCE
Status: DISCONTINUED | OUTPATIENT
Start: 2023-09-01 | End: 2023-09-02 | Stop reason: HOSPADM

## 2023-09-01 RX ORDER — KETAMINE HYDROCHLORIDE 10 MG/ML
INJECTION INTRAMUSCULAR; INTRAVENOUS PRN
Status: DISCONTINUED | OUTPATIENT
Start: 2023-09-01 | End: 2023-09-01

## 2023-09-01 RX ORDER — PROPOFOL 10 MG/ML
INJECTION, EMULSION INTRAVENOUS CONTINUOUS PRN
Status: DISCONTINUED | OUTPATIENT
Start: 2023-09-01 | End: 2023-09-01

## 2023-09-01 RX ORDER — PROPOFOL 10 MG/ML
INJECTION, EMULSION INTRAVENOUS PRN
Status: DISCONTINUED | OUTPATIENT
Start: 2023-09-01 | End: 2023-09-01

## 2023-09-01 RX ORDER — BUPIVACAINE HYDROCHLORIDE AND EPINEPHRINE 2.5; 5 MG/ML; UG/ML
INJECTION, SOLUTION EPIDURAL; INFILTRATION; INTRACAUDAL; PERINEURAL PRN
Status: DISCONTINUED | OUTPATIENT
Start: 2023-09-01 | End: 2023-09-01 | Stop reason: HOSPADM

## 2023-09-01 RX ORDER — GLYCOPYRROLATE 0.2 MG/ML
INJECTION, SOLUTION INTRAMUSCULAR; INTRAVENOUS PRN
Status: DISCONTINUED | OUTPATIENT
Start: 2023-09-01 | End: 2023-09-01

## 2023-09-01 RX ORDER — LIDOCAINE HYDROCHLORIDE 20 MG/ML
INJECTION, SOLUTION INFILTRATION; PERINEURAL PRN
Status: DISCONTINUED | OUTPATIENT
Start: 2023-09-01 | End: 2023-09-01

## 2023-09-01 RX ORDER — OXYCODONE HYDROCHLORIDE 5 MG/1
5 TABLET ORAL EVERY 6 HOURS PRN
Qty: 6 TABLET | Refills: 0 | Status: SHIPPED | OUTPATIENT
Start: 2023-09-01 | End: 2023-09-04

## 2023-09-01 RX ORDER — ACETAMINOPHEN 325 MG/1
975 TABLET ORAL ONCE
Status: COMPLETED | OUTPATIENT
Start: 2023-09-01 | End: 2023-09-01

## 2023-09-01 RX ORDER — LIDOCAINE 40 MG/G
CREAM TOPICAL
Status: DISCONTINUED | OUTPATIENT
Start: 2023-09-01 | End: 2023-09-02 | Stop reason: HOSPADM

## 2023-09-01 RX ORDER — FENTANYL CITRATE 50 UG/ML
INJECTION, SOLUTION INTRAMUSCULAR; INTRAVENOUS PRN
Status: DISCONTINUED | OUTPATIENT
Start: 2023-09-01 | End: 2023-09-01

## 2023-09-01 RX ORDER — ONDANSETRON 4 MG/1
4 TABLET, ORALLY DISINTEGRATING ORAL EVERY 30 MIN PRN
Status: DISCONTINUED | OUTPATIENT
Start: 2023-09-01 | End: 2023-09-02 | Stop reason: HOSPADM

## 2023-09-01 RX ORDER — LIDOCAINE HYDROCHLORIDE 10 MG/ML
INJECTION, SOLUTION EPIDURAL; INFILTRATION; INTRACAUDAL; PERINEURAL PRN
Status: DISCONTINUED | OUTPATIENT
Start: 2023-09-01 | End: 2023-09-01 | Stop reason: HOSPADM

## 2023-09-01 RX ORDER — FENTANYL CITRATE 50 UG/ML
50 INJECTION, SOLUTION INTRAMUSCULAR; INTRAVENOUS
Status: DISCONTINUED | OUTPATIENT
Start: 2023-09-01 | End: 2023-09-02 | Stop reason: HOSPADM

## 2023-09-01 RX ORDER — OXYCODONE HYDROCHLORIDE 5 MG/1
10 TABLET ORAL
Status: DISCONTINUED | OUTPATIENT
Start: 2023-09-01 | End: 2023-09-02 | Stop reason: HOSPADM

## 2023-09-01 RX ORDER — ONDANSETRON 2 MG/ML
4 INJECTION INTRAMUSCULAR; INTRAVENOUS EVERY 30 MIN PRN
Status: DISCONTINUED | OUTPATIENT
Start: 2023-09-01 | End: 2023-09-02 | Stop reason: HOSPADM

## 2023-09-01 RX ORDER — SODIUM CHLORIDE, SODIUM LACTATE, POTASSIUM CHLORIDE, CALCIUM CHLORIDE 600; 310; 30; 20 MG/100ML; MG/100ML; MG/100ML; MG/100ML
INJECTION, SOLUTION INTRAVENOUS CONTINUOUS
Status: DISCONTINUED | OUTPATIENT
Start: 2023-09-01 | End: 2023-09-02 | Stop reason: HOSPADM

## 2023-09-01 RX ADMIN — LIDOCAINE HYDROCHLORIDE 60 MG: 20 INJECTION, SOLUTION INFILTRATION; PERINEURAL at 09:55

## 2023-09-01 RX ADMIN — GLYCOPYRROLATE 0.2 MG: 0.2 INJECTION, SOLUTION INTRAMUSCULAR; INTRAVENOUS at 09:59

## 2023-09-01 RX ADMIN — FENTANYL CITRATE 25 MCG: 50 INJECTION, SOLUTION INTRAMUSCULAR; INTRAVENOUS at 10:32

## 2023-09-01 RX ADMIN — FENTANYL CITRATE 50 MCG: 50 INJECTION, SOLUTION INTRAMUSCULAR; INTRAVENOUS at 09:55

## 2023-09-01 RX ADMIN — KETAMINE HYDROCHLORIDE 10 MG: 10 INJECTION INTRAMUSCULAR; INTRAVENOUS at 10:01

## 2023-09-01 RX ADMIN — FENTANYL CITRATE 25 MCG: 50 INJECTION, SOLUTION INTRAMUSCULAR; INTRAVENOUS at 10:35

## 2023-09-01 RX ADMIN — KETAMINE HYDROCHLORIDE 10 MG: 10 INJECTION INTRAMUSCULAR; INTRAVENOUS at 09:58

## 2023-09-01 RX ADMIN — PROPOFOL 150 MCG/KG/MIN: 10 INJECTION, EMULSION INTRAVENOUS at 09:57

## 2023-09-01 RX ADMIN — KETAMINE HYDROCHLORIDE 10 MG: 10 INJECTION INTRAMUSCULAR; INTRAVENOUS at 10:04

## 2023-09-01 RX ADMIN — SODIUM CHLORIDE, SODIUM LACTATE, POTASSIUM CHLORIDE, CALCIUM CHLORIDE: 600; 310; 30; 20 INJECTION, SOLUTION INTRAVENOUS at 09:53

## 2023-09-01 RX ADMIN — KETAMINE HYDROCHLORIDE 10 MG: 10 INJECTION INTRAMUSCULAR; INTRAVENOUS at 10:24

## 2023-09-01 RX ADMIN — PROPOFOL 50 MG: 10 INJECTION, EMULSION INTRAVENOUS at 09:57

## 2023-09-01 RX ADMIN — ACETAMINOPHEN 975 MG: 325 TABLET ORAL at 07:59

## 2023-09-01 RX ADMIN — SODIUM CHLORIDE, SODIUM LACTATE, POTASSIUM CHLORIDE, CALCIUM CHLORIDE: 600; 310; 30; 20 INJECTION, SOLUTION INTRAVENOUS at 09:51

## 2023-09-01 RX ADMIN — KETAMINE HYDROCHLORIDE 10 MG: 10 INJECTION INTRAMUSCULAR; INTRAVENOUS at 10:03

## 2023-09-01 RX ADMIN — SODIUM CHLORIDE, SODIUM LACTATE, POTASSIUM CHLORIDE, CALCIUM CHLORIDE: 600; 310; 30; 20 INJECTION, SOLUTION INTRAVENOUS at 08:02

## 2023-09-01 NOTE — ANESTHESIA PREPROCEDURE EVALUATION
Anesthesia Pre-Procedure Evaluation    Patient: Cody Pickard   MRN: [id number] : 1990        Procedure : Procedure(s):  Debridement of fistula tract  POSSIBLE FISTULOTOMY, RECTUM  POSSIBLE COLONOSCOPY          Past Medical History:   Diagnosis Date     Anal fistula      Crohn's disease (H)      HTN (hypertension)       Past Surgical History:   Procedure Laterality Date     COLONOSCOPY  06/15/2020     EXAM UNDER ANESTHESIA ANUS N/A 2021    Procedure: EXAM UNDER ANESTHESIA, ANUS;  Surgeon: Vidhya Hamm MD;  Location: [facility] OR     EXAM UNDER ANESTHESIA ANUS N/A 2022    Procedure: EXAM UNDER ANESTHESIA, ANUS;  Surgeon: Vidhya Hamm MD;  Location: [facility] OR     EXAM UNDER ANESTHESIA ANUS N/A 2022    Procedure: EXAM UNDER ANESTHESIA, ANUS, Partial Fistuotomy seton x2;  Surgeon: Vidhya Hamm MD;  Location: [facility] OR     FISTULOTOMY RECTUM N/A 2022    Procedure: partial FISTULOTOMY, RECTUM, seton replacement x2;  Surgeon: Vidhya Hamm MD;  Location: [facility] OR     FISTULOTOMY RECTUM N/A 2022    Procedure: POSSIBLE FISTULOTOMY, RECTUM;  Surgeon: Vidhya Hamm MD;  Location: [facility] OR     INCISION AND DRAINAGE RECTUM, COMBINED N/A 2021    Procedure: INCISION AND DRAINAGE, RECTUM, placement of Seton;  Surgeon: Vidhya Hamm MD;  Location: [facility]     PLACEMENT OF SETON RECTUM N/A 2021    Procedure: PLACEMENT OF SETON RECTUM;  Surgeon: Vidhya Hamm MD;  Location: [facility] OR     SIGMOIDOSCOPY,BIOPSY  2020      No Known Allergies   Social History     Tobacco Use     Smoking status: Former     Packs/day: 0.50     Years: 2.50     Pack years: 1.25     Types: Cigarettes     Start date:      Quit date: 2020     Years since quitting: 3.0     Smokeless tobacco: Never   Substance Use Topics     Alcohol use: Yes     Alcohol/week: 2.0 standard drinks of alcohol     Types: 2 Cans of beer per week       Wt Readings from Last 1 Encounters:   09/01/23 95.3 kg (210 lb)        Anesthesia Evaluation            ROS/MED HX  ENT/Pulmonary:       Neurologic:       Cardiovascular:     (+)  hypertension- -   -  - -                                      METS/Exercise Tolerance:     Hematologic:       Musculoskeletal:       GI/Hepatic:       Renal/Genitourinary:       Endo:       Psychiatric/Substance Use:       Infectious Disease:       Malignancy:       Other:          Physical Exam    Airway        Mallampati: II   TM distance: > 3 FB   Neck ROM: full     Respiratory Devices and Support         Dental       (+) Completely normal teeth      Cardiovascular          Rhythm and rate: regular     Pulmonary           breath sounds clear to auscultation       OUTSIDE LABS:  CBC:   Lab Results   Component Value Date    WBC 7.6 05/12/2023    WBC 6.8 11/14/2022    HGB 14.5 05/12/2023    HGB 14.5 11/14/2022    HCT 43.6 05/12/2023    HCT 43.0 11/14/2022     05/12/2023     11/14/2022     BMP:   Lab Results   Component Value Date     06/10/2022     09/06/2021    POTASSIUM 4.3 06/10/2022    POTASSIUM 4.0 09/06/2021    CHLORIDE 108 06/10/2022    CHLORIDE 105 09/06/2021    CO2 29 06/10/2022    CO2 26 09/06/2021    BUN 10 06/10/2022    BUN 9 09/06/2021    CR 0.84 05/12/2023    CR 0.67 06/10/2022     (H) 06/10/2022    GLC 94 09/06/2021     COAGS: No results found for: PTT, INR, FIBR  POC: No results found for: BGM, HCG, HCGS  HEPATIC:   Lab Results   Component Value Date    ALBUMIN 4.2 05/12/2023    PROTTOTAL 7.8 05/12/2023    ALT 58 05/12/2023    AST 28 05/12/2023    ALKPHOS 64 05/12/2023    BILITOTAL 0.7 05/12/2023     OTHER:   Lab Results   Component Value Date    MICHELLE 9.0 06/10/2022    CRP <2.9 05/12/2023    SED 4 05/12/2023       Anesthesia Plan    ASA Status:  2       Anesthesia Type: MAC.     - Reason for MAC: immobility needed              Consents    Anesthesia Plan(s) and associated risks, benefits,  and realistic alternatives discussed. Questions answered and patient/representative(s) expressed understanding.     - Discussed: Risks, Benefits and Alternatives for BOTH SEDATION and the PROCEDURE were discussed     - Discussed with:  Patient            Postoperative Care    Pain management: Multi-modal analgesia.   PONV prophylaxis: Ondansetron (or other 5HT-3), Dexamethasone or Solumedrol     Comments:              Maninder Montoya MD

## 2023-09-01 NOTE — ANESTHESIA POSTPROCEDURE EVALUATION
Patient: Cody Pickard    Procedure: Procedure(s):  Examination under anesthesia, Debridement of fistula tract  PARTIAL FISTULOTOMY, REPLACEMENT OF SETON STITCH  COLONOSCOPY       Anesthesia Type:  MAC    Note:  Disposition: Outpatient   Postop Pain Control: Uneventful            Sign Out: Well controlled pain   PONV: No   Neuro/Psych: Uneventful            Sign Out: Acceptable/Baseline neuro status   Airway/Respiratory: Uneventful            Sign Out: Acceptable/Baseline resp. status   CV/Hemodynamics: Uneventful            Sign Out: Acceptable CV status; No obvious hypovolemia; No obvious fluid overload   Other NRE: NONE   DID A NON-ROUTINE EVENT OCCUR?            Last vitals:  Vitals Value Taken Time   /68 09/01/23 1116   Temp 36.1  C (97  F) 09/01/23 1116   Pulse     Resp 16 09/01/23 1116   SpO2 100 % 09/01/23 1116       Electronically Signed By: Maninder Montoya MD  September 1, 2023  11:25 AM

## 2023-09-01 NOTE — OP NOTE
SURGEON: Vidhya Hamm MD      ASSISTANT: Renard Grady, Medical student     PREOPERATIVE DIAGNOSIS: Crohn's disease and complex anorectal fistula.     POSTOPERATIVE DIAGNOSIS: Crohn's disease and complex anorectal fistula.      PROCEDURE: Examination under anesthesia, debridement of perirectal fistula with complex tract, partial fistulotomy, replacement of seton times 2. Colonoscopy.     INDICATIONS: Cody Pickard is a 33 year old male with Crohn's disease and complex anorectal fistula. He was seen in Colorectal Surgery clinic and there was healing but still drainage. An Magnetic Resonance Imaging (MRI) was improved on 7/29/2023 His previous examination under anesthesia was 7/8/2022 and the 12/6/2022. He also notes that his seton drain ties have been coming off sometimes and concern for ensuring they are secure. The risks and benefits of surgery were thoroughly discussed with the patient and he agreed to proceed.     DESCRIPTION OF PROCEDURE: The patient was brought to the operating room, placed prone on the operating room table. Deep sedation was induced with intravenous medicines with deep sedation and monitored anesthesia care. We prepped and draped the area in the usual sterile fashion. We began the procedure with a timeout and we performed anoscopy which demonstrated anal tags and a some anal stricturing, minimal induration and minimal purulence in the left posterior quadrant. This was consistent with the patients perianal disease. There were 2 setons in place - left posterior and posterior - with some ties off of both. The left posterior fistulotomy site was healed and some granulation tissue. This was debrided of granulation tissue and a partial fistulotomy was performed. The posterior seton was associated with minimal to no purulence. The 2 setons were replaced with new yellow vessel loops and secured with 6 separate 2-0 Silk. There was good hemostasis. Colonoscopy was the performed and the  colonoscopy easily advanced to the ileocecal valve. The terminal ileum was entered and there was not obvious disease. There were no polyps and the preparation was good. The colonoscopy was gradually withdrawn over 10 minutes and retroflexion was performed with no obvious rectal abnormalities with the exception of the anal disease. At the end the case, all instruments and sponge counts were correct x2. The patient was emerged from anesthesia and taken to postoperative anesthesia care unit in good condition.     SPECIMEN: None.      ESTIMATED BLOOD LOSS: Minimal.      URINE OUTPUT: Not measured.      PAUL MARTIN MD   Colon and Rectal Surgery Staff  [facility]       Ileocecal valve and appendiceal orifice   Retroflexion of the rectum

## 2023-09-01 NOTE — DISCHARGE INSTRUCTIONS
Anorectal Surgery Instructions      What can I expect after anorectal surgery?  Most anorectal procedures are done as outpatient surgery, and you go home the same day as the procedure. A few surgical procedures will require that you stay in the hospital for about one to three days. No matter where the procedure is done or how long or short it takes, these recommendations will help you heal and feel more comfortable.    Medicines:  The anal area is very sensitive; you can expect to have some pain for up to 2-4 weeks after the procedure. Your doctor will give you a prescription for one or more pain medications. In general, there are two types of medicines that can provide relief.    AVOID Non-steroidal anti-inflammatory drugs (NSAIDS) with Crohn's disease.   Narcotic pain relievers. These include Vicodin, Percocet, and Tylenol number 3. These are all prescription medications. These should be used as prescribed and as needed. Because these drugs have side effects (including constipation), you should reduce your use of these medications as tolerated as your pain improves.  Some patients find it easiest to transition away from narcotic drugs by also taking acetaminophen (Tylenol). However, it is important to realize that most narcotic pain relievers also have acetaminophen, and excessive doses of acetaminophen can be dangerous.Accordingly, you can substitute 1000 milligrams of acetaminophen (two Extra Strength Tylenol or similar generic) for any given dose of narcotic, but acetominophen should not be taken in addition to a full narcotic dose that contains acetaminophen. The maximum acceptable dose of acetaminophen is 4000 milligrams.  Refilling prescriptions. If you need additional pain medication, please call the triage nurse at [phone] during normal business hours (8 a.m. to 4 p.m., Monday though Friday) or have your pharmacy fax a refill request to [phone]. If you call after hours or on the weekends, the  doctor on call may not know you personally and may not renew narcotic pain medication by phone. Call your primary care provider for all other medication refills.     Bowel function and Perineal care:  Bowel movements can be very painful. It is important to have regular bowel movements at least every other day and to keep your stool soft. Your doctor may tell you to take a stool softener, such as Colace, once or twice a day. Try to avoid constipation and/or diarrhea as this can make the pain and bleeding worse. A high fiber diet, including at least four servings of fruits or vegetables daily, will help to keep your bowel movements regular and soft. If you have trouble getting enough fiber in your daily diet, a fiber supplement can be considered, including Metamucil, Benefiber, or Citrucel, and can be bought at your local grocery store or pharmacy. It is important to drink six to eight glasses of water or juice everyday when using fiber products.    You can expect to have some bleeding after bowel movements, but it should stop soon after you wipe. Use a wet cloth or perianal pad (Tucks or Preparation H pads) to gently wipe the area after each bowel movement. Do not rub the anal area or use a lot of pressure. Using a spray bottle filled with warm water helps loosen any remaining stool. Blot gently with a soft dry cloth or tissue paper.    If possible, take a tub bath immediately after each bowel movement. Baths should be take at least 3 times daily for the first week to 10 days following your procedure. You should soak in the tub for 10 to 15 minutes each time with water as warm as you can tolerate. Even after you go back to work, it is a good idea to sit in the tub in the morning, after returning from work, and again in the evening before bedtime.    Constipation will cause you to strain when you have a bowel movement. The hard stool will be difficult to pass, will increase pain and bleeding, and will slow down  healing. If you have not had a bowel movement within 2 days, take 2 teaspoons of Milk of Magnesia in the morning. If there are no results, repeat this. Stop taking Milk of Magnesia or other laxatives if you begin to have diarrhea.    Diarrhea can occur if too much laxative is taken or for several other reasons. If you have 3 or more loose, watery stools in a 24-hour period, stop taking laxatives. Continue to eat a high fiber diet and to take bulk-forming agents such as Metamucil, Benefiber, or Citrucel. You may take Immodium as directed on the package but please call the triage nurse, [phone], if this was not discussed with you surgeon preoperatively.    If you are still having diarrhea or constipation after you have tried these recommendations, please call the triage nurse line, [phone].    Bleeding/Infection:  Infection around the anal opening is not very common. The anal area has excellent blood supply, which helps the area to heal. Bloody discharge after bowel movements is normal and may last 2 to 4 weeks after your surgery. However, if you bleed between bowel movements and cannot get it to stop, call the triage nurse immediately [phone].    Activity:  After your procedure, there are no restrictions on your activity except restrictions surrounding being on narcotics and in pain, such as no heavy machine operating or driving. You may walk, climb stairs, ride in a car, and sit as tolerated. It is helpful to avoid sitting in one position for long periods (2 or more hours). After some surgeries, you may be told not to perform any lifting (more than 10 pounds) for several weeks after surgery.    When do I need to call the doctor or triage nurse?  If you experience any of the problems listed here, call our triage nurse during business hours ([phone]). The nurse will help you with your problem or have the doctor call you. After hours and on weekends, please call the main hospital number  ([phone]) and ask for the colon and rectal surgery person on call. Some is available to help you 24 hours a day, seven days a week.  Fever greater than 101 degrees  Chills  Foul-smelling drainage  Nausea and vomiting  Diarrhea - greater than 3 water stools in 24 hours  Constipation - no bowel movement after 3 days  Severe bleeding that does not stop soon after a bowel movement  Problems with the incision, including increased pain, swelling, or redness        Colon [facility] Rectal [facility] Clinic  Phone: [phone]                  [facility]  Home Care Following Anesthesia  For 24 hours after surgery:  Get plenty of rest.  A responsible adult must stay with you for at least 24 hours after you leave the surgery center.  Do not drive or use heavy equipment.  If you have weakness or tingling, don't drive or use heavy equipment until this feeling goes away.   Do not drink alcohol.   Avoid strenuous or risky activities.  Ask for help when climbing stairs.  You may feel lightheaded.  IF so, sit for a few minutes before standing.  Have someone help you get up.   If you have nausea (feel sick to your stomach): Drink only clear liquids such as apple juice, ginger ale, broth or 7-Up.  Rest may also help.  Be sure to drink enough fluids.  Move to a regular diet as you feel able.   You may have a slight fever.  Call the doctor if your fever is over 100 F (37.7 C) (taken under the tongue) or lasts longer than 24 hours.  You may have a dry mouth, a sore throat, muscle aches or trouble sleeping. These should go away after 24 hours.  Do not make important or legal decisions.   It is recommended to avoid smoking.               Tips for taking pain medications  To get the best pain relief possible, remember these points:  Take pain medications as directed, before pain becomes severe.  Pain medication can upset your stomach: taking it with food may help.  Constipation is a common side effect of  pain medication. Drink plenty of  fluids.  Eat foods high in fiber. Take a stool softener if recommended by your doctor or pharmacist.  Do not drink alcohol, drive or operate machinery while taking pain medications.  Ask about other ways to control pain, such as with heat, ice or relaxation.    Tylenol/Acetaminophen Consumption    If you feel your pain relief is insufficient, you may take Tylenol/Acetaminophen in addition to your narcotic pain medication.   Be careful not to exceed 4,000 mg of Tylenol/Acetaminophen in a 24 hour period from all sources.  If you are taking extra strength Tylenol/acetaminophen (500 mg), the maximum dose is 8 tablets in 24 hours.  If you are taking regular strength acetaminophen (325 mg), the maximum dose is 12 tablets in 24 hours.    Call a doctor for any of the following:  Signs of infection (fever, growing tenderness at the surgery site, a large amount of drainage or bleeding, severe pain, foul-smelling drainage, redness, swelling).  It has been over 8 to 10 hours since surgery and you are still not able to urinate (pass water).  Headache for over 24 hours.  Numbness, tingling or weakness the day after surgery (if you had spinal anesthesia).  Signs of Covid-19 infection (temperature over 100 degrees, shortness of breath, cough, loss of taste/smell, generalized body aches, persistent headache, chills, sore throat, nausea/vomiting/diarrhea)  Your doctor is:       Dr. Vidhya Hamm, Colon Rectal: [phone]               Or dial [phone] and ask for the resident on call for:  Colon Rectal  For emergency care, call the:  [facility]:  [phone] (TTY for hearing impaired: [phone])

## 2023-09-01 NOTE — ANESTHESIA CARE TRANSFER NOTE
Patient: Cody Pickard    Procedure: Procedure(s):  Examination under anesthesia, Debridement of fistula tract  PARTIAL FISTULOTOMY, REPLACEMENT OF SETON STITCH  COLONOSCOPY       Diagnosis: Anal fistula [K60.3]  Crohn's disease with fistula, unspecified gastrointestinal tract location (H) [K50.913]  Diagnosis Additional Information: No value filed.    Anesthesia Type:   MAC     Note:    Oropharynx: oropharynx clear of all foreign objects  Level of Consciousness: awake  Oxygen Supplementation: room air    Independent Airway: airway patency satisfactory and stable  Dentition: dentition unchanged  Vital Signs Stable: post-procedure vital signs reviewed and stable    Patient transferred to: Phase II    Handoff Report: Identifed the Patient, Identified the Reponsible Provider, Reviewed the pertinent medical history, Discussed the surgical course, Reviewed Intra-OP anesthesia mangement and issues during anesthesia, Set expectations for post-procedure period and Allowed opportunity for questions and acknowledgement of understanding      Vitals:  Vitals Value Taken Time   BP     Temp     Pulse     Resp     SpO2         Electronically Signed By: MEENAKSHI Melvin CRNA  September 1, 2023  10:46 AM

## 2023-09-15 ENCOUNTER — OFFICE VISIT (OUTPATIENT)
Dept: SURGERY | Facility: CLINIC | Age: 33
End: 2023-09-15
Payer: COMMERCIAL

## 2023-09-15 VITALS — HEART RATE: 88 BPM | SYSTOLIC BLOOD PRESSURE: 133 MMHG | DIASTOLIC BLOOD PRESSURE: 76 MMHG | OXYGEN SATURATION: 100 %

## 2023-09-15 DIAGNOSIS — Z09 FOLLOW-UP EXAMINATION AFTER COLORECTAL SURGERY: Primary | ICD-10-CM

## 2023-09-15 DIAGNOSIS — K50.913 CROHN'S DISEASE WITH FISTULA, UNSPECIFIED GASTROINTESTINAL TRACT LOCATION (H): ICD-10-CM

## 2023-09-15 DIAGNOSIS — K60.30 ANAL FISTULA: ICD-10-CM

## 2023-09-15 PROCEDURE — 99024 POSTOP FOLLOW-UP VISIT: CPT

## 2023-09-15 ASSESSMENT — PAIN SCALES - GENERAL: PAINLEVEL: NO PAIN (0)

## 2023-09-15 NOTE — LETTER
9/15/2023       RE: Cody Pickard  [id number] [address] [place] [id number]     Dear Colleague,    Thank you for referring your patient, Cody Pickard, to the [facility] [place] at [facility]. Please see a copy of my visit note below.    Colon and Rectal Surgery Postoperative Clinic Note    RE: Cody Pickard  : 1990  WIL: 9/15/2023    Cody Pickard is a very pleasant 33 year old male with a history of Crohn's disease with complex anal fistula now s/p examination under anesthesia with debridement of fistula tract, partial fistulotomy, seton exchange x2, and colonoscopy with Dr. Hamm on 23.    Interval history: Doing well. No pain. More drainage than before.    Physical Examination: Exam was chaperoned by Des Trammell, EMT-P   /76 (BP Location: Left arm, Patient Position: Sitting, Cuff Size: Adult Large)   Pulse 88   SpO2 100%   General: alert, oriented, in no acute distress, sitting comfortably  HEENT: moist mucous membranes  Perianal external examination:  1 vessel loop seton in posterior midline with an additional yellow vessel loop seton as a counter seton to the left posterior, where there is a partial fistulotomy site open with healthy granulation tissue.    Assessment/Plan:  33 year old male with a history of Crohn's disease with complex anal fistula now s/p examination under anesthesia with debridement of fistula tract, partial fistulotomy, seton exchange x2, and colonoscopy with Dr. Hamm on 23. Doing well since surgery. Has a follow up with Dr. Hamm virtually on 10/25/23 to discuss next steps. Contact us in the meantime with questions or concerns. Patient's questions were answered to his stated satisfaction and he is in agreement with this plan.     Medical history:  Past Medical History:   Diagnosis Date    Anal fistula     Crohn's disease (H)     HTN (hypertension)         Surgical history:  Past Surgical History:   Procedure Laterality Date    COLONOSCOPY  06/15/2020    COLONOSCOPY N/A 9/1/2023    Procedure: COLONOSCOPY;  Surgeon: Vidhya Hamm MD;  Location: [facility] OR    EXAM UNDER ANESTHESIA ANUS N/A 11/19/2021    Procedure: EXAM UNDER ANESTHESIA, ANUS;  Surgeon: Vidhya Hamm MD;  Location: [facility] OR    EXAM UNDER ANESTHESIA ANUS N/A 7/8/2022    Procedure: EXAM UNDER ANESTHESIA, ANUS;  Surgeon: Vidhya Hamm MD;  Location: [facility] OR    EXAM UNDER ANESTHESIA ANUS N/A 12/16/2022    Procedure: EXAM UNDER ANESTHESIA, ANUS, Partial Fistuotomy seton x2;  Surgeon: Vidhya Hamm MD;  Location: [facility] OR    FISTULOTOMY RECTUM N/A 7/8/2022    Procedure: partial FISTULOTOMY, RECTUM, seton replacement x2;  Surgeon: Vidhya Hamm MD;  Location: [facility] OR    FISTULOTOMY RECTUM N/A 12/16/2022    Procedure: POSSIBLE FISTULOTOMY, RECTUM;  Surgeon: Vidhya Hamm MD;  Location: [facility] OR    FISTULOTOMY RECTUM N/A 9/1/2023    Procedure: PARTIAL FISTULOTOMY, REPLACEMENT OF SETON STITCH;  Surgeon: Vidhya Hamm MD;  Location: [facility] OR    INCISION AND DRAINAGE RECTUM, COMBINED N/A 09/06/2021    Procedure: INCISION AND DRAINAGE, RECTUM, placement of Seton;  Surgeon: Vidhya Hamm MD;  Location: [facility]    IRRIGATION AND DEBRIDEMENT PERINEAL, COMBINED N/A 9/1/2023    Procedure: Examination under anesthesia, Debridement of fistula tract;  Surgeon: Vidhya Hamm MD;  Location: [facility] OR    PLACEMENT OF SETON RECTUM N/A 11/19/2021    Procedure: PLACEMENT OF SETON RECTUM;  Surgeon: Vidhya Hamm MD;  Location: [facility] OR    SIGMOIDOSCOPY,BIOPSY  09/24/2020       Problem list:    Patient Active Problem List    Diagnosis Date Noted    Crohn's disease with fistula, unspecified gastrointestinal tract location (H) 07/19/2023     Priority: Medium    Anal fistula 11/09/2021     Priority: Medium      Added automatically from request for surgery [id number]      Acute post-operative pain 09/07/2021     Priority: Medium    Perianal abscess 09/06/2021     Priority: Medium    Perianal Crohn's disease, with abscess (H) 09/06/2021     Priority: Medium    IBD (inflammatory bowel disease) 02/22/2021     Priority: Medium       Medications:  Current Outpatient Medications   Medication Sig Dispense Refill    adalimumab (HUMIRA *CF* PEN) 40 MG/0.4ML pen kit Inject 0.4 mLs (40 mg) Subcutaneous every 7 days (Patient taking differently: Inject 40 mg Subcutaneous every 7 days Monday's) 4 each 5    amLODIPine (NORVASC) 5 MG tablet Take 5 mg by mouth every morning      lisinopril (ZESTRIL) 20 MG tablet Take 20 mg by mouth every morning       mesalamine (LIALDA) 1.2 g DR tablet TAKE 4 TABLETS(4800 MG) BY MOUTH EVERY MORNING (Patient taking differently: Take 4,800 mg by mouth every morning TAKE 4 TABLETS(4800 MG) BY MOUTH EVERY MORNING) 120 tablet 4       Allergies:  No Known Allergies    Family history:  Family History   Problem Relation Age of Onset    Multiple Sclerosis Mother     Ulcerative Colitis Maternal Grandmother     Colon Cancer No family hx of     Inflammatory Bowel Disease No family hx of     Irritable Bowel Syndrome No family hx of     Crohn's Disease No family hx of     Anesthesia Reaction No family hx of     Bleeding Disorder No family hx of     Clotting Disorder No family hx of        Social history:  Social History     Tobacco Use    Smoking status: Former     Packs/day: 0.50     Years: 2.50     Pack years: 1.25     Types: Cigarettes     Start date: 2018     Quit date: 08/2020     Years since quitting: 3.1    Smokeless tobacco: Never   Substance Use Topics    Alcohol use: Yes     Alcohol/week: 2.0 standard drinks of alcohol     Types: 2 Cans of beer per week     Marital status: .    Nursing Notes:   Des Trammell, EMT  9/15/2023  2:29 PM  Signed  Chief Complaint   Patient presents with    Post-op Visit        Vitals:    09/15/23 1428   BP: 133/76   BP Location: Left arm   Patient Position: Sitting   Cuff Size: Adult Large   Pulse: 88   SpO2: 100%       There is no height or weight on file to calculate BMI.    Des Trammell EMT-P       15 minutes spent on the date of the encounter doing chart review, history and exam, documentation and further activities as noted above.   This is a postop visit.        Again, thank you for allowing me to participate in the care of your patient.      Sincerely,    Susan Stern PA-C

## 2023-09-15 NOTE — NURSING NOTE
Chief Complaint   Patient presents with    Post-op Visit       Vitals:    09/15/23 1428   BP: 133/76   BP Location: Left arm   Patient Position: Sitting   Cuff Size: Adult Large   Pulse: 88   SpO2: 100%       There is no height or weight on file to calculate BMI.    Des Trammell EMT-P

## 2023-09-15 NOTE — PROGRESS NOTES
Colon and Rectal Surgery Postoperative Clinic Note    RE: Cody Pickard  : 1990  WIL: 9/15/2023    Cody Pickard is a very pleasant 33 year old male with a history of Crohn's disease with complex anal fistula now s/p examination under anesthesia with debridement of fistula tract, partial fistulotomy, seton exchange x2, and colonoscopy with Dr. Hamm on 23.    Interval history: Doing well. No pain. More drainage than before.    Physical Examination: Exam was chaperoned by Des Trammell, EMT-P   /76 (BP Location: Left arm, Patient Position: Sitting, Cuff Size: Adult Large)   Pulse 88   SpO2 100%   General: alert, oriented, in no acute distress, sitting comfortably  HEENT: moist mucous membranes  Perianal external examination:  1 vessel loop seton in posterior midline with an additional yellow vessel loop seton as a counter seton to the left posterior, where there is a partial fistulotomy site open with healthy granulation tissue.    Assessment/Plan:  33 year old male with a history of Crohn's disease with complex anal fistula now s/p examination under anesthesia with debridement of fistula tract, partial fistulotomy, seton exchange x2, and colonoscopy with Dr. Hamm on 23. Doing well since surgery. Has a follow up with Dr. Hamm virtually on 10/25/23 to discuss next steps. Contact us in the meantime with questions or concerns. Patient's questions were answered to his stated satisfaction and he is in agreement with this plan.     Medical history:  Past Medical History:   Diagnosis Date    Anal fistula     Crohn's disease (H)     HTN (hypertension)        Surgical history:  Past Surgical History:   Procedure Laterality Date    COLONOSCOPY  06/15/2020    COLONOSCOPY N/A 2023    Procedure: COLONOSCOPY;  Surgeon: Vidhya Hamm MD;  Location: [facility] OR    EXAM UNDER ANESTHESIA ANUS N/A 2021    Procedure: EXAM UNDER ANESTHESIA, ANUS;  Surgeon: Noris  Vidhya FALCON MD;  Location: [facility] OR    EXAM UNDER ANESTHESIA ANUS N/A 7/8/2022    Procedure: EXAM UNDER ANESTHESIA, ANUS;  Surgeon: Vidhya Hamm MD;  Location: [facility] OR    EXAM UNDER ANESTHESIA ANUS N/A 12/16/2022    Procedure: EXAM UNDER ANESTHESIA, ANUS, Partial Fistuotomy seton x2;  Surgeon: Vidhya Hamm MD;  Location: [facility] OR    FISTULOTOMY RECTUM N/A 7/8/2022    Procedure: partial FISTULOTOMY, RECTUM, seton replacement x2;  Surgeon: Vidhya Hamm MD;  Location: [facility] OR    FISTULOTOMY RECTUM N/A 12/16/2022    Procedure: POSSIBLE FISTULOTOMY, RECTUM;  Surgeon: Vidhya Hamm MD;  Location: [facility] OR    FISTULOTOMY RECTUM N/A 9/1/2023    Procedure: PARTIAL FISTULOTOMY, REPLACEMENT OF SETON STITCH;  Surgeon: Vidhya Hamm MD;  Location: [facility] OR    INCISION AND DRAINAGE RECTUM, COMBINED N/A 09/06/2021    Procedure: INCISION AND DRAINAGE, RECTUM, placement of Seton;  Surgeon: Vidhya Hamm MD;  Location: [facility]    IRRIGATION AND DEBRIDEMENT PERINEAL, COMBINED N/A 9/1/2023    Procedure: Examination under anesthesia, Debridement of fistula tract;  Surgeon: Vidhya Hamm MD;  Location: [facility] OR    PLACEMENT OF SETON RECTUM N/A 11/19/2021    Procedure: PLACEMENT OF SETON RECTUM;  Surgeon: Vidhya Hamm MD;  Location: [facility] OR    SIGMOIDOSCOPY,BIOPSY  09/24/2020       Problem list:    Patient Active Problem List    Diagnosis Date Noted    Crohn's disease with fistula, unspecified gastrointestinal tract location (H) 07/19/2023     Priority: Medium    Anal fistula 11/09/2021     Priority: Medium     Added automatically from request for surgery [id number]      Acute post-operative pain 09/07/2021     Priority: Medium    Perianal abscess 09/06/2021     Priority: Medium    Perianal Crohn's disease, with abscess (H) 09/06/2021     Priority: Medium    IBD (inflammatory bowel disease) 02/22/2021     Priority: Medium        Medications:  Current Outpatient Medications   Medication Sig Dispense Refill    adalimumab (HUMIRA *CF* PEN) 40 MG/0.4ML pen kit Inject 0.4 mLs (40 mg) Subcutaneous every 7 days (Patient taking differently: Inject 40 mg Subcutaneous every 7 days Monday's) 4 each 5    amLODIPine (NORVASC) 5 MG tablet Take 5 mg by mouth every morning      lisinopril (ZESTRIL) 20 MG tablet Take 20 mg by mouth every morning       mesalamine (LIALDA) 1.2 g DR tablet TAKE 4 TABLETS(4800 MG) BY MOUTH EVERY MORNING (Patient taking differently: Take 4,800 mg by mouth every morning TAKE 4 TABLETS(4800 MG) BY MOUTH EVERY MORNING) 120 tablet 4       Allergies:  No Known Allergies    Family history:  Family History   Problem Relation Age of Onset    Multiple Sclerosis Mother     Ulcerative Colitis Maternal Grandmother     Colon Cancer No family hx of     Inflammatory Bowel Disease No family hx of     Irritable Bowel Syndrome No family hx of     Crohn's Disease No family hx of     Anesthesia Reaction No family hx of     Bleeding Disorder No family hx of     Clotting Disorder No family hx of        Social history:  Social History     Tobacco Use    Smoking status: Former     Packs/day: 0.50     Years: 2.50     Pack years: 1.25     Types: Cigarettes     Start date: 2018     Quit date: 08/2020     Years since quitting: 3.1    Smokeless tobacco: Never   Substance Use Topics    Alcohol use: Yes     Alcohol/week: 2.0 standard drinks of alcohol     Types: 2 Cans of beer per week     Marital status: .    Nursing Notes:   Des Trammell EMT  9/15/2023  2:29 PM  Signed  Chief Complaint   Patient presents with    Post-op Visit       Vitals:    09/15/23 1428   BP: 133/76   BP Location: Left arm   Patient Position: Sitting   Cuff Size: Adult Large   Pulse: 88   SpO2: 100%       There is no height or weight on file to calculate BMI.    Des Trammell EMT-P       15 minutes spent on the date of the encounter doing chart review, history and exam,  documentation and further activities as noted above.   This is a postop visit.      Susan Stern PA-C  Colon and Rectal Surgery  [facility]

## 2023-10-14 DIAGNOSIS — K50.111 CROHN'S DISEASE OF LARGE INTESTINE WITH RECTAL BLEEDING (H): ICD-10-CM

## 2023-10-17 RX ORDER — MESALAMINE 1.2 G/1
TABLET, DELAYED RELEASE ORAL
Qty: 120 TABLET | Refills: 4 | Status: SHIPPED | OUTPATIENT
Start: 2023-10-17 | End: 2024-03-26

## 2023-10-17 NOTE — PROGRESS NOTES
Colon and Rectal Surgery Postoperative Video Note    Date: 10/18/2023     Referring provider:  Vidhya Hamm MD  [address]  [place] [address]     RE: Cody Pickard  : 1990  WIL: 10/18/2023    Cody Pickard is a 33 year old male who is being evaluated via a billable video visit.        Patient has given verbal consent for Video visit? Yes      Cody Pickard is a very pleasant 33 year old male with a history of Crohn's disease with complex anal fistula now 6 weeks s/p examination under anesthesia with debridement of fistula tract, partial fistulotomy, seton exchange x2, and colonoscopy.     Interval history: managing setons well. Some soft tissue has healed. Drainage manaage well.       PLEASE SEE NOTE BELOW FOR PHYSICAL EXAMINATION, REVIEW OF SYSTEMS, AND OTHER HISTORY.    Assessment/Plan:  33 year old male with a history of Crohn's disease with complex anal fistula now 6 weeks s/p examination under anesthesia with debridement of fistula tract, partial fistulotomy, seton exchange x2, and colonoscopy.     This is a postoperative visit.      Medical history:  Past Medical History:   Diagnosis Date    Anal fistula     Crohn's disease (H)     HTN (hypertension)        Surgical history:  Past Surgical History:   Procedure Laterality Date    COLONOSCOPY  06/15/2020    COLONOSCOPY N/A 2023    Procedure: COLONOSCOPY;  Surgeon: Vidhya Hmam MD;  Location: [facility] OR    EXAM UNDER ANESTHESIA ANUS N/A 2021    Procedure: EXAM UNDER ANESTHESIA, ANUS;  Surgeon: Vidhya Hamm MD;  Location: [facility] OR    EXAM UNDER ANESTHESIA ANUS N/A 2022    Procedure: EXAM UNDER ANESTHESIA, ANUS;  Surgeon: Vidhya Hamm MD;  Location: [facility] OR    EXAM UNDER ANESTHESIA ANUS N/A 2022    Procedure: EXAM UNDER ANESTHESIA, ANUS, Partial Fistuotomy seton x2;  Surgeon: Vidhya Hamm MD;  Location: [facility] OR    FISTULOTOMY RECTUM N/A 2022     Procedure: partial FISTULOTOMY, RECTUM, seton replacement x2;  Surgeon: Vidhya Hamm MD;  Location: [facility] OR    FISTULOTOMY RECTUM N/A 12/16/2022    Procedure: POSSIBLE FISTULOTOMY, RECTUM;  Surgeon: Vidhya Hamm MD;  Location: [facility] OR    FISTULOTOMY RECTUM N/A 9/1/2023    Procedure: PARTIAL FISTULOTOMY, REPLACEMENT OF SETON STITCH;  Surgeon: Vidhya Hamm MD;  Location: [facility] OR    INCISION AND DRAINAGE RECTUM, COMBINED N/A 09/06/2021    Procedure: INCISION AND DRAINAGE, RECTUM, placement of Seton;  Surgeon: Vidhya Hamm MD;  Location: [facility]    IRRIGATION AND DEBRIDEMENT PERINEAL, COMBINED N/A 9/1/2023    Procedure: Examination under anesthesia, Debridement of fistula tract;  Surgeon: Vidhya Hamm MD;  Location: [facility]    PLACEMENT OF SETON RECTUM N/A 11/19/2021    Procedure: PLACEMENT OF SETON RECTUM;  Surgeon: Vidhya Hamm MD;  Location: [facility] OR    SIGMOIDOSCOPY,BIOPSY  09/24/2020       Problem list:    Patient Active Problem List    Diagnosis Date Noted    Crohn's disease with fistula, unspecified gastrointestinal tract location (H) 07/19/2023     Priority: Medium    Anal fistula 11/09/2021     Priority: Medium     Added automatically from request for surgery [id number]      Acute post-operative pain 09/07/2021     Priority: Medium    Perianal abscess 09/06/2021     Priority: Medium    Perianal Crohn's disease, with abscess (H) 09/06/2021     Priority: Medium    IBD (inflammatory bowel disease) 02/22/2021     Priority: Medium       Medications:  Current Outpatient Medications   Medication Sig Dispense Refill    adalimumab (HUMIRA *CF* PEN) 40 MG/0.4ML pen kit Inject 0.4 mLs (40 mg) Subcutaneous every 7 days (Patient taking differently: Inject 40 mg Subcutaneous every 7 days Monday's) 4 each 5    amLODIPine (NORVASC) 5 MG tablet Take 5 mg by mouth every morning      lisinopril (ZESTRIL) 20 MG tablet Take 20 mg by mouth every morning        mesalamine (LIALDA) 1.2 g DR tablet Take 4 tablets (4,800 mg) by mouth every morning. 120 tablet 4       Allergies:  No Known Allergies    Family history:  Family History   Problem Relation Age of Onset    Multiple Sclerosis Mother     Ulcerative Colitis Maternal Grandmother     Colon Cancer No family hx of     Inflammatory Bowel Disease No family hx of     Irritable Bowel Syndrome No family hx of     Crohn's Disease No family hx of     Anesthesia Reaction No family hx of     Bleeding Disorder No family hx of     Clotting Disorder No family hx of        Social history:  Social History     Tobacco Use    Smoking status: Former     Packs/day: 0.50     Years: 2.50     Additional pack years: 0.00     Total pack years: 1.25     Types: Cigarettes     Start date: 2018     Quit date: 08/2020     Years since quitting: 3.2    Smokeless tobacco: Never   Substance Use Topics    Alcohol use: Yes     Alcohol/week: 2.0 standard drinks of alcohol     Types: 2 Cans of beer per week        Nursing Notes:   Des Trammell, EMT  10/18/2023  5:57 PM  Signed  Chief Complaint   Patient presents with    Post-op Visit       There were no vitals filed for this visit.    There is no height or weight on file to calculate BMI.    Des Trammell EMT-P         Vidhya Hamm MD  Colon and Rectal Surgery Staff  [facility]      This note was created using speech recognition software and may contain unintended word substitutions.

## 2023-10-17 NOTE — TELEPHONE ENCOUNTER
MESALAMINE 1.2GM TABLETS   Last Written Prescription Date:   5/9/2023  Last Fill Quantity: 120,   # refills: 4  Last Office Visit :  6/28/2023  Future Office visit:  1/2/2024  120 tabs, 4 Refills sent to thomas Harrington RN  Central Triage Red Flags/Med Refills    Aminosalicylate Agents [id number]/14/2023 04:03 AM   Protocol Details Normal Serum ALT on file within past 12 months    Recent (12 mo) or future (30 days) visit within the authorizing provider's specialty    Patient is 5 years of age or older    Normal CBC on file within past 12 months    Medication is active on med list    Normal Serum Creatinine on file within past 12 months       To be filled at: [id number] - [address] [facility] [place] [address]

## 2023-10-18 ENCOUNTER — VIRTUAL VISIT (OUTPATIENT)
Dept: SURGERY | Facility: CLINIC | Age: 33
End: 2023-10-18
Payer: COMMERCIAL

## 2023-10-18 DIAGNOSIS — K60.30 ANAL FISTULA: Primary | ICD-10-CM

## 2023-10-18 PROCEDURE — 99024 POSTOP FOLLOW-UP VISIT: CPT | Mod: 95 | Performed by: COLON & RECTAL SURGERY

## 2023-10-18 ASSESSMENT — PAIN SCALES - GENERAL: PAINLEVEL: NO PAIN (0)

## 2023-10-18 NOTE — LETTER
10/18/2023       RE: Cody Pickard  [id number] [address] [place] [id number]     Dear Colleague,    Thank you for referring your patient, Cody Pickard, to the [facility] [place] [facility]. Please see a copy of my visit note below.    Colon and Rectal Surgery Postoperative Video Note    Date: 10/18/2023     Referring provider:  Vidhya Hamm MD  [address] [address]     RE: Cody Pickard  : 1990  WIL: 10/18/2023    Cody Pickard is a 33 year old male who is being evaluated via a billable video visit.        Patient has given verbal consent for Video visit? Yes      Cody Pickard is a very pleasant 33 year old male with a history of Crohn's disease with complex anal fistula now 6 weeks s/p examination under anesthesia with debridement of fistula tract, partial fistulotomy, seton exchange x2, and colonoscopy.     Interval history: managing setons well. Some soft tissue has healed. Drainage manaage well.       PLEASE SEE NOTE BELOW FOR PHYSICAL EXAMINATION, REVIEW OF SYSTEMS, AND OTHER HISTORY.    Assessment/Plan:  33 year old male with a history of Crohn's disease with complex anal fistula now 6 weeks s/p examination under anesthesia with debridement of fistula tract, partial fistulotomy, seton exchange x2, and colonoscopy.     This is a postoperative visit.      Medical history:  Past Medical History:   Diagnosis Date    Anal fistula     Crohn's disease (H)     HTN (hypertension)        Surgical history:  Past Surgical History:   Procedure Laterality Date    COLONOSCOPY  06/15/2020    COLONOSCOPY N/A 2023    Procedure: COLONOSCOPY;  Surgeon: Vidhya Hamm MD;  Location: [facility]    EXAM UNDER ANESTHESIA ANUS N/A 2021    Procedure: EXAM UNDER ANESTHESIA, ANUS;  Surgeon: Vidhya Hamm MD;  Location: [facility]    EXAM UNDER ANESTHESIA ANUS N/A  7/8/2022    Procedure: EXAM UNDER ANESTHESIA, ANUS;  Surgeon: Vidhya Hamm MD;  Location: [facility]    EXAM UNDER ANESTHESIA ANUS N/A 12/16/2022    Procedure: EXAM UNDER ANESTHESIA, ANUS, Partial Fistuotomy seton x2;  Surgeon: Vidhya Hamm MD;  Location: [facility]    FISTULOTOMY RECTUM N/A 7/8/2022    Procedure: partial FISTULOTOMY, RECTUM, seton replacement x2;  Surgeon: Vidhya Hamm MD;  Location: [facility]    FISTULOTOMY RECTUM N/A 12/16/2022    Procedure: POSSIBLE FISTULOTOMY, RECTUM;  Surgeon: Vidhya Hamm MD;  Location: [facility]    FISTULOTOMY RECTUM N/A 9/1/2023    Procedure: PARTIAL FISTULOTOMY, REPLACEMENT OF SETON STITCH;  Surgeon: Vidhya Hamm MD;  Location: [facility]    INCISION AND DRAINAGE RECTUM, COMBINED N/A 09/06/2021    Procedure: INCISION AND DRAINAGE, RECTUM, placement of Seton;  Surgeon: Vidhya Hamm MD;  [facility]    IRRIGATION AND DEBRIDEMENT PERINEAL, COMBINED N/A 9/1/2023    Procedure: Examination under anesthesia, Debridement of fistula tract;  Surgeon: Vidhya Hamm MD;  Location: [facility]    PLACEMENT OF SETON RECTUM N/A 11/19/2021    Procedure: PLACEMENT OF SETON RECTUM;  Surgeon: Vidhya Hamm MD;  Location: [facility]    SIGMOIDOSCOPY,BIOPSY  09/24/2020       Problem list:    Patient Active Problem List    Diagnosis Date Noted    Crohn's disease with fistula, unspecified gastrointestinal tract location (H) 07/19/2023     Priority: Medium    Anal fistula 11/09/2021     Priority: Medium     Added automatically from request for surgery [id number]      Acute post-operative pain 09/07/2021     Priority: Medium    Perianal abscess 09/06/2021     Priority: Medium    Perianal Crohn's disease, with abscess (H) 09/06/2021     Priority: Medium    IBD (inflammatory bowel disease) 02/22/2021     Priority: Medium       Medications:  Current Outpatient Medications   Medication Sig Dispense Refill     adalimumab (HUMIRA *CF* PEN) 40 MG/0.4ML pen kit Inject 0.4 mLs (40 mg) Subcutaneous every 7 days (Patient taking differently: Inject 40 mg Subcutaneous every 7 days Monday's) 4 each 5    amLODIPine (NORVASC) 5 MG tablet Take 5 mg by mouth every morning      lisinopril (ZESTRIL) 20 MG tablet Take 20 mg by mouth every morning       mesalamine (LIALDA) 1.2 g DR tablet Take 4 tablets (4,800 mg) by mouth every morning. 120 tablet 4       Allergies:  No Known Allergies    Family history:  Family History   Problem Relation Age of Onset    Multiple Sclerosis Mother     Ulcerative Colitis Maternal Grandmother     Colon Cancer No family hx of     Inflammatory Bowel Disease No family hx of     Irritable Bowel Syndrome No family hx of     Crohn's Disease No family hx of     Anesthesia Reaction No family hx of     Bleeding Disorder No family hx of     Clotting Disorder No family hx of        Social history:  Social History     Tobacco Use    Smoking status: Former     Packs/day: 0.50     Years: 2.50     Additional pack years: 0.00     Total pack years: 1.25     Types: Cigarettes     Start date: 2018     Quit date: 08/2020     Years since quitting: 3.2    Smokeless tobacco: Never   Substance Use Topics    Alcohol use: Yes     Alcohol/week: 2.0 standard drinks of alcohol     Types: 2 Cans of beer per week      Nursing Notes:   Des Trammell, EMT  10/18/2023  5:57 PM  Signed  Chief Complaint   Patient presents with    Post-op Visit     There were no vitals filed for this visit.    There is no height or weight on file to calculate BMI.    Des Trammell EMT-P     This note was created using speech recognition software and may contain unintended word substitutions.      Again, thank you for allowing me to participate in the care of your patient.      Sincerely,    Vidhya Hamm MD

## 2023-10-18 NOTE — NURSING NOTE
Chief Complaint   Patient presents with    Post-op Visit       There were no vitals filed for this visit.    There is no height or weight on file to calculate BMI.    Des Trammell EMT-P

## 2023-11-17 ENCOUNTER — HOSPITAL ENCOUNTER (EMERGENCY)
Facility: CLINIC | Age: 33
Discharge: HOME | End: 2023-11-17
Attending: STUDENT IN AN ORGANIZED HEALTH CARE EDUCATION/TRAINING PROGRAM | Admitting: STUDENT IN AN ORGANIZED HEALTH CARE EDUCATION/TRAINING PROGRAM
Payer: COMMERCIAL

## 2023-11-17 ENCOUNTER — APPOINTMENT (OUTPATIENT)
Dept: CT IMAGING | Facility: CLINIC | Age: 33
End: 2023-11-17
Attending: STUDENT IN AN ORGANIZED HEALTH CARE EDUCATION/TRAINING PROGRAM
Payer: COMMERCIAL

## 2023-11-17 VITALS
HEART RATE: 54 BPM | BODY MASS INDEX: 31.5 KG/M2 | RESPIRATION RATE: 16 BRPM | SYSTOLIC BLOOD PRESSURE: 133 MMHG | DIASTOLIC BLOOD PRESSURE: 86 MMHG | HEIGHT: 70 IN | WEIGHT: 220 LBS | TEMPERATURE: 98 F | OXYGEN SATURATION: 98 %

## 2023-11-17 DIAGNOSIS — K42.9 UMBILICAL HERNIA WITHOUT OBSTRUCTION AND WITHOUT GANGRENE: ICD-10-CM

## 2023-11-17 LAB
ALBUMIN SERPL BCG-MCNC: 4.4 G/DL (ref 3.5–5.2)
ALP SERPL-CCNC: 58 U/L (ref 40–150)
ALT SERPL W P-5'-P-CCNC: 13 U/L (ref 0–70)
ANION GAP SERPL CALCULATED.3IONS-SCNC: 8 MMOL/L (ref 7–15)
AST SERPL W P-5'-P-CCNC: 22 U/L (ref 0–45)
BASOPHILS # BLD AUTO: 0.1 10E3/UL (ref 0–0.2)
BASOPHILS NFR BLD AUTO: 1 %
BILIRUB SERPL-MCNC: 0.5 MG/DL
BUN SERPL-MCNC: 7.5 MG/DL (ref 6–20)
CALCIUM SERPL-MCNC: 9.5 MG/DL (ref 8.6–10)
CHLORIDE SERPL-SCNC: 102 MMOL/L (ref 98–107)
CREAT SERPL-MCNC: 0.77 MG/DL (ref 0.67–1.17)
DEPRECATED HCO3 PLAS-SCNC: 27 MMOL/L (ref 22–29)
EGFRCR SERPLBLD CKD-EPI 2021: >90 ML/MIN/1.73M2
EOSINOPHIL # BLD AUTO: 0.1 10E3/UL (ref 0–0.7)
EOSINOPHIL NFR BLD AUTO: 1 %
ERYTHROCYTE [DISTWIDTH] IN BLOOD BY AUTOMATED COUNT: 12.5 % (ref 10–15)
GLUCOSE SERPL-MCNC: 126 MG/DL (ref 70–99)
HCT VFR BLD AUTO: 43.5 % (ref 40–53)
HGB BLD-MCNC: 14.9 G/DL (ref 13.3–17.7)
IMM GRANULOCYTES # BLD: 0 10E3/UL
IMM GRANULOCYTES NFR BLD: 0 %
LACTATE SERPL-SCNC: 0.7 MMOL/L (ref 0.7–2)
LIPASE SERPL-CCNC: 29 U/L (ref 13–60)
LYMPHOCYTES # BLD AUTO: 1.5 10E3/UL (ref 0.8–5.3)
LYMPHOCYTES NFR BLD AUTO: 26 %
MAGNESIUM SERPL-MCNC: 1.9 MG/DL (ref 1.7–2.3)
MCH RBC QN AUTO: 30.5 PG (ref 26.5–33)
MCHC RBC AUTO-ENTMCNC: 34.3 G/DL (ref 31.5–36.5)
MCV RBC AUTO: 89 FL (ref 78–100)
MONOCYTES # BLD AUTO: 0.6 10E3/UL (ref 0–1.3)
MONOCYTES NFR BLD AUTO: 10 %
NEUTROPHILS # BLD AUTO: 3.6 10E3/UL (ref 1.6–8.3)
NEUTROPHILS NFR BLD AUTO: 62 %
NRBC # BLD AUTO: 0 10E3/UL
NRBC BLD AUTO-RTO: 0 /100
PLATELET # BLD AUTO: 331 10E3/UL (ref 150–450)
POTASSIUM SERPL-SCNC: 4.2 MMOL/L (ref 3.4–5.3)
PROT SERPL-MCNC: 7.3 G/DL (ref 6.4–8.3)
RBC # BLD AUTO: 4.88 10E6/UL (ref 4.4–5.9)
SODIUM SERPL-SCNC: 137 MMOL/L (ref 135–145)
WBC # BLD AUTO: 5.8 10E3/UL (ref 4–11)

## 2023-11-17 PROCEDURE — 250N000013 HC RX MED GY IP 250 OP 250 PS 637: Performed by: STUDENT IN AN ORGANIZED HEALTH CARE EDUCATION/TRAINING PROGRAM

## 2023-11-17 PROCEDURE — 36415 COLL VENOUS BLD VENIPUNCTURE: CPT | Performed by: STUDENT IN AN ORGANIZED HEALTH CARE EDUCATION/TRAINING PROGRAM

## 2023-11-17 PROCEDURE — 250N000011 HC RX IP 250 OP 636: Performed by: STUDENT IN AN ORGANIZED HEALTH CARE EDUCATION/TRAINING PROGRAM

## 2023-11-17 PROCEDURE — 96360 HYDRATION IV INFUSION INIT: CPT | Mod: 59 | Performed by: STUDENT IN AN ORGANIZED HEALTH CARE EDUCATION/TRAINING PROGRAM

## 2023-11-17 PROCEDURE — 76705 ECHO EXAM OF ABDOMEN: CPT | Mod: 26 | Performed by: STUDENT IN AN ORGANIZED HEALTH CARE EDUCATION/TRAINING PROGRAM

## 2023-11-17 PROCEDURE — 83605 ASSAY OF LACTIC ACID: CPT | Performed by: STUDENT IN AN ORGANIZED HEALTH CARE EDUCATION/TRAINING PROGRAM

## 2023-11-17 PROCEDURE — 83735 ASSAY OF MAGNESIUM: CPT | Performed by: STUDENT IN AN ORGANIZED HEALTH CARE EDUCATION/TRAINING PROGRAM

## 2023-11-17 PROCEDURE — 74177 CT ABD & PELVIS W/CONTRAST: CPT

## 2023-11-17 PROCEDURE — 83690 ASSAY OF LIPASE: CPT | Performed by: STUDENT IN AN ORGANIZED HEALTH CARE EDUCATION/TRAINING PROGRAM

## 2023-11-17 PROCEDURE — 85025 COMPLETE CBC W/AUTO DIFF WBC: CPT | Performed by: STUDENT IN AN ORGANIZED HEALTH CARE EDUCATION/TRAINING PROGRAM

## 2023-11-17 PROCEDURE — 74177 CT ABD & PELVIS W/CONTRAST: CPT | Mod: 26 | Performed by: RADIOLOGY

## 2023-11-17 PROCEDURE — 258N000003 HC RX IP 258 OP 636: Performed by: STUDENT IN AN ORGANIZED HEALTH CARE EDUCATION/TRAINING PROGRAM

## 2023-11-17 PROCEDURE — 80053 COMPREHEN METABOLIC PANEL: CPT | Performed by: STUDENT IN AN ORGANIZED HEALTH CARE EDUCATION/TRAINING PROGRAM

## 2023-11-17 PROCEDURE — 99285 EMERGENCY DEPT VISIT HI MDM: CPT | Mod: 25 | Performed by: STUDENT IN AN ORGANIZED HEALTH CARE EDUCATION/TRAINING PROGRAM

## 2023-11-17 PROCEDURE — 250N000009 HC RX 250: Performed by: STUDENT IN AN ORGANIZED HEALTH CARE EDUCATION/TRAINING PROGRAM

## 2023-11-17 PROCEDURE — 76705 ECHO EXAM OF ABDOMEN: CPT | Performed by: STUDENT IN AN ORGANIZED HEALTH CARE EDUCATION/TRAINING PROGRAM

## 2023-11-17 PROCEDURE — 99284 EMERGENCY DEPT VISIT MOD MDM: CPT | Mod: 25 | Performed by: STUDENT IN AN ORGANIZED HEALTH CARE EDUCATION/TRAINING PROGRAM

## 2023-11-17 RX ORDER — ONDANSETRON 2 MG/ML
4 INJECTION INTRAMUSCULAR; INTRAVENOUS EVERY 30 MIN PRN
Status: DISCONTINUED | OUTPATIENT
Start: 2023-11-17 | End: 2023-11-17 | Stop reason: HOSPADM

## 2023-11-17 RX ORDER — OXYCODONE HYDROCHLORIDE 5 MG/1
5 TABLET ORAL EVERY 6 HOURS PRN
Qty: 12 TABLET | Refills: 0 | Status: SHIPPED | OUTPATIENT
Start: 2023-11-17 | End: 2023-11-20

## 2023-11-17 RX ORDER — MAGNESIUM HYDROXIDE/ALUMINUM HYDROXICE/SIMETHICONE 120; 1200; 1200 MG/30ML; MG/30ML; MG/30ML
15 SUSPENSION ORAL ONCE
Status: COMPLETED | OUTPATIENT
Start: 2023-11-17 | End: 2023-11-17

## 2023-11-17 RX ORDER — IOPAMIDOL 755 MG/ML
135 INJECTION, SOLUTION INTRAVASCULAR ONCE
Status: COMPLETED | OUTPATIENT
Start: 2023-11-17 | End: 2023-11-17

## 2023-11-17 RX ORDER — IBUPROFEN 600 MG/1
600 TABLET, FILM COATED ORAL EVERY 6 HOURS PRN
Qty: 56 TABLET | Refills: 0 | Status: SHIPPED | OUTPATIENT
Start: 2023-11-17 | End: 2023-12-01

## 2023-11-17 RX ORDER — OXYCODONE HYDROCHLORIDE 5 MG/1
5 TABLET ORAL ONCE
Status: COMPLETED | OUTPATIENT
Start: 2023-11-17 | End: 2023-11-17

## 2023-11-17 RX ORDER — LIDOCAINE HYDROCHLORIDE 20 MG/ML
10 SOLUTION OROPHARYNGEAL ONCE
Status: COMPLETED | OUTPATIENT
Start: 2023-11-17 | End: 2023-11-17

## 2023-11-17 RX ORDER — ACETAMINOPHEN 500 MG
1000 TABLET ORAL EVERY 6 HOURS PRN
Qty: 112 TABLET | Refills: 0 | Status: SHIPPED | OUTPATIENT
Start: 2023-11-17 | End: 2023-12-01

## 2023-11-17 RX ORDER — ONDANSETRON 4 MG/1
4 TABLET, ORALLY DISINTEGRATING ORAL EVERY 6 HOURS PRN
Qty: 12 TABLET | Refills: 0 | Status: SHIPPED | OUTPATIENT
Start: 2023-11-17 | End: 2023-11-20

## 2023-11-17 RX ADMIN — LIDOCAINE HYDROCHLORIDE 10 ML: 20 SOLUTION OROPHARYNGEAL at 08:21

## 2023-11-17 RX ADMIN — IOPAMIDOL 135 ML: 755 INJECTION, SOLUTION INTRAVENOUS at 09:14

## 2023-11-17 RX ADMIN — SODIUM CHLORIDE, POTASSIUM CHLORIDE, SODIUM LACTATE AND CALCIUM CHLORIDE 1000 ML: 600; 310; 30; 20 INJECTION, SOLUTION INTRAVENOUS at 08:21

## 2023-11-17 RX ADMIN — ALUMINUM HYDROXIDE, MAGNESIUM HYDROXIDE, AND SIMETHICONE 15 ML: 200; 200; 20 SUSPENSION ORAL at 08:21

## 2023-11-17 RX ADMIN — OXYCODONE HYDROCHLORIDE 5 MG: 5 TABLET ORAL at 08:21

## 2023-11-17 ASSESSMENT — ACTIVITIES OF DAILY LIVING (ADL): ADLS_ACUITY_SCORE: 35

## 2023-11-17 NOTE — ED TRIAGE NOTES
"Presents ambulatory to triage with complaints of abdominal pain that has gotten progressively worse since Tuesday and now radiates to the back. Some improvement to pain after taking \"1200mg of Tylenol.\" Patient reports history of Chrohns and abdominal hernia, but states that he normally does not have pain from these conditions.      Triage Assessment (Adult)       Row Name 11/17/23 0748          Triage Assessment    Airway WDL WDL        Respiratory WDL    Respiratory WDL WDL        Skin Circulation/Temperature WDL    Skin Circulation/Temperature WDL WDL        Cardiac WDL    Cardiac WDL WDL        Peripheral/Neurovascular WDL    Peripheral Neurovascular WDL WDL        Cognitive/Neuro/Behavioral WDL    Cognitive/Neuro/Behavioral WDL WDL                     "

## 2023-11-17 NOTE — ED PROVIDER NOTES
[place] EMERGENCY DEPARTMENT ([facility])    11/17/23       ED PROVIDER NOTE   Vertical Triage A  History     Chief Complaint   Patient presents with    Abdominal Pain     The history is provided by the patient and medical records.     Cody Pickard is a 33 year old male with history of Crohn's disease on Humira and mesalamine who presents with abdominal pain that has been ongoing for the past week.  The pain has been slightly worsening and persistent over time.  It is largely localized in the periumbilical and lower region of the abdomen.  Is not associated with heartburn.  No vomiting.  No fevers or chills.  He tried taking Tylenol 1000mg this morning without improvement of symptoms.  He has some subtle nausea. No problems with fistulas. The patient is followed by the [facility] GI service, Dr. Alfie Bowers and Abdiaziz KIRK.    Past Medical History  Past Medical History:   Diagnosis Date    Anal fistula     Crohn's disease (H)     HTN (hypertension)      Past Surgical History:   Procedure Laterality Date    COLONOSCOPY  06/15/2020    COLONOSCOPY N/A 9/1/2023    Procedure: COLONOSCOPY;  Surgeon: Vidhya Hamm MD;  Location: [facility] OR    EXAM UNDER ANESTHESIA ANUS N/A 11/19/2021    Procedure: EXAM UNDER ANESTHESIA, ANUS;  Surgeon: Vidhya Hamm MD;  Location: [facility] OR    EXAM UNDER ANESTHESIA ANUS N/A 7/8/2022    Procedure: EXAM UNDER ANESTHESIA, ANUS;  Surgeon: Vidhya Hamm MD;  Location: [facility] OR    EXAM UNDER ANESTHESIA ANUS N/A 12/16/2022    Procedure: EXAM UNDER ANESTHESIA, ANUS, Partial Fistuotomy seton x2;  Surgeon: Vidhya Hamm MD;  Location: [facility] OR    FISTULOTOMY RECTUM N/A 7/8/2022    Procedure: partial FISTULOTOMY, RECTUM, seton replacement x2;  Surgeon: Vidhya Hamm MD;  Location: [facility] OR    FISTULOTOMY RECTUM N/A 12/16/2022    Procedure: POSSIBLE FISTULOTOMY, RECTUM;  Surgeon: Vidhya Hamm MD;   Location: [facility] OR    FISTULOTOMY RECTUM N/A 9/1/2023    Procedure: PARTIAL FISTULOTOMY, REPLACEMENT OF SETON STITCH;  Surgeon: Vidhya Hamm MD;  Location: [facility] OR    INCISION AND DRAINAGE RECTUM, COMBINED N/A 09/06/2021    Procedure: INCISION AND DRAINAGE, RECTUM, placement of Seton;  Surgeon: Vidhya Hamm MD;  Location: [facility] OR    IRRIGATION AND DEBRIDEMENT PERINEAL, COMBINED N/A 9/1/2023    Procedure: Examination under anesthesia, Debridement of fistula tract;  Surgeon: Vidhya Hamm MD;  Location: [facility] OR    PLACEMENT OF SETON RECTUM N/A 11/19/2021    Procedure: PLACEMENT OF SETON RECTUM;  Surgeon: Vidhya Hamm MD;  Location: [facility] OR    SIGMOIDOSCOPY,BIOPSY  09/24/2020     acetaminophen (TYLENOL) 500 MG tablet  ibuprofen (ADVIL/MOTRIN) 600 MG tablet  ondansetron (ZOFRAN ODT) 4 MG ODT tab  oxyCODONE (ROXICODONE) 5 MG tablet  adalimumab (HUMIRA *CF* PEN) 40 MG/0.4ML pen kit  amLODIPine (NORVASC) 5 MG tablet  lisinopril (ZESTRIL) 20 MG tablet  mesalamine (LIALDA) 1.2 g DR tablet      No Known Allergies  Family History  Family History   Problem Relation Age of Onset    Multiple Sclerosis Mother     Ulcerative Colitis Maternal Grandmother     Colon Cancer No family hx of     Inflammatory Bowel Disease No family hx of     Irritable Bowel Syndrome No family hx of     Crohn's Disease No family hx of     Anesthesia Reaction No family hx of     Bleeding Disorder No family hx of     Clotting Disorder No family hx of      Social History   Social History     Tobacco Use    Smoking status: Former     Packs/day: 0.50     Years: 2.50     Additional pack years: 0.00     Total pack years: 1.25     Types: Cigarettes     Start date: 2018     Quit date: 08/2020     Years since quitting: 3.2    Smokeless tobacco: Never   Substance Use Topics    Alcohol use: Yes     Alcohol/week: 2.0 standard drinks of alcohol     Types: 2 Cans of beer per week    Drug use: Never      Past medical  "history, past surgical history, medications, allergies, family history, and social history were reviewed with the patient. No additional pertinent items.      A medically appropriate review of systems was performed with pertinent positives and negatives noted in the HPI, and all other systems negative.    Physical Exam   BP: (!) 138/97  Pulse: 61  Temp: 98  F (36.7  C)  Resp: 16  Height: 177.8 cm (5' 10\")  Weight: 99.8 kg (220 lb)  SpO2: 97 %  Physical Exam  Vital Signs Reviewed  Gen: Well nourished, well developed, r slightly uncomfortable, no acute distress  HEENT: NC/AT, PERRL, EOMI, MMM  Neck: Supple, FROM  CV: Regular Rate, no murmur/rub/gallop  Lungs/Chest: Normal Effort, CTAB  Abd: Soft, nondistended.  Mild diffuse tenderness with no rigidity rebound or guarding.  No palpable masses.  MSK/Back: FROM, no visible deformity  Neuro: A&Ox3, GCS 15, CN II-XII unremarkable  Skin: Warm, Dry, Intact, no visible lesions    ED Course, Procedures, & Data     ED Course as of 11/17/23 1224   Fri Nov 17, 2023   0937 I have intimately interpreted the abdominal CT.  I do not appreciate any free air.  Do not appreciate any hemoperitoneum.  There appears to be a small periumbilical hernia that is fat-containing.  No clear bowel obstruction.  Final results pending.     Seen and evaluated in VTA.  Bedside ultrasound unremarkable.  CT ordered.  Labs ordered.  Medications ordered.  Labs and imaging reviewed  Patient reassessed  Surgery referral placed  Discharge and return instructions discussed    Procedures  Results for orders placed during the hospital encounter of 11/17/23    POC US ABDOMEN LIMITED    Impression  Limited Bedside Gallbladder Ultrasound, performed and interpreted by me.  Indication: Abdominal Pain  The gall bladder (GB) was evaluated along the short and long axis in real time.  Findings  The gall bladder is not dilated  The anterior GB wall measures <4mm  GB stones are not seen and there is no GB sludge.  The " common bile duct was visualized and measures less than 6 mm in luminal diameter  Sonographic treadwell sign is Absent    IMPRESSION: No evidence of cholesystitis, GB stones, or biliary colic                     Results for orders placed or performed during the hospital encounter of 11/17/23   POC US ABDOMEN LIMITED     Status: None    Impression    Limited Bedside Gallbladder Ultrasound, performed and interpreted by me.  Indication: Abdominal Pain  The gall bladder (GB) was evaluated along the short and long axis in real time.    Findings  The gall bladder is not dilated  The anterior GB wall measures <4mm  GB stones are not seen and there is no GB sludge.  The common bile duct was visualized and measures less than 6 mm in luminal diameter   Sonographic treadwell sign is Absent    IMPRESSION: No evidence of cholesystitis, GB stones, or biliary colic        CT Abdomen Pelvis w Contrast     Status: None    Narrative    EXAMINATION: CT ABDOMEN PELVIS W CONTRAST, 11/17/2023 9:36 AM    INDICATION: Periumbical pain, persistent, nausea, hx crohns, possible  hx hernias    COMPARISON STUDY: 9/6/2021    TECHNIQUE: CT scan of the abdomen and pelvis was performed on  multidetector CT scanner using volumetric acquisition technique and  images were reconstructed in multiple planes with variable thickness  and reviewed on dedicated workstations.     CONTRAST: iopamidol (ISOVUE-370) solution 135 mL injected IV without  oral contrast    CT scan radiation dose is optimized to minimum requisite dose using  automated dose modulation techniques.    FINDINGS:    Lower thorax: Normal.    Liver: No mass. No intrahepatic biliary ductal dilation.    Biliary System: Normal gallbladder. No extrahepatic biliary ductal  dilation.    Pancreas: No mass or pancreatic ductal dilation.    Adrenal glands: No mass or nodules    Spleen: Normal.    Kidneys: No suspicious mass, obstructing calculus or hydronephrosis.    Gastrointestinal tract : There are 4  oblong dense objects present  within the lumen of the small bowel in the anterior abdomen which  likely represent ingested tablets. Normal appendix. Normal caliber  small bowel. Redundant sigmoid colon. Setons in place X2. Soft tissue  thickening and fat stranding predominantly in the left posterior  lateral soft tissues adjacent to the setons. No fluid collection.    Mesentery/peritoneum/retroperitoneum: No mass. No free fluid or air.   Tiny fat-containing umbilical hernia.    Lymph nodes: No significant lymphadenopathy.    Vasculature: Patent major abdominal vasculature.    Pelvis: Urinary bladder is normal.    Osseous structures: No aggressive or acute osseous lesion.      Soft tissues: Within normal limits.      Impression    IMPRESSION:   1. No acute findings in the abdomen or pelvis.  2. Perianal inflammation with setons in place.    I have personally reviewed the examination and initial interpretation  and I agree with the findings.    TADEO DUENAS MD         SYSTEM ID:  [id number]   Comprehensive metabolic panel     Status: Abnormal   Result Value Ref Range    Sodium 137 135 - 145 mmol/L    Potassium 4.2 3.4 - 5.3 mmol/L    Carbon Dioxide (CO2) 27 22 - 29 mmol/L    Anion Gap 8 7 - 15 mmol/L    Urea Nitrogen 7.5 6.0 - 20.0 mg/dL    Creatinine 0.77 0.67 - 1.17 mg/dL    GFR Estimate >90 >60 mL/min/1.73m2    Calcium 9.5 8.6 - 10.0 mg/dL    Chloride 102 98 - 107 mmol/L    Glucose 126 (H) 70 - 99 mg/dL    Alkaline Phosphatase 58 40 - 150 U/L    AST 22 0 - 45 U/L    ALT 13 0 - 70 U/L    Protein Total 7.3 6.4 - 8.3 g/dL    Albumin 4.4 3.5 - 5.2 g/dL    Bilirubin Total 0.5 <=1.2 mg/dL   Lipase     Status: Normal   Result Value Ref Range    Lipase 29 13 - 60 U/L   Lactic acid whole blood     Status: Normal   Result Value Ref Range    Lactic Acid 0.7 0.7 - 2.0 mmol/L   Magnesium     Status: Normal   Result Value Ref Range    Magnesium 1.9 1.7 - 2.3 mg/dL   CBC with platelets and differential     Status: None    Result Value Ref Range    WBC Count 5.8 4.0 - 11.0 10e3/uL    RBC Count 4.88 4.40 - 5.90 10e6/uL    Hemoglobin 14.9 13.3 - 17.7 g/dL    Hematocrit 43.5 40.0 - 53.0 %    MCV 89 78 - 100 fL    MCH 30.5 26.5 - 33.0 pg    MCHC 34.3 31.5 - 36.5 g/dL    RDW 12.5 10.0 - 15.0 %    Platelet Count 331 150 - 450 10e3/uL    % Neutrophils 62 %    % Lymphocytes 26 %    % Monocytes 10 %    % Eosinophils 1 %    % Basophils 1 %    % Immature Granulocytes 0 %    NRBCs per 100 WBC 0 <1 /100    Absolute Neutrophils 3.6 1.6 - 8.3 10e3/uL    Absolute Lymphocytes 1.5 0.8 - 5.3 10e3/uL    Absolute Monocytes 0.6 0.0 - 1.3 10e3/uL    Absolute Eosinophils 0.1 0.0 - 0.7 10e3/uL    Absolute Basophils 0.1 0.0 - 0.2 10e3/uL    Absolute Immature Granulocytes 0.0 <=0.4 10e3/uL    Absolute NRBCs 0.0 10e3/uL   CBC with platelets differential     Status: None    Narrative    The following orders were created for panel order CBC with platelets differential.  Procedure                               Abnormality         Status                     ---------                               -----------         ------                     CBC with platelets and d...[[id number]]                      Final result                 Please view results for these tests on the individual orders.     Medications   ondansetron (ZOFRAN) injection 4 mg ( Intravenous Canceled Entry 11/17/23 0824)   lactated ringers BOLUS 1,000 mL (0 mLs Intravenous Stopped 11/17/23 0909)   alum & mag hydroxide-simethicone (MAALOX) suspension 15 mL (15 mLs Oral $Given 11/17/23 0821)   lidocaine (viscous) (XYLOCAINE) 2 % solution 10 mL (10 mLs Mouth/Throat $Given 11/17/23 0821)   oxyCODONE (ROXICODONE) tablet 5 mg (5 mg Oral $Given 11/17/23 0821)   sodium chloride (PF) 0.9% PF flush 84 mL (84 mLs Intravenous $Given 11/17/23 0914)   iopamidol (ISOVUE-370) solution 135 mL (135 mLs Intravenous $Given 11/17/23 0914)     Labs Ordered and Resulted from Time of ED Arrival to Time of ED Departure    COMPREHENSIVE METABOLIC PANEL - Abnormal       Result Value    Sodium 137      Potassium 4.2      Carbon Dioxide (CO2) 27      Anion Gap 8      Urea Nitrogen 7.5      Creatinine 0.77      GFR Estimate >90      Calcium 9.5      Chloride 102      Glucose 126 (*)     Alkaline Phosphatase 58      AST 22      ALT 13      Protein Total 7.3      Albumin 4.4      Bilirubin Total 0.5     LIPASE - Normal    Lipase 29     LACTIC ACID WHOLE BLOOD - Normal    Lactic Acid 0.7     MAGNESIUM - Normal    Magnesium 1.9     CBC WITH PLATELETS AND DIFFERENTIAL    WBC Count 5.8      RBC Count 4.88      Hemoglobin 14.9      Hematocrit 43.5      MCV 89      MCH 30.5      MCHC 34.3      RDW 12.5      Platelet Count 331      % Neutrophils 62      % Lymphocytes 26      % Monocytes 10      % Eosinophils 1      % Basophils 1      % Immature Granulocytes 0      NRBCs per 100 WBC 0      Absolute Neutrophils 3.6      Absolute Lymphocytes 1.5      Absolute Monocytes 0.6      Absolute Eosinophils 0.1      Absolute Basophils 0.1      Absolute Immature Granulocytes 0.0      Absolute NRBCs 0.0       CT Abdomen Pelvis w Contrast   Final Result   IMPRESSION:    1. No acute findings in the abdomen or pelvis.   2. Perianal inflammation with setons in place.      I have personally reviewed the examination and initial interpretation   and I agree with the findings.      TADEO DUENAS MD            SYSTEM ID:  [id number]      POC US ABDOMEN LIMITED   Final Result   Limited Bedside Gallbladder Ultrasound, performed and interpreted by me.   Indication: Abdominal Pain   The gall bladder (GB) was evaluated along the short and long axis in real time.     Findings   The gall bladder is not dilated   The anterior GB wall measures <4mm   GB stones are not seen and there is no GB sludge.   The common bile duct was visualized and measures less than 6 mm in luminal diameter    Sonographic treadwell sign is Absent      IMPRESSION: No evidence of cholesystitis, GB  stones, or biliary colic                    Critical care was not performed.     Medical Decision Making  The patient's presentation was of high complexity (an acute health issue posing potential threat to life or bodily function).    The patient's evaluation involved:  ordering and/or review of 3+ test(s) in this encounter (see separate area of note for details)  independent interpretation of testing performed by another health professional (see separate area of note for details)    The patient's management necessitated moderate risk (prescription drug management including medications given in the ED).    Assessment & Plan    Cody Pickard is a 33 year old male with history of Crohn's disease on Humira and mesalamine who presents with abdominal pain that has been ongoing for the past week.  On arrival the patient is slightly uncomfortable but in no acute distress.  His vital signs are reassuring.  Abdominal exam is without signs of peritonitis.  Labs show no leukocytosis stable hemoglobin.  Lactate within normal limits.  Metabolic panel unremarkable.  Lipase within normal limits.  Bedside ultrasound does not suggest acute biliary pathology.  Given his history of Crohn's disease we will get a CT scan.  No strong infectious history or signs of infection on work-up.  If CT scan is without acute pathology would plan to discharge patient with some pain medications and have him follow-up with gastroenterology in clinic.      Patient was reassessed and pain was tolerable.  CT scan shows a small fat-containing hernia the patient reports he is able to reduce on his own.  No other acute life-threatening pathology identified.  Patient is stable for discharge with general surgery referral.  Return precautions for worsening pain fevers chills nausea vomiting bowel concerns or any other concerning symptoms he may note were provided.  Prescriptions for pain and nausea medication were provided.  Patient verbalizes understanding of  the need for outpatient surgery follow-up regarding his hernia close monitoring of his symptoms and return to the emergency department if they worsen.      I have reviewed the nursing notes. I have reviewed the findings, diagnosis, plan and need for follow up with the patient.    Discharge Medication List as of 11/17/2023 10:47 AM        START taking these medications    Details   acetaminophen (TYLENOL) 500 MG tablet Take 2 tablets (1,000 mg) by mouth every 6 hours as needed for mild pain or pain, Disp-112 tablet, R-0, E-Prescribe      ibuprofen (ADVIL/MOTRIN) 600 MG tablet Take 1 tablet (600 mg) by mouth every 6 hours as needed for moderate pain, Disp-56 tablet, R-0, E-Prescribe      ondansetron (ZOFRAN ODT) 4 MG ODT tab Take 1 tablet (4 mg) by mouth every 6 hours as needed, Disp-12 tablet, R-0, E-Prescribe      oxyCODONE (ROXICODONE) 5 MG tablet Take 1 tablet (5 mg) by mouth every 6 hours as needed, Disp-12 tablet, R-0, E-Prescribe             Final diagnoses:   Umbilical hernia without obstruction and without gangrene       Robbin Cuellar Jr., MD    [facility] EMERGENCY DEPARTMENT  11/17/2023     Robbin Cuellar MD  11/17/23 [id number]

## 2023-11-17 NOTE — DISCHARGE INSTRUCTIONS
Thank you for coming to the [facility] emergency department.  Your symptoms are likely related to a small fat-containing hernia.  Your lab work evaluation is otherwise reassuring.  We are prescribing pain medication to control symptoms.  You will need to follow with general surgery and to discuss elective repair of this hernia for definitive management of your symptoms.    Please make an appointment to follow up with Surgery - General Clinic(phone: [phone]) in 10-14 days.  You will need to call to schedule.    If your symptoms are worsening please do not hesitate to return to the emergency department at any time.

## 2023-11-20 NOTE — TELEPHONE ENCOUNTER
REFERRAL INFORMATION:  Referring Provider: Dr. Robbin Cuellar  Referring Clinic: [facility] - ED  Reason for Visit/Diagnosis: Umbilical hernia w/o obstruction or gangrene        FUTURE VISIT INFORMATION:  Appointment Date: 12/1/2023  Appointment Time: 2 PM     NOTES RECORD STATUS  DETAILS   OFFICE NOTE from Referring Provider N/A    OFFICE NOTE from Other Specialists [facility]:  10/18/23 - CR OV with Dr. Hamm  6/28/23 - GI OV with REAGAN Martinez  11/21/22 - GI OV with Dr. Bowers   [facility] DISCHARGE SUMMARY/ ED VISITS  Internal [facility]:  11/17/23 - ED OV with Dr. Cuellar  9/6/21 - Admission with Dr. Hamm   OPERATIVE REPORT N/A    PERTINENT LABS Care Everywhere / Internal    IMAGING (CT, MRI, US, XR)  [facility]:  1/17/23, 9/6/21 - CT Abd/Pelvis  11/17/23 - US Abdomen  7/29/23, 6/10/22, 9/18/21 - MRI Pelvis

## 2023-12-01 ENCOUNTER — PRE VISIT (OUTPATIENT)
Dept: SURGERY | Facility: CLINIC | Age: 33
End: 2023-12-01

## 2023-12-01 ENCOUNTER — OFFICE VISIT (OUTPATIENT)
Dept: SURGERY | Facility: CLINIC | Age: 33
End: 2023-12-01
Attending: STUDENT IN AN ORGANIZED HEALTH CARE EDUCATION/TRAINING PROGRAM
Payer: COMMERCIAL

## 2023-12-01 VITALS
OXYGEN SATURATION: 99 % | HEIGHT: 70 IN | HEART RATE: 73 BPM | DIASTOLIC BLOOD PRESSURE: 79 MMHG | SYSTOLIC BLOOD PRESSURE: 128 MMHG | WEIGHT: 219 LBS | BODY MASS INDEX: 31.35 KG/M2

## 2023-12-01 DIAGNOSIS — K42.9 UMBILICAL HERNIA WITHOUT OBSTRUCTION AND WITHOUT GANGRENE: ICD-10-CM

## 2023-12-01 PROCEDURE — 99204 OFFICE O/P NEW MOD 45 MIN: CPT | Performed by: SURGERY

## 2023-12-01 ASSESSMENT — PAIN SCALES - GENERAL: PAINLEVEL: NO PAIN (0)

## 2023-12-01 NOTE — PATIENT INSTRUCTIONS
If you wish to schedule umbilical hernia repair with Dr Holloway, please call Jeffrey Contreras to schedule a surgery date and a date for pre-anesthesia clinic at [phone]    -----    The risks of hernia repair were reviewed with the patient.    These risks combine the risks of abdominal surgery and the risks of hernia repair, including mesh implantation, and were described to the patient as follows:    Abdominal surgery risks:    These include, but are not limited to, death, myocardial infarction, pneumonia, urinary tract infection, deep venous thrombosis with or without pulmonary embolus, abdominal infection from bowel injury or abscess, bowel obstruction, wound infection, and bleeding.    Hernia repair risks:    Food and Drug Administration Comments on Hernia Repair Surgery and Mesh Implantation.    http://www.fda.gov/MedicalDevices/ProductsandMedicalProcedures/ImplantsandProsthetics/HerniaSurgicalMesh/default.htm      Hernia Repair Complications    Based on FDA s analysis of medical device adverse event reports and of peer-reviewed, scientific literature, the most common adverse events for all surgical repair of hernias--with or without mesh--are pain, infection, hernia recurrence, scar-like tissue that sticks tissues together (adhesion), blockage of the large or small intestine (obstruction), bleeding, abnormal connection between organs, vessels, or intestines (fistula), fluid build-up at the surgical site (seroma), and a hole in neighboring tissues or organs (perforation).    The most common adverse events following hernia repair with mesh are pain, infection, hernia recurrence, adhesion, and bowel obstruction. Some other potential adverse events that can occur following hernia repair with mesh are mesh migration and mesh shrinkage (contraction).    Many complications related to hernia repair with surgical mesh that have been reported to the FDA have been associated with recalled mesh products that are no longer on  the market. Pain, infection, recurrence, adhesion, obstruction, and perforation are the most common complications associated with recalled mesh. In the FDA s analysis of medical adverse event reports to the FDA, recalled mesh products were the main cause of bowel perforation and obstruction complications.    Please refer to the recall notices here and here for more information if you have recalled mesh. For more information on the recalled products, please visit the FDA Medical Device Recall website. Please visit the Medical & Radiation Emitting Device Database to search a specific type of surgical mesh.    If you are unsure about the specific mesh  and brand used in your surgery and have questions about your hernia repair, contact your surgeon or the facility where your surgery was performed to obtain the information from your medical record.

## 2023-12-01 NOTE — NURSING NOTE
"Chief Complaint   Patient presents with    New Patient     Umbilical hernia       Vitals:    12/01/23 1355   BP: 128/79   BP Location: Left arm   Patient Position: Sitting   Cuff Size: Adult Regular   Pulse: 73   SpO2: 99%   Weight: 99.3 kg (219 lb)   Height: 1.778 m (5' 10\")       Body mass index is 31.42 kg/m .                          Silver Brantley, EMT    "

## 2023-12-01 NOTE — PROGRESS NOTES
New Hernia Consultation Note      Cody Pickard  [id number]  1990    Requesting Provider: Robbin Win    Dear No Ref-Primary, Physician,    I was asked by Robbin Win to see this patient for the following problem: Cody Pickard is a 33 year old male who presents to clinic today for the following health issues       CHIEF COMPLAINT:  Umbilical bulge      ASSESSMENT/PLAN:  umbilical  Hernia size is less than 5cm in size.    Assessment & Plan   Problem List Items Addressed This Visit    None  Visit Diagnoses       Umbilical hernia without obstruction and without gangrene        Relevant Orders    PAC Visit Referral (For [facility] Only)    Case Request: HERNIORRHAPHY, UMBILICAL, OPEN (Completed)           No mesh, due to Crohns    30 minutes spent by me on the date of the encounter doing chart review, history and exam, documentation and further activities per the note    HISTORY OF PRESENT ILLNESS:  Location: umbilical  Severity: Mild          No data to display                     No data to display                     No data to display                          Patient Supplied Answers To HerQLes Assessment Questionnaire       No data to display              _______________________________________________________________________            NUTRITIONAL STATUS:  Lab Results   Component Value Date    ALBUMIN 4.4 11/17/2023    ALBUMIN 4.2 05/12/2023       Body mass index is 31.42 kg/m .    Patient is not immunosuppressed.    Patient is a current smoker.    Past Medical History:   Diagnosis Date    Anal fistula     Crohn's disease (H)     HTN (hypertension)        Patient Active Problem List   Diagnosis    IBD (inflammatory bowel disease)    Perianal abscess    Perianal Crohn's disease, with abscess (H)    Acute post-operative pain    Anal fistula    Crohn's disease with fistula, unspecified gastrointestinal tract location (H)       Past Surgical History:   Procedure Laterality Date    COLONOSCOPY  06/15/2020     COLONOSCOPY N/A 9/1/2023    Procedure: COLONOSCOPY;  Surgeon: Vidhya Hamm MD;  Location: [facility] OR    EXAM UNDER ANESTHESIA ANUS N/A 11/19/2021    Procedure: EXAM UNDER ANESTHESIA, ANUS;  Surgeon: Vidhya Hamm MD;  Location: [facility] OR    EXAM UNDER ANESTHESIA ANUS N/A 7/8/2022    Procedure: EXAM UNDER ANESTHESIA, ANUS;  Surgeon: Vidhya Hamm MD;  Location: [facility] OR    EXAM UNDER ANESTHESIA ANUS N/A 12/16/2022    Procedure: EXAM UNDER ANESTHESIA, ANUS, Partial Fistuotomy seton x2;  Surgeon: Vidhya Hamm MD;  Location: [facility] OR    FISTULOTOMY RECTUM N/A 7/8/2022    Procedure: partial FISTULOTOMY, RECTUM, seton replacement x2;  Surgeon: Vidhya Hamm MD;  Location: [facility] OR    FISTULOTOMY RECTUM N/A 12/16/2022    Procedure: POSSIBLE FISTULOTOMY, RECTUM;  Surgeon: Vidhya Hamm MD;  Location: [facility] OR    FISTULOTOMY RECTUM N/A 9/1/2023    Procedure: PARTIAL FISTULOTOMY, REPLACEMENT OF SETON STITCH;  Surgeon: Vidhya Hamm MD;  Location: [facility] OR    INCISION AND DRAINAGE RECTUM, COMBINED N/A 09/06/2021    Procedure: INCISION AND DRAINAGE, RECTUM, placement of Seton;  Surgeon: Vidhya Hamm MD;  Location: [facility]    IRRIGATION AND DEBRIDEMENT PERINEAL, COMBINED N/A 9/1/2023    Procedure: Examination under anesthesia, Debridement of fistula tract;  Surgeon: Vidhya Hamm MD;  Location: [facility] OR    PLACEMENT OF SETON RECTUM N/A 11/19/2021    Procedure: PLACEMENT OF SETON RECTUM;  Surgeon: Vidhya Hamm MD;  Location: [facility] OR    SIGMOIDOSCOPY,BIOPSY  09/24/2020       MEDICATIONS:  Current Outpatient Medications   Medication    acetaminophen (TYLENOL) 500 MG tablet    adalimumab (HUMIRA *CF* PEN) 40 MG/0.4ML pen kit    amLODIPine (NORVASC) 5 MG tablet    ibuprofen (ADVIL/MOTRIN) 600 MG tablet    lisinopril (ZESTRIL) 20 MG tablet    mesalamine (LIALDA) 1.2 g DR tablet     No current facility-administered  "medications for this visit.       ALLERGIES:  No Known Allergies    Social History     Socioeconomic History    Marital status:      Spouse name: None    Number of children: None    Years of education: None    Highest education level: None   Tobacco Use    Smoking status: Former     Packs/day: 0.50     Years: 2.50     Additional pack years: 0.00     Total pack years: 1.25     Types: Cigarettes     Start date: 2018     Quit date: 08/2020     Years since quitting: 3.3    Smokeless tobacco: Never   Substance and Sexual Activity    Alcohol use: Yes     Alcohol/week: 2.0 standard drinks of alcohol     Types: 2 Cans of beer per week    Drug use: Never       Family History   Problem Relation Age of Onset    Multiple Sclerosis Mother     Ulcerative Colitis Maternal Grandmother     Colon Cancer No family hx of     Inflammatory Bowel Disease No family hx of     Irritable Bowel Syndrome No family hx of     Crohn's Disease No family hx of     Anesthesia Reaction No family hx of     Bleeding Disorder No family hx of     Clotting Disorder No family hx of        ROS    N/a    PHYSICAL EXAM:  Objective    /79 (BP Location: Left arm, Patient Position: Sitting, Cuff Size: Adult Regular)   Pulse 73   Ht 1.778 m (5' 10\")   Wt 99.3 kg (219 lb)   SpO2 99%   BMI 31.42 kg/m    /79 (BP Location: Left arm, Patient Position: Sitting, Cuff Size: Adult Regular)   Pulse 73   Ht 1.778 m (5' 10\")   Wt 99.3 kg (219 lb)   SpO2 99%   BMI 31.42 kg/m    Body mass index is 31.42 kg/m .  Physical Exam   Small reducible [facility]      DISCUSSION OF RISKS:  The risks of hernia repair were reviewed with the patient.    These risks combine the risks of abdominal surgery and the risks of hernia repair, including mesh implantation, and were described to the patient as follows:    Abdominal surgery risks:    These include, but are not limited to, death, myocardial infarction, pneumonia, urinary tract infection, deep venous thrombosis with " or without pulmonary embolus, abdominal infection from bowel injury or abscess, bowel obstruction, wound infection, and bleeding.    Hernia repair risks:    Food and Drug Administration Comments on Hernia Repair Surgery and Mesh Implantation.    http://www.fda.gov/MedicalDevices/ProductsandMedicalProcedures/ImplantsandProsthetics/HerniaSurgicalMesh/default.htm      Hernia Repair Complications    Based on FDA s analysis of medical device adverse event reports and of peer-reviewed, scientific literature, the most common adverse events for all surgical repair of hernias--with or without mesh--are pain, infection, hernia recurrence, scar-like tissue that sticks tissues together (adhesion), blockage of the large or small intestine (obstruction), bleeding, abnormal connection between organs, vessels, or intestines (fistula), fluid build-up at the surgical site (seroma), and a hole in neighboring tissues or organs (perforation).    The most common adverse events following hernia repair with mesh are pain, infection, hernia recurrence, adhesion, and bowel obstruction. Some other potential adverse events that can occur following hernia repair with mesh are mesh migration and mesh shrinkage (contraction).    Many complications related to hernia repair with surgical mesh that have been reported to the FDA have been associated with recalled mesh products that are no longer on the market. Pain, infection, recurrence, adhesion, obstruction, and perforation are the most common complications associated with recalled mesh. In the FDA s analysis of medical adverse event reports to the FDA, recalled mesh products were the main cause of bowel perforation and obstruction complications.    Please refer to the recall notices here and here for more information if you have recalled mesh. For more information on the recalled products, please visit the FDA Medical Device Recall website. Please visit the Medical & Radiation Emitting Device  Database to search a specific type of surgical mesh.    If you are unsure about the specific mesh  and brand used in your surgery and have questions about your hernia repair, contact your surgeon or the facility where your surgery was performed to obtain the information from your medical record.           Sincerely,    Brian Holloway MD

## 2023-12-01 NOTE — LETTER
12/1/2023       RE: Cody Pickard  [id number] [address] [place] [id number]       Dear Colleague,    Thank you for referring your patient, Cody Pickard, to the [facility] [place] at [facility]. Please see a copy of my visit note below.    New Hernia Consultation Note      Cody Pickard  [id number]  1990    Requesting Provider: Robbin Win    Dear No Ref-Primary, Physician,    I was asked by Robbin Win to see this patient for the following problem: Cody Pickard is a 33 year old male who presents to clinic today for the following health issues       CHIEF COMPLAINT:  Umbilical bulge      ASSESSMENT/PLAN:  umbilical  Hernia size is less than 5cm in size.    Assessment & Plan  Problem List Items Addressed This Visit    None  Visit Diagnoses       Umbilical hernia without obstruction and without gangrene        Relevant Orders    PAC Visit Referral (For [facility] Only)    Case Request: HERNIORRHAPHY, UMBILICAL, OPEN (Completed)           No mesh, due to Crohns    30 minutes spent by me on the date of the encounter doing chart review, history and exam, documentation and further activities per the note    HISTORY OF PRESENT ILLNESS:  Location: umbilical  Severity: Mild          No data to display                     No data to display                     No data to display                          Patient Supplied Answers To HerQLes Assessment Questionnaire       No data to display              _______________________________________________________________________            NUTRITIONAL STATUS:  Lab Results   Component Value Date    ALBUMIN 4.4 11/17/2023    ALBUMIN 4.2 05/12/2023       Body mass index is 31.42 kg/m .    Patient is not immunosuppressed.    Patient is a current smoker.    Past Medical History:   Diagnosis Date    Anal fistula     Crohn's disease (H)     HTN (hypertension)        Patient Active Problem List    Diagnosis    IBD (inflammatory bowel disease)    Perianal abscess    Perianal Crohn's disease, with abscess (H)    Acute post-operative pain    Anal fistula    Crohn's disease with fistula, unspecified gastrointestinal tract location (H)       Past Surgical History:   Procedure Laterality Date    COLONOSCOPY  06/15/2020    COLONOSCOPY N/A 9/1/2023    Procedure: COLONOSCOPY;  Surgeon: Vidhya Hamm MD;  Location: [facility]    EXAM UNDER ANESTHESIA ANUS N/A 11/19/2021    Procedure: EXAM UNDER ANESTHESIA, ANUS;  Surgeon: Vidhya Hamm MD;  Location: [facility] OR    EXAM UNDER ANESTHESIA ANUS N/A 7/8/2022    Procedure: EXAM UNDER ANESTHESIA, ANUS;  Surgeon: Vidhya Hamm MD;  Location: [facility] OR    EXAM UNDER ANESTHESIA ANUS N/A 12/16/2022    Procedure: EXAM UNDER ANESTHESIA, ANUS, Partial Fistuotomy seton x2;  Surgeon: Vidhya Hamm MD;  Location: [facility] OR    FISTULOTOMY RECTUM N/A 7/8/2022    Procedure: partial FISTULOTOMY, RECTUM, seton replacement x2;  Surgeon: Vidhya Hamm MD;  Location: [facility] OR    FISTULOTOMY RECTUM N/A 12/16/2022    Procedure: POSSIBLE FISTULOTOMY, RECTUM;  Surgeon: Vidhya Hamm MD;  Location: [facility] OR    FISTULOTOMY RECTUM N/A 9/1/2023    Procedure: PARTIAL FISTULOTOMY, REPLACEMENT OF SETON STITCH;  Surgeon: Vidhya Hamm MD;  Location: [facility] OR    INCISION AND DRAINAGE RECTUM, COMBINED N/A 09/06/2021    Procedure: INCISION AND DRAINAGE, RECTUM, placement of Seton;  Surgeon: Vidhya Hamm MD;  [facility]    IRRIGATION AND DEBRIDEMENT PERINEAL, COMBINED N/A 9/1/2023    Procedure: Examination under anesthesia, Debridement of fistula tract;  Surgeon: Vidhya Hamm MD;  Location: [facility]    PLACEMENT OF SETON RECTUM N/A 11/19/2021    Procedure: PLACEMENT OF SETON RECTUM;  Surgeon: Vidhya Hamm MD;  Location: [facility] OR    SIGMOIDOSCOPY,BIOPSY  09/24/2020  "      MEDICATIONS:  Current Outpatient Medications   Medication    acetaminophen (TYLENOL) 500 MG tablet    adalimumab (HUMIRA *CF* PEN) 40 MG/0.4ML pen kit    amLODIPine (NORVASC) 5 MG tablet    ibuprofen (ADVIL/MOTRIN) 600 MG tablet    lisinopril (ZESTRIL) 20 MG tablet    mesalamine (LIALDA) 1.2 g DR tablet     No current facility-administered medications for this visit.       ALLERGIES:  No Known Allergies    Social History     Socioeconomic History    Marital status:      Spouse name: None    Number of children: None    Years of education: None    Highest education level: None   Tobacco Use    Smoking status: Former     Packs/day: 0.50     Years: 2.50     Additional pack years: 0.00     Total pack years: 1.25     Types: Cigarettes     Start date: 2018     Quit date: 08/2020     Years since quitting: 3.3    Smokeless tobacco: Never   Substance and Sexual Activity    Alcohol use: Yes     Alcohol/week: 2.0 standard drinks of alcohol     Types: 2 Cans of beer per week    Drug use: Never       Family History   Problem Relation Age of Onset    Multiple Sclerosis Mother     Ulcerative Colitis Maternal Grandmother     Colon Cancer No family hx of     Inflammatory Bowel Disease No family hx of     Irritable Bowel Syndrome No family hx of     Crohn's Disease No family hx of     Anesthesia Reaction No family hx of     Bleeding Disorder No family hx of     Clotting Disorder No family hx of        ROS    N/a    PHYSICAL EXAM:  Objective   /79 (BP Location: Left arm, Patient Position: Sitting, Cuff Size: Adult Regular)   Pulse 73   Ht 1.778 m (5' 10\")   Wt 99.3 kg (219 lb)   SpO2 99%   BMI 31.42 kg/m    /79 (BP Location: Left arm, Patient Position: Sitting, Cuff Size: Adult Regular)   Pulse 73   Ht 1.778 m (5' 10\")   Wt 99.3 kg (219 lb)   SpO2 99%   BMI 31.42 kg/m    Body mass index is 31.42 kg/m .  Physical Exam   Small reducible [facility]      DISCUSSION OF RISKS:  The risks of hernia repair were " reviewed with the patient.    These risks combine the risks of abdominal surgery and the risks of hernia repair, including mesh implantation, and were described to the patient as follows:    Abdominal surgery risks:    These include, but are not limited to, death, myocardial infarction, pneumonia, urinary tract infection, deep venous thrombosis with or without pulmonary embolus, abdominal infection from bowel injury or abscess, bowel obstruction, wound infection, and bleeding.    Hernia repair risks:    Food and Drug Administration Comments on Hernia Repair Surgery and Mesh Implantation.    http://www.fda.gov/MedicalDevices/ProductsandMedicalProcedures/ImplantsandProsthetics/HerniaSurgicalMesh/default.htm      Hernia Repair Complications    Based on FDA s analysis of medical device adverse event reports and of peer-reviewed, scientific literature, the most common adverse events for all surgical repair of hernias--with or without mesh--are pain, infection, hernia recurrence, scar-like tissue that sticks tissues together (adhesion), blockage of the large or small intestine (obstruction), bleeding, abnormal connection between organs, vessels, or intestines (fistula), fluid build-up at the surgical site (seroma), and a hole in neighboring tissues or organs (perforation).    The most common adverse events following hernia repair with mesh are pain, infection, hernia recurrence, adhesion, and bowel obstruction. Some other potential adverse events that can occur following hernia repair with mesh are mesh migration and mesh shrinkage (contraction).    Many complications related to hernia repair with surgical mesh that have been reported to the FDA have been associated with recalled mesh products that are no longer on the market. Pain, infection, recurrence, adhesion, obstruction, and perforation are the most common complications associated with recalled mesh. In the FDA s analysis of medical adverse event reports to the  FDA, recalled mesh products were the main cause of bowel perforation and obstruction complications.    Please refer to the recall notices here and here for more information if you have recalled mesh. For more information on the recalled products, please visit the FDA Medical Device Recall website. Please visit the Medical & Radiation Emitting Device Database to search a specific type of surgical mesh.    If you are unsure about the specific mesh  and brand used in your surgery and have questions about your hernia repair, contact your surgeon or the facility where your surgery was performed to obtain the information from your medical record.         Again, thank you for allowing me to participate in the care of your patient.      Sincerely,    Brian Holloway MD

## 2023-12-21 DIAGNOSIS — K50.111 CROHN'S DISEASE OF LARGE INTESTINE WITH RECTAL BLEEDING (H): ICD-10-CM

## 2023-12-21 DIAGNOSIS — K60.30 ANAL FISTULA: ICD-10-CM

## 2023-12-26 NOTE — TELEPHONE ENCOUNTER
adalimumab (HUMIRA *CF* PEN) 40 MG/0.4ML pen kit   4 each 5 7/20/2023       Last Office Visit : 6-  Future Office visit:  1-2-2024    Labs completed on :    1-  CBC with platelets and differential     05-  CRP Inflammation  0.0 - 8.0 mg/L <2.9     Erythrocyte Sedimentation Rate  0 - 15 mm/hr 4     Hepatic panel   WNL

## 2023-12-27 ENCOUNTER — LAB (OUTPATIENT)
Dept: LAB | Facility: CLINIC | Age: 33
End: 2023-12-27
Payer: COMMERCIAL

## 2023-12-27 DIAGNOSIS — K60.30 ANAL FISTULA: ICD-10-CM

## 2023-12-27 DIAGNOSIS — K50.111 CROHN'S DISEASE OF LARGE INTESTINE WITH RECTAL BLEEDING (H): ICD-10-CM

## 2023-12-27 LAB
ALBUMIN SERPL BCG-MCNC: 4.7 G/DL (ref 3.5–5.2)
ALP SERPL-CCNC: 63 U/L (ref 40–150)
ALT SERPL W P-5'-P-CCNC: 31 U/L (ref 0–70)
AST SERPL W P-5'-P-CCNC: 32 U/L (ref 0–45)
BASOPHILS # BLD AUTO: 0.1 10E3/UL (ref 0–0.2)
BASOPHILS NFR BLD AUTO: 1 %
BILIRUB DIRECT SERPL-MCNC: <0.2 MG/DL (ref 0–0.3)
BILIRUB SERPL-MCNC: 0.3 MG/DL
CRP SERPL-MCNC: <3 MG/L
EOSINOPHIL # BLD AUTO: 0.1 10E3/UL (ref 0–0.7)
EOSINOPHIL NFR BLD AUTO: 3 %
ERYTHROCYTE [DISTWIDTH] IN BLOOD BY AUTOMATED COUNT: 12.2 % (ref 10–15)
HCT VFR BLD AUTO: 43.1 % (ref 40–53)
HGB BLD-MCNC: 14.3 G/DL (ref 13.3–17.7)
IMM GRANULOCYTES # BLD: 0 10E3/UL
IMM GRANULOCYTES NFR BLD: 1 %
LYMPHOCYTES # BLD AUTO: 2 10E3/UL (ref 0.8–5.3)
LYMPHOCYTES NFR BLD AUTO: 35 %
MCH RBC QN AUTO: 30 PG (ref 26.5–33)
MCHC RBC AUTO-ENTMCNC: 33.2 G/DL (ref 31.5–36.5)
MCV RBC AUTO: 90 FL (ref 78–100)
MONOCYTES # BLD AUTO: 0.7 10E3/UL (ref 0–1.3)
MONOCYTES NFR BLD AUTO: 13 %
NEUTROPHILS # BLD AUTO: 2.6 10E3/UL (ref 1.6–8.3)
NEUTROPHILS NFR BLD AUTO: 47 %
NRBC # BLD AUTO: 0 10E3/UL
NRBC BLD AUTO-RTO: 0 /100
PLATELET # BLD AUTO: 317 10E3/UL (ref 150–450)
PROT SERPL-MCNC: 7.7 G/DL (ref 6.4–8.3)
RBC # BLD AUTO: 4.77 10E6/UL (ref 4.4–5.9)
WBC # BLD AUTO: 5.6 10E3/UL (ref 4–11)

## 2023-12-27 PROCEDURE — 80076 HEPATIC FUNCTION PANEL: CPT

## 2023-12-27 PROCEDURE — 36415 COLL VENOUS BLD VENIPUNCTURE: CPT

## 2023-12-27 PROCEDURE — 85025 COMPLETE CBC W/AUTO DIFF WBC: CPT

## 2023-12-27 PROCEDURE — 86140 C-REACTIVE PROTEIN: CPT

## 2023-12-27 RX ORDER — ADALIMUMAB 40MG/0.4ML
KIT SUBCUTANEOUS
Qty: 4 EACH | Refills: 0 | Status: SHIPPED | OUTPATIENT
Start: 2023-12-27 | End: 2024-01-24

## 2023-12-27 NOTE — TELEPHONE ENCOUNTER
Last clinic visit: 6/28/23  Next clinic visit: 1/2/24  Last set of labs: 12/27/23  MTM: due; referral placed    One-month supply of Humira refilled sent to pharmacy. Further refills to be sent by MTM after appointment.

## 2024-01-02 ENCOUNTER — VIRTUAL VISIT (OUTPATIENT)
Dept: GASTROENTEROLOGY | Facility: CLINIC | Age: 34
End: 2024-01-02
Attending: PHYSICIAN ASSISTANT
Payer: COMMERCIAL

## 2024-01-02 DIAGNOSIS — K50.113 CROHN'S DISEASE OF LARGE INTESTINE WITH FISTULA (H): Primary | ICD-10-CM

## 2024-01-02 PROCEDURE — 99214 OFFICE O/P EST MOD 30 MIN: CPT | Mod: 95 | Performed by: PHYSICIAN ASSISTANT

## 2024-01-02 ASSESSMENT — PAIN SCALES - GENERAL: PAINLEVEL: NO PAIN (0)

## 2024-01-02 NOTE — PROGRESS NOTES
Virtual Visit Details    Type of service:  Video Visit     Originating Location (pt. Location): Home    Distant Location (provider location):  Off-site  Platform used for Video Visit: Well    IBD CLINIC VISIT    CC/REFERRING MD:  Referred Self  REASON FOR CONSULTATION: Colitis    ASSESSMENT/PLAN:  33 year old male with Crohn's disease.     1. Crohn's disease:   Current medication:    - Mesalamine 4.8g daily   - Humira/adalimumab q7 days  Current clinical disease activity: Remission   Current endoscopic disease activity: 9/1/23 (with CRS during EUA) Colonoscopy was the performed and the colonoscopy easily advanced to the ileocecal valve. The terminal ileum was entered and there was not obvious disease. There were no polyps and the preparation was good. The colonoscopy was gradually withdrawn over 10 minutes and retroflexion was performed with no obvious rectal abnormalities with the exception of the anal disease.     -- Continue mesalamine at this time  -- Recommend continuing Humira  -- Seton placement/fistulotomy per colorectal surgery    2. IBD dysplasia surveillance: > 1/3 of colon involved. Will need dysplasia surveillance.   -- IBD dysplasia surveillance starting in 2028    Return to clinic in 6 months.     Abdiaziz Hull PA-C  Division of Gastroenterology, Hepatology and Nutrition  [facility]     IBD HISTORY  Age at diagnosis: 30  Endoscopic extent of disease: Continuous: rectum to proximal transverse  Histologic Extent of disease: Rectum, descending   Disease phenotype: Extensive   Rose-anal disease: Yes  Prior IBD surgeries: None  Prior IBD Medications:  - Vedolizumab - not dose escalated     DRUG MONITORING  TPMT enzyme activity:  n/a  6-TGN/6-MMPN levels: n/a  Biologic concentration:   Adalimumab: 6.1 on 9/2021, no antibodies      HPI:   Currently having 1-2 stools per day, soft formed. No blood in the stool. Weight is stable.     Had abdominal pain and went to ED 11/20/23. Had concern for  a small fat containing hernia but ultimately he did not proceed with repair. Eventually resolved.    Saw CRS on 9/1/23 and underwent examination under anesthesia with debridement of fistula tract, partial fistulotomy, seton exchange x2, and colonoscopy.    Colonoscopy was the performed and the colonoscopy easily advanced to the ileocecal valve. The terminal ileum was entered and there was not obvious disease. There were no polyps and the preparation was good. The colonoscopy was gradually withdrawn over 10 minutes and retroflexion was performed with no obvious rectal abnormalities with the exception of the anal disease.     sIBDQ:  IBDQ Score Date IBDQ - Total Score  (Higher score better)   6/28/2023   2:05 PM 69   2/28/2022   3:33 PM 64      ROS:    Constitutional, HEENT, cardiovascular, pulmonary, GI, , musculoskeletal, neuro, skin, endocrine and psych systems are negative, except as otherwise noted.    PHYSICAL EXAMINATION:  Constitutional: aaox3, cooperative, pleasant, not dyspneic/diaphoretic, no acute distress  Vitals reviewed: There were no vitals taken for this visit.  Wt:   Wt Readings from Last 2 Encounters:   12/01/23 99.3 kg (219 lb)   11/17/23 99.8 kg (220 lb)      Constitutional - general appearance is well and in no acute distress.  Eyes - sunglasses on   Respiratory - No cough, unlabored breathing  Musculoskeletal - range of motion intact: Neck and arms  Skin - No discoloration or lesions visible - pt reports red pustule/papule unable to see via video  Neurological - No tremors, headaches  Psychiatric - No anxiety, alert & oriented    PERTINENT PAST MEDICAL HISTORY:  Past Medical History:   Diagnosis Date    Anal fistula     Crohn's disease (H)     HTN (hypertension)       Inflammatory bowel disease  Hypertension     PREVIOUS SURGERIES:  Past Surgical History:   Procedure Laterality Date    COLONOSCOPY  06/15/2020    COLONOSCOPY N/A 9/1/2023    Procedure: COLONOSCOPY;  Surgeon: Noris  Vidhya FALCON MD;  Location: [facility] OR    EXAM UNDER ANESTHESIA ANUS N/A 11/19/2021    Procedure: EXAM UNDER ANESTHESIA, ANUS;  Surgeon: Vidhya Hamm MD;  Location: [facility] OR    EXAM UNDER ANESTHESIA ANUS N/A 7/8/2022    Procedure: EXAM UNDER ANESTHESIA, ANUS;  Surgeon: Vidhya Hamm MD;  Location: [facility] OR    EXAM UNDER ANESTHESIA ANUS N/A 12/16/2022    Procedure: EXAM UNDER ANESTHESIA, ANUS, Partial Fistuotomy seton x2;  Surgeon: Vidhya Hamm MD;  Location: [facility] OR    FISTULOTOMY RECTUM N/A 7/8/2022    Procedure: partial FISTULOTOMY, RECTUM, seton replacement x2;  Surgeon: Vidhya Hamm MD;  Location: [facility] OR    FISTULOTOMY RECTUM N/A 12/16/2022    Procedure: POSSIBLE FISTULOTOMY, RECTUM;  Surgeon: Vidhya Hamm MD;  Location: [facility] OR    FISTULOTOMY RECTUM N/A 9/1/2023    Procedure: PARTIAL FISTULOTOMY, REPLACEMENT OF SETON STITCH;  Surgeon: Vidhya Hamm MD;  Location: [facility] OR    INCISION AND DRAINAGE RECTUM, COMBINED N/A 09/06/2021    Procedure: INCISION AND DRAINAGE, RECTUM, placement of Seton;  Surgeon: Vidhya Hamm MD;  [facility]    IRRIGATION AND DEBRIDEMENT PERINEAL, COMBINED N/A 9/1/2023    Procedure: Examination under anesthesia, Debridement of fistula tract;  Surgeon: Vidhya Hamm MD;  Location: [facility] OR    PLACEMENT OF SETON RECTUM N/A 11/19/2021    Procedure: PLACEMENT OF SETON RECTUM;  Surgeon: Vidhya Hamm MD;  Location: [facility] OR    SIGMOIDOSCOPY,BIOPSY  09/24/2020     ALLERGIES:   No Known Allergies    PERTINENT MEDICATIONS:    Current Outpatient Medications:     adalimumab (HUMIRA *CF* PEN) 40 MG/0.4ML pen kit, Inject 0.4 mLs (40 mg) Subcutaneous every 7 days - Subcutaneous, Disp: 4 each, Rfl: 0    amLODIPine (NORVASC) 5 MG tablet, Take 5 mg by mouth every morning, Disp: , Rfl:     lisinopril (ZESTRIL) 20 MG tablet, Take 20 mg by mouth every morning , Disp: , Rfl:     mesalamine  (LIALDA) 1.2 g DR tablet, Take 4 tablets (4,800 mg) by mouth every morning., Disp: 120 tablet, Rfl: 4    SOCIAL HISTORY:  Social History     Socioeconomic History    Marital status:      Spouse name: Not on file    Number of children: Not on file    Years of education: Not on file    Highest education level: Not on file   Occupational History    Not on file   Tobacco Use    Smoking status: Former     Packs/day: 0.50     Years: 2.50     Additional pack years: 0.00     Total pack years: 1.25     Types: Cigarettes     Start date: 2018     Quit date: 08/2020     Years since quitting: 3.4    Smokeless tobacco: Never   Vaping Use    Vaping Use: Never used   Substance and Sexual Activity    Alcohol use: Yes     Alcohol/week: 2.0 standard drinks of alcohol     Types: 2 Cans of beer per week    Drug use: Never    Sexual activity: Not on file   Other Topics Concern    Not on file   Social History Narrative    Not on file     Social Determinants of Health     Financial Resource Strain: Not on file   Food Insecurity: Not on file   Transportation Needs: Not on file   Physical Activity: Not on file   Stress: Not on file   Social Connections: Not on file   Interpersonal Safety: Not on file   Housing Stability: Not on file       FAMILY HISTORY:  Family History   Problem Relation Age of Onset    Multiple Sclerosis Mother     Ulcerative Colitis Maternal Grandmother     Colon Cancer No family hx of     Inflammatory Bowel Disease No family hx of     Irritable Bowel Syndrome No family hx of     Crohn's Disease No family hx of     Anesthesia Reaction No family hx of     Bleeding Disorder No family hx of     Clotting Disorder No family hx of      No family history of IBD  Past/family/social history reviewed and no changes

## 2024-01-02 NOTE — NURSING NOTE
Is the patient currently in the state of [place]? YES    Visit mode:VIDEO    If the visit is dropped, the patient can be reconnected by: VIDEO VISIT: Text to cell phone:   Telephone Information:   Mobile [phone]       Will anyone else be joining the visit? NO  (If patient encounters technical issues they should call [phone]    How would you like to obtain your AVS? MyChart    Are changes needed to the allergy or medication list? Pt stated no changes to allergies and Pt stated no med changes    Reason for visit: RECHECK    Howard ESCOBEDOF

## 2024-01-02 NOTE — PATIENT INSTRUCTIONS
It was a pleasure taking care of you today.  I've included a brief summary of our discussion and care plan from today's visit below.  Please review this information with your primary care provider.  ______________________________________________________________________    My recommendations are summarized as follows:    -- Continue humira every week  -- Continue lialda 4.8g daily  -- Labs every 3 months  -- Next endoscopic assessment: 2028  -- Patient with IBD we recommend supplementation vitamin D 1000 units daily  -- Vaccines/immunizations to be updated: flu shot yearly, pneumonia, shingles, COVID series, tetanus  -- Yearly Dermatology visit for skin check while on immunosuppressive therapy. Can call [phone] to schedule.  -- No NSAIDs (ibuprofen, or anything containing ibuprofen)       For additional resources about inflammatory bowel disease visit [email or web address]    Return to GI Clinic in 6 months to review your progress.    ______________________________________________________________________    How do I schedule labs, imaging studies, or procedures that were ordered in clinic today?     Labs: To schedule lab appointment at the [facility], use my chart or call [phone]. If you have a [facility] lab closer to home where you are regularly seen you can give them a call.     Procedures: If a colonoscopy, upper endoscopy, breath test, esophageal manometry, or pH impedence was ordered today, our endoscopy team will call you to schedule this. If you have not heard from our endoscopy team within a week, please call [phone] to schedule.     Imaging Studies: If you were scheduled for a CT scan, X-ray, MRI, ultrasound, HIDA scan or other imaging study, please call [phone] to have this scheduled.     Referral: If a referral to another specialty was ordered, expect a phone call or follow instructions above. If you have not heard from anyone regarding your referral in  a week, please call our clinic to check the status.     Who do I call with any questions after my visit?  Please be in touch if there are any further questions that arise following today's visit.  There are multiple ways to contact your gastroenterology care team.      During business hours, you may reach a Gastroenterology nurse at [phone]    To schedule or reschedule an appointment, please call [phone].     You can always send a secure message through [employer].  [employer] messages are answered by your nurse or doctor typically within 24 hours.  Please allow extra time on weekends and holidays.      For urgent/emergent questions after business hours, you may reach the on-call GI Fellow by contacting the [facility]  at [phone].     How will I get the results of any tests ordered?    You will receive all of your results.  If you have signed up for [employer], any tests ordered at your visit will be available to you after your physician reviews them.  Typically this takes 1-2 weeks.  If there are urgent results that require a change in your care plan, your physician or nurse will call you to discuss the next steps.      What is [employer]?  [employer] is a secure way for you to access all of your healthcare records from the [facility].  It is a web based computer program, so you can sign on to it from any location.  It also allows you to send secure messages to your care team.  I recommend signing up for [employer] access if you have not already done so and are comfortable with using a computer.         Sincerely,    Abdiaziz Hull PA-C  [facility]  Division of Gastroenterology

## 2024-01-02 NOTE — LETTER
1/2/2024         RE: Cody Pickard  [address] [address] [place] [id number]        Dear Colleague,    Thank you for referring your patient, Cody Pickard, to the [facility] [place]. Please see a copy of my visit note below.    IBD CLINIC VISIT    CC/REFERRING MD:  Referred Self  REASON FOR CONSULTATION: Colitis    ASSESSMENT/PLAN:  33 year old male with Crohn's disease.     1. Crohn's disease:   Current medication:    - Mesalamine 4.8g daily   - Humira/adalimumab q7 days  Current clinical disease activity: Remission   Current endoscopic disease activity: 9/1/23 (with CRS during EUA) Colonoscopy was the performed and the colonoscopy easily advanced to the ileocecal valve. The terminal ileum was entered and there was not obvious disease. There were no polyps and the preparation was good. The colonoscopy was gradually withdrawn over 10 minutes and retroflexion was performed with no obvious rectal abnormalities with the exception of the anal disease.     -- Continue mesalamine at this time  -- Recommend continuing Humira  -- Seton placement/fistulotomy per colorectal surgery    2. IBD dysplasia surveillance: > 1/3 of colon involved. Will need dysplasia surveillance.   -- IBD dysplasia surveillance starting in 2028    Return to clinic in 6 months.     Abdiaziz Hull PA-C  Division of Gastroenterology, Hepatology and Nutrition  [facility]     IBD HISTORY  Age at diagnosis: 30  Endoscopic extent of disease: Continuous: rectum to proximal transverse  Histologic Extent of disease: Rectum, descending   Disease phenotype: Extensive   Rose-anal disease: Yes  Prior IBD surgeries: None  Prior IBD Medications:  - Vedolizumab - not dose escalated     DRUG MONITORING  TPMT enzyme activity:  n/a  6-TGN/6-MMPN levels: n/a  Biologic concentration:   Adalimumab: 6.1 on 9/2021, no antibodies      HPI:   Currently having 1-2 stools per day, soft formed. No blood in the stool. Weight  is stable.     Had abdominal pain and went to ED 11/20/23. Had concern for a small fat containing hernia but ultimately he did not proceed with repair. Eventually resolved.    Saw CRS on 9/1/23 and underwent examination under anesthesia with debridement of fistula tract, partial fistulotomy, seton exchange x2, and colonoscopy.    Colonoscopy was the performed and the colonoscopy easily advanced to the ileocecal valve. The terminal ileum was entered and there was not obvious disease. There were no polyps and the preparation was good. The colonoscopy was gradually withdrawn over 10 minutes and retroflexion was performed with no obvious rectal abnormalities with the exception of the anal disease.     sIBDQ:  IBDQ Score Date IBDQ - Total Score  (Higher score better)   6/28/2023   2:05 PM 69   2/28/2022   3:33 PM 64      ROS:    Constitutional, HEENT, cardiovascular, pulmonary, GI, , musculoskeletal, neuro, skin, endocrine and psych systems are negative, except as otherwise noted.    PHYSICAL EXAMINATION:  Constitutional: aaox3, cooperative, pleasant, not dyspneic/diaphoretic, no acute distress  Vitals reviewed: There were no vitals taken for this visit.  Wt:   Wt Readings from Last 2 Encounters:   12/01/23 99.3 kg (219 lb)   11/17/23 99.8 kg (220 lb)      Constitutional - general appearance is well and in no acute distress.  Eyes - sunglasses on   Respiratory - No cough, unlabored breathing  Musculoskeletal - range of motion intact: Neck and arms  Skin - No discoloration or lesions visible - pt reports red pustule/papule unable to see via video  Neurological - No tremors, headaches  Psychiatric - No anxiety, alert & oriented    PERTINENT PAST MEDICAL HISTORY:  Past Medical History:   Diagnosis Date    Anal fistula     Crohn's disease (H)     HTN (hypertension)       Inflammatory bowel disease  Hypertension     PREVIOUS SURGERIES:  Past Surgical History:   Procedure Laterality Date    COLONOSCOPY  06/15/2020     COLONOSCOPY N/A 9/1/2023    Procedure: COLONOSCOPY;  Surgeon: Vidhya Hamm MD;  Location: [facility] OR    EXAM UNDER ANESTHESIA ANUS N/A 11/19/2021    Procedure: EXAM UNDER ANESTHESIA, ANUS;  Surgeon: Vidhya Hamm MD;  Location: [facility] OR    EXAM UNDER ANESTHESIA ANUS N/A 7/8/2022    Procedure: EXAM UNDER ANESTHESIA, ANUS;  Surgeon: Vidhya Hamm MD;  Location: [facility] OR    EXAM UNDER ANESTHESIA ANUS N/A 12/16/2022    Procedure: EXAM UNDER ANESTHESIA, ANUS, Partial Fistuotomy seton x2;  Surgeon: Vidhya Hamm MD;  Location: [facility] OR    FISTULOTOMY RECTUM N/A 7/8/2022    Procedure: partial FISTULOTOMY, RECTUM, seton replacement x2;  Surgeon: Vidhya Hamm MD;  Location: [facility] OR    FISTULOTOMY RECTUM N/A 12/16/2022    Procedure: POSSIBLE FISTULOTOMY, RECTUM;  Surgeon: Vidhya Hmam MD;  Location: [facility] OR    FISTULOTOMY RECTUM N/A 9/1/2023    Procedure: PARTIAL FISTULOTOMY, REPLACEMENT OF SETON STITCH;  Surgeon: Vidhya Hamm MD;  Location: [facility] OR    INCISION AND DRAINAGE RECTUM, COMBINED N/A 09/06/2021    Procedure: INCISION AND DRAINAGE, RECTUM, placement of Seton;  Surgeon: Vidhya Hamm MD;  [facility]    IRRIGATION AND DEBRIDEMENT PERINEAL, COMBINED N/A 9/1/2023    Procedure: Examination under anesthesia, Debridement of fistula tract;  Surgeon: Vidhya Hamm MD;  Location: [facility] OR    PLACEMENT OF SETON RECTUM N/A 11/19/2021    Procedure: PLACEMENT OF SETON RECTUM;  Surgeon: Vidhya Hamm MD;  Location: [facility] OR    SIGMOIDOSCOPY,BIOPSY  09/24/2020     ALLERGIES:   No Known Allergies    PERTINENT MEDICATIONS:    Current Outpatient Medications:     adalimumab (HUMIRA *CF* PEN) 40 MG/0.4ML pen kit, Inject 0.4 mLs (40 mg) Subcutaneous every 7 days - Subcutaneous, Disp: 4 each, Rfl: 0    amLODIPine (NORVASC) 5 MG tablet, Take 5 mg by mouth every morning, Disp: , Rfl:     lisinopril (ZESTRIL) 20  MG tablet, Take 20 mg by mouth every morning , Disp: , Rfl:     mesalamine (LIALDA) 1.2 g DR tablet, Take 4 tablets (4,800 mg) by mouth every morning., Disp: 120 tablet, Rfl: 4    SOCIAL HISTORY:  Social History     Socioeconomic History    Marital status:      Spouse name: Not on file    Number of children: Not on file    Years of education: Not on file    Highest education level: Not on file   Occupational History    Not on file   Tobacco Use    Smoking status: Former     Packs/day: 0.50     Years: 2.50     Additional pack years: 0.00     Total pack years: 1.25     Types: Cigarettes     Start date: 2018     Quit date: 08/2020     Years since quitting: 3.4    Smokeless tobacco: Never   Vaping Use    Vaping Use: Never used   Substance and Sexual Activity    Alcohol use: Yes     Alcohol/week: 2.0 standard drinks of alcohol     Types: 2 Cans of beer per week    Drug use: Never    Sexual activity: Not on file   Other Topics Concern    Not on file   Social History Narrative    Not on file     Social Determinants of Health     Financial Resource Strain: Not on file   Food Insecurity: Not on file   Transportation Needs: Not on file   Physical Activity: Not on file   Stress: Not on file   Social Connections: Not on file   Interpersonal Safety: Not on file   Housing Stability: Not on file       FAMILY HISTORY:  Family History   Problem Relation Age of Onset    Multiple Sclerosis Mother     Ulcerative Colitis Maternal Grandmother     Colon Cancer No family hx of     Inflammatory Bowel Disease No family hx of     Irritable Bowel Syndrome No family hx of     Crohn's Disease No family hx of     Anesthesia Reaction No family hx of     Bleeding Disorder No family hx of     Clotting Disorder No family hx of      No family history of IBD  Past/family/social history reviewed and no changes          Again, thank you for allowing me to participate in the care of your patient.      Sincerely,    Abdiaziz Hull PA-C

## 2024-01-23 ENCOUNTER — TELEPHONE (OUTPATIENT)
Dept: GASTROENTEROLOGY | Facility: CLINIC | Age: 34
End: 2024-01-23

## 2024-01-23 DIAGNOSIS — K60.30 ANAL FISTULA: ICD-10-CM

## 2024-01-23 DIAGNOSIS — K50.111 CROHN'S DISEASE OF LARGE INTESTINE WITH RECTAL BLEEDING (H): ICD-10-CM

## 2024-01-23 NOTE — PROGRESS NOTES
Spoke with Cody, pt states he started a ne health insurance as of 2024 and will need a new PA for humira.

## 2024-01-23 NOTE — TELEPHONE ENCOUNTER
Patient wasn't on PAP, just had a copay card    PA Initiation    Medication: HUMIRA *CF* PEN 40 MG/0.4ML SC PNKT  Insurance Company: [employer] ([facility]) - Phone [phone] Fax [phone]  Pharmacy Filling the Rx:    Filling Pharmacy Phone:    Filling Pharmacy Fax:    Start Date: 1/23/2024  [id number]

## 2024-01-23 NOTE — TELEPHONE ENCOUNTER
Hello Team,  Please obtain PA for maintenance Humira. Pt states he was also on the PAP last year and asking if this can also be started.    Thank you.  Felecia

## 2024-01-24 ENCOUNTER — VIRTUAL VISIT (OUTPATIENT)
Dept: GASTROENTEROLOGY | Facility: CLINIC | Age: 34
End: 2024-01-24
Attending: PHYSICIAN ASSISTANT
Payer: COMMERCIAL

## 2024-01-24 DIAGNOSIS — K50.111 CROHN'S DISEASE OF LARGE INTESTINE WITH RECTAL BLEEDING (H): Primary | ICD-10-CM

## 2024-01-24 DIAGNOSIS — I10 ESSENTIAL HYPERTENSION: ICD-10-CM

## 2024-01-24 DIAGNOSIS — K60.30 ANAL FISTULA: ICD-10-CM

## 2024-01-24 RX ORDER — ADALIMUMAB 40MG/0.4ML
40 KIT SUBCUTANEOUS
Qty: 1.6 ML | Refills: 5 | Status: SHIPPED | OUTPATIENT
Start: 2024-01-24 | End: 2024-07-09

## 2024-01-24 NOTE — PROGRESS NOTES
Medication Therapy Management (MTM) Encounter    ASSESSMENT:                            Medication Adherence/Access: See below for considerations    Crohn's Disease: Cody would benefit from continuing Humira weekly for maintenance of remission. Will continue to follow PA for continuation. He is due for routine tuberculosis screening and one time hepatitis C screening. Will plan on drawing with next set of monitoring labs. Reviewed health maintenance as noted below. He has primary opted out of vaccines, but he was agreeable to having me include the recommendations in his after visit summary.    Hypertension: Stable. Encouraged re-establishing with a primary care provider.    PLAN:                            Humira refilled + standing labs renewed for 2024 to be completed every three months.   Repeat quantiferon screening and one time hepatitis C screening with next set of labs, ordered today.  2.   We will continue to follow the prior authorization for Humira.  3.   Cody to consider the following vaccines:   - seasonal influenza   - COVID-19    - Prevnar-20   - confirmation of varicella status for consideration of Shingrix series   - hepatitis A series (if desired)  4.  Please re-establish with a primary care provider who can help with managing your high blood pressure.    Follow-up: 6 months, sooner if needed    SUBJECTIVE/OBJECTIVE:                          Cody Pickard is a 33 year old male called for a follow-up visit from 7/19/2021.       Reason for visit: Humira maintenance     Allergies/ADRs: None  Tobacco: He reports that he quit smoking about 3 years ago. His smoking use included cigarettes. He started smoking about 6 years ago. He has a 1.3 pack-year smoking history. He has never used smokeless tobacco.  Alcohol: 7-9 beverages / week    Medication Adherence/Access:   -- Humira PA currently in process    Crohn's Disease:   Humira 40 mg subcutaneously weekly  Mesalamine 4.8 g daily    Notes he has about two  pens of Humira left. Notes this has been working the best of any of the medication options he has been on. No concerns for me today.    Current clinical disease activity: Remission   Current endoscopic disease activity: 9/1/23 (with CRS during EUA) Colonoscopy was the performed and the colonoscopy easily advanced to the ileocecal valve. The terminal ileum was entered and there was not obvious disease. There were no polyps and the preparation was good. The colonoscopy was gradually withdrawn over 10 minutes and retroflexion was performed with no obvious rectal abnormalities with the exception of the anal disease.     Abdominal CT 11/2023  Narrative & Impression   EXAMINATION: CT ABDOMEN PELVIS W CONTRAST, 11/17/2023 9:36 AM                                                                    IMPRESSION:   1. No acute findings in the abdomen or pelvis.  2. Perianal inflammation with setons in place.     I have personally reviewed the examination and initial interpretation  and I agree with the findings.     Last provider visit: 1/2/2024 with Abdiaziz Hull PA-C  Next provider visit: 7/9/2024 with Abdiaziz Hull PA-C  Last labs completed: 12/27/2023  Lab frequency: every 3 months   - standing labs available until 5/2024  Next labs due: March 2024    Last IBD Health Maintenance Review: 2021  PDC: 100% (Humira)    IBD Health Care Maintenance:  (Per chart review)  Vaccinations:  All patients on biologics should avoid live vaccines unless specifically indicated.    -- Influenza (every year) not on file   -- TdaP (every 10 years) 8/10/2017  -- Pneumococcal Pneumonia    Prevnar-13: not on file    Pneumovax-23: not on file    Prevnar-20: not on file   -- COVID-19 not on file   -- Yearly assessment for latent Tb (verbal screening and exam, PPD or QuantiFERON-Tb testing)      -- negative 2021    One time confirmation of immunity or serologies:  -- Hepatitis A (serologies or immunizations) not on file   -- Hepatitis B (serologies or  immunizations) serologies indicate immunity 7/2021  -- Varicella/Zoster    Varicella not on file    Zoster not on file     Due to the immunosuppression in this patient, I would not advise administration of live vaccines such as varicella/VZV, intranasal influenza, MMR, or yellow fever vaccine (if traveling).      Pre-Biologic Screening:  -- Hep B Surface Antibody serologies indicate immunity 7/2021  -- Hep B Surface Antigen non-reactive 7/2021  -- Hep B Core Antibody non-reactive 7/2021  -- Hep C Antibody not on file     Cancer Screening:  Colon cancer screening:  > 1/3 of colon involved. Will need dysplasia surveillance.   -- IBD dysplasia surveillance starting in 2028    Skin cancer screening: Annual visual exam of skin by dermatologist since patient is immunocompromised   -- seeing dermatology 3/2023    Depression Screening:    PHQ-2 Score:         1/2/2024    12:26 PM 8/21/2023     4:50 PM   PHQ-2 ( 1999 Pfizer)   Q1: Little interest or pleasure in doing things 0 0   Q2: Feeling down, depressed or hopeless 0 0   PHQ-2 Score 0 0     Research:  Are you interested in being contacted about enrollment in clinical research studies? No    Misc:  -- Avoid tobacco use  -- Avoid NSAIDs as there is potentially a 25% chance of causing an IBD flare    Hypertension:   Amlodipine 5 mg daily  Lisinopril 20 mg daily    No concerns reported today regarding medication side effects. Notes he doesn't have a primary care provider at this time.    BP Readings from Last 3 Encounters:   12/01/23 128/79   11/17/23 133/86   09/15/23 133/76     ----------------    I spent 15 minutes with this patient today. All changes were made via collaborative practice agreement with Abdiaziz Hull. A copy of the visit note was provided to the patient's provider(s).    A summary of these recommendations was sent via [employer].    Lo GottliebD, BCACP  MTM Pharmacist   [facility]   Phone: [phone]    Telemedicine  Visit Details  Type of service:  Telephone visit  Start Time:  12:31 PM  End Time: 12:46 PM     Medication Therapy Recommendations  IBD (inflammatory bowel disease)    Rationale: Preventive therapy - Needs additional medication therapy - Indication   Recommendation: Start Medication - Prevnar 20 0.5 ML Niurka   Status: Patient Agreed - Adherence/Education

## 2024-01-24 NOTE — PATIENT INSTRUCTIONS
"Recommendations from today's MTM visit:                                                    MTM (medication therapy management) is a service provided by a clinical pharmacist designed to help you get the most of out of your medicines.   Today we reviewed what your medicines are for, how to know if they are working, that your medicines are safe and how to make your medicine regimen as easy as possible.      Humira refilled + standing labs renewed for 2024 to be completed every three months.   Repeat quantiferon (tuberculosis) screening and one time hepatitis C screening with next set of labs, ordered today.  2.   We will continue to follow the prior authorization for Humira.  3.   Cody to consider the following vaccines:   - seasonal influenza   - COVID-19    - Prevnar-20   - confirmation of varicella status for consideration of Shingrix series  4.  Please re-establish with a primary care provider who can help with managing your high blood pressure.    Follow-up: 6 months, sooner if needed      It was great speaking with you today.  I value your experience and would be very thankful for your time in providing feedback in our clinic survey. In the next few days, you may receive an email or text message from [employer] with a link to a survey related to your  clinical pharmacist.\"     To schedule another MTM appointment, please call the clinic directly or you may call the MTM scheduling line at [phone].    My Clinical Pharmacist's contact information:                                                      Please feel free to contact me with any questions or concerns you have.      Lo GottliebD, BCACP  MTM Pharmacist   [facility]   Phone: [phone]    "

## 2024-01-24 NOTE — Clinical Note
1/24/2024         RE: Cody Pickard  [address] [address] [place] [id number]        Dear Colleague,    Thank you for referring your patient, Cody Pickard, to the [facility]. Please see a copy of my visit note below.    Medication Therapy Management (MTM) Encounter    ASSESSMENT:                            Medication Adherence/Access: See below for considerations    Crohn's Disease: Cody would benefit from continuing Humira weekly for maintenance of remission. Will continue to follow PA for continuation. He is due for routine tuberculosis screening and one time hepatitis C screening. Will plan on drawing with next set of monitoring labs. Reviewed health maintenance as noted below. He has primary opted out of vaccines, but he was agreeable to having me include the recommendations in his after visit summary.    Hypertension: Stable. Encouraged re-establishing with a primary care provider.    PLAN:                            Humira refilled + standing labs renewed for 2024 to be completed every three months.   Repeat quantiferon screening and one time hepatitis C screening with next set of labs, ordered today.  2.   We will continue to follow the prior authorization for Humira.  3.   Cody to consider the following vaccines:   - seasonal influenza   - COVID-19    - Prevnar-20   - confirmation of varicella status for consideration of Shingrix series  4.  Please re-establish with a primary care provider who can help with managing your high blood pressure.    Follow-up: 6 months, sooner if needed    SUBJECTIVE/OBJECTIVE:                          Cody Pickard is a 33 year old male called for a follow-up visit from 7/19/2021.       Reason for visit: Humira maintenance     Allergies/ADRs: None  Tobacco: He reports that he quit smoking about 3 years ago. His smoking use included cigarettes. He started smoking about 6 years ago. He has a 1.3 pack-year smoking history. He has never used smokeless  tobacco.  Alcohol: 7-9 beverages / week    Medication Adherence/Access:   -- Humira PA currently in process    Crohn's Disease:   Humira 40 mg subcutaneously weekly  Mesalamine 4.8 g daily    Notes he has about two pens of Humira left. Notes this has been working the best of any of the medication options he has been on. No concerns for me today.    Current clinical disease activity: Remission   Current endoscopic disease activity: 9/1/23 (with CRS during EUA) Colonoscopy was the performed and the colonoscopy easily advanced to the ileocecal valve. The terminal ileum was entered and there was not obvious disease. There were no polyps and the preparation was good. The colonoscopy was gradually withdrawn over 10 minutes and retroflexion was performed with no obvious rectal abnormalities with the exception of the anal disease.     Abdominal CT 11/2023  Narrative & Impression   EXAMINATION: CT ABDOMEN PELVIS W CONTRAST, 11/17/2023 9:36 AM                                                                    IMPRESSION:   1. No acute findings in the abdomen or pelvis.  2. Perianal inflammation with setons in place.     I have personally reviewed the examination and initial interpretation  and I agree with the findings.     Last provider visit: 1/2/2024 with Abdiaziz Hull PA-C  Next provider visit: 7/9/2024 with Abdiaziz Hull PA-C  Last labs completed: 12/27/2023  Lab frequency: every 3 months   - standing labs available until 5/2024  Next labs due: March 2024    Last IBD Health Maintenance Review: 2021  PDC: 100% (Humira)    IBD Health Care Maintenance:  (Per chart review)  Vaccinations:  All patients on biologics should avoid live vaccines unless specifically indicated.    -- Influenza (every year) not on file   -- TdaP (every 10 years) 8/10/2017  -- Pneumococcal Pneumonia    Prevnar-13: not on file    Pneumovax-23: not on file    Prevnar-20: not on file   -- COVID-19 not on file   -- Yearly assessment for latent Tb (verbal  screening and exam, PPD or QuantiFERON-Tb testing)      -- negative 2021    One time confirmation of immunity or serologies:  -- Hepatitis A (serologies or immunizations) not on file   -- Hepatitis B (serologies or immunizations) serologies indicate immunity 7/2021  -- Varicella/Zoster    Varicella not on file    Zoster not on file     Due to the immunosuppression in this patient, I would not advise administration of live vaccines such as varicella/VZV, intranasal influenza, MMR, or yellow fever vaccine (if traveling).      Pre-Biologic Screening:  -- Hep B Surface Antibody serologies indicate immunity 7/2021  -- Hep B Surface Antigen non-reactive 7/2021  -- Hep B Core Antibody non-reactive 7/2021  -- Hep C Antibody not on file     Cancer Screening:  Colon cancer screening:  > 1/3 of colon involved. Will need dysplasia surveillance.   -- IBD dysplasia surveillance starting in 2028    Skin cancer screening: Annual visual exam of skin by dermatologist since patient is immunocompromised   -- seeing dermatology 3/2023    Depression Screening:    PHQ-2 Score:         1/2/2024    12:26 PM 8/21/2023     4:50 PM   PHQ-2 ( 1999 Pfizer)   Q1: Little interest or pleasure in doing things 0 0   Q2: Feeling down, depressed or hopeless 0 0   PHQ-2 Score 0 0     Research:  Are you interested in being contacted about enrollment in clinical research studies? No    Misc:  -- Avoid tobacco use  -- Avoid NSAIDs as there is potentially a 25% chance of causing an IBD flare    Hypertension:   Amlodipine 5 mg daily  Lisinopril 20 mg daily    No concerns reported today regarding medication side effects. Notes he doesn't have a primary care provider at this time.    BP Readings from Last 3 Encounters:   12/01/23 128/79   11/17/23 133/86   09/15/23 133/76     ----------------    I spent 15 minutes with this patient today. All changes were made via collaborative practice agreement with Abdiaziz Hull. A copy of the visit note was provided to  the patient's provider(s).    A summary of these recommendations was sent via [employer].    Lo Peters PharmD, BCACP  MTM Pharmacist   [facility]   Phone: [phone]    Telemedicine Visit Details  Type of service:  Telephone visit  Start Time:  12:31 PM  End Time: 12:46 PM     Medication Therapy Recommendations  No medication therapy recommendations to display       Again, thank you for allowing me to participate in the care of your patient.        Sincerely,        Lo Peters RPH

## 2024-01-25 NOTE — TELEPHONE ENCOUNTER
Prior Authorization Approval    Medication: HUMIRA *CF* PEN 40 MG/0.4ML SC PNKT  Authorization Effective Date: 1/25/2024  Authorization Expiration Date: 1/23/2025  Approved Dose/Quantity: 4/28  Reference #: [id number]   Insurance Company: [employer] ([facility]) - Phone [phone] Fax [phone]  Expected CoPay: $    CoPay Card Available:      Financial Assistance Needed:    Which Pharmacy is filling the prescription: [facility] - [place], [place] - [address] [place] [address]  Pharmacy Notified:    Patient Notified:  yes

## 2024-03-06 ENCOUNTER — OFFICE VISIT (OUTPATIENT)
Dept: DERMATOLOGY | Facility: CLINIC | Age: 34
End: 2024-03-06
Payer: COMMERCIAL

## 2024-03-06 DIAGNOSIS — Z12.83 SKIN CANCER SCREENING: ICD-10-CM

## 2024-03-06 DIAGNOSIS — D23.9 DERMATOFIBROMA: Primary | ICD-10-CM

## 2024-03-06 DIAGNOSIS — K50.111 CROHN'S DISEASE OF LARGE INTESTINE WITH RECTAL BLEEDING (H): ICD-10-CM

## 2024-03-06 DIAGNOSIS — L81.4 SOLAR LENTIGO: ICD-10-CM

## 2024-03-06 DIAGNOSIS — D22.9 MULTIPLE BENIGN NEVI: ICD-10-CM

## 2024-03-06 PROCEDURE — 99203 OFFICE O/P NEW LOW 30 MIN: CPT | Performed by: DERMATOLOGY

## 2024-03-06 NOTE — LETTER
3/6/2024         RE: Cody Pickard  [address] [address] [place] [id number]        Dear Colleague,    Thank you for referring your patient, Cody Pickard, to the [facility]. Please see a copy of my visit note below.    [facility] Dermatology Note  Encounter Date: Mar 6, 2024  Office Visit     Dermatology Problem List:  1. FBSE q1 year    ____________________________________________    Assessment & Plan:    1. Reassurance provided for benign lesions not treated today including dermatofibrmoa, solar lentigines, and banal-appearing melanocytic nevi.      Procedures Performed:   None      Follow-up: 1 year(s) in-person, or earlier for new or changing lesions    Staff and Scribe:   Scribe Disclosure:   By signing my name below, I, Bhavaniarnav Haleyronda, attest that this documentation has been prepared under the direction and in the presence of Dr. Wilder Mcdonald MD.  - Electronically Signed: Bhavani Hernandez 03/06/24     Provider Disclosure:   The documentation recorded by the scribe accurately reflects the services I personally performed and the decisions made by me.    Wilder Mcdonald MD   of Dermatology  Department of Dermatology  [facility]      ____________________________________________    CC: Skin Check (Patient is here for a FBSE.  Patient is on Humira.  Patient has an area of concern on mid lower back which an acne-like spot that is not going away.  No HX of skin cancer.  )    HPI:  Mr. Cody Pickard is a(n) 33 year old male who presents today as a new patient for a FBSE.    Patient reports an area of concern which he believes to be a mole.    Patient is otherwise feeling well, without additional skin concerns.    Labs Reviewed:  None reviewed.    Physical exam:  Vitals: There were no vitals taken for this visit.  GEN: This is a well developed, well-nourished male in no acute distress, in a pleasant mood.    SKIN:  Adams phototype II  - Full skin, which includes the head/face, both arms, chest, back, abdomen,both legs, genitalia and/or groin buttocks, digits and/or nails, was examined.  - There is a firm tan/flesh colored papule that dimples with lateral pressure on the R lower back.  - Multiple regular brown pigmented macules and papules are identified on the head/neck, trunk, extremities.   - Scattered brown macules on sun exposed areas.  - No other lesions of concern on areas examined.     Medications:  Current Outpatient Medications   Medication     adalimumab (HUMIRA *CF* PEN) 40 MG/0.4ML pen kit     amLODIPine (NORVASC) 5 MG tablet     lisinopril (ZESTRIL) 20 MG tablet     mesalamine (LIALDA) 1.2 g DR tablet     No current facility-administered medications for this visit.      Past Medical History:   Patient Active Problem List   Diagnosis     IBD (inflammatory bowel disease)     Perianal abscess     Perianal Crohn's disease, with abscess (H)     Acute post-operative pain     Anal fistula     Crohn's disease with fistula, unspecified gastrointestinal tract location (H)     Past Medical History:   Diagnosis Date     Anal fistula      Crohn's disease (H)      HTN (hypertension)         CC Abdiaziz Hull PA-C  [address] [place] [address] on close of this encounter.a      Again, thank you for allowing me to participate in the care of your patient.        Sincerely,        Wilder Mcdonald MD

## 2024-03-06 NOTE — PROGRESS NOTES
[facility] Dermatology Note  Encounter Date: Mar 6, 2024  Office Visit     Dermatology Problem List:  1. FBSE q1 year    ____________________________________________    Assessment & Plan:    1. Reassurance provided for benign lesions not treated today including dermatofibrmoa, solar lentigines, and banal-appearing melanocytic nevi.      Procedures Performed:   None      Follow-up: 1 year(s) in-person, or earlier for new or changing lesions    Staff and Scribe:   Scribe Disclosure:   By signing my name below, I, Bhavani Hernandez, attest that this documentation has been prepared under the direction and in the presence of Dr. Wilder Mcdonald MD.  - Electronically Signed: Bhavani Hernandez 03/06/24     Provider Disclosure:   The documentation recorded by the scribe accurately reflects the services I personally performed and the decisions made by me.    Wilder Mcdonald MD   of Dermatology  Department of Dermatology  [facility]      ____________________________________________    CC: Skin Check (Patient is here for a FBSE.  Patient is on Humira.  Patient has an area of concern on mid lower back which an acne-like spot that is not going away.  No HX of skin cancer.  )    HPI:  Mr. Cody Pickard is a(n) 33 year old male who presents today as a new patient for a FBSE.    Patient reports an area of concern which he believes to be a mole.    Patient is otherwise feeling well, without additional skin concerns.    Labs Reviewed:  None reviewed.    Physical exam:  Vitals: There were no vitals taken for this visit.  GEN: This is a well developed, well-nourished male in no acute distress, in a pleasant mood.    SKIN: Adams phototype II  - Full skin, which includes the head/face, both arms, chest, back, abdomen,both legs, genitalia and/or groin buttocks, digits and/or nails, was examined.  - There is a firm tan/flesh colored papule that dimples with lateral  pressure on the R lower back.  - Multiple regular brown pigmented macules and papules are identified on the head/neck, trunk, extremities.   - Scattered brown macules on sun exposed areas.  - No other lesions of concern on areas examined.     Medications:  Current Outpatient Medications   Medication    adalimumab (HUMIRA *CF* PEN) 40 MG/0.4ML pen kit    amLODIPine (NORVASC) 5 MG tablet    lisinopril (ZESTRIL) 20 MG tablet    mesalamine (LIALDA) 1.2 g DR tablet     No current facility-administered medications for this visit.      Past Medical History:   Patient Active Problem List   Diagnosis    IBD (inflammatory bowel disease)    Perianal abscess    Perianal Crohn's disease, with abscess (H)    Acute post-operative pain    Anal fistula    Crohn's disease with fistula, unspecified gastrointestinal tract location (H)     Past Medical History:   Diagnosis Date    Anal fistula     Crohn's disease (H)     HTN (hypertension)         CC Abdiaziz Hull PA-C  [address] [place] [address] on close of this encounter.

## 2024-03-06 NOTE — NURSING NOTE
Cody Pickard's goals for this visit include:   Chief Complaint   Patient presents with    Skin Check     Patient is here for a FBSE.  Patient is on Humira.  Patient has an area of concern on mid lower back which an acne-like spot that is not going away.  No HX of skin cancer.        He requests these members of his care team be copied on today's visit information:     PCP: No Ref-Primary, Physician    Referring Provider:  Abdiaziz Hull PA-C  [address] [place] [id number]    There were no vitals taken for this visit.    Do you need any medication refills at today's visit?     Cinthya Collazo on 3/6/2024 at 7:57 AM

## 2024-03-11 ENCOUNTER — TELEPHONE (OUTPATIENT)
Dept: SURGERY | Facility: CLINIC | Age: 34
End: 2024-03-11
Payer: COMMERCIAL

## 2024-03-11 NOTE — TELEPHONE ENCOUNTER
Left voicemail for patient regarding scheduling surgery with Dr. Holloway.    Provided contact number to discuss.    P: [phone]    __    Ermelinda Maciel, on 3/11/2024 at 3:50 PM

## 2024-03-20 DIAGNOSIS — K50.111 CROHN'S DISEASE OF LARGE INTESTINE WITH RECTAL BLEEDING (H): ICD-10-CM

## 2024-03-22 NOTE — TELEPHONE ENCOUNTER
Left voicemail for patient regarding scheduling surgery with Dr. Holloway.    Provided contact number to discuss.    P: [phone]    __    Ermelnida Maciel, on 3/22/2024 at 12:53 PM

## 2024-03-26 RX ORDER — MESALAMINE 1.2 G/1
4800 TABLET, DELAYED RELEASE ORAL EVERY MORNING
Qty: 120 TABLET | Refills: 5 | Status: SHIPPED | OUTPATIENT
Start: 2024-03-26

## 2024-03-26 NOTE — TELEPHONE ENCOUNTER
Last Clinic Visit: 1/2/2024 [facility] [place]  Future Visit: 7/9/2024    Required labs current and WNL's

## 2024-03-29 NOTE — TELEPHONE ENCOUNTER
This writer called and spoke to the patient to see if he would like to schedule his surgery with Dr. Holloway. Patient stated that Dr. Holloway gave him the option of having surgery or not having surgery and as of right now he doesn't plan on scheduling anything at this time. Advised the patient to call if he would like to schedule surgery in the future. Patient expressed understanding.    Ermelinda Maciel on 3/29/2024 at 12:13 PM

## 2024-05-11 ENCOUNTER — HEALTH MAINTENANCE LETTER (OUTPATIENT)
Age: 34
End: 2024-05-11

## 2024-05-21 ENCOUNTER — PATIENT OUTREACH (OUTPATIENT)
Dept: GASTROENTEROLOGY | Facility: CLINIC | Age: 34
End: 2024-05-21
Payer: COMMERCIAL

## 2024-05-21 NOTE — PROGRESS NOTES
Spoke with Cody and relayed PA message/recommendations.      Cody stated he took his last dose on Monday. Pt will hold dose on Monday after surgery and will resume thereafter if he is doing good. Advice pt to also discuss this with his surgeon to make sure surgeon aware of dosing schedule.    Patient acknowledged understanding and agree with plan. Answer all questions offered contact information for pt to call with any questions or concerns.

## 2024-05-21 NOTE — PROGRESS NOTES
[facility] Team,  Cody left voicemail, pt broke his leg and will be getting surgery on Friday, asking if he needs to hold his humira prior to surgery and when to resume if holding.    Please advice.    Thank you.  Felecia

## 2024-05-21 NOTE — PROGRESS NOTES
Abdiaziz Hull PA-C  You; Lo Peters, RPH15 minutes ago (3:37 PM)     AP  I think that is reasonable.     Lo Peters, [facility]  You; Abdiaziz Hull PA-C1 hour ago (2:23 PM)     LT  I don't think there is a perfectly right answer here. If he is supposed to dose between now and surgery, I would probably hold that dose. If he is doing well post-operatively, could we have him resume on post-op day 7? LT

## 2024-07-09 ENCOUNTER — VIRTUAL VISIT (OUTPATIENT)
Dept: GASTROENTEROLOGY | Facility: CLINIC | Age: 34
End: 2024-07-09
Attending: PHYSICIAN ASSISTANT
Payer: COMMERCIAL

## 2024-07-09 ENCOUNTER — TELEPHONE (OUTPATIENT)
Dept: GASTROENTEROLOGY | Facility: CLINIC | Age: 34
End: 2024-07-09

## 2024-07-09 VITALS — HEIGHT: 69 IN | BODY MASS INDEX: 32.58 KG/M2 | WEIGHT: 220 LBS

## 2024-07-09 DIAGNOSIS — K50.111 CROHN'S DISEASE OF LARGE INTESTINE WITH RECTAL BLEEDING (H): Primary | ICD-10-CM

## 2024-07-09 DIAGNOSIS — K50.111 CROHN'S DISEASE OF LARGE INTESTINE WITH RECTAL BLEEDING (H): ICD-10-CM

## 2024-07-09 DIAGNOSIS — K60.30 ANAL FISTULA: ICD-10-CM

## 2024-07-09 PROCEDURE — 99214 OFFICE O/P EST MOD 30 MIN: CPT | Mod: 95 | Performed by: PHYSICIAN ASSISTANT

## 2024-07-09 RX ORDER — ADALIMUMAB 40MG/0.4ML
40 KIT SUBCUTANEOUS
Qty: 1.6 ML | Refills: 2 | Status: SHIPPED | OUTPATIENT
Start: 2024-07-09

## 2024-07-09 ASSESSMENT — PAIN SCALES - GENERAL: PAINLEVEL: NO PAIN (0)

## 2024-07-09 NOTE — PATIENT INSTRUCTIONS
It was a pleasure taking care of you today.  I've included a brief summary of our discussion and care plan from today's visit below.  Please review this information with your primary care provider.  ______________________________________________________________________    My recommendations are summarized as follows:    -- Restart humira every week  -- Finish out your lialda 4.8g daily then can stop  -- Labs every 3 months, due   -- Next endoscopic assessment: 2028  -- Patient with IBD we recommend supplementation vitamin D 1000 units daily  -- Vaccines/immunizations to be updated: flu shot yearly, pneumonia, shingles, COVID series, tetanus  -- Yearly Dermatology visit for skin check while on immunosuppressive therapy. Can call [phone] to schedule.  -- No NSAIDs (ibuprofen, or anything containing ibuprofen)     DEXA scan to be scheduled when your ankle is fully healed  You can schedule your DEXA scan by calling [phone].  The scan is done on the first floor at the [facility] [place].  The appointment is 30 minutes.  It is recommended that you wear light clothing, no zippers and no metal (including underwire of bra)  You are to withhold your calcium supplement for 24 hours prior to the scan.    For additional resources about inflammatory bowel disease visit [email or web address]    Return to GI Clinic in 3 months to review your progress.    ______________________________________________________________________    How do I schedule labs, imaging studies, or procedures that were ordered in clinic today?     Labs: To schedule lab appointment at the [facility], use my chart or call [phone]. If you have a [place] lab closer to home where you are regularly seen you can give them a call.     Procedures: If a colonoscopy, upper endoscopy, breath test, esophageal manometry, or pH impedence was ordered today, our endoscopy team will call you to schedule this. If  you have not heard from our endoscopy team within a week, please call [phone] to schedule.     Imaging Studies: If you were scheduled for a CT scan, X-ray, MRI, ultrasound, HIDA scan or other imaging study, please call [phone] to have this scheduled.     Referral: If a referral to another specialty was ordered, expect a phone call or follow instructions above. If you have not heard from anyone regarding your referral in a week, please call our clinic to check the status.     Who do I call with any questions after my visit?  Please be in touch if there are any further questions that arise following today's visit.  There are multiple ways to contact your gastroenterology care team.      During business hours, you may reach a Gastroenterology nurse at [phone]    To schedule or reschedule an appointment, please call [phone].     You can always send a secure message through EventVue.  EventVue messages are answered by your nurse or doctor typically within 24 hours.  Please allow extra time on weekends and holidays.      For urgent/emergent questions after business hours, you may reach the on-call GI Fellow by contacting the [facility]  at [phone].     How will I get the results of any tests ordered?    You will receive all of your results.  If you have signed up for [employer], any tests ordered at your visit will be available to you after your physician reviews them.  Typically this takes 1-2 weeks.  If there are urgent results that require a change in your care plan, your physician or nurse will call you to discuss the next steps.      What is EventVue?  EventVue is a secure way for you to access all of your healthcare records from the [facility].  It is a web based computer program, so you can sign on to it from any location.  It also allows you to send secure messages to your care team.  I recommend signing up for EventVue access if you have not already done so and  are comfortable with using a computer.         Sincerely,    Abdiaziz Hull PA-C  [facility]  Division of Gastroenterology

## 2024-07-09 NOTE — PROGRESS NOTES
Virtual Visit Details    Type of service:  Video Visit     Originating Location (pt. Location): Home    Distant Location (provider location):  Off-site  Platform used for Video Visit: Jennifer

## 2024-07-09 NOTE — NURSING NOTE
Current patient location:  Car    Is the patient currently in the state of [place]? YES    Visit mode:VIDEO    If the visit is dropped, the patient can be reconnected by: VIDEO VISIT: Text to cell phone:   Telephone Information:   Mobile [phone]       Will anyone else be joining the visit? NO  (If patient encounters technical issues they should call [phone]    How would you like to obtain your AVS? MyChart    Are changes needed to the allergy or medication list? No    Are refills needed on medications prescribed by this physician? NO    Reason for visit: RECHECK    Enriqueta BAEZ

## 2024-07-09 NOTE — TELEPHONE ENCOUNTER
----- Message from Abdiaziz Hull sent at 7/9/2024 11:24 AM CDT -----  Hi everyone!    He stopped his humira because he broke his ankle and needed surgery. Last does was week of 5/24/24.    Can we assist with getting this restarted for him on the weekly dosing?    Let me know how I can assist, thank you!    Abdiaziz

## 2024-07-09 NOTE — PROGRESS NOTES
Virtual Visit Details    Type of service:  Video Visit     Originating Location (pt. Location): Home    Distant Location (provider location):  Off-site  Platform used for Video Visit: Jennifer    IBD CLINIC VISIT    CC/REFERRING MD:  Referred Self  REASON FOR CONSULTATION: Colitis    ASSESSMENT/PLAN:  34 year old male with Crohn's disease.     1. Crohn's disease:   Current medication:    - Mesalamine 4.8g daily   - Humira/adalimumab q7 days  Current clinical disease activity: Remission   Current endoscopic disease activity: 9/1/23 (with CRS during EUA) Colonoscopy was the performed and the colonoscopy easily advanced to the ileocecal valve. The terminal ileum was entered and there was not obvious disease. There were no polyps and the preparation was good. The colonoscopy was gradually withdrawn over 10 minutes and retroflexion was performed with no obvious rectal abnormalities with the exception of the anal disease.    He has had a 6 week gap of his humira due to ankle surgery. I recommend restarting humira now. He does not have any GI symptoms at this time aside from some minimal drainage from his fistula sites.      -- Restart humira as soon as possible.  -- check for humira level and antibodies (timing TBD)  -- Finish out mesalamine prescription then can stop  -- Seton placement/fistulotomy per colorectal surgery  -- DEXA scan when his foot is fully healed    2. IBD dysplasia surveillance: > 1/3 of colon involved. Will need dysplasia surveillance.   -- IBD dysplasia surveillance starting in 2028    Return to clinic in 6 months.     Abdiaziz Hull PA-C  Division of Gastroenterology, Hepatology and Nutrition  [facility]     IBD HISTORY  Age at diagnosis: 30  Endoscopic extent of disease: Continuous: rectum to proximal transverse  Histologic Extent of disease: Rectum, descending   Disease phenotype: Extensive   Rose-anal disease: Yes  Prior IBD surgeries: None  Prior IBD Medications:  - Vedolizumab -  not dose escalated     DRUG MONITORING  TPMT enzyme activity:  n/a  6-TGN/6-MMPN levels: n/a  Biologic concentration:   Adalimumab: 6.1 on 9/2021, no antibodies      HPI:   Here for follow up.      Broke ankle 2 months ago walking with crocks on and rolled his ankle. He is in a boot. He has not taken Humira since 5/24/24 (the week of ankle surgery).  He quite smoking and ETOH. He has not had any changes in his GI symptoms or fistula drainage since stopping Humira. Currently having 1-2 stools per day, soft formed. No blood in the stool.     sIBDQ:  IBDQ Score Date IBDQ - Total Score  (Higher score better)   6/28/2023   2:05 PM 69   2/28/2022   3:33 PM 64      ROS:    Constitutional, HEENT, cardiovascular, pulmonary, GI, , musculoskeletal, neuro, skin, endocrine and psych systems are negative, except as otherwise noted.    PHYSICAL EXAMINATION:  Constitutional: aaox3, cooperative, pleasant, not dyspneic/diaphoretic, no acute distress  Vitals reviewed: There were no vitals taken for this visit.  Wt:   Wt Readings from Last 2 Encounters:   12/01/23 99.3 kg (219 lb)   11/17/23 99.8 kg (220 lb)      Constitutional - general appearance is well and in no acute distress.  Eyes - sunglasses on   Respiratory - No cough, unlabored breathing  Musculoskeletal - range of motion intact: Neck and arms  Skin - No discoloration or lesions visible - pt reports red pustule/papule unable to see via video  Neurological - No tremors, headaches  Psychiatric - No anxiety, alert & oriented    PERTINENT PAST MEDICAL HISTORY:  Past Medical History:   Diagnosis Date    Anal fistula     Crohn's disease (H)     HTN (hypertension)       Inflammatory bowel disease  Hypertension     PREVIOUS SURGERIES:  Past Surgical History:   Procedure Laterality Date    COLONOSCOPY  06/15/2020    COLONOSCOPY N/A 9/1/2023    Procedure: COLONOSCOPY;  Surgeon: Vidhya Hamm MD;  Location: [facility] OR    EXAM UNDER ANESTHESIA ANUS N/A 11/19/2021     Procedure: EXAM UNDER ANESTHESIA, ANUS;  Surgeon: Vidhya Hamm MD;  Location: [facility] OR    EXAM UNDER ANESTHESIA ANUS N/A 7/8/2022    Procedure: EXAM UNDER ANESTHESIA, ANUS;  Surgeon: Vidhya Hamm MD;  Location: [facility] OR    EXAM UNDER ANESTHESIA ANUS N/A 12/16/2022    Procedure: EXAM UNDER ANESTHESIA, ANUS, Partial Fistuotomy seton x2;  Surgeon: Vidhya Hamm MD;  Location: [facility] OR    FISTULOTOMY RECTUM N/A 7/8/2022    Procedure: partial FISTULOTOMY, RECTUM, seton replacement x2;  Surgeon: Vidhya Hamm MD;  Location: [facility] OR    FISTULOTOMY RECTUM N/A 12/16/2022    Procedure: POSSIBLE FISTULOTOMY, RECTUM;  Surgeon: Vidhya Hamm MD;  Location: [facility] OR    FISTULOTOMY RECTUM N/A 9/1/2023    Procedure: PARTIAL FISTULOTOMY, REPLACEMENT OF SETON STITCH;  Surgeon: Vidhya Hamm MD;  Location: [facility] OR    INCISION AND DRAINAGE RECTUM, COMBINED N/A 09/06/2021    Procedure: INCISION AND DRAINAGE, RECTUM, placement of Seton;  Surgeon: Vidhya Hamm MD;  Location: [facility] OR    IRRIGATION AND DEBRIDEMENT PERINEAL, COMBINED N/A 9/1/2023    Procedure: Examination under anesthesia, Debridement of fistula tract;  Surgeon: Vidhya Hamm MD;  Location: [facility] OR    PLACEMENT OF SETON RECTUM N/A 11/19/2021    Procedure: PLACEMENT OF SETON RECTUM;  Surgeon: Vidhya Hamm MD;  Location: [facility] OR    SIGMOIDOSCOPY,BIOPSY  09/24/2020     ALLERGIES:   No Known Allergies    PERTINENT MEDICATIONS:    Current Outpatient Medications:     adalimumab (HUMIRA *CF* PEN) 40 MG/0.4ML pen kit, Inject 0.4 mLs (40 mg) Subcutaneous every 7 days, Disp: 1.6 mL, Rfl: 5    amLODIPine (NORVASC) 5 MG tablet, Take 5 mg by mouth every morning, Disp: , Rfl:     lisinopril (ZESTRIL) 20 MG tablet, Take 20 mg by mouth every morning , Disp: , Rfl:     mesalamine (LIALDA) 1.2 g DR tablet, Take 4 tablets (4,800 mg) by mouth every morning, Disp: 120 tablet, Rfl:  5    SOCIAL HISTORY:  Social History     Socioeconomic History    Marital status: Single     Spouse name: Not on file    Number of children: Not on file    Years of education: Not on file    Highest education level: Not on file   Occupational History    Not on file   Tobacco Use    Smoking status: Former     Current packs/day: 0.00     Average packs/day: 0.5 packs/day for 2.6 years (1.3 ttl pk-yrs)     Types: Cigarettes     Start date: 2018     Quit date: 08/2020     Years since quitting: 3.9    Smokeless tobacco: Never   Vaping Use    Vaping status: Never Used   Substance and Sexual Activity    Alcohol use: Yes     Alcohol/week: 2.0 standard drinks of alcohol     Types: 2 Cans of beer per week    Drug use: Never    Sexual activity: Not on file   Other Topics Concern    Not on file   Social History Narrative    Not on file     Social Determinants of Health     Financial Resource Strain: High Risk (1/1/2022)    Received from [employer]    Financial Resource Strain     Difficulty of Paying Living Expenses: Not on file     Difficulty of Paying Living Expenses: Not on file   Food Insecurity: Not on file   Transportation Needs: Not on file   Physical Activity: Not on file   Stress: Not on file   Social Connections: Unknown (5/2/2023)    Received from [employer]    Social Connections     Frequency of Communication with Friends and Family: Not on file   Interpersonal Safety: Not on file   Housing Stability: Not on file       FAMILY HISTORY:  Family History   Problem Relation Age of Onset    Multiple Sclerosis Mother     Ulcerative Colitis Maternal Grandmother     Colon Cancer No family hx of     Inflammatory Bowel Disease No family hx of     Irritable Bowel Syndrome No family hx of     Crohn's Disease No family hx of     Anesthesia Reaction No family hx of     Bleeding Disorder No family hx of     Clotting Disorder No family hx of      No family history of  IBD  Past/family/social history reviewed and no changes

## 2024-07-09 NOTE — TELEPHONE ENCOUNTER
Discussed with MTM -     Plan to restart Humira medication, MTM to send prescription.     Will plan to have patient check level/antibodies on day 7, prior to administering second dose.     Instruct patient to hold third dose until level has resulted.     Most recent TB Quantiferon Gold 7/2021. New order placed.     Hepatitis B serologies completed 7/2021.    Inav[employer] message sent to clinic support pool requesting standing lab orders (due this month) and annual TB Quantiferon Gold be faxed to this patient's preferred clinic - [facility].     [employer] message sent to the patient with next step instructions.

## 2024-07-09 NOTE — TELEPHONE ENCOUNTER
Last provider visit: 7/9/2024 with Abdiaziz Hull PA-C  Next provider visit: not on file   Last labs completed: 4/2024  Lab frequency: every 3 months   - standing labs available until 1/2025  Next labs due: 7/2024  Last TB screening: negative 7/2021    Refill placed for Humira per CPA with Abdiaziz Hull PA-C

## 2024-07-09 NOTE — LETTER
7/9/2024      Cody Pickard  [id number] [address] [place] [id number]      Dear Colleague,    Thank you for referring your patient, Cody Pickard, to the [facility] [place]. Please see a copy of my visit note below.    Virtual Visit Details    Type of service:  Video Visit     Originating Location (pt. Location): Home    Distant Location (provider location):  Off-site  Platform used for Video Visit: [facility]    IBD CLINIC VISIT    CC/REFERRING MD:  Referred Self  REASON FOR CONSULTATION: Colitis    ASSESSMENT/PLAN:  34 year old male with Crohn's disease.     1. Crohn's disease:   Current medication:    - Mesalamine 4.8g daily   - Humira/adalimumab q7 days  Current clinical disease activity: Remission   Current endoscopic disease activity: 9/1/23 (with CRS during EUA) Colonoscopy was the performed and the colonoscopy easily advanced to the ileocecal valve. The terminal ileum was entered and there was not obvious disease. There were no polyps and the preparation was good. The colonoscopy was gradually withdrawn over 10 minutes and retroflexion was performed with no obvious rectal abnormalities with the exception of the anal disease.    He has had a 6 week gap of his humira due to ankle surgery. I recommend restarting humira now. He does not have any GI symptoms at this time aside from some minimal drainage from his fistula sites.      -- Restart humira as soon as possible.  -- check for humira level and antibodies (timing TBD)  -- Finish out mesalamine prescription then can stop  -- Seton placement/fistulotomy per colorectal surgery  -- DEXA scan when his foot is fully healed    2. IBD dysplasia surveillance: > 1/3 of colon involved. Will need dysplasia surveillance.   -- IBD dysplasia surveillance starting in 2028    Return to clinic in 6 months.     Abdiaziz Hull PA-C  Division of Gastroenterology, Hepatology and Nutrition  [facility]     IBD HISTORY  Age at  diagnosis: 30  Endoscopic extent of disease: Continuous: rectum to proximal transverse  Histologic Extent of disease: Rectum, descending   Disease phenotype: Extensive   Rose-anal disease: Yes  Prior IBD surgeries: None  Prior IBD Medications:  - Vedolizumab - not dose escalated     DRUG MONITORING  TPMT enzyme activity:  n/a  6-TGN/6-MMPN levels: n/a  Biologic concentration:   Adalimumab: 6.1 on 9/2021, no antibodies      HPI:   Here for follow up.      Broke ankle 2 months ago walking with crocks on and rolled his ankle. He is in a boot. He has not taken Humira since 5/24/24 (the week of ankle surgery).  He quite smoking and ETOH. He has not had any changes in his GI symptoms or fistula drainage since stopping Humira. Currently having 1-2 stools per day, soft formed. No blood in the stool.     sIBDQ:  IBDQ Score Date IBDQ - Total Score  (Higher score better)   6/28/2023   2:05 PM 69   2/28/2022   3:33 PM 64      ROS:    Constitutional, HEENT, cardiovascular, pulmonary, GI, , musculoskeletal, neuro, skin, endocrine and psych systems are negative, except as otherwise noted.    PHYSICAL EXAMINATION:  Constitutional: aaox3, cooperative, pleasant, not dyspneic/diaphoretic, no acute distress  Vitals reviewed: There were no vitals taken for this visit.  Wt:   Wt Readings from Last 2 Encounters:   12/01/23 99.3 kg (219 lb)   11/17/23 99.8 kg (220 lb)      Constitutional - general appearance is well and in no acute distress.  Eyes - sunglasses on   Respiratory - No cough, unlabored breathing  Musculoskeletal - range of motion intact: Neck and arms  Skin - No discoloration or lesions visible - pt reports red pustule/papule unable to see via video  Neurological - No tremors, headaches  Psychiatric - No anxiety, alert & oriented    PERTINENT PAST MEDICAL HISTORY:  Past Medical History:   Diagnosis Date     Anal fistula      Crohn's disease (H)      HTN (hypertension)       Inflammatory bowel disease  Hypertension      PREVIOUS SURGERIES:  Past Surgical History:   Procedure Laterality Date     COLONOSCOPY  06/15/2020     COLONOSCOPY N/A 9/1/2023    Procedure: COLONOSCOPY;  Surgeon: Vidhya Hamm MD;  Location: [facility] OR     EXAM UNDER ANESTHESIA ANUS N/A 11/19/2021    Procedure: EXAM UNDER ANESTHESIA, ANUS;  Surgeon: Vidhya Hamm MD;  Location: [facility] OR     EXAM UNDER ANESTHESIA ANUS N/A 7/8/2022    Procedure: EXAM UNDER ANESTHESIA, ANUS;  Surgeon: Vidhya Hamm MD;  Location: [facility] OR     EXAM UNDER ANESTHESIA ANUS N/A 12/16/2022    Procedure: EXAM UNDER ANESTHESIA, ANUS, Partial Fistuotomy seton x2;  Surgeon: Vidhya Hamm MD;  Location: [facility] OR     FISTULOTOMY RECTUM N/A 7/8/2022    Procedure: partial FISTULOTOMY, RECTUM, seton replacement x2;  Surgeon: Vidhya Hamm MD;  Location: [facility] OR     FISTULOTOMY RECTUM N/A 12/16/2022    Procedure: POSSIBLE FISTULOTOMY, RECTUM;  Surgeon: Vidhya Hamm MD;  Location: [facility] OR     FISTULOTOMY RECTUM N/A 9/1/2023    Procedure: PARTIAL FISTULOTOMY, REPLACEMENT OF SETON STITCH;  Surgeon: Vidhya Hamm MD;  Location: [facility] OR     INCISION AND DRAINAGE RECTUM, COMBINED N/A 09/06/2021    Procedure: INCISION AND DRAINAGE, RECTUM, placement of Seton;  Surgeon: Vidhya Hamm MD;  Location: [facility] OR     IRRIGATION AND DEBRIDEMENT PERINEAL, COMBINED N/A 9/1/2023    Procedure: Examination under anesthesia, Debridement of fistula tract;  Surgeon: Vidhya Hamm MD;  Location: [facility] OR     PLACEMENT OF SETON RECTUM N/A 11/19/2021    Procedure: PLACEMENT OF SETON RECTUM;  Surgeon: Vidhya Hamm MD;  Location: [facility] OR     SIGMOIDOSCOPY,BIOPSY  09/24/2020     ALLERGIES:   No Known Allergies    PERTINENT MEDICATIONS:    Current Outpatient Medications:      adalimumab (HUMIRA *CF* PEN) 40 MG/0.4ML pen kit, Inject 0.4 mLs (40 mg) Subcutaneous every 7 days, Disp: 1.6 mL, Rfl: 5      amLODIPine (NORVASC) 5 MG tablet, Take 5 mg by mouth every morning, Disp: , Rfl:      lisinopril (ZESTRIL) 20 MG tablet, Take 20 mg by mouth every morning , Disp: , Rfl:      mesalamine (LIALDA) 1.2 g DR tablet, Take 4 tablets (4,800 mg) by mouth every morning, Disp: 120 tablet, Rfl: 5    SOCIAL HISTORY:  Social History     Socioeconomic History     Marital status: Single     Spouse name: Not on file     Number of children: Not on file     Years of education: Not on file     Highest education level: Not on file   Occupational History     Not on file   Tobacco Use     Smoking status: Former     Current packs/day: 0.00     Average packs/day: 0.5 packs/day for 2.6 years (1.3 ttl pk-yrs)     Types: Cigarettes     Start date: 2018     Quit date: 08/2020     Years since quitting: 3.9     Smokeless tobacco: Never   Vaping Use     Vaping status: Never Used   Substance and Sexual Activity     Alcohol use: Yes     Alcohol/week: 2.0 standard drinks of alcohol     Types: 2 Cans of beer per week     Drug use: Never     Sexual activity: Not on file   Other Topics Concern     Not on file   Social History Narrative     Not on file     Social Determinants of Health     Financial Resource Strain: High Risk (1/1/2022)    Received from [employer]    Financial Resource Strain      Difficulty of Paying Living Expenses: Not on file      Difficulty of Paying Living Expenses: Not on file   Food Insecurity: Not on file   Transportation Needs: Not on file   Physical Activity: Not on file   Stress: Not on file   Social Connections: Unknown (5/2/2023)    Received from [employer]    Social Connections      Frequency of Communication with Friends and Family: Not on file   Interpersonal Safety: Not on file   Housing Stability: Not on file       FAMILY HISTORY:  Family History   Problem Relation Age of Onset     Multiple Sclerosis Mother      Ulcerative Colitis Maternal Grandmother       Colon Cancer No family hx of      Inflammatory Bowel Disease No family hx of      Irritable Bowel Syndrome No family hx of      Crohn's Disease No family hx of      Anesthesia Reaction No family hx of      Bleeding Disorder No family hx of      Clotting Disorder No family hx of      No family history of IBD  Past/family/social history reviewed and no changes          Virtual Visit Details    Type of service:  Video Visit     Originating Location (pt. Location): Home    Distant Location (provider location):  Off-site  Platform used for Video Visit: Jennifer      Again, thank you for allowing me to participate in the care of your patient.        Sincerely,        Abdiaziz Hull PA-C

## 2024-07-12 NOTE — TELEPHONE ENCOUNTER
Patient had not yet read [employer]ler message.    Contacted patient to discuss Humira plan, however no answer. Left detailed message requesting callback.    Received callback from patient and reviewed timeline.     Patient will do his first Humira injection today - 7/12.    Lab appointment scheduled at [facility] for 7/19. Patient expressed understanding to not inject second Humira dose until after the lab appointment. Humira level ordered. [employer] message sent to Ms. West requesting [employer] form be completed.     Following the lab appointment, patient will administer second Humira injection.     Patient instructed to hold third injection due 7/26, until level/antibody result is obtained. Advised patient to wait until he hears from GI clinic to do the third injection. Patient expressed understanding.     Reminder set to reach out to the patient on 7/25. MTM updated.

## 2024-07-16 ENCOUNTER — MEDICAL CORRESPONDENCE (OUTPATIENT)
Dept: HEALTH INFORMATION MANAGEMENT | Facility: CLINIC | Age: 34
End: 2024-07-16
Payer: COMMERCIAL

## 2024-07-16 ENCOUNTER — TELEPHONE (OUTPATIENT)
Dept: GASTROENTEROLOGY | Facility: CLINIC | Age: 34
End: 2024-07-16
Payer: COMMERCIAL

## 2024-07-16 NOTE — TELEPHONE ENCOUNTER
Prom[employer] form filled out with following information:    Medication:  Humira   Dose: 40mg   Frequency: q7days   Provider: Abdiaziz Hull   When is lab due: 7/19   Does the patient need a lab appointment: No, already scheduled       Scanned into patient chart. Lab order is in. Added to patient list. Copy also sent via email to [email or web address]      Kamilah West LPN

## 2024-07-16 NOTE — TELEPHONE ENCOUNTER
----- Message from Mary FALCON sent at 7/12/2024 11:05 AM CDT -----  Regarding: Humira level  Hello,    Can you please fill out and scan into the patients chart a prometheus lab order form for this patient?    Medication:  Humira  Dose: 40mg  Frequency: q7days  Provider: Abdiaziz Hull  When is lab due: 7/19  Does the patient need a lab appointment: No, already scheduled    Thank you,  Brian

## 2024-07-19 ENCOUNTER — TRANSFERRED RECORDS (OUTPATIENT)
Dept: HEALTH INFORMATION MANAGEMENT | Facility: CLINIC | Age: 34
End: 2024-07-19

## 2024-07-19 ENCOUNTER — LAB (OUTPATIENT)
Dept: LAB | Facility: CLINIC | Age: 34
End: 2024-07-19
Payer: COMMERCIAL

## 2024-07-19 DIAGNOSIS — K50.111 CROHN'S DISEASE OF LARGE INTESTINE WITH RECTAL BLEEDING (H): ICD-10-CM

## 2024-07-19 LAB
ALBUMIN SERPL BCG-MCNC: 4.6 G/DL (ref 3.5–5.2)
ALP SERPL-CCNC: 67 U/L (ref 40–150)
ALT SERPL W P-5'-P-CCNC: 12 U/L (ref 0–70)
ANION GAP SERPL CALCULATED.3IONS-SCNC: 11 MMOL/L (ref 7–15)
AST SERPL W P-5'-P-CCNC: 21 U/L (ref 0–45)
BASOPHILS # BLD AUTO: 0 10E3/UL (ref 0–0.2)
BASOPHILS NFR BLD AUTO: 1 %
BILIRUB SERPL-MCNC: 0.6 MG/DL
BUN SERPL-MCNC: 11.5 MG/DL (ref 6–20)
CALCIUM SERPL-MCNC: 9.8 MG/DL (ref 8.8–10.4)
CHLORIDE SERPL-SCNC: 102 MMOL/L (ref 98–107)
CREAT SERPL-MCNC: 0.81 MG/DL (ref 0.67–1.17)
CRP SERPL-MCNC: 4.23 MG/L
EGFRCR SERPLBLD CKD-EPI 2021: >90 ML/MIN/1.73M2
EOSINOPHIL # BLD AUTO: 0.2 10E3/UL (ref 0–0.7)
EOSINOPHIL NFR BLD AUTO: 4 %
ERYTHROCYTE [DISTWIDTH] IN BLOOD BY AUTOMATED COUNT: 12.1 % (ref 10–15)
GLUCOSE SERPL-MCNC: 156 MG/DL (ref 70–99)
HCO3 SERPL-SCNC: 27 MMOL/L (ref 22–29)
HCT VFR BLD AUTO: 41.5 % (ref 40–53)
HCV AB SERPL QL IA: NONREACTIVE
HGB BLD-MCNC: 13.6 G/DL (ref 13.3–17.7)
IMM GRANULOCYTES # BLD: 0 10E3/UL
IMM GRANULOCYTES NFR BLD: 0 %
LYMPHOCYTES # BLD AUTO: 2.1 10E3/UL (ref 0.8–5.3)
LYMPHOCYTES NFR BLD AUTO: 35 %
MCH RBC QN AUTO: 29.6 PG (ref 26.5–33)
MCHC RBC AUTO-ENTMCNC: 32.8 G/DL (ref 31.5–36.5)
MCV RBC AUTO: 90 FL (ref 78–100)
MONOCYTES # BLD AUTO: 0.4 10E3/UL (ref 0–1.3)
MONOCYTES NFR BLD AUTO: 7 %
NEUTROPHILS # BLD AUTO: 3.2 10E3/UL (ref 1.6–8.3)
NEUTROPHILS NFR BLD AUTO: 53 %
NRBC # BLD AUTO: 0 10E3/UL
NRBC BLD AUTO-RTO: 0 /100
PLATELET # BLD AUTO: 346 10E3/UL (ref 150–450)
POTASSIUM SERPL-SCNC: 4 MMOL/L (ref 3.4–5.3)
PROT SERPL-MCNC: 7.7 G/DL (ref 6.4–8.3)
RBC # BLD AUTO: 4.6 10E6/UL (ref 4.4–5.9)
SODIUM SERPL-SCNC: 140 MMOL/L (ref 135–145)
WBC # BLD AUTO: 6 10E3/UL (ref 4–11)

## 2024-07-19 PROCEDURE — 86481 TB AG RESPONSE T-CELL SUSP: CPT

## 2024-07-19 PROCEDURE — 80145 DRUG ASSAY ADALIMUMAB: CPT | Mod: 90

## 2024-07-19 PROCEDURE — 85025 COMPLETE CBC W/AUTO DIFF WBC: CPT

## 2024-07-19 PROCEDURE — 80053 COMPREHEN METABOLIC PANEL: CPT

## 2024-07-19 PROCEDURE — 86803 HEPATITIS C AB TEST: CPT

## 2024-07-19 PROCEDURE — 36415 COLL VENOUS BLD VENIPUNCTURE: CPT

## 2024-07-19 PROCEDURE — 99000 SPECIMEN HANDLING OFFICE-LAB: CPT

## 2024-07-19 PROCEDURE — 82542 COL CHROMOTOGRAPHY QUAL/QUAN: CPT | Mod: 90

## 2024-07-19 PROCEDURE — 86140 C-REACTIVE PROTEIN: CPT

## 2024-07-22 LAB
GAMMA INTERFERON BACKGROUND BLD IA-ACNC: 0 IU/ML
M TB IFN-G BLD-IMP: NEGATIVE
M TB IFN-G CD4+ BCKGRND COR BLD-ACNC: 10 IU/ML
MITOGEN IGNF BCKGRD COR BLD-ACNC: 0 IU/ML
MITOGEN IGNF BCKGRD COR BLD-ACNC: 0.01 IU/ML
QUANTIFERON MITOGEN: 10 IU/ML
QUANTIFERON NIL TUBE: 0 IU/ML
QUANTIFERON TB1 TUBE: 0.01 IU/ML
QUANTIFERON TB2 TUBE: 0

## 2024-07-25 LAB
ADALIMUMAB AB SERPL IA-MCNC: <1.7 U/ML
ADALIMUMAB SERPL-MCNC: 11.6 UG/ML

## 2024-07-25 NOTE — TELEPHONE ENCOUNTER
Patient does not have evidence of antibodies. Contacted patient and instructed him to continue Humira weekly as prescribed. Patient expressed understanding.

## 2024-09-09 ENCOUNTER — TELEPHONE (OUTPATIENT)
Dept: GASTROENTEROLOGY | Facility: CLINIC | Age: 34
End: 2024-09-09
Payer: COMMERCIAL

## 2024-09-09 NOTE — TELEPHONE ENCOUNTER
Pt is due for follow up with Lo ERICKSON     Call placed to pt to initiate scheudling on 9/9    Outcome: LVM and MyC

## 2024-09-18 ENCOUNTER — TRANSFERRED RECORDS (OUTPATIENT)
Dept: HEALTH INFORMATION MANAGEMENT | Facility: CLINIC | Age: 34
End: 2024-09-18

## 2024-10-09 ENCOUNTER — TELEPHONE (OUTPATIENT)
Dept: SURGERY | Facility: CLINIC | Age: 34
End: 2024-10-09
Payer: COMMERCIAL

## 2024-10-09 NOTE — TELEPHONE ENCOUNTER
[facility] Call [facility]    Phone Message    May a detailed message be left on voicemail: yes     Reason for Call: Other: Patient looking to see how to get device switched out seton and/or next steps. Please call to discuss     Action Taken: Message routed to:  [facility] IVETH    Travel Screening: Not Applicable     Date of Service:

## 2024-10-11 ENCOUNTER — OFFICE VISIT (OUTPATIENT)
Dept: SURGERY | Facility: CLINIC | Age: 34
End: 2024-10-11
Payer: COMMERCIAL

## 2024-10-11 ENCOUNTER — LAB (OUTPATIENT)
Dept: LAB | Facility: CLINIC | Age: 34
End: 2024-10-11
Payer: COMMERCIAL

## 2024-10-11 VITALS — HEART RATE: 94 BPM | DIASTOLIC BLOOD PRESSURE: 85 MMHG | SYSTOLIC BLOOD PRESSURE: 146 MMHG | OXYGEN SATURATION: 100 %

## 2024-10-11 DIAGNOSIS — K60.50 ANORECTAL FISTULA: Primary | ICD-10-CM

## 2024-10-11 DIAGNOSIS — K50.111 CROHN'S DISEASE OF LARGE INTESTINE WITH RECTAL BLEEDING (H): ICD-10-CM

## 2024-10-11 LAB
ALBUMIN SERPL BCG-MCNC: 4.5 G/DL (ref 3.5–5.2)
ALP SERPL-CCNC: 71 U/L (ref 40–150)
ALT SERPL W P-5'-P-CCNC: 25 U/L (ref 0–70)
ANION GAP SERPL CALCULATED.3IONS-SCNC: 9 MMOL/L (ref 7–15)
AST SERPL W P-5'-P-CCNC: 23 U/L (ref 0–45)
BASOPHILS # BLD AUTO: 0 10E3/UL (ref 0–0.2)
BASOPHILS NFR BLD AUTO: 1 %
BILIRUB SERPL-MCNC: 0.6 MG/DL
BUN SERPL-MCNC: 11.8 MG/DL (ref 6–20)
CALCIUM SERPL-MCNC: 9.5 MG/DL (ref 8.8–10.4)
CHLORIDE SERPL-SCNC: 102 MMOL/L (ref 98–107)
CREAT SERPL-MCNC: 0.83 MG/DL (ref 0.67–1.17)
CRP SERPL-MCNC: <3 MG/L
EGFRCR SERPLBLD CKD-EPI 2021: >90 ML/MIN/1.73M2
EOSINOPHIL # BLD AUTO: 0.1 10E3/UL (ref 0–0.7)
EOSINOPHIL NFR BLD AUTO: 2 %
ERYTHROCYTE [DISTWIDTH] IN BLOOD BY AUTOMATED COUNT: 12.7 % (ref 10–15)
GLUCOSE SERPL-MCNC: 126 MG/DL (ref 70–99)
HCO3 SERPL-SCNC: 28 MMOL/L (ref 22–29)
HCT VFR BLD AUTO: 44.2 % (ref 40–53)
HGB BLD-MCNC: 14.6 G/DL (ref 13.3–17.7)
IMM GRANULOCYTES # BLD: 0 10E3/UL
IMM GRANULOCYTES NFR BLD: 0 %
LYMPHOCYTES # BLD AUTO: 1.9 10E3/UL (ref 0.8–5.3)
LYMPHOCYTES NFR BLD AUTO: 36 %
MCH RBC QN AUTO: 29.3 PG (ref 26.5–33)
MCHC RBC AUTO-ENTMCNC: 33 G/DL (ref 31.5–36.5)
MCV RBC AUTO: 89 FL (ref 78–100)
MONOCYTES # BLD AUTO: 0.5 10E3/UL (ref 0–1.3)
MONOCYTES NFR BLD AUTO: 9 %
NEUTROPHILS # BLD AUTO: 2.8 10E3/UL (ref 1.6–8.3)
NEUTROPHILS NFR BLD AUTO: 52 %
NRBC # BLD AUTO: 0 10E3/UL
NRBC BLD AUTO-RTO: 0 /100
PLATELET # BLD AUTO: 333 10E3/UL (ref 150–450)
POTASSIUM SERPL-SCNC: 4.2 MMOL/L (ref 3.4–5.3)
PROT SERPL-MCNC: 7.7 G/DL (ref 6.4–8.3)
RBC # BLD AUTO: 4.98 10E6/UL (ref 4.4–5.9)
SODIUM SERPL-SCNC: 139 MMOL/L (ref 135–145)
WBC # BLD AUTO: 5.3 10E3/UL (ref 4–11)

## 2024-10-11 PROCEDURE — 80053 COMPREHEN METABOLIC PANEL: CPT | Performed by: PATHOLOGY

## 2024-10-11 PROCEDURE — 85025 COMPLETE CBC W/AUTO DIFF WBC: CPT | Performed by: PATHOLOGY

## 2024-10-11 PROCEDURE — 36415 COLL VENOUS BLD VENIPUNCTURE: CPT | Performed by: PATHOLOGY

## 2024-10-11 PROCEDURE — 86140 C-REACTIVE PROTEIN: CPT | Performed by: PATHOLOGY

## 2024-10-11 PROCEDURE — 46020 PLACEMENT OF SETON: CPT

## 2024-10-11 ASSESSMENT — PAIN SCALES - GENERAL: PAINLEVEL: NO PAIN (0)

## 2024-10-11 NOTE — NURSING NOTE
Chief Complaint   Patient presents with    Follow Up       Vitals:    10/11/24 1006   BP: (!) 146/85   BP Location: Right arm   Patient Position: Sitting   Cuff Size: Adult Regular   Pulse: 94   SpO2: 100%       There is no height or weight on file to calculate BMI.    Des Trammell EMT-P

## 2024-10-11 NOTE — PROGRESS NOTES
Colon and Rectal Surgery Consult Clinic Note    Date: 10/11/2024     Referring provider:  Vidhya Hamm MD  [address]  [place] [address]     RE: Cody Pickard  : 1990  WIL: 10/11/2024    Cody Pickard is a very pleasant 34 year old male with a history of Crohn's disease with complex anal fistula now s/p examination under anesthesia with debridement of fistula tract, partial fistulotomy, seton exchange x2, and colonoscopy with Dr. Hamm on 23. Patient here for seton exchange. States drainage has been the same. No other concerns.     Physical Examination: Exam was chaperoned by Des Trammell, EMT-P   BP (!) 146/85 (BP Location: Right arm, Patient Position: Sitting, Cuff Size: Adult Regular)   Pulse 94   SpO2 100%   General: alert and sitting comfortably in his chair   Perianal external examination:  1 vessel loop seton in posterior midline with an additional yellow vessel loop seton as a counter seton to the left posterior. Some ties are missing.   Digital rectal examination: Was deferred    Anoscopy: Was deferred    Procedures:  Seton exchange. Exchanged both setons and tied 6, 2-0 silk ties.     Assessment/Plan: 34 year old male Crohn's disease with complex anal fistula now s/p examination under anesthesia with debridement of fistula tract, partial fistulotomy, seton exchange x2, and colonoscopy with Dr. Hamm on 23. Did seton exchange for two setons in clinic today. Applied 6 ties. Patient is seeing Dr. Hamm fin two weeks to discuss possible fistulotomy for one of the setons. Patient is agreeable to this plan. Answered all his questions to his satisfaction. Encouraged to reach out with any additional questions or concerns.     Medical history:  Past Medical History:   Diagnosis Date    Anal fistula     Crohn's disease (H)     HTN (hypertension)        Surgical history:  Past Surgical History:   Procedure Laterality Date    COLONOSCOPY  06/15/2020     COLONOSCOPY N/A 9/1/2023    Procedure: COLONOSCOPY;  Surgeon: Vidhya Hamm MD;  Location: [facility] OR    EXAM UNDER ANESTHESIA ANUS N/A 11/19/2021    Procedure: EXAM UNDER ANESTHESIA, ANUS;  Surgeon: Vidhya Hamm MD;  Location: [facility] OR    EXAM UNDER ANESTHESIA ANUS N/A 7/8/2022    Procedure: EXAM UNDER ANESTHESIA, ANUS;  Surgeon: Vidhya Hamm MD;  Location: [facility] OR    EXAM UNDER ANESTHESIA ANUS N/A 12/16/2022    Procedure: EXAM UNDER ANESTHESIA, ANUS, Partial Fistuotomy seton x2;  Surgeon: Vidhya Hamm MD;  Location: [facility] OR    FISTULOTOMY RECTUM N/A 7/8/2022    Procedure: partial FISTULOTOMY, RECTUM, seton replacement x2;  Surgeon: Vidhya Hamm MD;  Location: [facility]    FISTULOTOMY RECTUM N/A 12/16/2022    Procedure: POSSIBLE FISTULOTOMY, RECTUM;  Surgeon: Vidhya Hamm MD;  Location: [facility]    FISTULOTOMY RECTUM N/A 9/1/2023    Procedure: PARTIAL FISTULOTOMY, REPLACEMENT OF SETON STITCH;  Surgeon: Vidhya Hamm MD;  Location: [facility]    INCISION AND DRAINAGE RECTUM, COMBINED N/A 09/06/2021    Procedure: INCISION AND DRAINAGE, RECTUM, placement of Seton;  Surgeon: Vidhya Hamm MD;  [facility]    IRRIGATION AND DEBRIDEMENT PERINEAL, COMBINED N/A 9/1/2023    Procedure: Examination under anesthesia, Debridement of fistula tract;  Surgeon: Vidhya Hamm MD;  Location: [facility]    PLACEMENT OF SETON RECTUM N/A 11/19/2021    Procedure: PLACEMENT OF SETON RECTUM;  Surgeon: Vidhya Hamm MD;  Location: [facility]    SIGMOIDOSCOPY,BIOPSY  09/24/2020       Problem list:    Patient Active Problem List    Diagnosis Date Noted    Crohn's disease with fistula, unspecified gastrointestinal tract location (H) 07/19/2023     Priority: Medium    Anal fistula 11/09/2021     Priority: Medium     Added automatically from request for surgery [id number]      Acute post-operative pain 09/07/2021      Priority: Medium    Perianal abscess 2021     Priority: Medium    Perianal Crohn's disease, with abscess (H) 2021     Priority: Medium    IBD (inflammatory bowel disease) 2021     Priority: Medium       Medications:  Current Outpatient Medications   Medication Sig Dispense Refill    adalimumab (HUMIRA *CF* PEN) 40 MG/0.4ML pen kit Inject 0.4 mLs (40 mg) subcutaneously every 7 days 1.6 mL 2    amLODIPine (NORVASC) 5 MG tablet Take 5 mg by mouth every morning      lisinopril (ZESTRIL) 20 MG tablet Take 20 mg by mouth every morning       mesalamine (LIALDA) 1.2 g DR tablet Take 4 tablets (4,800 mg) by mouth every morning 120 tablet 5       Allergies:  No Known Allergies    Family history:  Family History   Problem Relation Age of Onset    Multiple Sclerosis Mother     Ulcerative Colitis Maternal Grandmother     Colon Cancer No family hx of     Inflammatory Bowel Disease No family hx of     Irritable Bowel Syndrome No family hx of     Crohn's Disease No family hx of     Anesthesia Reaction No family hx of     Bleeding Disorder No family hx of     Clotting Disorder No family hx of        Social history:  Social History     Tobacco Use    Smoking status: Former     Current packs/day: 0.00     Average packs/day: 0.5 packs/day for 2.6 years (1.3 ttl pk-yrs)     Types: Cigarettes     Start date:      Quit date: 2020     Years since quittin.1    Smokeless tobacco: Never   Substance Use Topics    Alcohol use: Yes     Alcohol/week: 2.0 standard drinks of alcohol     Types: 2 Cans of beer per week    Marital status: single.  Occupation: n/a.    There are no exam notes on file for this visit.     25 minutes spent on the date of encounter performing chart review, history and exam, documentation and further activities as noted above    Kaitlyn Conti PA-C  Colon and Rectal Surgery   [facility]    This note was created using speech recognition software and may contain  unintended word substitutions.

## 2024-10-11 NOTE — LETTER
10/11/2024       RE: Cody Pickard  [id number] [address] [place] [id number]     Dear Colleague,    Thank you for referring your patient, Cody Pickard, to the [facility] [place] at [facility]. Please see a copy of my visit note below.    Colon and Rectal Surgery Consult Clinic Note    Date: 10/11/2024     Referring provider:  Vidhya Hamm MD  [address]  [place] [address]     RE: Cody Pickard  : 1990  WIL: 10/11/2024    Cody Pickard is a very pleasant 34 year old male with a history of Crohn's disease with complex anal fistula now s/p examination under anesthesia with debridement of fistula tract, partial fistulotomy, seton exchange x2, and colonoscopy with Dr. Hamm on 23. Patient here for seton exchange. States drainage has been the same. No other concerns.     Physical Examination: Exam was chaperoned by Des Trammell, EMT-P   BP (!) 146/85 (BP Location: Right arm, Patient Position: Sitting, Cuff Size: Adult Regular)   Pulse 94   SpO2 100%   General: alert and sitting comfortably in his chair   Perianal external examination:  1 vessel loop seton in posterior midline with an additional yellow vessel loop seton as a counter seton to the left posterior. Some ties are missing.   Digital rectal examination: Was deferred    Anoscopy: Was deferred    Procedures:  Seton exchange. Exchanged both setons and tied 6, 2-0 silk ties.     Assessment/Plan: 34 year old male Crohn's disease with complex anal fistula now s/p examination under anesthesia with debridement of fistula tract, partial fistulotomy, seton exchange x2, and colonoscopy with Dr. Hamm on 23. Did seton exchange for two setons in clinic today. Applied 6 ties. Patient is seeing Dr. Hamm fin two weeks to discuss possible fistulotomy for one of the setons. Patient is agreeable to this plan. Answered all  his questions to his satisfaction. Encouraged to reach out with any additional questions or concerns.     Medical history:  Past Medical History:   Diagnosis Date     Anal fistula      Crohn's disease (H)      HTN (hypertension)        Surgical history:  Past Surgical History:   Procedure Laterality Date     COLONOSCOPY  06/15/2020     COLONOSCOPY N/A 9/1/2023    Procedure: COLONOSCOPY;  Surgeon: Vidhya Hamm MD;  Location: [facility]     EXAM UNDER ANESTHESIA ANUS N/A 11/19/2021    Procedure: EXAM UNDER ANESTHESIA, ANUS;  Surgeon: Vidhya Hamm MD;  Location: [facility]     EXAM UNDER ANESTHESIA ANUS N/A 7/8/2022    Procedure: EXAM UNDER ANESTHESIA, ANUS;  Surgeon: Vidhya Hamm MD;  Location: [facility]     EXAM UNDER ANESTHESIA ANUS N/A 12/16/2022    Procedure: EXAM UNDER ANESTHESIA, ANUS, Partial Fistuotomy seton x2;  Surgeon: Vidhya Hamm MD;  Location: [facility]     FISTULOTOMY RECTUM N/A 7/8/2022    Procedure: partial FISTULOTOMY, RECTUM, seton replacement x2;  Surgeon: Vidhya Hamm MD;  Location: [facility]     FISTULOTOMY RECTUM N/A 12/16/2022    Procedure: POSSIBLE FISTULOTOMY, RECTUM;  Surgeon: Vidhya Hamm MD;  Location: [facility]     FISTULOTOMY RECTUM N/A 9/1/2023    Procedure: PARTIAL FISTULOTOMY, REPLACEMENT OF SETON STITCH;  Surgeon: Vidhya Hamm MD;  Location: [facility]     INCISION AND DRAINAGE RECTUM, COMBINED N/A 09/06/2021    Procedure: INCISION AND DRAINAGE, RECTUM, placement of Seton;  Surgeon: Vidhya Hamm MD;  [facility]     IRRIGATION AND DEBRIDEMENT PERINEAL, COMBINED N/A 9/1/2023    Procedure: Examination under anesthesia, Debridement of fistula tract;  Surgeon: Vidhya Hamm MD;  Location: [facility]     PLACEMENT OF SETON RECTUM N/A 11/19/2021    Procedure: PLACEMENT OF SETON RECTUM;  Surgeon: Vidhya Hamm MD;  Location: [facility]     SIGMOIDOSCOPY,BIOPSY  09/24/2020        Problem list:    Patient Active Problem List    Diagnosis Date Noted     Crohn's disease with fistula, unspecified gastrointestinal tract location (H) 2023     Priority: Medium     Anal fistula 2021     Priority: Medium     Added automatically from request for surgery [id number]       Acute post-operative pain 2021     Priority: Medium     Perianal abscess 2021     Priority: Medium     Perianal Crohn's disease, with abscess (H) 2021     Priority: Medium     IBD (inflammatory bowel disease) 2021     Priority: Medium       Medications:  Current Outpatient Medications   Medication Sig Dispense Refill     adalimumab (HUMIRA *CF* PEN) 40 MG/0.4ML pen kit Inject 0.4 mLs (40 mg) subcutaneously every 7 days 1.6 mL 2     amLODIPine (NORVASC) 5 MG tablet Take 5 mg by mouth every morning       lisinopril (ZESTRIL) 20 MG tablet Take 20 mg by mouth every morning        mesalamine (LIALDA) 1.2 g DR tablet Take 4 tablets (4,800 mg) by mouth every morning 120 tablet 5       Allergies:  No Known Allergies    Family history:  Family History   Problem Relation Age of Onset     Multiple Sclerosis Mother      Ulcerative Colitis Maternal Grandmother      Colon Cancer No family hx of      Inflammatory Bowel Disease No family hx of      Irritable Bowel Syndrome No family hx of      Crohn's Disease No family hx of      Anesthesia Reaction No family hx of      Bleeding Disorder No family hx of      Clotting Disorder No family hx of        Social history:  Social History     Tobacco Use     Smoking status: Former     Current packs/day: 0.00     Average packs/day: 0.5 packs/day for 2.6 years (1.3 ttl pk-yrs)     Types: Cigarettes     Start date:      Quit date: 2020     Years since quittin.1     Smokeless tobacco: Never   Substance Use Topics     Alcohol use: Yes     Alcohol/week: 2.0 standard drinks of alcohol     Types: 2 Cans of beer per week    Marital status: single.  Occupation:  n/a.    There are no exam notes on file for this visit.     25 minutes spent on the date of encounter performing chart review, history and exam, documentation and further activities as noted above    Kaitlyn Conti PA-C  Colon and Rectal Surgery   [facility]    This note was created using speech recognition software and may contain unintended word substitutions.      Again, thank you for allowing me to participate in the care of your patient.      Sincerely,    Kaitlyn Conti PA-C

## 2024-10-17 ENCOUNTER — TELEPHONE (OUTPATIENT)
Dept: GASTROENTEROLOGY | Facility: CLINIC | Age: 34
End: 2024-10-17
Payer: COMMERCIAL

## 2024-10-17 ENCOUNTER — TELEPHONE (OUTPATIENT)
Dept: PHARMACY | Facility: CLINIC | Age: 34
End: 2024-10-17
Payer: COMMERCIAL

## 2024-10-17 DIAGNOSIS — K50.111 CROHN'S DISEASE OF LARGE INTESTINE WITH RECTAL BLEEDING (H): ICD-10-CM

## 2024-10-17 DIAGNOSIS — K60.30 ANAL FISTULA: ICD-10-CM

## 2024-10-17 RX ORDER — ADALIMUMAB 40MG/0.4ML
40 KIT SUBCUTANEOUS
Qty: 4 EACH | Refills: 5 | Status: SHIPPED | OUTPATIENT
Start: 2024-10-17

## 2024-10-17 NOTE — TELEPHONE ENCOUNTER
PA Initiation    Medication: HUMIRA (2 PEN) 40 MG/0.4ML SC AJKT  Insurance Company: [employer] ([facility]) - Phone [phone] Fax [phone]  Pharmacy Filling the Rx:    Filling Pharmacy Phone:    Filling Pharmacy Fax:    Start Date: 10/17/2024  [id number]

## 2024-10-17 NOTE — TELEPHONE ENCOUNTER
Last provider visit: 2024 with Abdiaizz Hull PA-C  Next provider visit: 10/29/2024 with Abdiaziz Hull PA-C  Last labs completed: 10/2024  Lab frequency: every 3 months   - standing labs available until 2025  Next labs due: 2025  Last TB screenin2024    Received word that the pharmacy is requesting a new PA. Will connect with our pharmacy liaison. Order placed for Humira refill per CPA with Abdaiziz Hull PA-C.

## 2024-10-17 NOTE — TELEPHONE ENCOUNTER
Pt is due for 6 mo check in with Lo ERICKSON    Call placed to pt to initiate scheduling on 10/17    Outcome: Pt is scheduled for a phone visit on 11/8 at 9:30am CST      *Pt stated he received text from his pharmacy about Humira that it was delayed. I offered to call the pharmacy and inquire about why that text was sent. Will send a MyC to pt with update.

## 2024-10-22 NOTE — PROGRESS NOTES
Colon and Rectal Surgery Follow-up Clinic Note    Referring provider:  No referring provider defined for this encounter.       RE: Cody Pickard  : 1990  WIL: 10/23/2024      Cody Pickard is a very pleasant 34 year old male with a history of Crohn's disease with complex anal fistula s/p EUA with debridement of fistula tract, partial fistulotomy and seton exchange x2 and colonoscopy done 23. He is here to discuss possible fistulotomy of one of the setons.     Interval history: Patient was seen in clinic by REAGAN Khan 10/11/2024 where he had his two setons exchanged in clinic. Patient reports that his drainage has been unchanged. Wants to know if he can't get at least one of the setons out.       PLEASE SEE NOTE BELOW FOR PHYSICAL EXAMINATION, REVIEW OF SYSTEMS, AND OTHER HISTORY.    Assessment/Plan: 34 year old male with Crohn's disease with complex anal fistula s/p EUA with debridement of fistula tract, partial fistulotomy and seton exchange x2 and colonoscopy done 23. Patient is currently on Humira injection once a week for Crohn's which seems to have significantly improved perianal disease. Will schedule the patient for EUA, fistulotomy and possible seton exchange with me. Patient is agreeable to this plan. Answered all his questions to his satisfaction. Encouraged to reach out with any additional questions or concerns.       25 minutes spent on the date of encounter performing chart review, history and exam, documentation and further activities as noted above.    Vidhya Hamm MD  Colon and Rectal Surgery Staff  [facility]    -------------------------------------------------------------------------------------------------------------------    Medical history:  Past Medical History:   Diagnosis Date    Anal fistula     Crohn's disease (H)     HTN (hypertension)        Surgical history:  Past Surgical History:   Procedure Laterality Date     COLONOSCOPY  06/15/2020    COLONOSCOPY N/A 9/1/2023    Procedure: COLONOSCOPY;  Surgeon: Vidhya Hamm MD;  Location: [facility]    EXAM UNDER ANESTHESIA ANUS N/A 11/19/2021    Procedure: EXAM UNDER ANESTHESIA, ANUS;  Surgeon: Vidhya Hamm MD;  Location: [facility]    EXAM UNDER ANESTHESIA ANUS N/A 7/8/2022    Procedure: EXAM UNDER ANESTHESIA, ANUS;  Surgeon: Vidhya Hamm MD;  Location: [facility] OR    EXAM UNDER ANESTHESIA ANUS N/A 12/16/2022    Procedure: EXAM UNDER ANESTHESIA, ANUS, Partial Fistuotomy seton x2;  Surgeon: Vidhya Hamm MD;  Location: [facility]    FISTULOTOMY RECTUM N/A 7/8/2022    Procedure: partial FISTULOTOMY, RECTUM, seton replacement x2;  Surgeon: Vidhya Hamm MD;  Location: [facility]    FISTULOTOMY RECTUM N/A 12/16/2022    Procedure: POSSIBLE FISTULOTOMY, RECTUM;  Surgeon: Vidhya Hamm MD;  Location: [facility]    FISTULOTOMY RECTUM N/A 9/1/2023    Procedure: PARTIAL FISTULOTOMY, REPLACEMENT OF SETON STITCH;  Surgeon: Vidhya Hamm MD;  Location: [facility]    INCISION AND DRAINAGE RECTUM, COMBINED N/A 09/06/2021    Procedure: INCISION AND DRAINAGE, RECTUM, placement of Seton;  Surgeon: Vidhya Hamm MD;  [facility]    IRRIGATION AND DEBRIDEMENT PERINEAL, COMBINED N/A 9/1/2023    Procedure: Examination under anesthesia, Debridement of fistula tract;  Surgeon: Vidhya Hamm MD;  Location: [facility]    PLACEMENT OF SETON RECTUM N/A 11/19/2021    Procedure: PLACEMENT OF SETON RECTUM;  Surgeon: Vidhya Hamm MD;  Location: [facility]    SIGMOIDOSCOPY,BIOPSY  09/24/2020       Problem list:    Patient Active Problem List    Diagnosis Date Noted    Crohn's disease with fistula, unspecified gastrointestinal tract location (H) 07/19/2023     Priority: Medium    Anal fistula 11/09/2021     Priority: Medium     Added automatically from request for surgery [id number]      Acute post-operative  pain 2021     Priority: Medium    Perianal abscess 2021     Priority: Medium    Perianal Crohn's disease, with abscess (H) 2021     Priority: Medium    IBD (inflammatory bowel disease) 2021     Priority: Medium       Medications:  Current Outpatient Medications   Medication Sig Dispense Refill    Adalimumab (HUMIRA, 2 PEN,) 40 MG/0.4ML AJKT Inject 40 mg subcutaneously every 7 days. 4 each 5    amLODIPine (NORVASC) 5 MG tablet Take 5 mg by mouth every morning      lisinopril (ZESTRIL) 20 MG tablet Take 20 mg by mouth every morning       mesalamine (LIALDA) 1.2 g DR tablet Take 4 tablets (4,800 mg) by mouth every morning 120 tablet 5       Allergies:  No Known Allergies    Family history:  Family History   Problem Relation Age of Onset    Multiple Sclerosis Mother     Ulcerative Colitis Maternal Grandmother     Colon Cancer No family hx of     Inflammatory Bowel Disease No family hx of     Irritable Bowel Syndrome No family hx of     Crohn's Disease No family hx of     Anesthesia Reaction No family hx of     Bleeding Disorder No family hx of     Clotting Disorder No family hx of        Social history:  Social History     Socioeconomic History    Marital status: Single     Spouse name: Not on file    Number of children: Not on file    Years of education: Not on file    Highest education level: Not on file   Occupational History    Not on file   Tobacco Use    Smoking status: Former     Current packs/day: 0.00     Average packs/day: 0.5 packs/day for 2.6 years (1.3 ttl pk-yrs)     Types: Cigarettes     Start date:      Quit date: 2020     Years since quittin.2    Smokeless tobacco: Never   Vaping Use    Vaping status: Never Used   Substance and Sexual Activity    Alcohol use: Yes     Alcohol/week: 2.0 standard drinks of alcohol     Types: 2 Cans of beer per week    Drug use: Never    Sexual activity: Not on file   Other Topics Concern    Not on file   Social History Narrative    Not on  file     Social Drivers of Health     Financial Resource Strain: High Risk (1/1/2022)    [facility], [facility]    Financial Resource Strain     Difficulty of Paying Living Expenses: Not on file     Difficulty of Paying Living Expenses: Not on file   Food Insecurity: Not on file   Transportation Needs: Not on file   Physical Activity: Not on file   Stress: Not on file   Social Connections: Unknown (5/2/2023)    Received from [facility], [facility]    Social Connections     Frequency of Communication with Friends and Family: Not on file   Interpersonal Safety: Not on file   Housing Stability: Not on file         Nursing Notes:   Des Trammell, EMT  10/23/2024  3:31 PM  Signed  Chief Complaint   Patient presents with    Follow Up       Vitals:    10/23/24 1530   BP: 121/79   BP Location: Left arm   Patient Position: Sitting   Cuff Size: Adult Large   Pulse: 90   SpO2: 99%       There is no height or weight on file to calculate BMI.    Des Trammell EMT-P       Physical Examination: chaperoned by REAGAN Khan  /79 (BP Location: Left arm, Patient Position: Sitting, Cuff Size: Adult Large)   Pulse 90   SpO2 99%   General: alert and sitting comfortably in his chair   Perianal external examination:  1 vessel loop seton in posterior midline with an additional yellow vessel loop seton as a counter seton to the left posterior that does not involve muscle - this could possibly be removed.     Digital rectal examination: Was deferred.    Anoscopy: Was deferred.

## 2024-10-22 NOTE — TELEPHONE ENCOUNTER
Prior Authorization Approval    Medication: HUMIRA (2 PEN) 40 MG/0.4ML SC AJKT  Authorization Effective Date: 10/22/2024  Authorization Expiration Date: 10/17/2025  Approved Dose/Quantity: 4/28  Reference #: [id number]   Insurance Company: [employer] ([facility]) - Phone [phone] Fax [phone]  Expected CoPay: $    CoPay Card Available:      Financial Assistance Needed:    Which Pharm[facility] is filling the prescription: [facility] - [address]  Pharmacy Notified:    Patient Notified:

## 2024-10-23 ENCOUNTER — OFFICE VISIT (OUTPATIENT)
Dept: SURGERY | Facility: CLINIC | Age: 34
End: 2024-10-23
Payer: COMMERCIAL

## 2024-10-23 VITALS — HEART RATE: 90 BPM | OXYGEN SATURATION: 99 % | SYSTOLIC BLOOD PRESSURE: 121 MMHG | DIASTOLIC BLOOD PRESSURE: 79 MMHG

## 2024-10-23 DIAGNOSIS — K60.50 ANORECTAL FISTULA: ICD-10-CM

## 2024-10-23 DIAGNOSIS — K50.913 CROHN'S DISEASE WITH FISTULA, UNSPECIFIED GASTROINTESTINAL TRACT LOCATION (H): Primary | ICD-10-CM

## 2024-10-23 DIAGNOSIS — K60.30 ANAL FISTULA: Primary | ICD-10-CM

## 2024-10-23 PROCEDURE — 99213 OFFICE O/P EST LOW 20 MIN: CPT | Performed by: COLON & RECTAL SURGERY

## 2024-10-23 NOTE — PATIENT INSTRUCTIONS
Follow up:    Scheduling will give you a call within three business days to schedule surgery    Appointment you will need in prep: pre op physical with our anesthesia team or your PCP within 30 days of procedure  You will need to do a 2 fleet enema bowel prep the day of surgery. You will receive the instructions in a surgery packet via mail and [employer].      Rebekah VALENZUELA [phone]    Clinic Fax Number [phone]    Surgery Scheduling [phone]    BOWEL PREP: FLEET ENEMA INSTRUCTIONS     Purchase 2 Fleet enemas over the counter at any local pharmacy or store.  There is no prescription required.  Start your prep 2 hours prior to your appointment check-in time.     2 Hours Prior to Arrival for Your Procedure or Surgery     1. Remove orange protective shield from enema Comfortip before inserting.     2. With steady pressure, gently insert enema tip into rectum with a slight side-to-side movement, with tip pointing toward the navel.  Insertion may be easier if you bear down, as if you are having a bowel movement.     3. Do not force the enema tip into rectum, as this can cause injury.     4. squeeze bottle until nearly all liquid is gone.  It is not neccessary to empty the bottle      5. Remove Comforttip from rectum, and maintain position until you feel the urge to evacuate is strong (usually 2-5 minutes).       Repeat the same steps for the second enema 30 minutes after completing the first enema.

## 2024-10-23 NOTE — NURSING NOTE
Chief Complaint   Patient presents with    Follow Up       Vitals:    10/23/24 1530   BP: 121/79   BP Location: Left arm   Patient Position: Sitting   Cuff Size: Adult Large   Pulse: 90   SpO2: 99%       There is no height or weight on file to calculate BMI.    Des Trammell EMT-P

## 2024-10-23 NOTE — Clinical Note
10/23/2024       RE: Cody Pickard  [address] [address] [place] [id number]     Dear Colleague,    Thank you for referring your patient, Cody Pickard, to the [facility]ON [facility] [place] at [facility]. Please see a copy of my visit note below.    Colon and Rectal Surgery Follow-up Clinic Note    Referring provider:  No referring provider defined for this encounter.       RE: Cody Pickard  : 1990  WIL: 10/23/2024      Cody Pickard is a very pleasant 34 year old male with a history of Crohn's disease with complex anal fistula s/p EUA with debridement of fistula tract, partial fistulotomy and seton exchange x2 and colonoscopy done 23. He is here to discuss possible fistulotomy of one of the setons.     Interval history: Patient was seen in clinic by REAGAN Khan 10/11/2024 where he had his two setons exchanged in clinic. Patient reports that his drainage has been unchanged. Wants to know if he can't get at least one of the setons out.       PLEASE SEE NOTE BELOW FOR PHYSICAL EXAMINATION, REVIEW OF SYSTEMS, AND OTHER HISTORY.    Assessment/Plan: 34 year old male with Crohn's disease with complex anal fistula s/p EUA with debridement of fistula tract, partial fistulotomy and seton exchange x2 and colonoscopy done 23. Patient is currently on Humira injection once a week for Crohn's which seems to have significantly improved perianal disease. Will schedule the patient for EUA, fistulotomy and possible seton exchange with me. Patient is agreeable to this plan. Answered all his questions to his satisfaction. Encouraged to reach out with any additional questions or concerns.       25 minutes spent on the date of encounter performing chart review, history and exam, documentation and further activities as noted above.    Vidhya Hamm MD  Colon and Rectal Surgery Staff  [facility]  Center    -------------------------------------------------------------------------------------------------------------------    Medical history:  Past Medical History:   Diagnosis Date    Anal fistula     Crohn's disease (H)     HTN (hypertension)        Surgical history:  Past Surgical History:   Procedure Laterality Date    COLONOSCOPY  06/15/2020    COLONOSCOPY N/A 9/1/2023    Procedure: COLONOSCOPY;  Surgeon: Vidhya Hamm MD;  Location: [facility] OR    EXAM UNDER ANESTHESIA ANUS N/A 11/19/2021    Procedure: EXAM UNDER ANESTHESIA, ANUS;  Surgeon: Vidhya Hamm MD;  Location: [facility] OR    EXAM UNDER ANESTHESIA ANUS N/A 7/8/2022    Procedure: EXAM UNDER ANESTHESIA, ANUS;  Surgeon: Vidhya Hamm MD;  Location: [facility] OR    EXAM UNDER ANESTHESIA ANUS N/A 12/16/2022    Procedure: EXAM UNDER ANESTHESIA, ANUS, Partial Fistuotomy seton x2;  Surgeon: Vidhya Hamm MD;  Location: [facility] OR    FISTULOTOMY RECTUM N/A 7/8/2022    Procedure: partial FISTULOTOMY, RECTUM, seton replacement x2;  Surgeon: Vidhya Hamm MD;  Location: [facility] OR    FISTULOTOMY RECTUM N/A 12/16/2022    Procedure: POSSIBLE FISTULOTOMY, RECTUM;  Surgeon: Vidhya Hamm MD;  Location: [facility] OR    FISTULOTOMY RECTUM N/A 9/1/2023    Procedure: PARTIAL FISTULOTOMY, REPLACEMENT OF SETON STITCH;  Surgeon: Vidhya Hamm MD;  Location: [facility] OR    INCISION AND DRAINAGE RECTUM, COMBINED N/A 09/06/2021    Procedure: INCISION AND DRAINAGE, RECTUM, placement of Seton;  Surgeon: Vidhya Hamm MD;  [facility]    IRRIGATION AND DEBRIDEMENT PERINEAL, COMBINED N/A 9/1/2023    Procedure: Examination under anesthesia, Debridement of fistula tract;  Surgeon: Vidhya Hamm MD;  Location: [facility] OR    PLACEMENT OF SETON RECTUM N/A 11/19/2021    Procedure: PLACEMENT OF SETON RECTUM;  Surgeon: Vidhya Hamm MD;  Location: [facility] OR    SIGMOIDOSCOPY,BIOPSY   2020       Problem list:    Patient Active Problem List    Diagnosis Date Noted    Crohn's disease with fistula, unspecified gastrointestinal tract location (H) 2023     Priority: Medium    Anal fistula 2021     Priority: Medium     Added automatically from request for surgery [id number]      Acute post-operative pain 2021     Priority: Medium    Perianal abscess 2021     Priority: Medium    Perianal Crohn's disease, with abscess (H) 2021     Priority: Medium    IBD (inflammatory bowel disease) 2021     Priority: Medium       Medications:  Current Outpatient Medications   Medication Sig Dispense Refill    Adalimumab (HUMIRA, 2 PEN,) 40 MG/0.4ML AJKT Inject 40 mg subcutaneously every 7 days. 4 each 5    amLODIPine (NORVASC) 5 MG tablet Take 5 mg by mouth every morning      lisinopril (ZESTRIL) 20 MG tablet Take 20 mg by mouth every morning       mesalamine (LIALDA) 1.2 g DR tablet Take 4 tablets (4,800 mg) by mouth every morning 120 tablet 5       Allergies:  No Known Allergies    Family history:  Family History   Problem Relation Age of Onset    Multiple Sclerosis Mother     Ulcerative Colitis Maternal Grandmother     Colon Cancer No family hx of     Inflammatory Bowel Disease No family hx of     Irritable Bowel Syndrome No family hx of     Crohn's Disease No family hx of     Anesthesia Reaction No family hx of     Bleeding Disorder No family hx of     Clotting Disorder No family hx of        Social history:  Social History     Socioeconomic History    Marital status: Single     Spouse name: Not on file    Number of children: Not on file    Years of education: Not on file    Highest education level: Not on file   Occupational History    Not on file   Tobacco Use    Smoking status: Former     Current packs/day: 0.00     Average packs/day: 0.5 packs/day for 2.6 years (1.3 ttl pk-yrs)     Types: Cigarettes     Start date:      Quit date: 2020     Years since quittin.2     Smokeless tobacco: Never   Vaping Use    Vaping status: Never Used   Substance and Sexual Activity    Alcohol use: Yes     Alcohol/week: 2.0 standard drinks of alcohol     Types: 2 Cans of beer per week    Drug use: Never    Sexual activity: Not on file   Other Topics Concern    Not on file   Social History Narrative    Not on file     Social Drivers of Health     Financial Resource Strain: High Risk (1/1/2022)    [email or web address]    Financial Resource Strain     Difficulty of Paying Living Expenses: Not on file     Difficulty of Paying Living Expenses: Not on file   Food Insecurity: Not on file   Transportation Needs: Not on file   Physical Activity: Not on file   Stress: Not on file   Social Connections: Unknown (5/2/2023)    [facility], [facility] [employer] [facility]    Social Connections     Frequency of Communication with Friends and Family: Not on file   Interpersonal Safety: Not on file   Housing Stability: Not on file         Nursing Notes:   Des Trammell, EMT  10/23/2024  3:31 PM  Signed  Chief Complaint   Patient presents with    Follow Up       Vitals:    10/23/24 1530   BP: 121/79   BP Location: Left arm   Patient Position: Sitting   Cuff Size: Adult Large   Pulse: 90   SpO2: 99%       There is no height or weight on file to calculate BMI.    Des Trammell EMT-P       Physical Examination: chaperoned by REAGAN Khan  /79 (BP Location: Left arm, Patient Position: Sitting, Cuff Size: Adult Large)   Pulse 90   SpO2 99%   General: alert and sitting comfortably in his chair   Perianal external examination:  1 vessel loop seton in posterior midline with an additional yellow vessel loop seton as a counter seton to the left posterior that does not involve muscle - this could possibly be removed.     Digital rectal examination: Was  deferred.    Anoscopy: Was deferred.        Again, thank you for allowing me to participate in the care of your patient.      Sincerely,    Vidhya Hamm MD

## 2024-10-28 ENCOUNTER — TELEPHONE (OUTPATIENT)
Dept: SURGERY | Facility: CLINIC | Age: 34
End: 2024-10-28
Payer: COMMERCIAL

## 2024-10-28 PROBLEM — K60.50 ANORECTAL FISTULA: Status: ACTIVE | Noted: 2024-10-23

## 2024-10-28 NOTE — TELEPHONE ENCOUNTER
Spoke to patient regarding scheduling surgery with Dr. Hamm.     Patient states he will call back later today as it was not a good time.     Advised we would reach out next week if we haven't heard back from him.       Renetta Patten on 10/28/2024 at 10:46 AM

## 2024-10-28 NOTE — TELEPHONE ENCOUNTER
Patient called to schedule surgery with Dr. Hamm    Spoke with: Cody     Date of Surgery: 01/03/2025    Estimated Arrival time Discussed with Patient:  No    Location of surgery: [facility]     Pre-Op H&P: Instructed to schedule w/ PCP - Nash Morales MD within 10-30 days of surgery    WOC: No     Labs: No     Imaging: No     Post-Op Appt Date w/ NP/PA:  MEENAKSHI Santizo, CNP 01/13/2025 at 2:30PM    Bowel Prep:  Yes  2 Fleets Mailed in Packet    Discussed with patient pre-op RN will call 2-3 days prior to surgery with arrival time and instructions:  Yes       Standard Surgery Packet Sent: Yes 10/28/24  via Mail - Standard      Additional Comments: Offered additional surgery dates of 01/17/2025 and 01/31/2025. Patient initially stated that 01/03 would not work for him - gave additional surgery dates and patient then stated that he would make 01/03 work.     Patient requested to be added to cancellation list stating he would really like to get this done before the end of the year.           Renetta Patten on 10/28/2024 at 11:08 AM

## 2024-10-29 ENCOUNTER — VIRTUAL VISIT (OUTPATIENT)
Dept: GASTROENTEROLOGY | Facility: CLINIC | Age: 34
End: 2024-10-29
Attending: PHYSICIAN ASSISTANT
Payer: COMMERCIAL

## 2024-10-29 DIAGNOSIS — K50.113 CROHN'S DISEASE OF LARGE INTESTINE WITH FISTULA (H): Primary | ICD-10-CM

## 2024-10-29 PROCEDURE — 99442 PR PHYSICIAN TELEPHONE EVALUATION 11-20 MIN: CPT | Mod: 93 | Performed by: PHYSICIAN ASSISTANT

## 2024-10-29 NOTE — PROGRESS NOTES
Virtual Visit Details    Type of service:  Telephone Visit   Phone call duration: 16 minutes   Originating Location (pt. Location): Home    Distant Location (provider location):  Off-site    IBD CLINIC VISIT    CC/REFERRING MD:  Referred Self  REASON FOR CONSULTATION: Colitis    ASSESSMENT/PLAN:  34 year old male with Crohn's disease.     1. Crohn's disease:   Current medication:    - Humira/adalimumab q7 days (last level was obtained after a 6 week gap after his ankle surgery with a 6 week gap, no antibodies upon restart).  Current clinical disease activity: Remission   Current endoscopic disease activity: 9/1/23 (with CRS during EUA) Colonoscopy was the performed and the colonoscopy easily advanced to the ileocecal valve. The terminal ileum was entered and there was not obvious disease. There were no polyps and the preparation was good. The colonoscopy was gradually withdrawn over 10 minutes and retroflexion was performed with no obvious rectal abnormalities with the exception of the anal disease.    No antibodies present when checked after a 6 week gap of humira after his ankle surgery. He has another surgery tomorrow for hardware removal.    He does not have any GI symptoms at this time aside from very minimal drainage from his fistula sites, and the plan is to schedule with CRS for EUA, fistulotomy and possible seton exchange with Dr. Gagan Jackson.      -- Continue humira weekly  -- Seton placement/fistulotomy per colorectal surgery  -- DEXA scan when his foot is fully healed    2. IBD dysplasia surveillance: > 1/3 of colon involved. Will need dysplasia surveillance.   -- IBD dysplasia surveillance starting in 2028    Return to clinic in 6 months.     Abdiaziz Hull PA-C  Division of Gastroenterology, Hepatology and Nutrition  [facility]     IBD HISTORY  Age at diagnosis: 30  Endoscopic extent of disease: Continuous: rectum to proximal transverse  Histologic Extent of disease: Rectum, descending    Disease phenotype: Extensive   Rose-anal disease: Yes  Prior IBD surgeries: None  Prior IBD Medications:  - Vedolizumab - not dose escalated     DRUG MONITORING  TPMT enzyme activity:  n/a  6-TGN/6-MMPN levels: n/a  Biologic concentration:   Adalimumab: 6.1 on 9/2021, no antibodies      HPI:   Here for follow up.      Current feeling well from a GI standpoint. 1-2 formed stools per day. No blood in the stool. Occasional urgency. No nighttime stools. Continues to be doing well with smoking cessation. Stopped mesalamine last visit and feels stools are more formed now.    Tomorrow he is scheduled to get his hardware out of his ankle.     He just saw CRS, planning to schedule the patient for EUA, fistulotomy and possible seton exchange with Dr. Gagan Jackson.     sIBDQ:  IBDQ Score Date IBDQ - Total Score  (Higher score better)   7/9/2024  11:05 AM 67   6/28/2023   2:05 PM 69   2/28/2022   3:33 PM 64      ROS:    Constitutional, HEENT, cardiovascular, pulmonary, GI, , musculoskeletal, neuro, skin, endocrine and psych systems are negative, except as otherwise noted.    PHYSICAL EXAMINATION:  Constitutional: aaox3, cooperative, pleasant, not dyspneic/diaphoretic, no acute distress  Vitals reviewed: There were no vitals taken for this visit.  Wt:   Wt Readings from Last 2 Encounters:   07/09/24 99.8 kg (220 lb)   12/01/23 99.3 kg (219 lb)      Constitutional - general appearance is well and in no acute distress.  Eyes - sunglasses on   Respiratory - No cough, unlabored breathing  Musculoskeletal - range of motion intact: Neck and arms  Skin - No discoloration or lesions visible - pt reports red pustule/papule unable to see via video  Neurological - No tremors, headaches  Psychiatric - No anxiety, alert & oriented    PERTINENT PAST MEDICAL HISTORY:  Past Medical History:   Diagnosis Date    Anal fistula     Crohn's disease (H)     HTN (hypertension)       Inflammatory bowel disease  Hypertension     PREVIOUS  SURGERIES:  Past Surgical History:   Procedure Laterality Date    COLONOSCOPY  06/15/2020    COLONOSCOPY N/A 9/1/2023    Procedure: COLONOSCOPY;  Surgeon: Vidhya Hamm MD;  Location: [facility] OR    EXAM UNDER ANESTHESIA ANUS N/A 11/19/2021    Procedure: EXAM UNDER ANESTHESIA, ANUS;  Surgeon: Vidhya Hamm MD;  Location: [facility] OR    EXAM UNDER ANESTHESIA ANUS N/A 7/8/2022    Procedure: EXAM UNDER ANESTHESIA, ANUS;  Surgeon: Vidhya Hamm MD;  Location: [facility] OR    EXAM UNDER ANESTHESIA ANUS N/A 12/16/2022    Procedure: EXAM UNDER ANESTHESIA, ANUS, Partial Fistuotomy seton x2;  Surgeon: Vidhya Hamm MD;  Location: [facility] OR    FISTULOTOMY RECTUM N/A 7/8/2022    Procedure: partial FISTULOTOMY, RECTUM, seton replacement x2;  Surgeon: Vidhya Hamm MD;  Location: [facility] OR    FISTULOTOMY RECTUM N/A 12/16/2022    Procedure: POSSIBLE FISTULOTOMY, RECTUM;  Surgeon: Vidhya Hamm MD;  Location: [facility] OR    FISTULOTOMY RECTUM N/A 9/1/2023    Procedure: PARTIAL FISTULOTOMY, REPLACEMENT OF SETON STITCH;  Surgeon: Vidhya Hamm MD;  Location: [facility] OR    INCISION AND DRAINAGE RECTUM, COMBINED N/A 09/06/2021    Procedure: INCISION AND DRAINAGE, RECTUM, placement of Seton;  Surgeon: Vidhya Hamm MD;  [facility]    IRRIGATION AND DEBRIDEMENT PERINEAL, COMBINED N/A 9/1/2023    Procedure: Examination under anesthesia, Debridement of fistula tract;  Surgeon: Vidhya Hamm MD;  Location: [facility] OR    PLACEMENT OF SETON RECTUM N/A 11/19/2021    Procedure: PLACEMENT OF SETON RECTUM;  Surgeon: Vidhya Hamm MD;  Location: [facility] OR    SIGMOIDOSCOPY,BIOPSY  09/24/2020     ALLERGIES:   No Known Allergies    PERTINENT MEDICATIONS:    Current Outpatient Medications:     Adalimumab (HUMIRA, 2 PEN,) 40 MG/0.4ML AJKT, Inject 40 mg subcutaneously every 7 days., Disp: 4 each, Rfl: 5    amLODIPine (NORVASC) 5 MG tablet, Take 5  mg by mouth every morning, Disp: , Rfl:     lisinopril (ZESTRIL) 20 MG tablet, Take 20 mg by mouth every morning , Disp: , Rfl:     mesalamine (LIALDA) 1.2 g DR tablet, Take 4 tablets (4,800 mg) by mouth every morning, Disp: 120 tablet, Rfl: 5    SOCIAL HISTORY:  Social History     Socioeconomic History    Marital status: Single     Spouse name: Not on file    Number of children: Not on file    Years of education: Not on file    Highest education level: Not on file   Occupational History    Not on file   Tobacco Use    Smoking status: Former     Current packs/day: 0.00     Average packs/day: 0.5 packs/day for 2.6 years (1.3 ttl pk-yrs)     Types: Cigarettes     Start date:      Quit date: 2020     Years since quittin.2    Smokeless tobacco: Never   Vaping Use    Vaping status: Never Used   Substance and Sexual Activity    Alcohol use: Yes     Alcohol/week: 2.0 standard drinks of alcohol     Types: 2 Cans of beer per week    Drug use: Never    Sexual activity: Not on file   Other Topics Concern    Not on file   Social History Narrative    Not on file     Social Drivers of Health     Financial Resource Strain: High Risk (2022)    [facility]    Financial Resource Strain     Difficulty of Paying Living Expenses: Not on file     Difficulty of Paying Living Expenses: Not on file   Food Insecurity: Not on file   Transportation Needs: Not on file   Physical Activity: Not on file   Stress: Not on file   Social Connections: Unknown (2023)    Received from [facility], [facility]    Social Connections     Frequency of Communication with Friends and Family: Not on file   Interpersonal Safety: Not on file   Housing Stability: Not on file       FAMILY HISTORY:  Family History   Problem Relation Age of Onset    Multiple Sclerosis Mother     Ulcerative  Colitis Maternal Grandmother     Colon Cancer No family hx of     Inflammatory Bowel Disease No family hx of     Irritable Bowel Syndrome No family hx of     Crohn's Disease No family hx of     Anesthesia Reaction No family hx of     Bleeding Disorder No family hx of     Clotting Disorder No family hx of      No family history of IBD  Past/family/social history reviewed and no changes

## 2024-10-29 NOTE — NURSING NOTE
Current patient location: Patient declined to provide     Is the patient currently in the state of [place]? YES    Visit mode:TELEPHONE    If the visit is dropped, the patient can be reconnected by: TELEPHONE VISIT: Phone number:   Telephone Information:   Mobile [phone]       Will anyone else be joining the visit? NO  (If patient encounters technical issues they should call [phone])    Are changes needed to the allergy or medication list? No    Are refills needed on medications prescribed by this physician? NO    Rooming Documentation:  Not applicable    Reason for visit: RECHECK    Rosio BAEZ

## 2024-10-29 NOTE — PATIENT INSTRUCTIONS
It was a pleasure taking care of you today.  I've included a brief summary of our discussion and care plan from today's visit below.  Please review this information with your primary care provider.  ______________________________________________________________________    My recommendations are summarized as follows:    -- Continue with humira every week  -- Labs every 3 months, due   -- Next endoscopic assessment: 2028  -- Patient with IBD we recommend supplementation vitamin D 1000 units daily  -- Vaccines/immunizations to be updated: flu shot yearly, pneumonia, shingles, COVID series, tetanus  -- Yearly Dermatology visit for skin check while on immunosuppressive therapy. Can call [phone] to schedule.  -- No NSAIDs (ibuprofen, or anything containing ibuprofen)     DEXA scan to be scheduled when your ankle is fully healed  You can schedule your DEXA scan by calling [phone].  The scan is done on the first floor at the [facility].  The appointment is 30 minutes.  It is recommended that you wear light clothing, no zippers and no metal (including underwire of bra)  You are to withhold your calcium supplement for 24 hours prior to the scan.    For additional resources about inflammatory bowel disease visit [email or web address]    Return to GI Clinic in 6 months to review your progress.    ______________________________________________________________________    How do I schedule labs, imaging studies, or procedures that were ordered in clinic today?     Labs: To schedule lab appointment at the [facility], use my chart or call [phone]. If you have a [place] lab closer to home where you are regularly seen you can give them a call.     Procedures: If a colonoscopy, upper endoscopy, breath test, esophageal manometry, or pH impedence was ordered today, our endoscopy team will call you to schedule this. If you have not heard from our endoscopy team  within a week, please call [phone] to schedule.     Imaging Studies: If you were scheduled for a CT scan, X-ray, MRI, ultrasound, HIDA scan or other imaging study, please call [phone] to have this scheduled.     Referral: If a referral to another specialty was ordered, expect a phone call or follow instructions above. If you have not heard from anyone regarding your referral in a week, please call our clinic to check the status.     Who do I call with any questions after my visit?  Please be in touch if there are any further questions that arise following today's visit.  There are multiple ways to contact your gastroenterology care team.      During business hours, you may reach a Gastroenterology nurse at [phone]    To schedule or reschedule an appointment, please call [phone].     You can always send a secure message through [employer].  [employer] messages are answered by your nurse or doctor typically within 24 hours.  Please allow extra time on weekends and holidays.      For urgent/emergent questions after business hours, you may reach the on-call GI Fellow by contacting the [facility]  at [phone].     How will I get the results of any tests ordered?    You will receive all of your results.  If you have signed up for [employer], any tests ordered at your visit will be available to you after your physician reviews them.  Typically this takes 1-2 weeks.  If there are urgent results that require a change in your care plan, your physician or nurse will call you to discuss the next steps.      What is [employer]?  [employer] is a secure way for you to access all of your healthcare records from the [facility].  It is a web based computer program, so you can sign on to it from any location.  It also allows you to send secure messages to your care team.  I recommend signing up for [employer] access if you have not already done so and are comfortable with using a computer.          Sincerely,    Abdiaziz Hull PA-C  [facility]  Division of Gastroenterology

## 2024-10-29 NOTE — LETTER
10/29/2024      Cody Pickard  [id number] [address] [place] [id number]      Dear Colleague,    Thank you for referring your patient, Cody Pickard, to the [facility] [place]. Please see a copy of my visit note below.    Virtual Visit Details    Type of service:  Telephone Visit   Phone call duration: 16 minutes   Originating Location (pt. Location): Home    Distant Location (provider location):  Off-site    IBD CLINIC VISIT    CC/REFERRING MD:  Referred Self  REASON FOR CONSULTATION: Colitis    ASSESSMENT/PLAN:  34 year old male with Crohn's disease.     1. Crohn's disease:   Current medication:    - Humira/adalimumab q7 days (last level was obtained after a 6 week gap after his ankle surgery with a 6 week gap, no antibodies upon restart).  Current clinical disease activity: Remission   Current endoscopic disease activity: 9/1/23 (with CRS during EUA) Colonoscopy was the performed and the colonoscopy easily advanced to the ileocecal valve. The terminal ileum was entered and there was not obvious disease. There were no polyps and the preparation was good. The colonoscopy was gradually withdrawn over 10 minutes and retroflexion was performed with no obvious rectal abnormalities with the exception of the anal disease.    No antibodies present when checked after a 6 week gap of humira after his ankle surgery. He has another surgery tomorrow for hardware removal.    He does not have any GI symptoms at this time aside from very minimal drainage from his fistula sites, and the plan is to schedule with CRS for EUA, fistulotomy and possible seton exchange with Dr. Gagan Jackson.      -- Continue humira weekly  -- Seton placement/fistulotomy per colorectal surgery  -- DEXA scan when his foot is fully healed    2. IBD dysplasia surveillance: > 1/3 of colon involved. Will need dysplasia surveillance.   -- IBD dysplasia surveillance starting in 2028    Return to clinic in 6 months.     Abdiaziz  RICHIE Hull PA-C  Division of Gastroenterology, Hepatology and Nutrition  [facility]     IBD HISTORY  Age at diagnosis: 30  Endoscopic extent of disease: Continuous: rectum to proximal transverse  Histologic Extent of disease: Rectum, descending   Disease phenotype: Extensive   Rose-anal disease: Yes  Prior IBD surgeries: None  Prior IBD Medications:  - Vedolizumab - not dose escalated     DRUG MONITORING  TPMT enzyme activity:  n/a  6-TGN/6-MMPN levels: n/a  Biologic concentration:   Adalimumab: 6.1 on 9/2021, no antibodies      HPI:   Here for follow up.      Current feeling well from a GI standpoint. 1-2 formed stools per day. No blood in the stool. Occasional urgency. No nighttime stools. Continues to be doing well with smoking cessation. Stopped mesalamine last visit and feels stools are more formed now.    Tomorrow he is scheduled to get his hardware out of his ankle.     He just saw CRS, planning to schedule the patient for EUA, fistulotomy and possible seton exchange with Dr. Gagan Jackson.     sIBDQ:  IBDQ Score Date IBDQ - Total Score  (Higher score better)   7/9/2024  11:05 AM 67   6/28/2023   2:05 PM 69   2/28/2022   3:33 PM 64      ROS:    Constitutional, HEENT, cardiovascular, pulmonary, GI, , musculoskeletal, neuro, skin, endocrine and psych systems are negative, except as otherwise noted.    PHYSICAL EXAMINATION:  Constitutional: aaox3, cooperative, pleasant, not dyspneic/diaphoretic, no acute distress  Vitals reviewed: There were no vitals taken for this visit.  Wt:   Wt Readings from Last 2 Encounters:   07/09/24 99.8 kg (220 lb)   12/01/23 99.3 kg (219 lb)      Constitutional - general appearance is well and in no acute distress.  Eyes - sunglasses on   Respiratory - No cough, unlabored breathing  Musculoskeletal - range of motion intact: Neck and arms  Skin - No discoloration or lesions visible - pt reports red pustule/papule unable to see via video  Neurological - No tremors,  headaches  Psychiatric - No anxiety, alert & oriented    PERTINENT PAST MEDICAL HISTORY:  Past Medical History:   Diagnosis Date     Anal fistula      Crohn's disease (H)      HTN (hypertension)       Inflammatory bowel disease  Hypertension     PREVIOUS SURGERIES:  Past Surgical History:   Procedure Laterality Date     COLONOSCOPY  06/15/2020     COLONOSCOPY N/A 9/1/2023    Procedure: COLONOSCOPY;  Surgeon: Vidhya Hamm MD;  Location: [facility] OR     EXAM UNDER ANESTHESIA ANUS N/A 11/19/2021    Procedure: EXAM UNDER ANESTHESIA, ANUS;  Surgeon: Vidhya Hamm MD;  Location: [facility] OR     EXAM UNDER ANESTHESIA ANUS N/A 7/8/2022    Procedure: EXAM UNDER ANESTHESIA, ANUS;  Surgeon: Vidhya Hamm MD;  Location: [facility] OR     EXAM UNDER ANESTHESIA ANUS N/A 12/16/2022    Procedure: EXAM UNDER ANESTHESIA, ANUS, Partial Fistuotomy seton x2;  Surgeon: Vidhya Hamm MD;  Location: [facility] OR     FISTULOTOMY RECTUM N/A 7/8/2022    Procedure: partial FISTULOTOMY, RECTUM, seton replacement x2;  Surgeon: Vidhya Hamm MD;  Location: [facility] OR     FISTULOTOMY RECTUM N/A 12/16/2022    Procedure: POSSIBLE FISTULOTOMY, RECTUM;  Surgeon: Vidhya Hamm MD;  Location: [facility] OR     FISTULOTOMY RECTUM N/A 9/1/2023    Procedure: PARTIAL FISTULOTOMY, REPLACEMENT OF SETON STITCH;  Surgeon: Vidhya Hamm MD;  Location: [facility] OR     INCISION AND DRAINAGE RECTUM, COMBINED N/A 09/06/2021    Procedure: INCISION AND DRAINAGE, RECTUM, placement of Seton;  Surgeon: Vidhya Hamm MD;  [facility]     IRRIGATION AND DEBRIDEMENT PERINEAL, COMBINED N/A 9/1/2023    Procedure: Examination under anesthesia, Debridement of fistula tract;  Surgeon: Vidhya Hamm MD;  Location: [facility] OR     PLACEMENT OF SETON RECTUM N/A 11/19/2021    Procedure: PLACEMENT OF SETON RECTUM;  Surgeon: Vidhya Hamm MD;  Location: [facility] OR     SIGMOIDOSCOPY,BIOPSY   2020     ALLERGIES:   No Known Allergies    PERTINENT MEDICATIONS:    Current Outpatient Medications:      Adalimumab (HUMIRA, 2 PEN,) 40 MG/0.4ML AJKT, Inject 40 mg subcutaneously every 7 days., Disp: 4 each, Rfl: 5     amLODIPine (NORVASC) 5 MG tablet, Take 5 mg by mouth every morning, Disp: , Rfl:      lisinopril (ZESTRIL) 20 MG tablet, Take 20 mg by mouth every morning , Disp: , Rfl:      mesalamine (LIALDA) 1.2 g DR tablet, Take 4 tablets (4,800 mg) by mouth every morning, Disp: 120 tablet, Rfl: 5    SOCIAL HISTORY:  Social History     Socioeconomic History     Marital status: Single     Spouse name: Not on file     Number of children: Not on file     Years of education: Not on file     Highest education level: Not on file   Occupational History     Not on file   Tobacco Use     Smoking status: Former     Current packs/day: 0.00     Average packs/day: 0.5 packs/day for 2.6 years (1.3 ttl pk-yrs)     Types: Cigarettes     Start date:      Quit date: 2020     Years since quittin.2     Smokeless tobacco: Never   Vaping Use     Vaping status: Never Used   Substance and Sexual Activity     Alcohol use: Yes     Alcohol/week: 2.0 standard drinks of alcohol     Types: 2 Cans of beer per week     Drug use: Never     Sexual activity: Not on file   Other Topics Concern     Not on file   Social History Narrative     Not on file     Social Drivers of Health     Financial Resource Strain: High Risk (2022)    [facility]    Financial Resource Strain      Difficulty of Paying Living Expenses: Not on file      Difficulty of Paying Living Expenses: Not on file   Food Insecurity: Not on file   Transportation Needs: Not on file   Physical Activity: Not on file   Stress: Not on file   Social Connections: Unknown (2023)    [facility], [facility]  [facility]    Social Connections      Frequency of Communication with Friends and Family: Not on file   Interpersonal Safety: Not on file   Housing Stability: Not on file       FAMILY HISTORY:  Family History   Problem Relation Age of Onset     Multiple Sclerosis Mother      Ulcerative Colitis Maternal Grandmother      Colon Cancer No family hx of      Inflammatory Bowel Disease No family hx of      Irritable Bowel Syndrome No family hx of      Crohn's Disease No family hx of      Anesthesia Reaction No family hx of      Bleeding Disorder No family hx of      Clotting Disorder No family hx of      No family history of IBD  Past/family/social history reviewed and no changes            Again, thank you for allowing me to participate in the care of your patient.        Sincerely,        Abdiaziz Hull PA-C

## 2024-11-01 NOTE — PROGRESS NOTES
Colon and Rectal Surgery Follow-up Clinic Note    Referring provider:  No referring provider defined for this encounter.       RE: Cody Pickard  : 1990  WIL: 10/23/2024      Cody Pickard is a very pleasant 34 year old male with a history of Crohn's disease with complex anal fistula s/p EUA with debridement of fistula tract, partial fistulotomy and seton exchange x2 and colonoscopy done 23. He is here to discuss possible fistulotomy of one of the setons.     Interval history: Patient was seen in clinic by REAGAN Khan 10/11/2024 where he had his two setons exchanged in clinic. Patient reports that his drainage has been unchanged. Wants to know if he can't get at least one of the setons out.       PLEASE SEE NOTE BELOW FOR PHYSICAL EXAMINATION, REVIEW OF SYSTEMS, AND OTHER HISTORY.    Assessment/Plan: 34 year old male with Crohn's disease with complex anal fistula s/p EUA with debridement of fistula tract, partial fistulotomy and seton exchange x2 and colonoscopy done 23. Patient is currently on Humira injection once a week for Crohn's which seems to have significantly improved perianal disease. Will schedule the patient for EUA, fistulotomy and possible seton exchange with me. Patient is agreeable to this plan. Answered all his questions to his satisfaction. Encouraged to reach out with any additional questions or concerns.       25 minutes spent on the date of encounter performing chart review, history and exam, documentation and further activities as noted above.    Vidhya Hamm MD  Colon and Rectal Surgery Staff  [facility]    -------------------------------------------------------------------------------------------------------------------    Medical history:  Past Medical History:   Diagnosis Date    Anal fistula     Crohn's disease (H)     HTN (hypertension)        Surgical history:  Past Surgical History:   Procedure Laterality Date     COLONOSCOPY  06/15/2020    COLONOSCOPY N/A 9/1/2023    Procedure: COLONOSCOPY;  Surgeon: Vidhya Hamm MD;  Location: [facility] OR    EXAM UNDER ANESTHESIA ANUS N/A 11/19/2021    Procedure: EXAM UNDER ANESTHESIA, ANUS;  Surgeon: Vidhya Hamm MD;  Location: [facility] OR    EXAM UNDER ANESTHESIA ANUS N/A 7/8/2022    Procedure: EXAM UNDER ANESTHESIA, ANUS;  Surgeon: Vidhya Hamm MD;  Location: [facility] OR    EXAM UNDER ANESTHESIA ANUS N/A 12/16/2022    Procedure: EXAM UNDER ANESTHESIA, ANUS, Partial Fistuotomy seton x2;  Surgeon: Vidhya Hamm MD;  Location: [facility] OR    FISTULOTOMY RECTUM N/A 7/8/2022    Procedure: partial FISTULOTOMY, RECTUM, seton replacement x2;  Surgeon: Vidhya Hamm MD;  Location: [facility]    FISTULOTOMY RECTUM N/A 12/16/2022    Procedure: POSSIBLE FISTULOTOMY, RECTUM;  Surgeon: Vidhya Hamm MD;  Location: [facility] OR    FISTULOTOMY RECTUM N/A 9/1/2023    Procedure: PARTIAL FISTULOTOMY, REPLACEMENT OF SETON STITCH;  Surgeon: Vidhya Hamm MD;  Location: [facility] OR    INCISION AND DRAINAGE RECTUM, COMBINED N/A 09/06/2021    Procedure: INCISION AND DRAINAGE, RECTUM, placement of Seton;  Surgeon: Vidhya Hamm MD;  [facility]    IRRIGATION AND DEBRIDEMENT PERINEAL, COMBINED N/A 9/1/2023    Procedure: Examination under anesthesia, Debridement of fistula tract;  Surgeon: Vidhya Hamm MD;  Location: [facility] OR    PLACEMENT OF SETON RECTUM N/A 11/19/2021    Procedure: PLACEMENT OF SETON RECTUM;  Surgeon: Vidhya Hamm MD;  Location: [facility] OR    SIGMOIDOSCOPY,BIOPSY  09/24/2020       Problem list:    Patient Active Problem List    Diagnosis Date Noted    Anorectal fistula 10/23/2024     Priority: Medium    Crohn's disease with fistula, unspecified gastrointestinal tract location (H) 07/19/2023     Priority: Medium    Anal fistula 11/09/2021     Priority: Medium     Added automatically from  request for surgery [id number]      Acute post-operative pain 2021     Priority: Medium    Perianal abscess 2021     Priority: Medium    Perianal Crohn's disease, with abscess (H) 2021     Priority: Medium    IBD (inflammatory bowel disease) 2021     Priority: Medium       Medications:  Current Outpatient Medications   Medication Sig Dispense Refill    Adalimumab (HUMIRA, 2 PEN,) 40 MG/0.4ML AJKT Inject 40 mg subcutaneously every 7 days. 4 each 5    amLODIPine (NORVASC) 5 MG tablet Take 5 mg by mouth every morning      lisinopril (ZESTRIL) 20 MG tablet Take 20 mg by mouth every morning       mesalamine (LIALDA) 1.2 g DR tablet Take 4 tablets (4,800 mg) by mouth every morning 120 tablet 5       Allergies:  No Known Allergies    Family history:  Family History   Problem Relation Age of Onset    Multiple Sclerosis Mother     Ulcerative Colitis Maternal Grandmother     Colon Cancer No family hx of     Inflammatory Bowel Disease No family hx of     Irritable Bowel Syndrome No family hx of     Crohn's Disease No family hx of     Anesthesia Reaction No family hx of     Bleeding Disorder No family hx of     Clotting Disorder No family hx of        Social history:  Social History     Socioeconomic History    Marital status: Single     Spouse name: Not on file    Number of children: Not on file    Years of education: Not on file    Highest education level: Not on file   Occupational History    Not on file   Tobacco Use    Smoking status: Former     Current packs/day: 0.00     Average packs/day: 0.5 packs/day for 2.6 years (1.3 ttl pk-yrs)     Types: Cigarettes     Start date:      Quit date: 2020     Years since quittin.2    Smokeless tobacco: Never   Vaping Use    Vaping status: Never Used   Substance and Sexual Activity    Alcohol use: Yes     Alcohol/week: 2.0 standard drinks of alcohol     Types: 2 Cans of beer per week    Drug use: Never    Sexual activity: Not on file   Other Topics  Concern    Not on file   Social History Narrative    Not on file     Social Drivers of Health     Financial Resource Strain: High Risk (1/1/2022)    [facility], [email or web address]    Financial Resource Strain     Difficulty of Paying Living Expenses: Not on file     Difficulty of Paying Living Expenses: Not on file   Food Insecurity: Not on file   Transportation Needs: Not on file   Physical Activity: Not on file   Stress: Not on file   Social Connections: Unknown (5/2/2023)    Received from [employer] [facility], [employer] [facility]    Social Connections     Frequency of Communication with Friends and Family: Not on file   Interpersonal Safety: Not on file   Housing Stability: Not on file         Nursing Notes:   Des Trammell, EMT  10/23/2024  3:31 PM  Signed  Chief Complaint   Patient presents with    Follow Up       Vitals:    10/23/24 1530   BP: 121/79   BP Location: Left arm   Patient Position: Sitting   Cuff Size: Adult Large   Pulse: 90   SpO2: 99%       There is no height or weight on file to calculate BMI.    Des Trammell EMT-P       Physical Examination: chaperoned by REAGAN Khan  /79 (BP Location: Left arm, Patient Position: Sitting, Cuff Size: Adult Large)   Pulse 90   SpO2 99%   General: alert and sitting comfortably in his chair   Perianal external examination:  1 vessel loop seton in posterior midline with an additional yellow vessel loop seton as a counter seton to the left posterior that does not involve muscle - this could possibly be removed.     Digital rectal examination: Was deferred.    Anoscopy: Was deferred.

## 2024-11-08 ENCOUNTER — VIRTUAL VISIT (OUTPATIENT)
Dept: PHARMACY | Facility: CLINIC | Age: 34
End: 2024-11-08
Attending: PHYSICIAN ASSISTANT
Payer: COMMERCIAL

## 2024-11-08 VITALS — WEIGHT: 220 LBS | HEIGHT: 69 IN | BODY MASS INDEX: 32.58 KG/M2

## 2024-11-08 DIAGNOSIS — K50.111 CROHN'S DISEASE OF LARGE INTESTINE WITH RECTAL BLEEDING (H): Primary | ICD-10-CM

## 2024-11-08 ASSESSMENT — PAIN SCALES - GENERAL: PAINLEVEL_OUTOF10: NO PAIN (0)

## 2024-11-08 NOTE — PATIENT INSTRUCTIONS
"Recommendations from today's MTM visit:                                                       Continue Humira 40 mg subcutaneously weekly. Labs next due in January.       Cody to consider the following vaccines:              - seasonal influenza              - COVID-19               - Prevnar-20              - confirmation of varicella status for consideration of Shingrix series              - hepatitis A series (if desired)    -- Please let us know if you have questions or would like to discuss this plan further.    Follow-up:    -- 6 months for MTM, sooner if needed    It was great speaking with you today.  I value your experience and would be very thankful for your time in providing feedback in our clinic survey. In the next few days, you may receive an email or text message from [facility] with a link to a survey related to your  clinical pharmacist.\"     To schedule another MTM appointment, please call the clinic directly or you may call the MTM scheduling line at [phone].    My Clinical Pharmacist's contact information:                                                      Please feel free to contact me with any questions or concerns you have.      Lo GottliebD, BCACP  MTM Pharmacist   [facility]   Phone: [phone]    "

## 2024-11-08 NOTE — PROGRESS NOTES
Medication Therapy Management (MTM) Encounter    ASSESSMENT:                            Medication Adherence/Access: No issues identified.    Crohn's Disease: Cody would benefit from continuing on weekly Humira as maintenance therapy. He is up-to-date on labs and has provider follow-up as well as follow-up with CRS in place. Will renew standing labs as they will  prior to next draw. Provided reminders for vaccinations which he will continue to consider and let us know if he is interested in moving forward with them. He had a gap in therapy for surgery earlier this year, but fortunately did not show evidence of antibodies during re-exposure.    PLAN:                            Continue Humira 40 mg subcutaneously weekly. Labs next due in January.       Cody to consider the following vaccines:              - seasonal influenza              - COVID-19               - Prevnar-20              - confirmation of varicella status for consideration of Shingrix series              - hepatitis A series (if desired)    -- Please let us know if you have questions or would like to discuss this plan further.    Follow-up:    -- 6 months for MTM, sooner if needed    SUBJECTIVE/OBJECTIVE:                          Cody Pickard is a 34 year old male seen for a follow-up visit.       Reason for visit: Humira / azathioprine check-in    Allergies/ADRs: None  Tobacco: He reports that he quit smoking about 4 years ago. His smoking use included cigarettes. He started smoking about 6 years ago. He has a 1.3 pack-year smoking history. He has never used smokeless tobacco.    Medication Adherence/Access: no issues reported  -- Humira renewed in October for six month supply    Crohn's Disease:   Humira 40 mg subcutaneously every 7 days  Mesalamine 4.8 g daily (stopped)    He has recently had gaps in Humira treatment due to surgery. He met with his IBD provider last week. There is a plan to meet with CRS for seton placement/fistulotomy. Did  take his Humira dose last week after surgery, and then forgot last night so did a dose this morning.     Current clinical disease activity: Remission   Current endoscopic disease activity: 23 (with CRS during EUA) Colonoscopy was the performed and the colonoscopy easily advanced to the ileocecal valve. The terminal ileum was entered and there was not obvious disease. There were no polyps and the preparation was good. The colonoscopy was gradually withdrawn over 10 minutes and retroflexion was performed with no obvious rectal abnormalities with the exception of the anal disease. Humira level checked during re-load given gap in therapy for surgery.    Adalimumab level:  Lab Results   Component Value Date    ADACON 11.6 2024    ADAABY <1.7 2024     Last provider visit: 10/29/2024 with Abdiaziz Hull PA-C  Next provider visit: 2025 with Abdiaziz Hull PA-C  Last labs completed: 10/2024  Lab frequency: every 3 months   - standing labs available until 2025  Next labs due: 2025  Last TB screenin2024  PDC: 52% (Humira)    ----------------    I spent 5 minutes with this patient today. All changes were made via collaborative practice agreement with Abdiaziz Hull. A copy of the visit note was provided to the patient's provider(s).    A summary of these recommendations was sent via [employer].    Lo GottliebD, BCACP  MTM Pharmacist   [facility]   Phone: [phone]    Telemedicine Visit Details  The patient's medications can be safely assessed via a telemedicine encounter.  Type of service:  Telephone visit  Originating Location (pt. Location): Home    Distant Location (provider location):  Off-site  Start Time: 9:35 AM  End Time: 9:39 AM     Medication Therapy Recommendations  No medication therapy recommendations to display

## 2024-11-08 NOTE — NURSING NOTE
Current patient location:  Pt at work    Is the patient currently in the state of [place]? YES    Visit mode:TELEPHONE    If the visit is dropped, the patient can be reconnected by: TELEPHONE VISIT: Phone number:   Telephone Information:   Mobile [phone]       Will anyone else be joining the visit? NO  (If patient encounters technical issues they should call [phone]    Are changes needed to the allergy or medication list? No    Are refills needed on medications prescribed by this physician? NO    Rooming Documentation:  Not applicable    Reason for visit: RECHECK    Luh BAEZ

## 2024-11-23 ENCOUNTER — TRANSFERRED RECORDS (OUTPATIENT)
Dept: HEALTH INFORMATION MANAGEMENT | Facility: CLINIC | Age: 34
End: 2024-11-23

## 2024-12-06 ENCOUNTER — TRANSFERRED RECORDS (OUTPATIENT)
Dept: HEALTH INFORMATION MANAGEMENT | Facility: CLINIC | Age: 34
End: 2024-12-06

## 2024-12-12 ENCOUNTER — TRANSFERRED RECORDS (OUTPATIENT)
Dept: HEALTH INFORMATION MANAGEMENT | Facility: CLINIC | Age: 34
End: 2024-12-12

## 2025-01-02 ENCOUNTER — LAB (OUTPATIENT)
Dept: LAB | Facility: CLINIC | Age: 35
End: 2025-01-02
Payer: COMMERCIAL

## 2025-01-02 ENCOUNTER — TELEPHONE (OUTPATIENT)
Dept: SURGERY | Facility: CLINIC | Age: 35
End: 2025-01-02

## 2025-01-02 DIAGNOSIS — K60.30 ANAL FISTULA: Primary | ICD-10-CM

## 2025-01-02 DIAGNOSIS — K60.30 ANAL FISTULA: ICD-10-CM

## 2025-01-02 LAB
ANION GAP SERPL CALCULATED.3IONS-SCNC: 10 MMOL/L (ref 7–15)
BUN SERPL-MCNC: 16.4 MG/DL (ref 6–20)
CALCIUM SERPL-MCNC: 8.6 MG/DL (ref 8.8–10.4)
CHLORIDE SERPL-SCNC: 102 MMOL/L (ref 98–107)
CREAT SERPL-MCNC: 0.83 MG/DL (ref 0.67–1.17)
EGFRCR SERPLBLD CKD-EPI 2021: >90 ML/MIN/1.73M2
GLUCOSE SERPL-MCNC: 93 MG/DL (ref 70–99)
HCO3 SERPL-SCNC: 26 MMOL/L (ref 22–29)
POTASSIUM SERPL-SCNC: 4.5 MMOL/L (ref 3.4–5.3)
SODIUM SERPL-SCNC: 138 MMOL/L (ref 135–145)

## 2025-01-02 NOTE — TELEPHONE ENCOUNTER
Received incoming call from Cody regarding concern of high potassium. Cody went in for preop H&P at [facility] on 12/30 for upcoming procedure. Potassium was checked and came back at 5.3. Dr. Rader then ordered recheck for today 1/2, which resulted at 5.5 (do not have access to these records). Pt was told he received a call from preop office stating there was a concern about having to cancel surgery.    Spoke with Dr. Hamm regarding lab results. Plan to order BMP recheck today to determine if safe to proceed with surgery tomorrow. Pt scheduled for 5pm labs.

## 2025-01-03 ENCOUNTER — HOSPITAL ENCOUNTER (OUTPATIENT)
Facility: AMBULATORY SURGERY CENTER | Age: 35
Discharge: HOME | End: 2025-01-03
Attending: COLON & RECTAL SURGERY
Payer: COMMERCIAL

## 2025-01-03 VITALS
HEART RATE: 61 BPM | RESPIRATION RATE: 16 BRPM | BODY MASS INDEX: 32.58 KG/M2 | TEMPERATURE: 97.6 F | SYSTOLIC BLOOD PRESSURE: 115 MMHG | WEIGHT: 220 LBS | DIASTOLIC BLOOD PRESSURE: 69 MMHG | HEIGHT: 69 IN | OXYGEN SATURATION: 99 %

## 2025-01-03 DIAGNOSIS — K50.913 CROHN'S DISEASE WITH FISTULA, UNSPECIFIED GASTROINTESTINAL TRACT LOCATION (H): Primary | ICD-10-CM

## 2025-01-03 RX ORDER — NALOXONE HYDROCHLORIDE 0.4 MG/ML
0.1 INJECTION, SOLUTION INTRAMUSCULAR; INTRAVENOUS; SUBCUTANEOUS
Status: DISCONTINUED | OUTPATIENT
Start: 2025-01-03 | End: 2025-01-04 | Stop reason: HOSPADM

## 2025-01-03 RX ORDER — SODIUM CHLORIDE, SODIUM LACTATE, POTASSIUM CHLORIDE, CALCIUM CHLORIDE 600; 310; 30; 20 MG/100ML; MG/100ML; MG/100ML; MG/100ML
INJECTION, SOLUTION INTRAVENOUS CONTINUOUS
Status: DISCONTINUED | OUTPATIENT
Start: 2025-01-03 | End: 2025-01-04 | Stop reason: HOSPADM

## 2025-01-03 RX ORDER — ONDANSETRON 2 MG/ML
4 INJECTION INTRAMUSCULAR; INTRAVENOUS EVERY 30 MIN PRN
Status: DISCONTINUED | OUTPATIENT
Start: 2025-01-03 | End: 2025-01-04 | Stop reason: HOSPADM

## 2025-01-03 RX ORDER — ONDANSETRON 4 MG/1
4 TABLET, ORALLY DISINTEGRATING ORAL EVERY 30 MIN PRN
Status: DISCONTINUED | OUTPATIENT
Start: 2025-01-03 | End: 2025-01-04 | Stop reason: HOSPADM

## 2025-01-03 RX ORDER — OXYCODONE HYDROCHLORIDE 5 MG/1
10 TABLET ORAL
Status: DISCONTINUED | OUTPATIENT
Start: 2025-01-03 | End: 2025-01-04 | Stop reason: HOSPADM

## 2025-01-03 RX ORDER — HYDROMORPHONE HYDROCHLORIDE 1 MG/ML
0.4 INJECTION, SOLUTION INTRAMUSCULAR; INTRAVENOUS; SUBCUTANEOUS EVERY 5 MIN PRN
Status: DISCONTINUED | OUTPATIENT
Start: 2025-01-03 | End: 2025-01-04 | Stop reason: HOSPADM

## 2025-01-03 RX ORDER — CHOLECALCIFEROL (VITAMIN D3) 50 MCG
1 TABLET ORAL
COMMUNITY
Start: 2024-05-24

## 2025-01-03 RX ORDER — DEXAMETHASONE SODIUM PHOSPHATE 10 MG/ML
4 INJECTION, SOLUTION INTRAMUSCULAR; INTRAVENOUS
Status: DISCONTINUED | OUTPATIENT
Start: 2025-01-03 | End: 2025-01-04 | Stop reason: HOSPADM

## 2025-01-03 RX ORDER — OXYCODONE HYDROCHLORIDE 5 MG/1
5 TABLET ORAL EVERY 6 HOURS PRN
Qty: 6 TABLET | Refills: 0 | Status: SHIPPED | OUTPATIENT
Start: 2025-01-03 | End: 2025-01-06

## 2025-01-03 RX ORDER — ONDANSETRON 2 MG/ML
4 INJECTION INTRAMUSCULAR; INTRAVENOUS ONCE
Status: COMPLETED | OUTPATIENT
Start: 2025-01-03 | End: 2025-01-03

## 2025-01-03 RX ORDER — ACETAMINOPHEN 325 MG/1
975 TABLET ORAL ONCE
Status: COMPLETED | OUTPATIENT
Start: 2025-01-03 | End: 2025-01-03

## 2025-01-03 RX ORDER — FENTANYL CITRATE 50 UG/ML
25 INJECTION, SOLUTION INTRAMUSCULAR; INTRAVENOUS EVERY 5 MIN PRN
Status: DISCONTINUED | OUTPATIENT
Start: 2025-01-03 | End: 2025-01-04 | Stop reason: HOSPADM

## 2025-01-03 RX ORDER — HYDROMORPHONE HYDROCHLORIDE 1 MG/ML
0.2 INJECTION, SOLUTION INTRAMUSCULAR; INTRAVENOUS; SUBCUTANEOUS EVERY 5 MIN PRN
Status: DISCONTINUED | OUTPATIENT
Start: 2025-01-03 | End: 2025-01-04 | Stop reason: HOSPADM

## 2025-01-03 RX ORDER — LIDOCAINE 40 MG/G
CREAM TOPICAL
Status: DISCONTINUED | OUTPATIENT
Start: 2025-01-03 | End: 2025-01-04 | Stop reason: HOSPADM

## 2025-01-03 RX ORDER — ACETAMINOPHEN 325 MG/1
650 TABLET ORAL
COMMUNITY

## 2025-01-03 RX ORDER — OXYCODONE HYDROCHLORIDE 5 MG/1
5 TABLET ORAL
Status: DISCONTINUED | OUTPATIENT
Start: 2025-01-03 | End: 2025-01-04 | Stop reason: HOSPADM

## 2025-01-03 RX ORDER — ACETAMINOPHEN 325 MG/1
975 TABLET ORAL ONCE
Status: DISCONTINUED | OUTPATIENT
Start: 2025-01-03 | End: 2025-01-04 | Stop reason: HOSPADM

## 2025-01-03 RX ORDER — FENTANYL CITRATE 50 UG/ML
50 INJECTION, SOLUTION INTRAMUSCULAR; INTRAVENOUS EVERY 5 MIN PRN
Status: DISCONTINUED | OUTPATIENT
Start: 2025-01-03 | End: 2025-01-04 | Stop reason: HOSPADM

## 2025-01-03 RX ADMIN — SODIUM CHLORIDE, SODIUM LACTATE, POTASSIUM CHLORIDE, CALCIUM CHLORIDE: 600; 310; 30; 20 INJECTION, SOLUTION INTRAVENOUS at 09:36

## 2025-01-03 RX ADMIN — ONDANSETRON 4 MG: 2 INJECTION INTRAMUSCULAR; INTRAVENOUS at 09:55

## 2025-01-03 RX ADMIN — ACETAMINOPHEN 975 MG: 325 TABLET ORAL at 09:37

## 2025-01-03 NOTE — DISCHARGE INSTRUCTIONS
Anorectal Surgery Instructions    Tylenol 975 mg given at 9:30AM.   Ok to take more after 3:30PM.        What can I expect after anorectal surgery?  Most anorectal procedures are done as outpatient surgery, and you go home the same day as the procedure. A few surgical procedures will require that you stay in the hospital for about one to three days. No matter where the procedure is done or how long or short it takes, these recommendations will help you heal and feel more comfortable.    Medicines:  The anal area is very sensitive; you can expect to have some pain for up to 2-4 weeks after the procedure. Your doctor may give you a prescription for one or more pain medications. In general, here are instructions to help provide relief.    Non-steroidal anti-inflammatory drugs (NSAIDS). Do not take these with your inflammatory bowel disease. This class of drugs includes ibuprophen and naproxen.   Narcotic pain relievers. These include Vicodin, Percocet, and Tylenol number 3. These are all prescription medications. These should be used as prescribed and as needed. Because these drugs have side effects (including constipation), you should reduce your use of these medications as tolerated as your pain improves.  Some patients find it easiest to transition away from narcotic drugs by also taking acetaminophen (Tylenol). However, it is important to realize that most narcotic pain relievers also have acetaminophen, and excessive doses of acetaminophen can be dangerous.Accordingly, you can substitute 1000 milligrams of acetaminophen (two Extra Strength Tylenol or similar generic) for any given dose of narcotic, but acetominophen should not be taken in addition to a full narcotic dose that contains acetaminophen. The maximum acceptable dose of acetaminophen is 4000 milligrams.  Refilling prescriptions. If you need additional pain medication, please call the triage nurse at [phone] during normal business hours (8 a.m. to 4  p.m., Monday though Friday) or have your pharmacy fax a refill request to [phone]. If you call after hours or on the weekends, the doctor on call may not know you personally and may not renew narcotic pain medication by phone. Call your primary care provider for all other medication refills.     Bowel function and Perineal care:  Bowel movements can be very painful. It is important to have regular bowel movements at least every other day and to keep your stool soft. Your doctor may tell you to take a stool softener, such as Colace, once or twice a day. Try to avoid constipation and/or diarrhea as this can make the pain and bleeding worse. A high fiber diet, including at least four servings of fruits or vegetables daily, will help to keep your bowel movements regular and soft. If you have trouble getting enough fiber in your daily diet, a fiber supplement can be considered, including Metamucil, Benefiber, or Citrucel, and can be bought at your local grocery store or pharmacy. It is important to drink six to eight glasses of water or juice everyday when using fiber products.    You can expect to have some bleeding after bowel movements, but it should stop soon after you wipe. Use a wet cloth or perianal pad (Tucks or Preparation H pads) to gently wipe the area after each bowel movement. Do not rub the anal area or use a lot of pressure. Using a spray bottle filled with warm water helps loosen any remaining stool. Blot gently with a soft dry cloth or tissue paper.    If possible, take a tub bath immediately after each bowel movement. Baths should be take at least 3 times daily for the first week to 10 days following your procedure. You should soak in the tub for 10 to 15 minutes each time with water as warm as you can tolerate. Even after you go back to work, it is a good idea to sit in the tub in the morning, after returning from work, and again in the evening before bedtime.    Constipation will cause you to  strain when you have a bowel movement. The hard stool will be difficult to pass, will increase pain and bleeding, and will slow down healing. If you have not had a bowel movement within 2 days, take 2 teaspoons of Milk of Magnesia in the morning. If there are no results, repeat this. Stop taking Milk of Magnesia or other laxatives if you begin to have diarrhea.    Diarrhea can occur if too much laxative is taken or for several other reasons. If you have 3 or more loose, watery stools in a 24-hour period, stop taking laxatives. Continue to eat a high fiber diet and to take bulk-forming agents such as Metamucil, Benefiber, or Citrucel. You may take Immodium as directed on the package but please call the triage nurse, [phone], if this was not discussed with you surgeon preoperatively.    If you are still having diarrhea or constipation after you have tried these recommendations, please call the triage nurse line, [phone].    Bleeding/Infection:  Infection around the anal opening is not very common. The anal area has excellent blood supply, which helps the area to heal. Bloody discharge after bowel movements is normal and may last 2 to 4 weeks after your surgery. However, if you bleed between bowel movements and cannot get it to stop, call the triage nurse immediately [phone].    Activity:  After your procedure, there are no restrictions on your activity except restrictions surrounding being on narcotics and in pain, such as no heavy machine operating or driving. You may walk, climb stairs, ride in a car, and sit as tolerated. It is helpful to avoid sitting in one position for long periods (2 or more hours). After some surgeries, you may be told not to perform any lifting (more than 10 pounds) for several weeks after surgery.    When do I need to call the doctor or triage nurse?  If you experience any of the problems listed here, call our triage nurse during business hours ([phone]). The nurse will  help you with your problem or have the doctor call you. After hours and on weekends, please call the main hospital number ([phone]) and ask for the colon and rectal surgery person on call. Some is available to help you 24 hours a day, seven days a week.  Fever greater than 101 degrees  Chills  Foul-smelling drainage  Nausea and vomiting  Diarrhea - greater than 3 water stools in 24 hours  Constipation - no bowel movement after 3 days  Severe bleeding that does not stop soon after a bowel movement  Problems with the incision, including increased pain, swelling, or redness        [facility]  Phone: [phone]    [facility]  Home Care Following Anesthesia  For 24 hours after surgery:  Get plenty of rest.  A responsible adult must stay with you for at least 24 hours after you leave the surgery center.  Do not drive or use heavy equipment.  If you have weakness or tingling, don't drive or use heavy equipment until this feeling goes away.   Do not drink alcohol.   Avoid strenuous or risky activities.  Ask for help when climbing stairs.  You may feel lightheaded.  IF so, sit for a few minutes before standing.  Have someone help you get up.   If you have nausea (feel sick to your stomach): Drink only clear liquids such as apple juice, ginger ale, broth or 7-Up.  Rest may also help.  Be sure to drink enough fluids.  Move to a regular diet as you feel able.   You may have a slight fever.  Call the doctor if your fever is over 100 F (37.7 C) (taken under the tongue) or lasts longer than 24 hours.  You may have a dry mouth, a sore throat, muscle aches or trouble sleeping. These should go away after 24 hours.  Do not make important or legal decisions.   It is recommended to avoid smoking.               Tips for taking pain medications  To get the best pain relief possible, remember these points:  Take pain medications as directed, before pain becomes severe.  Pain  medication can upset your stomach: taking it with food may help.  Constipation is a common side effect of pain medication. Drink plenty of  fluids.  Eat foods high in fiber. Take a stool softener if recommended by your doctor or pharmacist.  Do not drink alcohol, drive or operate machinery while taking pain medications.  Ask about other ways to control pain, such as with heat, ice or relaxation.    Tylenol/Acetaminophen Consumption    If you feel your pain relief is insufficient, you may take Tylenol/Acetaminophen in addition to your narcotic pain medication.   Be careful not to exceed 4,000 mg of Tylenol/Acetaminophen in a 24 hour period from all sources.  If you are taking extra strength Tylenol/acetaminophen (500 mg), the maximum dose is 8 tablets in 24 hours.  If you are taking regular strength acetaminophen (325 mg), the maximum dose is 12 tablets in 24 hours.    Call a doctor for any of the following:  Signs of infection (fever, growing tenderness at the surgery site, a large amount of drainage or bleeding, severe pain, foul-smelling drainage, redness, swelling).  It has been over 8 to 10 hours since surgery and you are still not able to urinate (pass water).  Headache for over 24 hours.  Numbness, tingling or weakness the day after surgery (if you had spinal anesthesia).  Signs of Covid-19 infection (temperature over 100 degrees, shortness of breath, cough, loss of taste/smell, generalized body aches, persistent headache, chills, sore throat, nausea/vomiting/diarrhea)    Your doctor is:       Dr. Vidhya Hamm, Colon Rectal: [phone]               After hours and weekends call the hospital @ [phone] and ask for the resident on call for:  Colon Rectal  For emergency care, call the:  [place]:  [phone] (TTY for hearing impaired: [phone])

## 2025-01-05 NOTE — OP NOTE
SURGEON: Vidhya Hamm MD     ASSISTANT: None    PREOPERATIVE DIAGNOSIS: Crohn's disease and complex anorectal fistula.     POSTOPERATIVE DIAGNOSIS: Crohn's disease and complex anorectal fistula.     PROCEDURE: Examination under anesthesia, completion fisulotomy with seton removal, partial fistulotomy with seton exchange     INDICATIONS: Cody Pickard is a 34 year old male with Crohn's disease and complex anorectal fistula. He has had multiple examination under anesthesia procedures and in September 2023 still had 2 setons and had both heavily debrided. He has done well and the interim and we are proceeding with an examination under anesthesia. The risks and benefits of surgery were thoroughly discussed with the patient and he agreed to proceed.     DESCRIPTION OF PROCEDURE: The patient was brought to the operating room, placed prone on the operating room table. Deep sedation was induced with intravenous medicines with deep sedation and monitored anesthesia care. We prepped and draped the area in the usual sterile fashion. We began the procedure with a timeout and performed an anal block using a mixture 50/50 of bupivacaine and lidocaine with epinephrine. A total of 40 mL were infused and following this, we performed anoscopy which demonstrated anal tags and a some anal stricturing, minimal induration and minimal purulence in the left posterior quadrant. This was consistent with the patients perianal disease. There were 2 setons in place - left posterior and posterior - with some ties off of both. The left posterior fistulotomy site was healed and very mature with no remaining muscle. A completion fistulotomy was performed. The posterior fistula was also quite mature and went through significant muscle. A partial fistulotomy superficially and laterally was performed to shorten the tract. The seton was replaced with a new yellow vessel loop which was made shorter and secured with 6 separate 2-0 Silk.  There was good hemostasis. At the end the case, all instruments and sponge counts were correct x2. The patient was emerged from anesthesia and taken to postoperative anesthesia care unit in good condition.     SPECIMEN: None.     ESTIMATED BLOOD LOSS: Minimal.     DRAINS: None.     URINE OUTPUT: Not measured.     PAUL MARTIN MD   Colon and Rectal Surgery Staff  [facility]

## 2025-01-09 ENCOUNTER — OFFICE VISIT (OUTPATIENT)
Dept: SURGERY | Facility: CLINIC | Age: 35
End: 2025-01-09
Payer: COMMERCIAL

## 2025-01-09 VITALS — SYSTOLIC BLOOD PRESSURE: 142 MMHG | HEART RATE: 81 BPM | OXYGEN SATURATION: 94 % | DIASTOLIC BLOOD PRESSURE: 87 MMHG

## 2025-01-09 DIAGNOSIS — Z09 FOLLOW-UP EXAMINATION AFTER COLORECTAL SURGERY: Primary | ICD-10-CM

## 2025-01-09 DIAGNOSIS — K60.30 ANAL FISTULA: ICD-10-CM

## 2025-01-09 ASSESSMENT — PAIN SCALES - GENERAL: PAINLEVEL_OUTOF10: MILD PAIN (2)

## 2025-01-09 NOTE — PROGRESS NOTES
Colon and Rectal Surgery Postoperative Clinic Note    RE: Cody Pickard  : 1990  WIL: 2025    Cody Pickard is a very pleasant 34 year old male with Crohn's disease and complex anorectal fistula. He has had multiple examination under anesthesia procedures and in 2023 still had 2 setons and had both heavily debrided. He is now status post EUA with completion fistulotomy with seton removal, partial fistulotomy and seton exchange on 1/3/25 by Dr. Hamm.     Interval history:Cody has been doing well.  No significant pain.  The wounds do have some drainage but this has been improving.  No difficulty with bowel movements and he is on Humira for his Crohn's.    Physical Examination: Exam was chaperoned by Delaney Smyth EMT   /87 (BP Location: Left arm, Patient Position: Sitting, Cuff Size: Adult Large)   Pulse 81   SpO2 94%   General: alert, oriented, in no acute distress, sitting comfortably  HEENT: mucous membranes moist    Perianal external examination:  Surgical site in the posterior position remains open with good granulation tissue present.  Seton in place with ties external to the tract.    Digital rectal examination: Was deferred.    Anoscopy: Was deferred.    Assessment/Plan:  34 year old male status post EUA with completion fistulotomy with seton removal, partial fistulotomy and seton exchange on 1/3/25 by Dr. Hamm.  He is recovering well.  No significant pain.  Minimal drainage.  No difficulty with bowel movements and he is on Humira for his Crohn's.  Sitz bath's several times a day and keep gauze tucked at the site to collect drainage.  Will have him return in about 6 weeks to meet with Dr. Hamm to discuss plan for the remaining seton to see if there is any way of having this removed in the future or if the plan is for this to stay in long-term.  Encouraged him to contact the clinic in the meantime with any questions or concerns.    Medical history:  Past  Medical History:   Diagnosis Date    Anal fistula     Crohn's disease (H)     HTN (hypertension)        Surgical history:  Past Surgical History:   Procedure Laterality Date    COLONOSCOPY  06/15/2020    COLONOSCOPY N/A 9/1/2023    Procedure: COLONOSCOPY;  Surgeon: Vidhya Hamm MD;  Location: [facility] OR    EXAM UNDER ANESTHESIA ANUS N/A 11/19/2021    Procedure: EXAM UNDER ANESTHESIA, ANUS;  Surgeon: Vidhya Hamm MD;  Location: [facility] OR    EXAM UNDER ANESTHESIA ANUS N/A 7/8/2022    Procedure: EXAM UNDER ANESTHESIA, ANUS;  Surgeon: Vidhya Hamm MD;  Location: [facility] OR    EXAM UNDER ANESTHESIA ANUS N/A 12/16/2022    Procedure: EXAM UNDER ANESTHESIA, ANUS, Partial Fistuotomy seton x2;  Surgeon: Vidhya Hamm MD;  Location: [facility] OR    FISTULOTOMY RECTUM N/A 7/8/2022    Procedure: partial FISTULOTOMY, RECTUM, seton replacement x2;  Surgeon: Vidhya Hamm MD;  Location: [facility] OR    FISTULOTOMY RECTUM N/A 12/16/2022    Procedure: POSSIBLE FISTULOTOMY, RECTUM;  Surgeon: Vidhya Hamm MD;  Location: [facility] OR    FISTULOTOMY RECTUM N/A 9/1/2023    Procedure: PARTIAL FISTULOTOMY, REPLACEMENT OF SETON STITCH;  Surgeon: Vidhya Hamm MD;  Location: [facility] OR    FISTULOTOMY RECTUM N/A 1/3/2025    Procedure: exam under anesthesia, completion fisulotomy, partial fistulotomy, seton exchange;  Surgeon: Vidhya Hamm MD;  Location: [facility] OR    INCISION AND DRAINAGE RECTUM, COMBINED N/A 09/06/2021    Procedure: INCISION AND DRAINAGE, RECTUM, placement of Seton;  Surgeon: Vidhya Hamm MD;  Location: [facility] OR    IRRIGATION AND DEBRIDEMENT PERINEAL, COMBINED N/A 9/1/2023    Procedure: Examination under anesthesia, Debridement of fistula tract;  Surgeon: Vidhya Hamm MD;  Location: [facility] OR    PLACEMENT OF SETON RECTUM N/A 11/19/2021    Procedure: PLACEMENT OF SETON RECTUM;  Surgeon: Vidhya Hamm MD;   Location: [facility] OR    SIGMOIDOSCOPY,BIOPSY  09/24/2020       Problem list:    Patient Active Problem List    Diagnosis Date Noted    Anorectal fistula 10/23/2024     Priority: Medium    Crohn's disease with fistula, unspecified gastrointestinal tract location (H) 07/19/2023     Priority: Medium    Anal fistula 11/09/2021     Priority: Medium     Added automatically from request for surgery [id number]      Acute post-operative pain 09/07/2021     Priority: Medium    Perianal abscess 09/06/2021     Priority: Medium    Perianal Crohn's disease, with abscess (H) 09/06/2021     Priority: Medium    IBD (inflammatory bowel disease) 02/22/2021     Priority: Medium       Medications:  Current Outpatient Medications   Medication Sig Dispense Refill    acetaminophen (TYLENOL) 325 MG tablet Take 650 mg by mouth.      Adalimumab (HUMIRA, 2 PEN,) 40 MG/0.4ML AJKT Inject 40 mg subcutaneously every 7 days. 4 each 5    amLODIPine (NORVASC) 5 MG tablet Take 5 mg by mouth every morning      lisinopril (ZESTRIL) 20 MG tablet Take 20 mg by mouth every morning       VITAMIN D3 50 MCG (2000 UT) tablet Take 1 tablet by mouth daily at 2 pm.         Allergies:  Allergies   Allergen Reactions    Ibuprofen Other (See Comments)     Cannot take simultaneously with humera    Cannot take simultaneously with Humira.   Other: Insomnia       Family history:  Family History   Problem Relation Age of Onset    Multiple Sclerosis Mother     Ulcerative Colitis Maternal Grandmother     Colon Cancer No family hx of     Inflammatory Bowel Disease No family hx of     Irritable Bowel Syndrome No family hx of     Crohn's Disease No family hx of     Anesthesia Reaction No family hx of     Bleeding Disorder No family hx of     Clotting Disorder No family hx of        Social history:  Social History     Tobacco Use    Smoking status: Former     Current packs/day: 0.00     Average packs/day: 0.5 packs/day for 2.6 years (1.3 ttl pk-yrs)     Types: Cigarettes     Start  date:      Quit date: 2020     Years since quittin.4    Smokeless tobacco: Never   Substance Use Topics    Alcohol use: Yes     Alcohol/week: 2.0 standard drinks of alcohol     Types: 2 Cans of beer per week     Marital status: single.    Nursing Notes:   [place] Tr[place]ell, EMT  2025  2:24 PM  Signed  Chief Complaint   Patient presents with    Post-op Visit       Vitals:    25 1422   BP: 142/87   BP Location: Left arm   Patient Position: Sitting   Cuff Size: Adult Large   Pulse: 81   SpO2: 94%       There is no height or weight on file to calculate BMI.    Des Trammell EMT-P       20 minutes spent on the date of the encounter doing chart review, history and exam, documentation and further activities as noted above.   This is a postop visit.    MEENAKSHI Bass, NP-C  Colon and Rectal Surgery  [facility]    This note was created using speech recognition software and may contain unintended word substitutions.

## 2025-01-09 NOTE — NURSING NOTE
Chief Complaint   Patient presents with    Post-op Visit       Vitals:    01/09/25 1422   BP: 142/87   BP Location: Left arm   Patient Position: Sitting   Cuff Size: Adult Large   Pulse: 81   SpO2: 94%       There is no height or weight on file to calculate BMI.    Des Trammell EMT-P

## 2025-01-09 NOTE — LETTER
2025       RE: Cody Pickard  [id number] [facility] [id number]     Dear Colleague,    Thank you for referring your patient, Cody Pickard, to the [facility] [place] [facility]. Please see a copy of my visit note below.    Colon and Rectal Surgery Postoperative Clinic Note    RE: Cody Pickard  : 1990  WIL: 2025    Cody Pickard is a very pleasant 34 year old male with Crohn's disease and complex anorectal fistula. He has had multiple examination under anesthesia procedures and in 2023 still had 2 setons and had both heavily debrided. He is now status post EUA with completion fistulotomy with seton removal, partial fistulotomy and seton exchange on 1/3/25 by Dr. Hamm.     Interval history:Cody has been doing well.  No significant pain.  The wounds do have some drainage but this has been improving.  No difficulty with bowel movements and he is on Humira for his Crohn's.    Physical Examination: Exam was chaperoned by Delaney Smyth, EMT   /87 (BP Location: Left arm, Patient Position: Sitting, Cuff Size: Adult Large)   Pulse 81   SpO2 94%   General: alert, oriented, in no acute distress, sitting comfortably  HEENT: mucous membranes moist    Perianal external examination:  Surgical site in the posterior position remains open with good granulation tissue present.  Seton in place with ties external to the tract.    Digital rectal examination: Was deferred.    Anoscopy: Was deferred.    Assessment/Plan:  34 year old male status post EUA with completion fistulotomy with seton removal, partial fistulotomy and seton exchange on 1/3/25 by Dr. Hamm.  He is recovering well.  No significant pain.  Minimal drainage.  No difficulty with bowel movements and he is on Humira for his Crohn's.  Sitz bath's several times a day and keep gauze tucked at the site to collect drainage.  Will have him  return in about 6 weeks to meet with Dr. Hamm to discuss plan for the remaining seton to see if there is any way of having this removed in the future or if the plan is for this to stay in long-term.  Encouraged him to contact the clinic in the meantime with any questions or concerns.    Medical history:  Past Medical History:   Diagnosis Date     Anal fistula      Crohn's disease (H)      HTN (hypertension)        Surgical history:  Past Surgical History:   Procedure Laterality Date     COLONOSCOPY  06/15/2020     COLONOSCOPY N/A 9/1/2023    Procedure: COLONOSCOPY;  Surgeon: Vidhya Hamm MD;  Location: [facility] OR     EXAM UNDER ANESTHESIA ANUS N/A 11/19/2021    Procedure: EXAM UNDER ANESTHESIA, ANUS;  Surgeon: Vidhya Hamm MD;  Location: [facility] OR     EXAM UNDER ANESTHESIA ANUS N/A 7/8/2022    Procedure: EXAM UNDER ANESTHESIA, ANUS;  Surgeon: Vidhya Hamm MD;  Location: [facility] OR     EXAM UNDER ANESTHESIA ANUS N/A 12/16/2022    Procedure: EXAM UNDER ANESTHESIA, ANUS, Partial Fistuotomy seton x2;  Surgeon: Vidhya Hamm MD;  Location: [facility] OR     FISTULOTOMY RECTUM N/A 7/8/2022    Procedure: partial FISTULOTOMY, RECTUM, seton replacement x2;  Surgeon: Vidhya Hamm MD;  Location: [facility] OR     FISTULOTOMY RECTUM N/A 12/16/2022    Procedure: POSSIBLE FISTULOTOMY, RECTUM;  Surgeon: Vidhya Hamm MD;  Location: [facility] OR     FISTULOTOMY RECTUM N/A 9/1/2023    Procedure: PARTIAL FISTULOTOMY, REPLACEMENT OF SETON STITCH;  Surgeon: Vidhya Hamm MD;  Location: [facility] OR     FISTULOTOMY RECTUM N/A 1/3/2025    Procedure: exam under anesthesia, completion fisulotomy, partial fistulotomy, seton exchange;  Surgeon: Vidhya Hamm MD;  Location: [facility] OR     INCISION AND DRAINAGE RECTUM, COMBINED N/A 09/06/2021    Procedure: INCISION AND DRAINAGE, RECTUM, placement of Seton;  Surgeon: Vidhya Hamm MD;  [facility]     IRRIGATION AND DEBRIDEMENT PERINEAL, COMBINED N/A 9/1/2023    Procedure: Examination under anesthesia, Debridement of fistula tract;  Surgeon: Vidhya Hamm MD;  Location: [facility] OR     PLACEMENT OF SETON RECTUM N/A 11/19/2021    Procedure: PLACEMENT OF SETON RECTUM;  Surgeon: Vidhya Hamm MD;  Location: [facility] OR     SIGMOIDOSCOPY,BIOPSY  09/24/2020       Problem list:    Patient Active Problem List    Diagnosis Date Noted     Anorectal fistula 10/23/2024     Priority: Medium     Crohn's disease with fistula, unspecified gastrointestinal tract location (H) 07/19/2023     Priority: Medium     Anal fistula 11/09/2021     Priority: Medium     Added automatically from request for surgery [id number]       Acute post-operative pain 09/07/2021     Priority: Medium     Perianal abscess 09/06/2021     Priority: Medium     Perianal Crohn's disease, with abscess (H) 09/06/2021     Priority: Medium     IBD (inflammatory bowel disease) 02/22/2021     Priority: Medium       Medications:  Current Outpatient Medications   Medication Sig Dispense Refill     acetaminophen (TYLENOL) 325 MG tablet Take 650 mg by mouth.       Adalimumab (HUMIRA, 2 PEN,) 40 MG/0.4ML AJKT Inject 40 mg subcutaneously every 7 days. 4 each 5     amLODIPine (NORVASC) 5 MG tablet Take 5 mg by mouth every morning       lisinopril (ZESTRIL) 20 MG tablet Take 20 mg by mouth every morning        VITAMIN D3 50 MCG (2000 UT) tablet Take 1 tablet by mouth daily at 2 pm.         Allergies:  Allergies   Allergen Reactions     Ibuprofen Other (See Comments)     Cannot take simultaneously with humera    Cannot take simultaneously with Humira.   Other: Insomnia       Family history:  Family History   Problem Relation Age of Onset     Multiple Sclerosis Mother      Ulcerative Colitis Maternal Grandmother      Colon Cancer No family hx of      Inflammatory Bowel Disease No family hx of      Irritable Bowel Syndrome No family hx of      Crohn's  Disease No family hx of      Anesthesia Reaction No family hx of      Bleeding Disorder No family hx of      Clotting Disorder No family hx of        Social history:  Social History     Tobacco Use     Smoking status: Former     Current packs/day: 0.00     Average packs/day: 0.5 packs/day for 2.6 years (1.3 ttl pk-yrs)     Types: Cigarettes     Start date:      Quit date: 2020     Years since quittin.4     Smokeless tobacco: Never   Substance Use Topics     Alcohol use: Yes     Alcohol/week: 2.0 standard drinks of alcohol     Types: 2 Cans of beer per week     Marital status: single.    Nursing Notes:   Des Trammell, EMT  2025  2:24 PM  Signed  Chief Complaint   Patient presents with     Post-op Visit       Vitals:    25 1422   BP: 142/87   BP Location: Left arm   Patient Position: Sitting   Cuff Size: Adult Large   Pulse: 81   SpO2: 94%       There is no height or weight on file to calculate BMI.    Des Trammell EMT-P       20 minutes spent on the date of the encounter doing chart review, history and exam, documentation and further activities as noted above.   This is a postop visit.    MEENAKSHI Bass, NP-C  Colon and Rectal Surgery  [facility]    This note was created using speech recognition software and may contain unintended word substitutions.        Again, thank you for allowing me to participate in the care of your patient.      Sincerely,    MEENAKSHI Bass CNP

## 2025-02-03 ENCOUNTER — MYC MEDICAL ADVICE (OUTPATIENT)
Dept: GASTROENTEROLOGY | Facility: CLINIC | Age: 35
End: 2025-02-03
Payer: COMMERCIAL

## 2025-02-11 ENCOUNTER — PRE VISIT (OUTPATIENT)
Dept: ORTHOPEDICS | Facility: CLINIC | Age: 35
End: 2025-02-11

## 2025-02-11 ENCOUNTER — ANCILLARY PROCEDURE (OUTPATIENT)
Dept: GENERAL RADIOLOGY | Facility: CLINIC | Age: 35
End: 2025-02-11
Attending: PODIATRIST
Payer: COMMERCIAL

## 2025-02-11 ENCOUNTER — OFFICE VISIT (OUTPATIENT)
Dept: ORTHOPEDICS | Facility: CLINIC | Age: 35
End: 2025-02-11
Payer: COMMERCIAL

## 2025-02-11 DIAGNOSIS — M19.171 POST-TRAUMATIC ARTHRITIS OF ANKLE, RIGHT: ICD-10-CM

## 2025-02-11 DIAGNOSIS — M25.571 CHRONIC PAIN OF RIGHT ANKLE: Primary | ICD-10-CM

## 2025-02-11 DIAGNOSIS — G89.29 CHRONIC PAIN OF RIGHT ANKLE: ICD-10-CM

## 2025-02-11 DIAGNOSIS — M25.571 CHRONIC PAIN OF RIGHT ANKLE: ICD-10-CM

## 2025-02-11 DIAGNOSIS — G89.29 CHRONIC PAIN OF RIGHT ANKLE: Primary | ICD-10-CM

## 2025-02-11 PROCEDURE — 99205 OFFICE O/P NEW HI 60 MIN: CPT | Performed by: PODIATRIST

## 2025-02-11 PROCEDURE — 73610 X-RAY EXAM OF ANKLE: CPT | Mod: RT | Performed by: RADIOLOGY

## 2025-02-11 NOTE — PROGRESS NOTES
Date of Service: 2/11/2025    Chief Complaint:   Chief Complaint   Patient presents with    Consult     Right ankle pain - since May 2024        HPI: Cody is a 34 year old male who presents today for further evaluation of right ankle pain. He has a complicated history with this ankle and is not a perfect historian. He fractured the ankle 5/17/24. Fracture was trimalleolar. He had an ORIF through [facility] 5/24/24. He continued to have pain in the ankle and hardware removal was performed a few months ago. He notes that starting after the ORIF, he felt locking and pain in the ankle. This was part of the rationale for hardware removal. He notes that since having the hardware removed, the ankle locks when he is in plantarflexion. He has to manually plantarflex the ankle and make it pop out. This does cause pain. He has significant pes planus BL.     Review of Systems:No n/v/d/f/c/ns/sob/cp    PMH:   Past Medical History:   Diagnosis Date    Anal fistula     Crohn's disease (H)     HTN (hypertension)        PSxH:   Past Surgical History:   Procedure Laterality Date    COLONOSCOPY  06/15/2020    COLONOSCOPY N/A 9/1/2023    Procedure: COLONOSCOPY;  Surgeon: Vidhya Hamm MD;  Location: [facility] OR    EXAM UNDER ANESTHESIA ANUS N/A 11/19/2021    Procedure: EXAM UNDER ANESTHESIA, ANUS;  Surgeon: Vidhya Hamm MD;  Location: [facility] OR    EXAM UNDER ANESTHESIA ANUS N/A 7/8/2022    Procedure: EXAM UNDER ANESTHESIA, ANUS;  Surgeon: Vidhya Hamm MD;  Location: [facility] OR    EXAM UNDER ANESTHESIA ANUS N/A 12/16/2022    Procedure: EXAM UNDER ANESTHESIA, ANUS, Partial Fistuotomy seton x2;  Surgeon: Vidhya Hamm MD;  Location: [facility] OR    FISTULOTOMY RECTUM N/A 7/8/2022    Procedure: partial FISTULOTOMY, RECTUM, seton replacement x2;  Surgeon: Vidhya Hamm MD;  Location: [facility] OR    FISTULOTOMY RECTUM N/A 12/16/2022    Procedure: POSSIBLE FISTULOTOMY, RECTUM;  Surgeon:  Vidhya Hamm MD;  Location: [facility] OR    FISTULOTOMY RECTUM N/A 2023    Procedure: PARTIAL FISTULOTOMY, REPLACEMENT OF SETON STITCH;  Surgeon: Vidhya Hamm MD;  Location: [facility] OR    FISTULOTOMY RECTUM N/A 1/3/2025    Procedure: exam under anesthesia, completion fisulotomy, partial fistulotomy, seton exchange;  Surgeon: Vidhya Hamm MD;  Location: [facility] OR    INCISION AND DRAINAGE RECTUM, COMBINED N/A 2021    Procedure: INCISION AND DRAINAGE, RECTUM, placement of Seton;  Surgeon: Vidhya Hamm MD;  [facility]    IRRIGATION AND DEBRIDEMENT PERINEAL, COMBINED N/A 2023    Procedure: Examination under anesthesia, Debridement of fistula tract;  Surgeon: Vidhya Hamm MD;  Location: [facility] OR    PLACEMENT OF SETON RECTUM N/A 2021    Procedure: PLACEMENT OF SETON RECTUM;  Surgeon: Vidhya Hamm MD;  Location: [facility] OR    SIGMOIDOSCOPY,BIOPSY  2020       Allergies: Ibuprofen    SH:   Social History     Socioeconomic History    Marital status: Single     Spouse name: Not on file    Number of children: Not on file    Years of education: Not on file    Highest education level: Not on file   Occupational History    Not on file   Tobacco Use    Smoking status: Former     Current packs/day: 0.00     Average packs/day: 0.5 packs/day for 2.6 years (1.3 ttl pk-yrs)     Types: Cigarettes     Start date:      Quit date: 2020     Years since quittin.5    Smokeless tobacco: Never   Vaping Use    Vaping status: Never Used   Substance and Sexual Activity    Alcohol use: Yes     Alcohol/week: 2.0 standard drinks of alcohol     Types: 2 Cans of beer per week    Drug use: Never    Sexual activity: Not on file   Other Topics Concern    Not on file   Social History Narrative    Not on file     Social Drivers of Health     Financial Resource Strain: High Risk (2022)    [facility]    Financial Resource Strain     Difficulty of Paying Living Expenses: Not on file     Difficulty of Paying Living Expenses: Not on file   Food Insecurity: Not on file   Transportation Needs: Not on file   Physical Activity: Not on file   Stress: Not on file   Social Connections: Unknown (5/2/2023)    [facility], [facility]    Social Connections     Frequency of Communication with Friends and Family: Not on file   Interpersonal Safety: Low Risk  (1/3/2025)    Interpersonal Safety     Do you feel physically and emotionally safe where you currently live?: Yes     Within the past 12 months, have you been hit, slapped, kicked or otherwise physically hurt by someone?: No     Within the past 12 months, have you been humiliated or emotionally abused in other ways by your partner or ex-partner?: No   Housing Stability: Not on file       FH:   Family History   Problem Relation Age of Onset    Multiple Sclerosis Mother     Ulcerative Colitis Maternal Grandmother     Colon Cancer No family hx of     Inflammatory Bowel Disease No family hx of     Irritable Bowel Syndrome No family hx of     Crohn's Disease No family hx of     Anesthesia Reaction No family hx of     Bleeding Disorder No family hx of     Clotting Disorder No family hx of        Objective:  Data Unavailable Data Unavailable Data Unavailable Data Unavailable Data Unavailable 0 lbs 0 oz    PT and DP pulses are 2/4 bilaterally. CRT is instant. Positive pedal hair.   Gross sensation is intact bilaterally.   Equinus is noted bilaterally. Severe pes planus BL with collapse of the TN joints, medially. Pain noted with palpation of the medial malleolus at the tip. Some pain noted about the posterior tibial tendon with difficulty with single and double heel raises.   Nails normal bilaterally. No open lesions are noted.     Image review:  He has multiple studies from multiple sources. I reviewed two XR reports from [facility]. He had a CT in September 2024 from [facility] and an MRI s/p hardware removal in December 2024 from [facility]. Both of these were independently interpreted by myself. The MRI shows medial malleolus inflammation and spurring noted.     Assessment:   Encounter Diagnoses   Name Primary?    Chronic pain of right ankle Yes    Post-traumatic arthritis of ankle, right          Plan:  - Pt seen and evaluated.  - Previous MRI, CT, and XRs reviewed.   - He does note that the pain is getting better. I discussed the CT and MRI with Dr. Cat. Cody could have sx to clean up the medial malleolus. He would like a consult with Dr. Cat. Will order.   - He will defer a brace for now.   - See again PRN.    On the DOS, I spent 74 min with patient care, documentation, chart/image review, and care coordination.

## 2025-02-11 NOTE — LETTER
2/11/2025      Cody Pickard  [id number] [facility] [address]      Dear Colleague,    Thank you for referring your patient, Cody Pickard, to the [facility] [place]. Please see a copy of my visit note below.      Date of Service: 2/11/2025    Chief Complaint:   Chief Complaint   Patient presents with     Consult     Right ankle pain - since May 2024        HPI: Cody is a 34 year old male who presents today for further evaluation of right ankle pain. He has a complicated history with this ankle and is not a perfect historian. He fractured the ankle 5/17/24. Fracture was trimalleolar. He had an ORIF through [facility] 5/24/24. He continued to have pain in the ankle and hardware removal was performed a few months ago. He notes that starting after the ORIF, he felt locking and pain in the ankle. This was part of the rationale for hardware removal. He notes that since having the hardware removed, the ankle locks when he is in plantarflexion. He has to manually plantarflex the ankle and make it pop out. This does cause pain. He has significant pes planus BL.     Review of Systems:No n/v/d/f/c/ns/sob/cp    PMH:   Past Medical History:   Diagnosis Date     Anal fistula      Crohn's disease (H)      HTN (hypertension)        PSxH:   Past Surgical History:   Procedure Laterality Date     COLONOSCOPY  06/15/2020     COLONOSCOPY N/A 9/1/2023    Procedure: COLONOSCOPY;  Surgeon: Vidhya Hamm MD;  Location: [facility] OR     EXAM UNDER ANESTHESIA ANUS N/A 11/19/2021    Procedure: EXAM UNDER ANESTHESIA, ANUS;  Surgeon: Vidhya Hamm MD;  Location: [facility] OR     EXAM UNDER ANESTHESIA ANUS N/A 7/8/2022    Procedure: EXAM UNDER ANESTHESIA, ANUS;  Surgeon: Vidhya Hamm MD;  Location: [facility] OR     EXAM UNDER ANESTHESIA ANUS N/A 12/16/2022    Procedure: EXAM UNDER ANESTHESIA, ANUS, Partial Fistuotomy seton x2;  Surgeon: Vidhya Hamm MD;  Location: [facility]  OR     FISTULOTOMY RECTUM N/A 2022    Procedure: partial FISTULOTOMY, RECTUM, seton replacement x2;  Surgeon: Vidhya Hamm MD;  Location: [facility] OR     FISTULOTOMY RECTUM N/A 2022    Procedure: POSSIBLE FISTULOTOMY, RECTUM;  Surgeon: Vidhya Hamm MD;  Location: [facility]     FISTULOTOMY RECTUM N/A 2023    Procedure: PARTIAL FISTULOTOMY, REPLACEMENT OF SETON STITCH;  Surgeon: Vidhya Hamm MD;  Location: [facility] OR     FISTULOTOMY RECTUM N/A 1/3/2025    Procedure: exam under anesthesia, completion fisulotomy, partial fistulotomy, seton exchange;  Surgeon: Vidhya Hamm MD;  Location: [facility] OR     INCISION AND DRAINAGE RECTUM, COMBINED N/A 2021    Procedure: INCISION AND DRAINAGE, RECTUM, placement of Seton;  Surgeon: Vidhya Hamm MD;  [facility]     IRRIGATION AND DEBRIDEMENT PERINEAL, COMBINED N/A 2023    Procedure: Examination under anesthesia, Debridement of fistula tract;  Surgeon: Vidhya Hamm MD;  Location: [facility] OR     PLACEMENT OF SETON RECTUM N/A 2021    Procedure: PLACEMENT OF SETON RECTUM;  Surgeon: Vidhya Hamm MD;  Location: [facility] OR     SIGMOIDOSCOPY,BIOPSY  2020       Allergies: Ibuprofen    SH:   Social History     Socioeconomic History     Marital status: Single     Spouse name: Not on file     Number of children: Not on file     Years of education: Not on file     Highest education level: Not on file   Occupational History     Not on file   Tobacco Use     Smoking status: Former     Current packs/day: 0.00     Average packs/day: 0.5 packs/day for 2.6 years (1.3 ttl pk-yrs)     Types: Cigarettes     Start date:      Quit date: 2020     Years since quittin.5     Smokeless tobacco: Never   Vaping Use     Vaping status: Never Used   Substance and Sexual Activity     Alcohol use: Yes     Alcohol/week: 2.0 standard drinks of alcohol     Types: 2 Cans of beer per week      Drug use: Never     Sexual activity: Not on file   Other Topics Concern     Not on file   Social History Narrative     Not on file     Social Drivers of Health     Financial Resource Strain: High Risk (1/1/2022)    Received [facility], [facility]    Financial Resource Strain      Difficulty of Paying Living Expenses: Not on file      Difficulty of Paying Living Expenses: Not on file   Food Insecurity: Not on file   Transportation Needs: Not on file   Physical Activity: Not on file   Stress: Not on file   Social Connections: Unknown (5/2/2023)    Received from [employer] [facility] [employer] [facility], [facility]    Social Connections      Frequency of Communication with Friends and Family: Not on file   Interpersonal Safety: Low Risk  (1/3/2025)    Interpersonal Safety      Do you feel physically and emotionally safe where you currently live?: Yes      Within the past 12 months, have you been hit, slapped, kicked or otherwise physically hurt by someone?: No      Within the past 12 months, have you been humiliated or emotionally abused in other ways by your partner or ex-partner?: No   Housing Stability: Not on file       FH:   Family History   Problem Relation Age of Onset     Multiple Sclerosis Mother      Ulcerative Colitis Maternal Grandmother      Colon Cancer No family hx of      Inflammatory Bowel Disease No family hx of      Irritable Bowel Syndrome No family hx of      Crohn's Disease No family hx of      Anesthesia Reaction No family hx of      Bleeding Disorder No family hx of      Clotting Disorder No family hx of        Objective:  Data Unavailable Data Unavailable Data Unavailable Data Unavailable Data Unavailable 0 lbs 0 oz    PT and DP pulses are 2/4 bilaterally. CRT is instant. Positive pedal hair.   Gross sensation is intact bilaterally.   Equinus is noted bilaterally. Severe pes planus  BL with collapse of the TN joints, medially. Pain noted with palpation of the medial malleolus at the tip. Some pain noted about the posterior tibial tendon with difficulty with single and double heel raises.   Nails normal bilaterally. No open lesions are noted.     Image review: He has multiple studies from multiple sources. I reviewed two XR reports from [facility]. He had a CT in September 2024 [facility] and an MRI s/p hardware removal in December 2024 from [facility]. Both of these were independently interpreted by myself. The MRI shows medial malleolus inflammation and spurring noted.     Assessment:   Encounter Diagnoses   Name Primary?     Chronic pain of right ankle Yes     Post-traumatic arthritis of ankle, right          Plan:  - Pt seen and evaluated.  - Previous MRI, CT, and XRs reviewed.   - He does note that the pain is getting better. I discussed the CT and MRI with Dr. Cat. Cody could have sx to clean up the medial malleolus. He would like a consult with Dr. Cat. Will order.   - He will defer a brace for now.   - See again PRN.    On the DOS, I spent 74 min with patient care, documentation, chart/image review, and care coordination.        Again, thank you for allowing me to participate in the care of your patient.        Sincerely,        Luis E Collado DPM    Electronically signed

## 2025-02-11 NOTE — NURSING NOTE
Reason For Visit:   Chief Complaint   Patient presents with    Consult     Right ankle pain - since May 2024       Primary MD: No Ref-Primary, Physician  Ref. MD:       There were no vitals taken for this visit.    Pain Assessment  Patient Currently in Pain: Yes  Patient's Stated Pain Goal: 2  0-10 Pain Scale: 2  Primary Pain Location: Ankle (Right)  Pain Descriptors:  (Patient states ankle locks up)             Shreyas Johnson, PING

## 2025-02-13 ENCOUNTER — PATIENT OUTREACH (OUTPATIENT)
Dept: CARE COORDINATION | Facility: CLINIC | Age: 35
End: 2025-02-13
Payer: COMMERCIAL

## 2025-02-17 ENCOUNTER — PATIENT OUTREACH (OUTPATIENT)
Dept: CARE COORDINATION | Facility: CLINIC | Age: 35
End: 2025-02-17
Payer: COMMERCIAL

## 2025-02-18 NOTE — TELEPHONE ENCOUNTER
Action February 18, 2025 1:35 PM MT   Action Taken Called patient for more information about surgical history. Patient states his surgeries were both done with [facility] by Dr. Bolivar. He had an MRI and CT with [facility] and XRAYS with [facility].   Sent a request for records and imaging from [facility].   Sent [facility] records to scan,sent a request for imaging from [facility].     DIAGNOSIS: RIGHT ANKLE   APPOINTMENT DATE: 02/25/2025   NOTES STATUS DETAILS   OFFICE NOTE from referring provider Internal 02/11/2025 - Luis E Collado DPM - [facility]   OFFICE NOTE from other specialist [facility]  Care Everywhere 11/23/2024 - Ramírez Bolivar DPM - [facility]    05/06/2021 - Hue Gonsalez MD -  Ortho   DISCHARGE REPORT from the ER Care Everywhere 05/18/2024 Micheal Kim MD  @ [facility] ED   OPERATIVE REPORT In process [facility] - 10/30/2024 - Right Ankle Hardware Removal  05 /24/2024 -   Right Ankle Hardware   MRI PACS [facility]:  12/06/2024 - RT Ankle   CT SCAN In process [facility]:  09/18/2024 - RT Ankle     XRAYS (IMAGES & REPORTS) In process [facility]:  11/23/2024, 11/11/2024 - RT Ankle    [facility]: PACS  05/18/2024- RT Ankle

## 2025-02-25 ENCOUNTER — PRE VISIT (OUTPATIENT)
Dept: ORTHOPEDICS | Facility: CLINIC | Age: 35
End: 2025-02-25

## 2025-02-25 ENCOUNTER — ANCILLARY PROCEDURE (OUTPATIENT)
Dept: CT IMAGING | Facility: CLINIC | Age: 35
End: 2025-02-25
Attending: ORTHOPAEDIC SURGERY
Payer: COMMERCIAL

## 2025-02-25 ENCOUNTER — OFFICE VISIT (OUTPATIENT)
Dept: ORTHOPEDICS | Facility: CLINIC | Age: 35
End: 2025-02-25
Attending: PODIATRIST
Payer: COMMERCIAL

## 2025-02-25 VITALS — HEIGHT: 70 IN | WEIGHT: 230 LBS | BODY MASS INDEX: 32.93 KG/M2

## 2025-02-25 DIAGNOSIS — G89.29 CHRONIC PAIN OF RIGHT ANKLE: ICD-10-CM

## 2025-02-25 DIAGNOSIS — M19.171 POST-TRAUMATIC ARTHRITIS OF ANKLE, RIGHT: ICD-10-CM

## 2025-02-25 DIAGNOSIS — M25.571 CHRONIC PAIN OF RIGHT ANKLE: ICD-10-CM

## 2025-02-25 DIAGNOSIS — M19.171 POST-TRAUMATIC ARTHRITIS OF ANKLE, RIGHT: Primary | ICD-10-CM

## 2025-02-25 PROCEDURE — 99204 OFFICE O/P NEW MOD 45 MIN: CPT | Performed by: ORTHOPAEDIC SURGERY

## 2025-02-25 PROCEDURE — 73700 CT LOWER EXTREMITY W/O DYE: CPT | Mod: RT | Performed by: RADIOLOGY
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
## 2025-02-25 NOTE — NURSING NOTE
"Reason For Visit:   Chief Complaint   Patient presents with    Consult     Post-traumatic arthritis of the right ankle, chronic ankle pain. Fx last May 2024, surgery. CHAD in October 2024. Had imaging and orthotics. Clicks, pops, locks up, instability pain.        Ht 1.778 m (5' 10\")   Wt 104.3 kg (230 lb)   BMI 33.00 kg/m           Eleni Beckwith, ATC    "

## 2025-02-26 ENCOUNTER — MYC MEDICAL ADVICE (OUTPATIENT)
Dept: ORTHOPEDICS | Facility: CLINIC | Age: 35
End: 2025-02-26
Payer: COMMERCIAL

## 2025-02-26 NOTE — TELEPHONE ENCOUNTER
Left Voicemail (1st Attempt) and Sent Mychart (1st Attempt) for the patient to call back and schedule the following:    Appointment type: XR JOINT INJECTION  Provider: Imaging  Return date: Next Available  Specialty phone number: [phone](2)(1)

## 2025-03-03 ENCOUNTER — HOSPITAL ENCOUNTER (OUTPATIENT)
Dept: GENERAL RADIOLOGY | Facility: CLINIC | Age: 35
Discharge: HOME | End: 2025-03-03
Attending: ORTHOPAEDIC SURGERY
Payer: COMMERCIAL

## 2025-03-03 ENCOUNTER — HOSPITAL ENCOUNTER (OUTPATIENT)
Facility: CLINIC | Age: 35
Discharge: HOME | End: 2025-03-03
Admitting: PHYSICIAN ASSISTANT
Payer: COMMERCIAL

## 2025-03-03 VITALS — HEART RATE: 60 BPM | OXYGEN SATURATION: 96 % | DIASTOLIC BLOOD PRESSURE: 78 MMHG | SYSTOLIC BLOOD PRESSURE: 123 MMHG

## 2025-03-03 DIAGNOSIS — M19.171 POST-TRAUMATIC ARTHRITIS OF ANKLE, RIGHT: ICD-10-CM

## 2025-03-03 PROCEDURE — 250N000009 HC RX 250: Performed by: ORTHOPAEDIC SURGERY

## 2025-03-03 PROCEDURE — 255N000002 HC RX 255 OP 636: Performed by: ORTHOPAEDIC SURGERY

## 2025-03-03 PROCEDURE — 250N000011 HC RX IP 250 OP 636: Mod: JW | Performed by: ORTHOPAEDIC SURGERY

## 2025-03-03 PROCEDURE — 77002 NEEDLE LOCALIZATION BY XRAY: CPT

## 2025-03-03 PROCEDURE — 96372 THER/PROPH/DIAG INJ SC/IM: CPT | Performed by: ORTHOPAEDIC SURGERY

## 2025-03-03 RX ORDER — BUPIVACAINE HYDROCHLORIDE 2.5 MG/ML
5 INJECTION, SOLUTION EPIDURAL; INFILTRATION; INTRACAUDAL; PERINEURAL ONCE
Status: COMPLETED | OUTPATIENT
Start: 2025-03-03 | End: 2025-03-03

## 2025-03-03 RX ORDER — IOPAMIDOL 408 MG/ML
10 INJECTION, SOLUTION INTRATHECAL ONCE
Status: COMPLETED | OUTPATIENT
Start: 2025-03-03 | End: 2025-03-03

## 2025-03-03 RX ORDER — LIDOCAINE HYDROCHLORIDE 10 MG/ML
5 INJECTION, SOLUTION EPIDURAL; INFILTRATION; INTRACAUDAL; PERINEURAL ONCE
Status: COMPLETED | OUTPATIENT
Start: 2025-03-03 | End: 2025-03-03

## 2025-03-03 RX ORDER — BUPIVACAINE HYDROCHLORIDE 5 MG/ML
30 INJECTION, SOLUTION EPIDURAL; INTRACAUDAL; PERINEURAL ONCE
Status: DISCONTINUED | OUTPATIENT
Start: 2025-03-03 | End: 2025-03-03

## 2025-03-03 RX ADMIN — BUPIVACAINE HYDROCHLORIDE 3 ML: 2.5 INJECTION, SOLUTION EPIDURAL; INFILTRATION; INTRACAUDAL; PERINEURAL at 09:23

## 2025-03-03 RX ADMIN — IOPAMIDOL 1 ML: 408 INJECTION, SOLUTION INTRATHECAL at 09:22

## 2025-03-03 RX ADMIN — LIDOCAINE HYDROCHLORIDE 3 ML: 10 INJECTION, SOLUTION EPIDURAL; INFILTRATION; INTRACAUDAL; PERINEURAL at 09:20

## 2025-03-03 ASSESSMENT — ACTIVITIES OF DAILY LIVING (ADL)
ADLS_ACUITY_SCORE: 50

## 2025-03-03 NOTE — DISCHARGE INSTRUCTIONS
JOINT STEROID INJECTION DISCHARGE INSTRUCTIONS    What to expect  After the procedure, you may feel some temporary relief from the local anesthetic, but that will likely wear off within a few hours. Your symptoms may then return to pre-procedure level, or even be temporarily worse for a day or two.  Please contact your referring provider to discuss next steps.  What to do   You may resume your normal activity or as directed by your referring provider.   You may shower tomorrow, but do not submerge in bathtub, hot tub or pool for 48 hours.    Use ice packs as needed for discomfort.  You may resume all medications, including blood thinners.  You may take over the counter acetaminophen (Tylenol) or ibuprofen (Motrin, Advil) for mild discomfort after the procedure.    What to watch for  Bruising or slight swelling at the puncture site can be normal. If you begin to develop redness or excessive swelling at the site, fever over 1010F, notable increase in pain, weakness, or numbness, please contact your primary care or referring provider.  If you have questions or concerns  You may contact the [facility] Radiology Department at [phone] between 8am-4:30pm Monday through Friday.  If you have urgent questions outside of these normal business hours, please contact the [facility] on-call physician at [phone].    The provider who performed your procedure was  Jayda IKRK.

## 2025-03-04 ENCOUNTER — VIRTUAL VISIT (OUTPATIENT)
Dept: ORTHOPEDICS | Facility: CLINIC | Age: 35
End: 2025-03-04
Payer: COMMERCIAL

## 2025-03-04 DIAGNOSIS — K50.913 CROHN'S DISEASE WITH FISTULA, UNSPECIFIED GASTROINTESTINAL TRACT LOCATION (H): ICD-10-CM

## 2025-03-04 DIAGNOSIS — M25.571 PAIN IN JOINT, ANKLE AND FOOT, RIGHT: Primary | ICD-10-CM

## 2025-03-04 PROCEDURE — 98006 SYNCH AUDIO-VIDEO EST MOD 30: CPT | Performed by: ORTHOPAEDIC SURGERY

## 2025-03-04 NOTE — PROGRESS NOTES
Chief complaint: Right ankle pain    Patient was interviewed via video encounter.  Patient was home.  Patient authorize encounter.    Discussed with the patient the findings on the CT scan as well as he reported the findings after undergoing the injection.  He reports to continue having locking of the ankle of the was not as painful as he was without the injection.    Discussed with the patient that I am surprised to have the walking that he is reporting based on the CT scan findings however he is very very consistent with his report.    We discussed that at this point nothing we can offer him is an arthroscopic debridement of the ankle to look for loose bodies and proceed with a debridement of the ankle joint.    Discussed with him the most likely postoperative course and complications from such intervention which include but not limited to infection bleeding nerve damage residual pain stiffness and recurrence of the locking.    Patient was pleased with discussion.  He will schedule surgery at this of his convenience.  The surgery will consist of an arthroscopic debridement of the ankle for bony impingement.    Patient has no activity restrictions.    I spent 17 minutes in today's patient's video interview.

## 2025-03-04 NOTE — CONFIDENTIAL NOTE
Teaching Flowsheet     Visit Type: Telephone    Time Start: 2:09  Time End: 2:18  Total Time Spent: 9 min.    Surgeon: Dr. Cat  Location of Surgery (known or anticipated): [facility]  or [facility]  Type of Anesthesia: Choice    Pertinent Medical History:  Chrons on Humira  Were medical conditions reviewed and appropriate for location? Yes  BMI: Obesity Grade I BMI 30-34.9    Relevant Diagnosis: Right ankle pain  Teaching Topic: Ankle arthroscopy and debridement    Person(s) involved in teaching:   Patient  : No.   Verified Patient's Phone Number: Yes    Caregiver//  Patient verbalized understanding a responsible adult is required to drive him home and stay with him for 24 hours.     - Does patient have difficulty getting a ride to appointments (post-ops, PT/OT): No  - Patient's plan after discharge: home same day with spouse or family member     Motivation Level:  Asks Questions: Yes  Eager to Learn: Yes  Cooperative: Yes  Receptive (willing/able to accept information): Yes  Any cultural factors/Nondenominational beliefs that may influence understanding or compliance? No     Patient demonstrates understanding of the following:  Reason for the appointment, diagnosis and treatment plan: Yes  Knowledge of proper use of medications and conditions for which they are ordered (with special attention to potential side effects or drug interactions): Yes  Which situations necessitate calling provider and whom to contact: Yes- referred to stoplight tool     Teaching Concerns Addressed:   Proper use and care of medical equip, care aids, etc.: Yes  Nutritional needs and diet plan: Yes  Pain management techniques: Yes  Wound Care: Yes  How and/when to access community resources: Yes       Instructional Materials Used/Given:  a surgery packet labeled to be mailed and awaiting . Instructed patient to buy or get two 8 ounces bottles of antiseptic surgical soap called 4% CHG. Common name brand of this soap are  Hibiclens and Exidine. I told them they can find this at their local pharmacy, clinic or retail store. If they have trouble finding it, I told them to ask their pharmacist to help them find a substitute.     - Important Contact Info/Phone Numbers: emphasizing clinic number [phone] and after hours number [phone]  - Map/location of surgery and follow-up appointments  - Showering instructions  - Stoplight Tool  - Dr. Cat's post operative guidelines     -Next step: schedule a surgery date and schedule a pre-op with PAC. Instructed them to review all their medications with provider at appointment and determine if any medications must be stopped prior to surgery. Requested they fax office note to pre-admissions if PCP is not within the [facility].     Tara Holter, RN

## 2025-03-04 NOTE — LETTER
3/4/2025      Cody Pickard  [address] [facility] [address]      Dear Colleague,    Thank you for referring your patient, Cody Pickard, to the [facility] [place]. Please see a copy of my visit note below.    Chief complaint: Right ankle pain    Patient was interviewed via video encounter.  Patient was home.  Patient authorize encounter.    Discussed with the patient the findings on the CT scan as well as he reported the findings after undergoing the injection.  He reports to continue having locking of the ankle of the was not as painful as he was without the injection.    Discussed with the patient that I am surprised to have the walking that he is reporting based on the CT scan findings however he is very very consistent with his report.    We discussed that at this point nothing we can offer him is an arthroscopic debridement of the ankle to look for loose bodies and proceed with a debridement of the ankle joint.    Discussed with him the most likely postoperative course and complications from such intervention which include but not limited to infection bleeding nerve damage residual pain stiffness and recurrence of the locking.    Patient was pleased with discussion.  He will schedule surgery at this of his convenience.  The surgery will consist of an arthroscopic debridement of the ankle for bony impingement.    Patient has no activity restrictions.    I spent 17 minutes in today's patient's video interview.      Again, thank you for allowing me to participate in the care of your patient.        Sincerely,        Jose Cat MD    Electronically signed

## 2025-03-06 ENCOUNTER — TELEPHONE (OUTPATIENT)
Dept: ORTHOPEDICS | Facility: CLINIC | Age: 35
End: 2025-03-06
Payer: COMMERCIAL

## 2025-03-06 NOTE — TELEPHONE ENCOUNTER
Patient is scheduled for surgery with Dr. Cta.     Spoke with: Patient     Date of Surgery: 3/19/25    Location: [facility]     Pre op with Provider: BOBBI     H&P: Patient will find a primary clinic to schedule H&P with. Asked that patient give our office a call should he be unable to be scheduled.     Additional imaging/appointments: Patient is scheduled for 2 week post op on 4/7/25 at 9:00 am.   Patient is scheduled for 6 week post op on 4/29/25 at 11:40.     Surgery packet: Will mail [facility] packet, confirmed with patient address in chart works best.      Additional comments: Patient confirmed he did check with insurance company regarding [facility] facility and that they informed him he is in network.         Mary Mark on 3/6/2025 at 12:48 PM

## 2025-03-06 NOTE — TELEPHONE ENCOUNTER
Called patient to schedule surgery with Dr. Cat. Patient offered next available date at [facility], 4/09/25. Patient said that date would not work within his schedule and was informed by provider that patient would be seen for the surgery within the next 1-2 weeks. Let patient know within that specific timeframe as of right now [facility] would be the only available facility and with that patient would need to confirm with his insurance that facility is in network and I would need to confirm with provider pt is ok to be seen at that facility prior to scheduling. Let patient know I would send a message to the provider confirming but in the mean time advised he reach out to his insurance company directly.     Mary Mark on 3/6/2025 at 10:34 AM

## 2025-03-07 NOTE — PROGRESS NOTES
[facility]ermatology Note    Encounter Date: Mar 10, 2025    Dermatology Problem List:  Last skin check 3/10/25  0. NUB, R plantar foot, photo obtained 3/10/25    ______________________________________    Impression/Plan:  1. Neoplasm of unspecified behavior of the skin (D49.2) on the R plantar foot. The differential diagnosis includes verruca.   - Will continue to monitor as patient has upcoming ankle arthroscopy   - Future considerations: cryo    2. Reassurance provided for benign lesions not treated today including cherry angiomata, solar lentigines, and banal-appearing melanocytic nevi.        Follow-up in 6-8 months.       Staff Involved:  Staff and Scribe  Scribe Disclosure:   I, Lynnette Diggs, am serving as a scribe to document services personally performed by Wilder Mcdonald MD based on data collection and the provider's statements to me.     Provider Disclosure:   The documentation recorded by the scribe accurately reflects the services I personally performed and the decisions made by me.    Wilder Mcdonald MD   of Dermatology  Department of Dermatology  [facility]        CC:   Chief Complaint   Patient presents with    Skin Check     Full body skin check. Lump on bottom of left foot. No personal history of skin cancer.        History of Present Illness:  Mr. Cody Pickard is a 35 year old male who presents as a return patient.    Today, patient reports a lump on the bottom of L foot. Not itchy but does report some discomfort.    Labs:  N/a    Physical exam:  Vitals: There were no vitals taken for this visit.  GEN: This is a well developed, well-nourished male in no acute distress, in a pleasant mood.    SKIN: Adams phototype II  - Full skin, which includes the head/face, both arms, chest, back, abdomen,both legs, genitalia and/or groin buttocks, digits and/or nails, was examined.  - There are dome shaped bright red papules  on the trunk and extremities .   - Multiple regular brown pigmented macules and papules are identified on the trunk and extremities. .   - Scattered brown macules on sun exposed areas.  - There is skin colored papule on the R plantar foot interrupting skin lines.  - No other lesions of concern on areas examined.     Past Medical History:   Past Medical History:   Diagnosis Date    Anal fistula     Crohn's disease (H)     HTN (hypertension)      Past Surgical History:   Procedure Laterality Date    COLONOSCOPY  06/15/2020    COLONOSCOPY N/A 9/1/2023    Procedure: COLONOSCOPY;  Surgeon: Vidhya Hamm MD;  Location: [facility] OR    EXAM UNDER ANESTHESIA ANUS N/A 11/19/2021    Procedure: EXAM UNDER ANESTHESIA, ANUS;  Surgeon: Vidhya Hamm MD;  Location: [facility] OR    EXAM UNDER ANESTHESIA ANUS N/A 7/8/2022    Procedure: EXAM UNDER ANESTHESIA, ANUS;  Surgeon: Vidhya Hamm MD;  Location: [facility] OR    EXAM UNDER ANESTHESIA ANUS N/A 12/16/2022    Procedure: EXAM UNDER ANESTHESIA, ANUS, Partial Fistuotomy seton x2;  Surgeon: Vidhya Hamm MD;  Location: [facility] OR    FISTULOTOMY RECTUM N/A 7/8/2022    Procedure: partial FISTULOTOMY, RECTUM, seton replacement x2;  Surgeon: Vidhya Hamm MD;  Location: [facility] OR    FISTULOTOMY RECTUM N/A 12/16/2022    Procedure: POSSIBLE FISTULOTOMY, RECTUM;  Surgeon: Vidhya Hamm MD;  Location: [facility] OR    FISTULOTOMY RECTUM N/A 9/1/2023    Procedure: PARTIAL FISTULOTOMY, REPLACEMENT OF SETON STITCH;  Surgeon: Vidhya Hamm MD;  Location: [facility] OR    FISTULOTOMY RECTUM N/A 1/3/2025    Procedure: exam under anesthesia, completion fisulotomy, partial fistulotomy, seton exchange;  Surgeon: Vidhya Hamm MD;  Location: [facility] OR    INCISION AND DRAINAGE RECTUM, COMBINED N/A 09/06/2021    Procedure: INCISION AND DRAINAGE, RECTUM, placement of Seton;  Surgeon: Vidhya Hamm MD;  Location: [facility] OR     IRRIGATION AND DEBRIDEMENT PERINEAL, COMBINED N/A 9/1/2023    Procedure: Examination under anesthesia, Debridement of fistula tract;  Surgeon: Vidhya Hamm MD;  Location: [facility] OR    PLACEMENT OF SETON RECTUM N/A 11/19/2021    Procedure: PLACEMENT OF SETON RECTUM;  Surgeon: Vidhya Hamm MD;  Location: [facility] OR    SIGMOIDOSCOPY,BIOPSY  09/24/2020       Social History:   reports that he quit smoking about 4 years ago. His smoking use included cigarettes. He started smoking about 7 years ago. He has a 1.3 pack-year smoking history. He has never used smokeless tobacco. He reports current alcohol use of about 2.0 standard drinks of alcohol per week. He reports that he does not use drugs.    Family History:  Family History   Problem Relation Age of Onset    Multiple Sclerosis Mother     Ulcerative Colitis Maternal Grandmother     Colon Cancer No family hx of     Inflammatory Bowel Disease No family hx of     Irritable Bowel Syndrome No family hx of     Crohn's Disease No family hx of     Anesthesia Reaction No family hx of     Bleeding Disorder No family hx of     Clotting Disorder No family hx of        Medications:  Current Outpatient Medications   Medication Sig Dispense Refill    acetaminophen (TYLENOL) 325 MG tablet Take 650 mg by mouth every 4 hours as needed.      Adalimumab (HUMIRA, 2 PEN,) 40 MG/0.4ML AJKT Inject 40 mg subcutaneously every 7 days. 4 each 5    amLODIPine (NORVASC) 5 MG tablet Take 5 mg by mouth every morning      lisinopril (ZESTRIL) 20 MG tablet Take 20 mg by mouth every morning       VITAMIN D3 50 MCG (2000 UT) tablet Take 1 tablet by mouth daily at 2 pm.       Allergies   Allergen Reactions    Ibuprofen Other (See Comments)     Cannot take simultaneously with humera    Cannot take simultaneously with Humira.   Other: Insomnia

## 2025-03-10 ENCOUNTER — OFFICE VISIT (OUTPATIENT)
Dept: DERMATOLOGY | Facility: CLINIC | Age: 35
End: 2025-03-10
Attending: DERMATOLOGY
Payer: COMMERCIAL

## 2025-03-10 DIAGNOSIS — L81.4 SOLAR LENTIGO: ICD-10-CM

## 2025-03-10 DIAGNOSIS — D22.9 MULTIPLE MELANOCYTIC NEVI: ICD-10-CM

## 2025-03-10 DIAGNOSIS — D48.5 NEOPLASM OF UNCERTAIN BEHAVIOR OF SKIN: ICD-10-CM

## 2025-03-10 DIAGNOSIS — D18.01 CHERRY ANGIOMA: Primary | ICD-10-CM

## 2025-03-10 NOTE — LETTER
3/10/2025      Cody Pickard  [id number] [facility] [address]      Dear Colleague,    Thank you for referring your patient, Cody Pickard, to the [facility]. Please see a copy of my visit note below.    [facility] Dermatology Note    Encounter Date: Mar 10, 2025    Dermatology Problem List:  Last skin check 3/10/25  0. NUB, R plantar foot, photo obtained 3/10/25    ______________________________________    Impression/Plan:  1. Neoplasm of unspecified behavior of the skin (D49.2) on the R plantar foot. The differential diagnosis includes verruca.   - Will continue to monitor as patient has upcoming ankle arthroscopy   - Future considerations: cryo    2. Reassurance provided for benign lesions not treated today including cherry angiomata, solar lentigines, and banal-appearing melanocytic nevi.        Follow-up in 6-8 months.       Staff Involved:  Staff and Scribe  Scribe Disclosure:   I, Lynnette Diggs, am serving as a scribe to document services personally performed by Wilder Mcdonald MD based on data collection and the provider's statements to me.     Provider Disclosure:   The documentation recorded by the scribe accurately reflects the services I personally performed and the decisions made by me.    Wilder Mcdonald MD   of Dermatology  Department of Dermatology  [facility]        CC:   Chief Complaint   Patient presents with     Skin Check     Full body skin check. Lump on bottom of left foot. No personal history of skin cancer.        History of Present Illness:  Mr. Cody Pickard is a 35 year old male who presents as a return patient.    Today, patient reports a lump on the bottom of L foot. Not itchy but does report some discomfort.    Labs:  N/a    Physical exam:  Vitals: There were no vitals taken for this visit.  GEN: This is a well developed, well-nourished male in no acute distress, in a pleasant mood.     SKIN: Adams phototype II  - Full skin, which includes the head/face, both arms, chest, back, abdomen,both legs, genitalia and/or groin buttocks, digits and/or nails, was examined.  - There are dome shaped bright red papules on the trunk and extremities .   - Multiple regular brown pigmented macules and papules are identified on the trunk and extremities. .   - Scattered brown macules on sun exposed areas.  - There is skin colored papule on the R plantar foot interrupting skin lines.  - No other lesions of concern on areas examined.     Past Medical History:   Past Medical History:   Diagnosis Date     Anal fistula      Crohn's disease (H)      HTN (hypertension)      Past Surgical History:   Procedure Laterality Date     COLONOSCOPY  06/15/2020     COLONOSCOPY N/A 9/1/2023    Procedure: COLONOSCOPY;  Surgeon: Vidhya Hamm MD;  Location: [facility] OR     EXAM UNDER ANESTHESIA ANUS N/A 11/19/2021    Procedure: EXAM UNDER ANESTHESIA, ANUS;  Surgeon: Vidhya Hamm MD;  Location: [facility] OR     EXAM UNDER ANESTHESIA ANUS N/A 7/8/2022    Procedure: EXAM UNDER ANESTHESIA, ANUS;  Surgeon: Vidhya Hamm MD;  Location: [facility] OR     EXAM UNDER ANESTHESIA ANUS N/A 12/16/2022    Procedure: EXAM UNDER ANESTHESIA, ANUS, Partial Fistuotomy seton x2;  Surgeon: Vidhya Hamm MD;  Location: [facility] OR     FISTULOTOMY RECTUM N/A 7/8/2022    Procedure: partial FISTULOTOMY, RECTUM, seton replacement x2;  Surgeon: Vidhya Hamm MD;  Location: [facility] OR     FISTULOTOMY RECTUM N/A 12/16/2022    Procedure: POSSIBLE FISTULOTOMY, RECTUM;  Surgeon: Vidhya Hamm MD;  Location: [facility] OR     FISTULOTOMY RECTUM N/A 9/1/2023    Procedure: PARTIAL FISTULOTOMY, REPLACEMENT OF SETON STITCH;  Surgeon: Vidhya Hamm MD;  Location: [facility] OR     FISTULOTOMY RECTUM N/A 1/3/2025    Procedure: exam under anesthesia, completion fisulotomy, partial fistulotomy, seton exchange;   Surgeon: Vidhya Hamm MD;  Location: [facility] OR     INCISION AND DRAINAGE RECTUM, COMBINED N/A 09/06/2021    Procedure: INCISION AND DRAINAGE, RECTUM, placement of Seton;  Surgeon: Vidhya Hamm MD;  Location: [facility] OR     IRRIGATION AND DEBRIDEMENT PERINEAL, COMBINED N/A 9/1/2023    Procedure: Examination under anesthesia, Debridement of fistula tract;  Surgeon: Vidhya Hamm MD;  Location: [facility] OR     PLACEMENT OF SETON RECTUM N/A 11/19/2021    Procedure: PLACEMENT OF SETON RECTUM;  Surgeon: Vidhya Hamm MD;  Location: [facility] OR     SIGMOIDOSCOPY,BIOPSY  09/24/2020       Social History:   reports that he quit smoking about 4 years ago. His smoking use included cigarettes. He started smoking about 7 years ago. He has a 1.3 pack-year smoking history. He has never used smokeless tobacco. He reports current alcohol use of about 2.0 standard drinks of alcohol per week. He reports that he does not use drugs.    Family History:  Family History   Problem Relation Age of Onset     Multiple Sclerosis Mother      Ulcerative Colitis Maternal Grandmother      Colon Cancer No family hx of      Inflammatory Bowel Disease No family hx of      Irritable Bowel Syndrome No family hx of      Crohn's Disease No family hx of      Anesthesia Reaction No family hx of      Bleeding Disorder No family hx of      Clotting Disorder No family hx of        Medications:  Current Outpatient Medications   Medication Sig Dispense Refill     acetaminophen (TYLENOL) 325 MG tablet Take 650 mg by mouth every 4 hours as needed.       Adalimumab (HUMIRA, 2 PEN,) 40 MG/0.4ML AJKT Inject 40 mg subcutaneously every 7 days. 4 each 5     amLODIPine (NORVASC) 5 MG tablet Take 5 mg by mouth every morning       lisinopril (ZESTRIL) 20 MG tablet Take 20 mg by mouth every morning        VITAMIN D3 50 MCG (2000 UT) tablet Take 1 tablet by mouth daily at 2 pm.       Allergies   Allergen Reactions     Ibuprofen Other  (See Comments)     Cannot take simultaneously with humera    Cannot take simultaneously with Humira.   Other: Insomnia             Again, thank you for allowing me to participate in the care of your patient.        Sincerely,        Wilder Mcdonald MD    Electronically signed

## 2025-03-10 NOTE — NURSING NOTE
Cody Pickard's goals for this visit include:   Chief Complaint   Patient presents with    Skin Check     Full body skin check. Lump on bottom of left foot. No personal history of skin cancer.        He requests these members of his care team be copied on today's visit information:     PCP: No Ref-Primary, Physician    Referring Provider:  Wilder Mcdonald MD  [address] [place] [id number]    There were no vitals taken for this visit.    Do you need any medication refills at today's visit? Therese Bosch [facility]

## 2025-03-11 ENCOUNTER — TRANSFERRED RECORDS (OUTPATIENT)
Dept: HEALTH INFORMATION MANAGEMENT | Facility: CLINIC | Age: 35
End: 2025-03-11
Payer: COMMERCIAL

## 2025-03-31 ENCOUNTER — OFFICE VISIT (OUTPATIENT)
Dept: ORTHOPEDICS | Facility: CLINIC | Age: 35
End: 2025-03-31
Payer: COMMERCIAL

## 2025-03-31 DIAGNOSIS — M25.571 PAIN IN JOINT, ANKLE AND FOOT, RIGHT: Primary | ICD-10-CM

## 2025-03-31 DIAGNOSIS — M19.171 POST-TRAUMATIC ARTHRITIS OF ANKLE, RIGHT: ICD-10-CM

## 2025-03-31 PROCEDURE — 99207 PR NO CHARGE NURSE ONLY: CPT | Performed by: PODIATRIST

## 2025-03-31 NOTE — PROGRESS NOTES
Reason for visit:    Cody Pickard came in to the clinic for a two week post op check.    His surgery was done 3/19/2025 by Dr Cat. He had a Right ankle extensive arthroscopic debridement and Right tibia partial excision.     Assessment:    Cody came into the clinic in a tall CAM walking boot WBAT.    The Surgical wounds were exposed and found to be well-healed; so the sutures were removed. Skin was c/d/I. No swelling noted.    Plan:     He was placed in his tall walking CAM boot but will change into a work boot in his car.  He was told to wear the CAM walking boot for comfort as WBAT with no restrictions. He reports he has not started physical therapy and still has clicking and popping in his ankle with no pain. He has not been contacted about physical therapy and was given a new order today during his nurse visit. He will start doing ROM exercises at home until he starts Physical therapy.     He has an appointment to see Dr. Cat at 6 weeks post op and at that time Dr. Cat will determine further restrictions.    All questions were answered. He has our phone number and will call with additional questions or problems. He left content with the visit and all questions and concerns were answered.    KEARA Milligan Dr. is the overseeing provider on site today.

## 2025-04-15 DIAGNOSIS — K60.30 ANAL FISTULA: ICD-10-CM

## 2025-04-15 DIAGNOSIS — K50.111 CROHN'S DISEASE OF LARGE INTESTINE WITH RECTAL BLEEDING (H): ICD-10-CM

## 2025-04-17 DIAGNOSIS — K60.30 ANAL FISTULA: ICD-10-CM

## 2025-04-17 DIAGNOSIS — K50.111 CROHN'S DISEASE OF LARGE INTESTINE WITH RECTAL BLEEDING (H): ICD-10-CM

## 2025-04-17 RX ORDER — ADALIMUMAB 40MG/0.4ML
KIT SUBCUTANEOUS
OUTPATIENT
Start: 2025-04-17

## 2025-04-17 RX ORDER — ADALIMUMAB 40MG/0.4ML
40 KIT SUBCUTANEOUS
Qty: 4 EACH | Refills: 0 | Status: SHIPPED | OUTPATIENT
Start: 2025-04-17

## 2025-04-17 NOTE — ANESTHESIA POSTPROCEDURE EVALUATION
Last Written Prescription:  pantoprazole (PROTONIX) 40 MG EC tablet 180 tablet 0 1/15/2025 -- No   Sig - Route: TAKE 1 TABLET BY MOUTH TWICE A DAY - Oral   Sent to pharmacy as: Pantoprazole Sodium 40 MG Oral Tablet Delayed Release (PROTONIX)   Class: E-Prescribe     ----------------------  Last Visit Date: 11/29/2024  Future Visit Date: 0    ----------------------      Refill decision: Medication refilled per  Medication Refill in Ambulatory Care  policy.        Request from pharmacy:  Requested Prescriptions   Pending Prescriptions Disp Refills    pantoprazole (PROTONIX) 40 MG EC tablet [Pharmacy Med Name: PANTOPRAZOLE SOD DR 40 MG TAB] 180 tablet 0     Sig: TAKE 1 TABLET BY MOUTH TWICE A DAY       PPI Protocol Passed - 4/17/2025  9:17 AM        Passed - Medication is active on med list and the sig matches. RN to manually verify dose and sig if red X/fail.     If the protocol passes (green check), you do not need to verify med dose and sig.    A prescription matches if they are the same clinical intention.    For Example: once daily and every morning are the same.    The protocol can not identify upper and lower case letters as matching and will fail.     For Example: Take 1 tablet (50 mg) by mouth daily     TAKE 1 TABLET (50 MG) BY MOUTH DAILY    For all fails (red x), verify dose and sig.    If the refill does match what is on file, the RN can still proceed to approve the refill request.       If they do not match, route to the appropriate provider.             Passed - Medication indicated for associated diagnosis     The medication is prescribed for one or more of the following conditions:     Erosive esophagitis    Gastritis   Gastric hypersecretion   Gastric ulcer   Gastroesophageal reflux disease   Helicobacter pylori gastrointestinal tract infection   Ulcer of duodenum   Drug-induced peptic ulcer   Esophageal stricture   Gastrointestinal hemorrhage   Indigestion   Stress ulcer   Zollinger-Thomas  Patient: Cody Pickard    Procedure: Procedure(s):  EXAM UNDER ANESTHESIA, ANUS  PLACEMENT OF SETON RECTUM       Diagnosis:Anal fistula [K60.3]  Diagnosis Additional Information: No value filed.    Anesthesia Type:  MAC    Note:  Disposition: Outpatient   Postop Pain Control: Uneventful            Sign Out: Well controlled pain   PONV: No   Neuro/Psych: Uneventful            Sign Out: Acceptable/Baseline neuro status   Airway/Respiratory: Uneventful            Sign Out: Acceptable/Baseline resp. status   CV/Hemodynamics: Uneventful            Sign Out: Acceptable CV status; No obvious hypovolemia; No obvious fluid overload   Other NRE: NONE   DID A NON-ROUTINE EVENT OCCUR? No           Last vitals:  Vitals Value Taken Time   /72 11/19/21 1158   Temp 36.2  C (97.2  F) 11/19/21 1158   Pulse     Resp 16 11/19/21 1158   SpO2 100 % 11/19/21 1158       Electronically Signed By: Mauro Medley MD  November 19, 2021  12:36 PM   syndrome   Valdez s esophagus   Laryngopharyngeal reflux   Epigastric Pain   Morbid Obesity   Cough   History of Peptic Ulcer   Esophageal Atresia, Stenosis and Fistula   Cystic Fibrosis  Bronchiectasis          Passed - Recent (12 mo) or future (90 days) visit within the authorizing provider's specialty     The patient must have completed an in-person or virtual visit within the past 12 months or has a future visit scheduled within the next 90 days with the authorizing provider s specialty.  Urgent care and e-visits do not qualify as an office visit for this protocol.          Passed - Patient is age 18 or older        Passed - No active pregnacy on record        Passed - No positive pregnancy test in past 12 months                Last Written Prescription:  rosuvastatin (CRESTOR) 40 MG tablet 90 tablet 0 1/15/2025 -- No   Sig - Route: TAKE 1 TABLET BY MOUTH EVERY DAY - Oral   Sent to pharmacy as: Rosuvastatin Calcium 40 MG Oral Tablet (CRESTOR)   Class: E-Prescribe     ----------------------  Last Visit Date: 11/29/2024  Future Visit Date: 0    ----------------------    Refill decision: Medication unable to be refilled by RN due to: Overdue labs/test:                                    LDL Cholesterol Calculated   Date Value Ref Range Status   03/01/2024 68 <=100 mg/dL Final   07/29/2020 65 <100 mg/dL Final     Comment:     Desirable:       <100 mg/dl                       rosuvastatin (CRESTOR) 40 MG tablet [Pharmacy Med Name: ROSUVASTATIN CALCIUM 40 MG TAB] 90 tablet 0     Sig: TAKE 1 TABLET BY MOUTH EVERY DAY       Antihyperlipidemic agents Failed - 4/17/2025  9:17 AM        Failed - LDL on file in the past 12 months        Passed - Medication is active on med list and the sig matches. RN to manually verify dose and sig if red X/fail.     If the protocol passes (green check), you do not need to verify med dose and sig.    A prescription matches if they are the same clinical intention.    For Example: once  daily and every morning are the same.    The protocol can not identify upper and lower case letters as matching and will fail.     For Example: Take 1 tablet (50 mg) by mouth daily     TAKE 1 TABLET (50 MG) BY MOUTH DAILY    For all fails (red x), verify dose and sig.    If the refill does match what is on file, the RN can still proceed to approve the refill request.       If they do not match, route to the appropriate provider.             Passed - Recent (12 mo) or future (90 days) visit within the authorizing provider's specialty     The patient must have completed an in-person or virtual visit within the past 12 months or has a future visit scheduled within the next 90 days with the authorizing provider s specialty.  Urgent care and e-visits do not qualify as an office visit for this protocol.          Passed - Patient is age 18 years or older        Passed - No active pregnancy on record        Passed - No positive pregnancy test in past 12 mos

## 2025-04-17 NOTE — PROGRESS NOTES
Last provider visit: 10/29/2025 - Abdiaziz Hull PA-C  Next provider visit: 2025 - Abdiaziz Hull PA-C  Last labs completed: 10/11/2024  Lab frequency: every 3 months   - standing labs available until 2025  Next labs due: now  Last TB screenin2024  PDC: 89% (adalimumab)    Refilled under CPA with Abdiaziz Hull PA-C

## 2025-04-18 ENCOUNTER — DOCUMENTATION ONLY (OUTPATIENT)
Dept: GASTROENTEROLOGY | Facility: CLINIC | Age: 35
End: 2025-04-18
Payer: COMMERCIAL

## 2025-04-18 NOTE — PROGRESS NOTES
INCOMING FAX:     Facility:  Name: [facility]  Phone: [phone]  Fax: [phone]                   What:  Humira verification        Action:  Humira approved for q1w

## 2025-04-29 ENCOUNTER — VIRTUAL VISIT (OUTPATIENT)
Dept: GASTROENTEROLOGY | Facility: CLINIC | Age: 35
End: 2025-04-29
Attending: PHYSICIAN ASSISTANT
Payer: COMMERCIAL

## 2025-04-29 ENCOUNTER — LAB (OUTPATIENT)
Dept: LAB | Facility: CLINIC | Age: 35
End: 2025-04-29
Payer: COMMERCIAL

## 2025-04-29 ENCOUNTER — OFFICE VISIT (OUTPATIENT)
Dept: ORTHOPEDICS | Facility: CLINIC | Age: 35
End: 2025-04-29
Payer: COMMERCIAL

## 2025-04-29 DIAGNOSIS — M25.571 PAIN IN JOINT, ANKLE AND FOOT, RIGHT: Primary | ICD-10-CM

## 2025-04-29 DIAGNOSIS — K50.113 CROHN'S DISEASE OF LARGE INTESTINE WITH FISTULA (H): Primary | ICD-10-CM

## 2025-04-29 DIAGNOSIS — K50.111 CROHN'S DISEASE OF LARGE INTESTINE WITH RECTAL BLEEDING (H): ICD-10-CM

## 2025-04-29 LAB
ALBUMIN SERPL BCG-MCNC: 4.6 G/DL (ref 3.5–5.2)
ALP SERPL-CCNC: 68 U/L (ref 40–150)
ALT SERPL W P-5'-P-CCNC: 25 U/L (ref 0–70)
AST SERPL W P-5'-P-CCNC: 22 U/L (ref 0–45)
BASOPHILS # BLD AUTO: 0.1 10E3/UL (ref 0–0.2)
BASOPHILS NFR BLD AUTO: 1 %
BILIRUB DIRECT SERPL-MCNC: 0.21 MG/DL (ref 0–0.3)
BILIRUB SERPL-MCNC: 0.5 MG/DL
CRP SERPL-MCNC: <3 MG/L
EOSINOPHIL # BLD AUTO: 0.1 10E3/UL (ref 0–0.7)
EOSINOPHIL NFR BLD AUTO: 1 %
ERYTHROCYTE [DISTWIDTH] IN BLOOD BY AUTOMATED COUNT: 12.8 % (ref 10–15)
HCT VFR BLD AUTO: 42.6 % (ref 40–53)
HGB BLD-MCNC: 14.4 G/DL (ref 13.3–17.7)
IMM GRANULOCYTES # BLD: 0 10E3/UL
IMM GRANULOCYTES NFR BLD: 0 %
LYMPHOCYTES # BLD AUTO: 3.9 10E3/UL (ref 0.8–5.3)
LYMPHOCYTES NFR BLD AUTO: 49 %
MCH RBC QN AUTO: 29.1 PG (ref 26.5–33)
MCHC RBC AUTO-ENTMCNC: 33.8 G/DL (ref 31.5–36.5)
MCV RBC AUTO: 86 FL (ref 78–100)
MONOCYTES # BLD AUTO: 0.6 10E3/UL (ref 0–1.3)
MONOCYTES NFR BLD AUTO: 8 %
NEUTROPHILS # BLD AUTO: 3.2 10E3/UL (ref 1.6–8.3)
NEUTROPHILS NFR BLD AUTO: 40 %
NRBC # BLD AUTO: 0 10E3/UL
NRBC BLD AUTO-RTO: 0 /100
PLATELET # BLD AUTO: 368 10E3/UL (ref 150–450)
PROT SERPL-MCNC: 8.1 G/DL (ref 6.4–8.3)
RBC # BLD AUTO: 4.94 10E6/UL (ref 4.4–5.9)
WBC # BLD AUTO: 7.9 10E3/UL (ref 4–11)

## 2025-04-29 PROCEDURE — 80076 HEPATIC FUNCTION PANEL: CPT | Performed by: PATHOLOGY

## 2025-04-29 PROCEDURE — 86140 C-REACTIVE PROTEIN: CPT | Performed by: PATHOLOGY

## 2025-04-29 PROCEDURE — 85025 COMPLETE CBC W/AUTO DIFF WBC: CPT | Performed by: PATHOLOGY

## 2025-04-29 PROCEDURE — 36415 COLL VENOUS BLD VENIPUNCTURE: CPT | Performed by: PATHOLOGY

## 2025-04-29 NOTE — PROGRESS NOTES
Chief complaint: Status post right ankle extensive arthroscopic debridement with partial tibia excision performed on March 19, 2025    Patient presents today for further follow-up.  Reports to be quite pleased with the function of the ankle.  Denies to have any pain.  Reports to have been back to work for the past 10 days and to have no issues whatsoever    On today's exam he presents with full range of motion of the ankle hindfoot and midfoot joint skin is intact skin is intact presents with no pain on palpation there is no effusion.    Assessment: Status post right ankle arthroscopic debridement    Plan: I discussed with the patient to force the dorsiflexion as much as possible given the fact that she has openly admitted he will not go to physical therapy because he does not have the time.  He should proceed with this exercises for at least the next 2 to 3 months.    He will follow up on as needed basis.  All questions were answered.

## 2025-04-29 NOTE — PATIENT INSTRUCTIONS
It was a pleasure taking care of you today.  I've included a brief summary of our discussion and care plan from today's visit below.  Please review this information with your primary care provider.  ______________________________________________________________________    My recommendations are summarized as follows:    -- Continue with humira every week  -- Labs every 3 months, due   -- Next endoscopic assessment: 2028  -- Patient with IBD we recommend supplementation vitamin D 1000 units daily  -- Vaccines/immunizations to be updated: flu shot yearly, pneumonia, shingles, COVID series, tetanus  -- Yearly Dermatology visit for skin check while on immunosuppressive therapy. Can call [phone] to schedule.  -- No NSAIDs (ibuprofen, or anything containing ibuprofen)     DEXA scan to be scheduled   You can schedule your DEXA scan by calling [phone].  The scan is done on the first floor at the [facility] [place].  The appointment is 30 minutes.  It is recommended that you wear light clothing, no zippers and no metal (including underwire of bra)  You are to withhold your calcium supplement for 24 hours prior to the scan.    For additional resources about inflammatory bowel disease visit [email or web address]    Return to GI Clinic in 6 months to review your progress.    ______________________________________________________________________    How do I schedule labs, imaging studies, or procedures that were ordered in clinic today?     Labs: To schedule lab appointment at the [facility], use my chart or call [phone]. If you have a [facility] lab closer to home where you are regularly seen you can give them a call.     Procedures: If a colonoscopy, upper endoscopy, breath test, esophageal manometry, or pH impedence was ordered today, our endoscopy team will call you to schedule this. If you have not heard from our endoscopy team within a week, please call  [phone] to schedule.     Imaging Studies: If you were scheduled for a CT scan, X-ray, MRI, ultrasound, HIDA scan or other imaging study, please call [phone] to have this scheduled.     Referral: If a referral to another specialty was ordered, expect a phone call or follow instructions above. If you have not heard from anyone regarding your referral in a week, please call our clinic to check the status.     Who do I call with any questions after my visit?  Please be in touch if there are any further questions that arise following today's visit.  There are multiple ways to contact your gastroenterology care team.      During business hours, you may reach a Gastroenterology nurse at [phone]    To schedule or reschedule an appointment, please call [phone].     You can always send a secure message through [employer].  [employer] messages are answered by your nurse or doctor typically within 24 hours.  Please allow extra time on weekends and holidays.      For urgent/emergent questions after business hours, you may reach the on-call GI Fellow by contacting the [facility]  at [phone].     How will I get the results of any tests ordered?    You will receive all of your results.  If you have signed up for [employer], any tests ordered at your visit will be available to you after your physician reviews them.  Typically this takes 1-2 weeks.  If there are urgent results that require a change in your care plan, your physician or nurse will call you to discuss the next steps.      What is [facility]?  [employer] is a secure way for you to access all of your healthcare records from the [facility].  It is a web based computer program, so you can sign on to it from any location.  It also allows you to send secure messages to your care team.  I recommend signing up for [facility] access if you have not already done so and are comfortable with using a computer.         Sincerely,    Abdiaziz  Kurtis HARMON  [facility]  Division of Gastroenterology

## 2025-04-29 NOTE — NURSING NOTE
Current patient location: Patient declined to provide     Is the patient currently in the state of [place]? YES    Visit mode: TELEPHONE    If the visit is dropped, the patient can be reconnected by:TELEPHONE VISIT: Phone number:   Telephone Information:   Mobile [phone]       Will anyone else be joining the visit? NO  (If patient encounters technical issues they should call [phone] :[phone])    Are changes needed to the allergy or medication list? No    Are refills needed on medications prescribed by this physician? NO    Rooming Documentation:  Questionnaire(s) completed    Reason for visit: RECHECK    Rosio BAEZ

## 2025-04-29 NOTE — PROGRESS NOTES
IBD CLINIC VISIT    CC/REFERRING MD:  Referred Self  REASON FOR CONSULTATION: Colitis    ASSESSMENT/PLAN:  35 year old male with Crohn's disease.     1. Crohn's disease:   Current medication: Adalimumab q7 days  Current clinical disease activity: Remission   Current endoscopic disease activity: 9/1/23 (with CRS during EUA) Colonoscopy was the performed and the colonoscopy easily advanced to the ileocecal valve. The terminal ileum was entered and there was not obvious disease. There were no polyps and the preparation was good. The colonoscopy was gradually withdrawn over 10 minutes and retroflexion was performed with no obvious rectal abnormalities with the exception of the anal disease.    -- Continue humira weekly  -- Seton placement/fistulotomy per colorectal surgery  -- DEXA scan when his foot is fully healed    2. IBD dysplasia surveillance: > 1/3 of colon involved. Will need dysplasia surveillance.   -- IBD dysplasia surveillance starting in 2028    Return to clinic in 6 months.     Thank you for this consultation.  27 minutes spent on the date of the encounter doing chart review, history and exam, documentation and further activities as noted above.  It was a pleasure to participate in the care of this patient; please contact us with any further questions.      Abdiaziz Hull PA-C  Division of Gastroenterology, Hepatology and Nutrition  [facility]      IBD HISTORY  Age at diagnosis: 30  Endoscopic extent of disease: Continuous: rectum to proximal transverse  Histologic Extent of disease: Rectum, descending   Disease phenotype: Extensive   Rose-anal disease: Yes  Prior IBD surgeries: None  Prior IBD Medications:  - Vedolizumab - not dose escalated     DRUG MONITORING  TPMT enzyme activity:  n/a  6-TGN/6-MMPN levels: n/a  Biologic concentration:   Adalimumab: 6.1 on 9/2021, no antibodies      HPI:   Here for follow up.      Had a fistulotomy and seton exchange 1/3/25 w/ CRS.    Current feeling well  from a GI standpoint. 1-2 formed stools per day. No blood in the stool. Occasional urgency. No nighttime stools. Continues to be doing well with smoking cessation.     Had ankle surgery 6 weeks ago.    sIBDQ:  IBDQ Score Date IBDQ - Total Score  (Higher score better)   7/9/2024  11:05 AM 67   6/28/2023   2:05 PM 69   2/28/2022   3:33 PM 64      ROS:    Constitutional, HEENT, cardiovascular, pulmonary, GI, , musculoskeletal, neuro, skin, endocrine and psych systems are negative, except as otherwise noted.    PHYSICAL EXAMINATION:  Constitutional: aaox3, cooperative, pleasant, not dyspneic/diaphoretic, no acute distress  Vitals reviewed: There were no vitals taken for this visit.  Wt:   Wt Readings from Last 2 Encounters:   02/25/25 104.3 kg (230 lb)   01/03/25 99.8 kg (220 lb)      This visit was conducted through a telephone visit. The physical exam was deferred.      PERTINENT PAST MEDICAL HISTORY:  Past Medical History:   Diagnosis Date    Anal fistula     Crohn's disease (H)     HTN (hypertension)       Inflammatory bowel disease  Hypertension     PREVIOUS SURGERIES:  Past Surgical History:   Procedure Laterality Date    COLONOSCOPY  06/15/2020    COLONOSCOPY N/A 9/1/2023    Procedure: COLONOSCOPY;  Surgeon: Vidhya Hamm MD;  Location: [facility] OR    EXAM UNDER ANESTHESIA ANUS N/A 11/19/2021    Procedure: EXAM UNDER ANESTHESIA, ANUS;  Surgeon: Vidhya Hamm MD;  Location: [facility] OR    EXAM UNDER ANESTHESIA ANUS N/A 7/8/2022    Procedure: EXAM UNDER ANESTHESIA, ANUS;  Surgeon: Vidhya Hamm MD;  Location: [facility] OR    EXAM UNDER ANESTHESIA ANUS N/A 12/16/2022    Procedure: EXAM UNDER ANESTHESIA, ANUS, Partial Fistuotomy seton x2;  Surgeon: Vidhya Hamm MD;  Location: [facility] OR    FISTULOTOMY RECTUM N/A 7/8/2022    Procedure: partial FISTULOTOMY, RECTUM, seton replacement x2;  Surgeon: Vidhya Hamm MD;  Location: [facility] OR    FISTULOTOMY RECTUM N/A  12/16/2022    Procedure: POSSIBLE FISTULOTOMY, RECTUM;  Surgeon: Vidhya Hamm MD;  Location: [facility] OR    FISTULOTOMY RECTUM N/A 9/1/2023    Procedure: PARTIAL FISTULOTOMY, REPLACEMENT OF SETON STITCH;  Surgeon: Vidhya Hamm MD;  Location: [facility] OR    FISTULOTOMY RECTUM N/A 1/3/2025    Procedure: exam under anesthesia, completion fisulotomy, partial fistulotomy, seton exchange;  Surgeon: Vidhya Hamm MD;  Location: [facility] OR    INCISION AND DRAINAGE RECTUM, COMBINED N/A 09/06/2021    Procedure: INCISION AND DRAINAGE, RECTUM, placement of Seton;  Surgeon: Vidhya Hamm MD;  [facility]    IRRIGATION AND DEBRIDEMENT PERINEAL, COMBINED N/A 9/1/2023    Procedure: Examination under anesthesia, Debridement of fistula tract;  Surgeon: Vidhya Hamm MD;  Location: [facility]    PLACEMENT OF SETON RECTUM N/A 11/19/2021    Procedure: PLACEMENT OF SETON RECTUM;  Surgeon: Vidhya Hamm MD;  Location: [facility] OR    SIGMOIDOSCOPY,BIOPSY  09/24/2020     ALLERGIES:     Allergies   Allergen Reactions    Ibuprofen Other (See Comments)     Cannot take simultaneously with humera    Cannot take simultaneously with Humira.   Other: Insomnia       PERTINENT MEDICATIONS:    Current Outpatient Medications:     acetaminophen (TYLENOL) 325 MG tablet, Take 650 mg by mouth every 4 hours as needed., Disp: , Rfl:     Adalimumab (HUMIRA, 2 PEN,) 40 MG/0.4ML pen kit, Inject 0.4 mLs (40 mg) subcutaneously every 7 days., Disp: 4 each, Rfl: 0    amLODIPine (NORVASC) 5 MG tablet, Take 5 mg by mouth every morning, Disp: , Rfl:     lisinopril (ZESTRIL) 20 MG tablet, Take 20 mg by mouth every morning , Disp: , Rfl:     VITAMIN D3 50 MCG (2000 UT) tablet, Take 1 tablet by mouth daily at 2 pm., Disp: , Rfl:     SOCIAL HISTORY:  Social History     Socioeconomic History    Marital status: Single     Spouse name: Not on file    Number of children: Not on file    Years of education: Not  on file    Highest education level: Not on file   Occupational History    Not on file   Tobacco Use    Smoking status: Former     Current packs/day: 0.00     Average packs/day: 0.5 packs/day for 2.6 years (1.3 ttl pk-yrs)     Types: Cigarettes     Start date:      Quit date: 2020     Years since quittin.7    Smokeless tobacco: Never   Vaping Use    Vaping status: Never Used   Substance and Sexual Activity    Alcohol use: Yes     Alcohol/week: 2.0 standard drinks of alcohol     Types: 2 Cans of beer per week    Drug use: Never    Sexual activity: Not on file   Other Topics Concern    Not on file   Social History Narrative    Not on file     Social Drivers of Health     Financial Resource Strain: High Risk (2022)    [facility], [facility]    Financial Resource Strain     Difficulty of Paying Living Expenses: Not on file     Difficulty of Paying Living Expenses: Not on file   Food Insecurity: Not on file   Transportation Needs: Not on file   Physical Activity: Not on file   Stress: Not on file   Social Connections: Unknown (2023)    Received from [facility], [facility]    Social Connections     Frequency of Communication with Friends and Family: Not on file   Interpersonal Safety: Low Risk  (1/3/2025)    Interpersonal Safety     Do you feel physically and emotionally safe where you currently live?: Yes     Within the past 12 months, have you been hit, slapped, kicked or otherwise physically hurt by someone?: No     Within the past 12 months, have you been humiliated or emotionally abused in other ways by your partner or ex-partner?: No   Housing Stability: Not on file       FAMILY HISTORY:  Family History   Problem Relation Age of Onset    Multiple Sclerosis Mother     Ulcerative Colitis Maternal Grandmother     Colon Cancer No family hx of      Inflammatory Bowel Disease No family hx of     Irritable Bowel Syndrome No family hx of     Crohn's Disease No family hx of     Anesthesia Reaction No family hx of     Bleeding Disorder No family hx of     Clotting Disorder No family hx of      No family history of IBD  Past/family/social history reviewed and no changes

## 2025-04-29 NOTE — NURSING NOTE
Reason For Visit:   Chief Complaint   Patient presents with    Surgical Followup     6 wk post-op  Right ankle extensive arthroscopic debridement and Right tibia partial excision DOS 3/19/25         35 year old  1990      Primary MD: No Ref-Primary, Physician  Ref. MD: est      There were no vitals taken for this visit.    Pain Assessment  Patient Currently in Pain: Yes  0-10 Pain Scale: 2  Primary Pain Location: Ankle (right)            Nicolas White ATC

## 2025-04-29 NOTE — LETTER
4/29/2025      Cody Pickard  [id number] [facility] [address]      Dear Colleague,    Thank you for referring your patient, Cody Pickard, to the [facility] [place]. Please see a copy of my visit note below.    Chief complaint: Status post right ankle extensive arthroscopic debridement with partial tibia excision performed on March 19, 2025    Patient presents today for further follow-up.  Reports to be quite pleased with the function of the ankle.  Denies to have any pain.  Reports to have been back to work for the past 10 days and to have no issues whatsoever    On today's exam he presents with full range of motion of the ankle hindfoot and midfoot joint skin is intact skin is intact presents with no pain on palpation there is no effusion.    Assessment: Status post right ankle arthroscopic debridement    Plan: I discussed with the patient to force the dorsiflexion as much as possible given the fact that she has openly admitted he will not go to physical therapy because he does not have the time.  He should proceed with this exercises for at least the next 2 to 3 months.    He will follow up on as needed basis.  All questions were answered.        Again, thank you for allowing me to participate in the care of your patient.        Sincerely,        Jose Cat MD    Electronically signed

## 2025-04-29 NOTE — LETTER
4/29/2025      Cody Pickard  [id number] [facility] [address]      Dear Colleague,    Thank you for referring your patient, Cody Pickard, to the [facility] [place]. Please see a copy of my visit note below.    IBD CLINIC VISIT    CC/REFERRING MD:  Referred Self  REASON FOR CONSULTATION: Colitis    ASSESSMENT/PLAN:  35 year old male with Crohn's disease.     1. Crohn's disease:   Current medication: Adalimumab q7 days  Current clinical disease activity: Remission   Current endoscopic disease activity: 9/1/23 (with CRS during EUA) Colonoscopy was the performed and the colonoscopy easily advanced to the ileocecal valve. The terminal ileum was entered and there was not obvious disease. There were no polyps and the preparation was good. The colonoscopy was gradually withdrawn over 10 minutes and retroflexion was performed with no obvious rectal abnormalities with the exception of the anal disease.    -- Continue humira weekly  -- Seton placement/fistulotomy per colorectal surgery  -- DEXA scan when his foot is fully healed    2. IBD dysplasia surveillance: > 1/3 of colon involved. Will need dysplasia surveillance.   -- IBD dysplasia surveillance starting in 2028    Return to clinic in 6 months.     Thank you for this consultation.  27 minutes spent on the date of the encounter doing chart review, history and exam, documentation and further activities as noted above.  It was a pleasure to participate in the care of this patient; please contact us with any further questions.      Abdiaziz Hull PA-C  Division of Gastroenterology, Hepatology and Nutrition  [facility]      IBD HISTORY  Age at diagnosis: 30  Endoscopic extent of disease: Continuous: rectum to proximal transverse  Histologic Extent of disease: Rectum, descending   Disease phenotype: Extensive   Rose-anal disease: Yes  Prior IBD surgeries: None  Prior IBD Medications:  - Vedolizumab - not dose escalated      DRUG MONITORING  TPMT enzyme activity:  n/a  6-TGN/6-MMPN levels: n/a  Biologic concentration:   Adalimumab: 6.1 on 9/2021, no antibodies      HPI:   Here for follow up.      Had a fistulotomy and seton exchange 1/3/25 w/ CRS.    Current feeling well from a GI standpoint. 1-2 formed stools per day. No blood in the stool. Occasional urgency. No nighttime stools. Continues to be doing well with smoking cessation.     Had ankle surgery 6 weeks ago.    sIBDQ:  IBDQ Score Date IBDQ - Total Score  (Higher score better)   7/9/2024  11:05 AM 67   6/28/2023   2:05 PM 69   2/28/2022   3:33 PM 64      ROS:    Constitutional, HEENT, cardiovascular, pulmonary, GI, , musculoskeletal, neuro, skin, endocrine and psych systems are negative, except as otherwise noted.    PHYSICAL EXAMINATION:  Constitutional: aaox3, cooperative, pleasant, not dyspneic/diaphoretic, no acute distress  Vitals reviewed: There were no vitals taken for this visit.  Wt:   Wt Readings from Last 2 Encounters:   02/25/25 104.3 kg (230 lb)   01/03/25 99.8 kg (220 lb)      This visit was conducted through a telephone visit. The physical exam was deferred.      PERTINENT PAST MEDICAL HISTORY:  Past Medical History:   Diagnosis Date     Anal fistula      Crohn's disease (H)      HTN (hypertension)       Inflammatory bowel disease  Hypertension     PREVIOUS SURGERIES:  Past Surgical History:   Procedure Laterality Date     COLONOSCOPY  06/15/2020     COLONOSCOPY N/A 9/1/2023    Procedure: COLONOSCOPY;  Surgeon: Vidhya Hamm MD;  Location: [facility] OR     EXAM UNDER ANESTHESIA ANUS N/A 11/19/2021    Procedure: EXAM UNDER ANESTHESIA, ANUS;  Surgeon: Vidhya Hamm MD;  Location: [facility]     EXAM UNDER ANESTHESIA ANUS N/A 7/8/2022    Procedure: EXAM UNDER ANESTHESIA, ANUS;  Surgeon: Vidhya Hamm MD;  Location: [facility]     EXAM UNDER ANESTHESIA ANUS N/A 12/16/2022    Procedure: EXAM UNDER ANESTHESIA, ANUS, Partial Fistuotomy  seton x2;  Surgeon: Vidhya Hamm MD;  Location: [facility]     FISTULOTOMY RECTUM N/A 7/8/2022    Procedure: partial FISTULOTOMY, RECTUM, seton replacement x2;  Surgeon: Vidhya Hamm MD;  Location: [facility]     FISTULOTOMY RECTUM N/A 12/16/2022    Procedure: POSSIBLE FISTULOTOMY, RECTUM;  Surgeon: Vidhya Hamm MD;  Location: [facility]     FISTULOTOMY RECTUM N/A 9/1/2023    Procedure: PARTIAL FISTULOTOMY, REPLACEMENT OF SETON STITCH;  Surgeon: Vidhya Hamm MD;  Location: [facility]     FISTULOTOMY RECTUM N/A 1/3/2025    Procedure: exam under anesthesia, completion fisulotomy, partial fistulotomy, seton exchange;  Surgeon: Vidhya Hamm MD;  Location: [facility]     INCISION AND DRAINAGE RECTUM, COMBINED N/A 09/06/2021    Procedure: INCISION AND DRAINAGE, RECTUM, placement of Seton;  Surgeon: Vidhya Hamm MD;  [facility]     IRRIGATION AND DEBRIDEMENT PERINEAL, COMBINED N/A 9/1/2023    Procedure: Examination under anesthesia, Debridement of fistula tract;  Surgeon: Vidhya Hamm MD;  Location: [facility]     PLACEMENT OF SETON RECTUM N/A 11/19/2021    Procedure: PLACEMENT OF SETON RECTUM;  Surgeon: Vidhya Hamm MD;  Location: [facility] OR     SIGMOIDOSCOPY,BIOPSY  09/24/2020     ALLERGIES:     Allergies   Allergen Reactions     Ibuprofen Other (See Comments)     Cannot take simultaneously with humera    Cannot take simultaneously with Humira.   Other: Insomnia       PERTINENT MEDICATIONS:    Current Outpatient Medications:      acetaminophen (TYLENOL) 325 MG tablet, Take 650 mg by mouth every 4 hours as needed., Disp: , Rfl:      Adalimumab (HUMIRA, 2 PEN,) 40 MG/0.4ML pen kit, Inject 0.4 mLs (40 mg) subcutaneously every 7 days., Disp: 4 each, Rfl: 0     amLODIPine (NORVASC) 5 MG tablet, Take 5 mg by mouth every morning, Disp: , Rfl:      lisinopril (ZESTRIL) 20 MG tablet, Take 20 mg by mouth every morning , Disp: , Rfl:       VITAMIN D3 50 MCG ( UT) tablet, Take 1 tablet by mouth daily at 2 pm., Disp: , Rfl:     SOCIAL HISTORY:  Social History     Socioeconomic History     Marital status: Single     Spouse name: Not on file     Number of children: Not on file     Years of education: Not on file     Highest education level: Not on file   Occupational History     Not on file   Tobacco Use     Smoking status: Former     Current packs/day: 0.00     Average packs/day: 0.5 packs/day for 2.6 years (1.3 ttl pk-yrs)     Types: Cigarettes     Start date:      Quit date: 2020     Years since quittin.7     Smokeless tobacco: Never   Vaping Use     Vaping status: Never Used   Substance and Sexual Activity     Alcohol use: Yes     Alcohol/week: 2.0 standard drinks of alcohol     Types: 2 Cans of beer per week     Drug use: Never     Sexual activity: Not on file   Other Topics Concern     Not on file   Social History Narrative     Not on file     Social Drivers of Health     Financial Resource Strain: High Risk (2022)    Received from [facility], [facility]    Financial Resource Strain      Difficulty of Paying Living Expenses: Not on file      Difficulty of Paying Living Expenses: Not on file   Food Insecurity: Not on file   Transportation Needs: Not on file   Physical Activity: Not on file   Stress: Not on file   Social Connections: Unknown (2023)    Received from [facility], [facility]    Social Connections      Frequency of Communication with Friends and Family: Not on file   Interpersonal Safety: Low Risk  (1/3/2025)    Interpersonal Safety      Do you feel physically and emotionally safe where you currently live?: Yes      Within the past 12 months, have you been hit, slapped, kicked or otherwise physically hurt by someone?: No      Within the past 12 months, have you been humiliated or  emotionally abused in other ways by your partner or ex-partner?: No   Housing Stability: Not on file       FAMILY HISTORY:  Family History   Problem Relation Age of Onset     Multiple Sclerosis Mother      Ulcerative Colitis Maternal Grandmother      Colon Cancer No family hx of      Inflammatory Bowel Disease No family hx of      Irritable Bowel Syndrome No family hx of      Crohn's Disease No family hx of      Anesthesia Reaction No family hx of      Bleeding Disorder No family hx of      Clotting Disorder No family hx of      No family history of IBD  Past/family/social history reviewed and no changes            Virtual Visit Details    Type of service:  Telephone Visit   Phone call duration: 12 minutes   Originating Location (pt. Location): Home    Distant Location (provider location):  Off-site  Telephone visit completed due to the patient did not have access to video, while the distant provider did.      Again, thank you for allowing me to participate in the care of your patient.        Sincerely,        Abdiaziz Hull PA-C    Electronically signed

## 2025-04-29 NOTE — PROGRESS NOTES
Virtual Visit Details    Type of service:  Telephone Visit   Phone call duration: 12 minutes   Originating Location (pt. Location): Home    Distant Location (provider location):  Off-site  Telephone visit completed due to the patient did not have access to video, while the distant provider did.

## 2025-05-09 ENCOUNTER — TELEPHONE (OUTPATIENT)
Dept: GASTROENTEROLOGY | Facility: CLINIC | Age: 35
End: 2025-05-09
Payer: COMMERCIAL

## 2025-05-09 NOTE — TELEPHONE ENCOUNTER
PA Initiation    Medication: HUMIRA (2 PEN) 40 MG/0.4ML SC AJKT  Insurance Company: [facility] - Phone [phone] Fax [phone]  Pharmacy Filling the Rx:    Filling Pharmacy Phone:    Filling Pharmacy Fax:    Start Date: 5/9/2025  [id number]

## 2025-05-09 NOTE — LETTER
May 13, 2025    To:   [facility]  [address]  [place] [address]    RE:   Cody Pickard  : 1990  Policy #: [id number]  Case/Reference: [id number]    To Whom It May Concern,    Below is the clinical summary pertinent to the appeal of Humira (2 Pen) Subcutaneous Auto-Injector Kit 40mg/0.4ml once weekly. We understand that you are denying our request because patient must have a poor response to at least a 3 month trial of the max FDA labeled dose. This is a request for continuation of existing therapy.    Background   Diagnosis: Crohn's disease of large intestine with rectal bleeding (H) [K50.111]    Current IBD medications:   - Humira 40mg once weekly started in 2021    Relevant drug levels:          Previous IBD Therapies:   -Humira 40mg every 2 weeks from 2021-2021    Clinical disease assessment on current medications:   Current medication: Adalimumab q7 days  Current clinical disease activity: Remission   Current endoscopic disease activity: 23 (with CRS during EUA) Colonoscopy was the performed and the colonoscopy easily advanced to the ileocecal valve. The terminal ileum was entered and there was not obvious disease. There were no polyps and the preparation was good. The colonoscopy was gradually withdrawn over 10 minutes and retroflexion was performed with no obvious rectal abnormalities with the exception of the anal disease.       CRP Inflammation   Date Value Ref Range Status   2025 <3.00 <5.00 mg/L Final         Lab Results   Component Value Date    CALPRF 5.4 2023       Dispense history:      Rationale for requested therapy  I am writing to appeal the denial of continued coverage for Humira (adalimumab) 40 mg weekly for my patient, Cody Pickard, who has achieved endoscopic remission on this regimen, and discontinuation or alteration of this therapy poses a significant risk of disease relapse. Cody was escalated to  adalimumab 40 mg weekly in 2021 in setting of inadequate response to standard dosing.   The American College of Gastroenterology (ACG) guidelines recommend the use of anti-TNF agents, including adalimumab, for the treatment of moderate-to-severe Crohn's disease that is refractory to conventional therapies.[1] Adalimumab has been shown to be effective in maintaining clinical remission and endoscopic healing in patients with Crohn's disease.[2-3]  Clinical studies have demonstrated that adalimumab is superior to placebo in maintaining remission, with a significant reduction in the risk of relapse.[3] Furthermore, the American Gastroenterological Association (AGA) guidelines support the use of adalimumab for the maintenance of remission in patients with moderate-to-severe Crohn's disease.[4]  The efficacy of adalimumab for the induction/maintenance of remission in Crohn's and ulcerative colitis was demonstrated in the CLASSIC (Crohn's) and ULTRA (UC) clinical trials. In these pivotal trials, 30% (CLASSIC II) and 16% (ULTRA 2) of patients required dose escalation to 40mg once weekly, due to an inadequate clinical response.[5,6]  In a subsequent multicenter cohort study by Kelton et al, 34% of Crohn's patients on adalimumab 40mg every 2 weeks required dose escalation to 40mg once weekly, due to loss of clinical response, with 67% of these patients maintaining a long-term clinical response.[7] Similarly, in a multicenter cohort study by Grecia et al, among ulcerative colitis patients experiencing loss of response on adalimumab 40mg every 2 weeks, 47% achieved a short-term clinical response, and 34% maintained a long-term clinical response after dose escalation to adalimumab 40mg once weekly.[8]   My patient has responded well to the current regimen of Humira 40 mg weekly, achieving endoscopic remission as confirmed by recent evaluations. Given the chronic and relapsing nature of Crohn's disease, maintaining the current  therapy is crucial to prevent disease flare-ups and complications. The ACG guidelines emphasize the importance of continuing effective therapy to sustain remission and improve long-term outcomes.  In conclusion, based on the clinical evidence and guidelines from the ACG and AGA, it is medically necessary for Cody Pickard to continue receiving Humira 40 mg weekly. I urge you to reconsider the denial and approve the continuation of this essential therapy.    How we will measure success:   Based on the above, we will measure success defined as an improvement of both symptoms and inflammatory parameters, specifically clinical and endoscopic parameters over 6-12 months timeframe. Should we not achieve these measures we will pursue a different line of therapy.    Duration  12 months    Summary  In summary, Humira (2 Pen) Subcutaneous Auto-Injector Kit 40mg/0.4ml once weekly is medically necessary for this patient s medical condition. Please call my office at [phone] if I can provide you with any additional information to approve my request. I look forward to receiving your timely response and approval of this request.     References  Doug GR, Zachary EV, Iva KL, Darling VALENCIA, Adiel LB, Hoda BE. ACG Clinical Guideline: Management of Crohn's Disease in Adults. Am J Gastroenterol. 2018 Apr;113(4):481-517. doi: 10.1038/ajg.2018.27. Epub 2018 Mar 27. Erratum in: Am J Gastroenterol. 2018 Jul;113(7):1101. doi: 10.1038/j74358-648-4837-s. PMID: 12119735.  Nico N, Angelito ECL, Ayleen PS, Maurice KINSEY, Bjorn ROSE, Robi W. Comparative Effectiveness of Biologics for Endoscopic Healing of the Ileum and Colon in Crohn's Disease. Am J Gastroenterol. 2022 Jul 1;117(7):4619-8257. doi: 10.05216/ajg.[id number]. Epub 2022 Apr 15. PMID: 06159764.  Sis CM, Karlene TM, Lottie J, Merna M, Ozzy CE, Benedict CHOK, Vivian R, Bjorn V, José Miguel BG. Adalimumab for maintenance of remission in Crohn's disease. [place] Database  Syst Rev. 2020 May 16;5(5):MI560014. doi: 10.1002/[id number].pub2. PMID: 79360499; PMCID: [id number].  Lance CHOD, Ramsey EY, Mohan E, Serge H, Ela Y,  S, Taco NICK; American Gastroenterological Association [place] Clinical Guidelines Committee. [facility] Clinical Practice Guidelines on the Medical Management of Moderate to Severe Luminal and Perianal Fistulizing Crohn's Disease. Gastroenterology. 2021 Jun;160(7):5884-2669. doi: 10.1053/j.gastro.2021.04.022. PMID: 26317895; PMCID: [id number].  Bayron et al. Adalimumab for maintenance treatment of Crohn's disease: results of the CLASSIC II trial. Gut 2007; 56:5980-2896   Bayron et al. Adalimumab induces and maintains clinical remission in patients with moderate-to-severe ulcerative colitis. Gastroenterology. 2012;142(2):257-65   Kelton et al. Incidence and Predictors of Success of Adalimumab Dose Escalation and De-escalation in Ulcerative Colitis: a Real-World [place] Cohort Study. J Crohn's Colitis. 2013;7(2):154-60   Grecia et al. Adalimumab Maintenance Treatment in Ulcerative Colitis: Outcomes by Prior Anti-TNF Use and Efficacy of Dose Escalation. Dig Dis Sci. 2017;62(2):481-490     Sincerely,    Abdiaziz Hull PA-C  Division of Gastroenterology, Hepatology and Nutrition  [facility]

## 2025-05-13 NOTE — TELEPHONE ENCOUNTER
PRIOR AUTHORIZATION DENIED    Medication: HUMIRA (2 PEN) 40 MG/0.4ML SC AJKT  Insurance Company: [employer] [place] - Phone [phone] Fax [phone]  Denial Date: 5/13/2025  Denial Reason(s):     Appeal Information:     Patient Notified: no

## 2025-05-13 NOTE — TELEPHONE ENCOUNTER
Faxed over office notes from 2021 stating patient tried q2w dosing and flex sig notes indicated the need to dose increase.

## 2025-05-16 NOTE — TELEPHONE ENCOUNTER
Medication Appeal Initiation    Medication: HUMIRA (2 PEN) 40 MG/0.4ML SC AJKT  Appeal Start Date:  5/16/2025  Insurance Company: [employer]  Insurance Phone:   Insurance Fax:   Comments:    faxed [email or web address]

## 2025-05-17 ENCOUNTER — HEALTH MAINTENANCE LETTER (OUTPATIENT)
Age: 35
End: 2025-05-17

## 2025-05-27 NOTE — TELEPHONE ENCOUNTER
Health Call Center    Phone Message    May a detailed message be left on voicemail: yes     Reason for Call: Medication Question or concern regarding medication   Prescription Clarification  Name of Medication: HUMIRA (2 PEN) 40 MG/0.4ML SC AJKT   Prescribing Provider: Abdiaziz Hull   Pharmacy: [facility] - [place], [place] - [address]    What on the order needs clarification? Patient is calling and is wanting the team to call back as he is needing this medication and is wondering the process step with his insurance. Thanks!      Action Taken: Message routed to:  [facility] GI    Travel Screening: Not Applicable     Date of Service:

## 2025-05-28 ENCOUNTER — TELEPHONE (OUTPATIENT)
Dept: PHARMACY | Facility: CLINIC | Age: 35
End: 2025-05-28
Payer: COMMERCIAL

## 2025-05-28 NOTE — TELEPHONE ENCOUNTER
Reached out to [facility]. [facility] Pinky assisted with ensuring patient would be contacted about Humira delivery ASAP and they will help Cody with copay card as well.     Jodie De Los Santos, PharmD  [facility] Pharmacist  [facility]astroenterology

## 2025-05-28 NOTE — TELEPHONE ENCOUNTER
MEDICATION APPEAL APPROVED    Medication: HUMIRA (2 PEN) 40 MG/0.4ML SC AJKT  Authorization Effective Date: 5/23/2025  Authorization Expiration Date: 5/23/2026  Approved Dose/Quantity: 4/28  Reference #: [id number]   Appeal Insurance Company: [employer]  Expected CoPay: $       CoPay Card Available:    Financial Assistance Needed: yes  Filling Pharmacy: [facility] - [facility] [address]  Patient Notified: yes  Comments:

## 2025-05-28 NOTE — TELEPHONE ENCOUNTER
Pt states something is going on with rx. Pt reports new ins and ins denied half of it. Pt states got off phone with pharmacyh and they don't have rx there. Pt thinks they might have switched pharmacies    Pt is due Monday     ID: [id number]  Rx Grp: [id number]  PCN: [facility]  Bin: [id number]      *Pt requested a call back when we can get this figured out. I'm happy to do that, just let me know* :)

## 2025-09-03 ENCOUNTER — TELEPHONE (OUTPATIENT)
Dept: SURGERY | Facility: CLINIC | Age: 35
End: 2025-09-03

## 2025-09-03 ENCOUNTER — OFFICE VISIT (OUTPATIENT)
Dept: SURGERY | Facility: CLINIC | Age: 35
End: 2025-09-03
Payer: COMMERCIAL

## 2025-09-03 VITALS — OXYGEN SATURATION: 98 % | HEART RATE: 78 BPM | SYSTOLIC BLOOD PRESSURE: 143 MMHG | DIASTOLIC BLOOD PRESSURE: 87 MMHG

## 2025-09-03 DIAGNOSIS — K60.30 ANAL FISTULA: Primary | ICD-10-CM

## 2025-09-03 ASSESSMENT — PAIN SCALES - GENERAL: PAINLEVEL_OUTOF10: NO PAIN (0)

## (undated) DEVICE — DRSG GAUZE 4X4" TRAY 6939

## (undated) DEVICE — GLOVE BIOGEL PI MICRO SZ 6.5 48565

## (undated) DEVICE — PREP POVIDONE IODINE SCRUB 7.5% 4OZ APL82212

## (undated) DEVICE — PANTIES MESH LG/XLG 2PK 706M2

## (undated) DEVICE — GLOVE PROTEXIS W/NEU-THERA 6.5  2D73TE65

## (undated) DEVICE — KIT ENDO FIRST STEP DISINFECTANT 200ML W/POUCH EP-4

## (undated) DEVICE — NDL ANGIOCATH 14GA 1.25" 4048

## (undated) DEVICE — VESSEL LOOPS YELLOW MAXI 31145694

## (undated) DEVICE — SUCTION MANIFOLD NEPTUNE 2 SYS 4 PORT 0702-020-000

## (undated) DEVICE — TAPE DURAPORE 3" SILK 1538-3

## (undated) DEVICE — LINEN TOWEL PACK X5 5464

## (undated) DEVICE — SUCTION MANIFOLD NEPTUNE 2 SYS 1 PORT 702-025-000

## (undated) DEVICE — SOL NACL 0.9% IRRIG 1000ML BOTTLE 2F7124

## (undated) DEVICE — TEST TUBE W/SCREW CAP 17361

## (undated) DEVICE — PAD CHUX UNDERPAD 30X30"

## (undated) DEVICE — SOL WATER IRRIG 500ML BOTTLE 2F7113

## (undated) DEVICE — PREP POVIDONE IODINE SOLUTION 10% 4OZ BOTTLE 29906-004

## (undated) DEVICE — LINEN TOWEL PACK X6 WHITE 5487

## (undated) DEVICE — GLOVE PROTEXIS POWDER FREE SMT 6.5  2D72PT65X

## (undated) DEVICE — SUCTION TIP YANKAUER W/O VENT K86

## (undated) DEVICE — SU SILK 0 SH 30" K834H

## (undated) DEVICE — PACK RECTAL KIT CUSTOM ASC

## (undated) DEVICE — SPONGE RAY-TEC 4X8" 7318

## (undated) DEVICE — OINTMENT ANTIBIOTIC BACITRACIN ZINC .9 G 1171

## (undated) DEVICE — STRAP KNEE/BODY 31143004

## (undated) DEVICE — WIPE PREMOIST CLEANSING WASHCLOTHS 7988

## (undated) DEVICE — NDL BLUNT 18GA 1.5" FILTER 305211

## (undated) DEVICE — SYR BULB IRRIG 50ML LATEX FREE 0035280

## (undated) DEVICE — SU SILK 2-0 TIE 12X30" A305H

## (undated) DEVICE — ESU GROUND PAD ADULT W/CORD E7507

## (undated) DEVICE — ESU ELEC BLADE 6" COATED/INSULATED E1455-6

## (undated) DEVICE — SPECIMEN CONTAINER 3OZ W/FORMALIN 59901

## (undated) DEVICE — DRAPE SHEET MED 44X70" 9355

## (undated) DEVICE — SYR 10ML PREFILLED 0.9% NACL INJ NOT STERILE 306547

## (undated) DEVICE — GOWN IMPERVIOUS 2XL BLUE

## (undated) DEVICE — PACK RECTAL UMMC

## (undated) DEVICE — TUBING SUCTION MEDI-VAC 1/4"X20' N620A

## (undated) DEVICE — APPLICATOR COTTON TIP 3" 9325

## (undated) DEVICE — PAD CHUX UNDERPAD 30X36" P3036C

## (undated) DEVICE — ESU ELEC NDL 1" E1552

## (undated) DEVICE — TUBING SUCTION 12"X1/4" N612

## (undated) DEVICE — SYR BULB IRRIG DOVER 60 ML LATEX FREE 67000

## (undated) DEVICE — PREP SKIN SCRUB TRAY 4461A

## (undated) DEVICE — PACK MINOR CUSTOM ASC

## (undated) DEVICE — SOL WATER IRRIG 1000ML BOTTLE 2F7114

## (undated) DEVICE — MARKER SKIN DOUBLE TIP W/FLEXI-RULER W/LABELS

## (undated) RX ORDER — DEXAMETHASONE SODIUM PHOSPHATE 4 MG/ML
INJECTION, SOLUTION INTRA-ARTICULAR; INTRALESIONAL; INTRAMUSCULAR; INTRAVENOUS; SOFT TISSUE
Status: DISPENSED
Start: 2022-07-08

## (undated) RX ORDER — FENTANYL CITRATE 50 UG/ML
INJECTION, SOLUTION INTRAMUSCULAR; INTRAVENOUS
Status: DISPENSED
Start: 2021-09-27

## (undated) RX ORDER — LIDOCAINE HYDROCHLORIDE AND EPINEPHRINE 10; 10 MG/ML; UG/ML
INJECTION, SOLUTION INFILTRATION; PERINEURAL
Status: DISPENSED
Start: 2021-08-25

## (undated) RX ORDER — BUPIVACAINE HYDROCHLORIDE 5 MG/ML
INJECTION, SOLUTION EPIDURAL; INTRACAUDAL; PERINEURAL
Status: DISPENSED
Start: 2025-03-03

## (undated) RX ORDER — BUPIVACAINE HYDROCHLORIDE AND EPINEPHRINE 5; 5 MG/ML; UG/ML
INJECTION, SOLUTION PERINEURAL
Status: DISPENSED
Start: 2021-09-06

## (undated) RX ORDER — GABAPENTIN 300 MG/1
CAPSULE ORAL
Status: DISPENSED
Start: 2022-07-08

## (undated) RX ORDER — LIDOCAINE HYDROCHLORIDE 10 MG/ML
INJECTION, SOLUTION EPIDURAL; INFILTRATION; INTRACAUDAL; PERINEURAL
Status: DISPENSED
Start: 2023-09-01

## (undated) RX ORDER — EPINEPHRINE 1 MG/ML
INJECTION, SOLUTION INTRAMUSCULAR; SUBCUTANEOUS
Status: DISPENSED
Start: 2023-09-01

## (undated) RX ORDER — HYDROMORPHONE HYDROCHLORIDE 1 MG/ML
INJECTION, SOLUTION INTRAMUSCULAR; INTRAVENOUS; SUBCUTANEOUS
Status: DISPENSED
Start: 2021-09-06

## (undated) RX ORDER — ONDANSETRON 2 MG/ML
INJECTION INTRAMUSCULAR; INTRAVENOUS
Status: DISPENSED
Start: 2021-11-19

## (undated) RX ORDER — LIDOCAINE HYDROCHLORIDE 20 MG/ML
INJECTION, SOLUTION EPIDURAL; INFILTRATION; INTRACAUDAL; PERINEURAL
Status: DISPENSED
Start: 2021-11-19

## (undated) RX ORDER — ACETAMINOPHEN 325 MG/1
TABLET ORAL
Status: DISPENSED
Start: 2022-07-08

## (undated) RX ORDER — OXYCODONE HYDROCHLORIDE 5 MG/1
TABLET ORAL
Status: DISPENSED
Start: 2022-07-08

## (undated) RX ORDER — GLYCOPYRROLATE 0.2 MG/ML
INJECTION INTRAMUSCULAR; INTRAVENOUS
Status: DISPENSED
Start: 2022-12-16

## (undated) RX ORDER — ONDANSETRON 2 MG/ML
INJECTION INTRAMUSCULAR; INTRAVENOUS
Status: DISPENSED
Start: 2022-07-08

## (undated) RX ORDER — OXYCODONE HYDROCHLORIDE 5 MG/1
TABLET ORAL
Status: DISPENSED
Start: 2022-12-16

## (undated) RX ORDER — BUPIVACAINE HYDROCHLORIDE 2.5 MG/ML
INJECTION, SOLUTION EPIDURAL; INFILTRATION; INTRACAUDAL
Status: DISPENSED
Start: 2023-09-01

## (undated) RX ORDER — BUPIVACAINE HYDROCHLORIDE 2.5 MG/ML
INJECTION, SOLUTION EPIDURAL; INFILTRATION; INTRACAUDAL; PERINEURAL
Status: DISPENSED
Start: 2025-03-03

## (undated) RX ORDER — LIDOCAINE HYDROCHLORIDE 20 MG/ML
INJECTION, SOLUTION EPIDURAL; INFILTRATION; INTRACAUDAL; PERINEURAL
Status: DISPENSED
Start: 2022-07-08

## (undated) RX ORDER — PROPOFOL 10 MG/ML
INJECTION, EMULSION INTRAVENOUS
Status: DISPENSED
Start: 2023-09-01

## (undated) RX ORDER — PROPOFOL 10 MG/ML
INJECTION, EMULSION INTRAVENOUS
Status: DISPENSED
Start: 2021-09-06

## (undated) RX ORDER — ACETAMINOPHEN 325 MG/1
TABLET ORAL
Status: DISPENSED
Start: 2025-01-03

## (undated) RX ORDER — LIDOCAINE HYDROCHLORIDE 20 MG/ML
INJECTION, SOLUTION EPIDURAL; INFILTRATION; INTRACAUDAL; PERINEURAL
Status: DISPENSED
Start: 2021-09-06

## (undated) RX ORDER — GLYCOPYRROLATE 0.2 MG/ML
INJECTION INTRAMUSCULAR; INTRAVENOUS
Status: DISPENSED
Start: 2021-11-19

## (undated) RX ORDER — ONDANSETRON 2 MG/ML
INJECTION INTRAMUSCULAR; INTRAVENOUS
Status: DISPENSED
Start: 2021-09-06

## (undated) RX ORDER — ACETAMINOPHEN 325 MG/1
TABLET ORAL
Status: DISPENSED
Start: 2022-12-16

## (undated) RX ORDER — PROPOFOL 10 MG/ML
INJECTION, EMULSION INTRAVENOUS
Status: DISPENSED
Start: 2021-11-19

## (undated) RX ORDER — FENTANYL CITRATE 50 UG/ML
INJECTION, SOLUTION INTRAMUSCULAR; INTRAVENOUS
Status: DISPENSED
Start: 2022-07-08

## (undated) RX ORDER — GLYCOPYRROLATE 0.2 MG/ML
INJECTION INTRAMUSCULAR; INTRAVENOUS
Status: DISPENSED
Start: 2023-09-01

## (undated) RX ORDER — FENTANYL CITRATE 50 UG/ML
INJECTION, SOLUTION INTRAMUSCULAR; INTRAVENOUS
Status: DISPENSED
Start: 2025-01-03

## (undated) RX ORDER — DEXAMETHASONE SODIUM PHOSPHATE 4 MG/ML
INJECTION, SOLUTION INTRA-ARTICULAR; INTRALESIONAL; INTRAMUSCULAR; INTRAVENOUS; SOFT TISSUE
Status: DISPENSED
Start: 2021-09-06

## (undated) RX ORDER — ACETAMINOPHEN 325 MG/1
TABLET ORAL
Status: DISPENSED
Start: 2023-09-01

## (undated) RX ORDER — FENTANYL CITRATE 50 UG/ML
INJECTION, SOLUTION INTRAMUSCULAR; INTRAVENOUS
Status: DISPENSED
Start: 2021-09-06

## (undated) RX ORDER — KETAMINE HYDROCHLORIDE 10 MG/ML
INJECTION INTRAMUSCULAR; INTRAVENOUS
Status: DISPENSED
Start: 2021-11-19

## (undated) RX ORDER — BUPIVACAINE HYDROCHLORIDE 2.5 MG/ML
INJECTION, SOLUTION EPIDURAL; INFILTRATION; INTRACAUDAL
Status: DISPENSED
Start: 2021-09-06

## (undated) RX ORDER — PROPOFOL 10 MG/ML
INJECTION, EMULSION INTRAVENOUS
Status: DISPENSED
Start: 2022-12-16

## (undated) RX ORDER — LIDOCAINE HYDROCHLORIDE 10 MG/ML
INJECTION, SOLUTION EPIDURAL; INFILTRATION; INTRACAUDAL; PERINEURAL
Status: DISPENSED
Start: 2025-03-03

## (undated) RX ORDER — LIDOCAINE HYDROCHLORIDE AND EPINEPHRINE 10; 10 MG/ML; UG/ML
INJECTION, SOLUTION INFILTRATION; PERINEURAL
Status: DISPENSED
Start: 2021-10-15

## (undated) RX ORDER — LIDOCAINE HYDROCHLORIDE 10 MG/ML
INJECTION, SOLUTION EPIDURAL; INFILTRATION; INTRACAUDAL; PERINEURAL
Status: DISPENSED
Start: 2021-09-06

## (undated) RX ORDER — HYDROMORPHONE HCL IN WATER/PF 6 MG/30 ML
PATIENT CONTROLLED ANALGESIA SYRINGE INTRAVENOUS
Status: DISPENSED
Start: 2021-09-07

## (undated) RX ORDER — ONDANSETRON 2 MG/ML
INJECTION INTRAMUSCULAR; INTRAVENOUS
Status: DISPENSED
Start: 2025-01-03

## (undated) RX ORDER — FENTANYL CITRATE 50 UG/ML
INJECTION, SOLUTION INTRAMUSCULAR; INTRAVENOUS
Status: DISPENSED
Start: 2023-09-01

## (undated) RX ORDER — HYDROMORPHONE HYDROCHLORIDE 1 MG/ML
INJECTION, SOLUTION INTRAMUSCULAR; INTRAVENOUS; SUBCUTANEOUS
Status: DISPENSED
Start: 2021-11-19

## (undated) RX ORDER — ONDANSETRON 2 MG/ML
INJECTION INTRAMUSCULAR; INTRAVENOUS
Status: DISPENSED
Start: 2022-12-16